# Patient Record
Sex: FEMALE | Race: WHITE | NOT HISPANIC OR LATINO | Employment: OTHER | URBAN - METROPOLITAN AREA
[De-identification: names, ages, dates, MRNs, and addresses within clinical notes are randomized per-mention and may not be internally consistent; named-entity substitution may affect disease eponyms.]

---

## 2017-12-14 ENCOUNTER — APPOINTMENT (OUTPATIENT)
Dept: RADIOLOGY | Facility: CLINIC | Age: 47
End: 2017-12-14

## 2017-12-14 ENCOUNTER — ALLSCRIPTS OFFICE VISIT (OUTPATIENT)
Dept: OTHER | Facility: OTHER | Age: 47
End: 2017-12-14

## 2017-12-14 DIAGNOSIS — M25.552 PAIN IN LEFT HIP: ICD-10-CM

## 2017-12-14 DIAGNOSIS — M16.12 PRIMARY OSTEOARTHRITIS OF LEFT HIP: ICD-10-CM

## 2017-12-14 PROCEDURE — 73502 X-RAY EXAM HIP UNI 2-3 VIEWS: CPT

## 2017-12-15 NOTE — PROGRESS NOTES
Assessment  1  Primary localized osteoarthritis of left hip (715 15) (M16 12)    Plan     Left hip pain    · * XR HIP/PELV 2-3 VWS LEFT W PELVIS IF PERFORMED; Status:Active - RetrospectiveBy Protocol Authorization; Requested for:[id number];      Primary localized osteoarthritis of left hip    · Diclofenac Sodium 75 MG Oral Tablet Delayed Release; TAKE 1 TABLET BYMOUTH TWICE A DAY AS NEEDED FOR PAIN with food   · *1 - [facility]  *  Status: Active  Requested for: [id number]  Care Summary provided  : Yes   · Follow-up visit in 1 month Evaluation and Treatment  Follow-up  Status: Hold For -Scheduling  Requested for: [id number]   · Injection major joint or bursa (shoulder, knee, SI)-POC; Status:Active; Requestedfor:[id number];     Discussion/Summary    Ms Montana has severe L hip arthritis  We discussed her treatment options today  At this time we are going to move forward with a left hip intra-articular steroid injection  She is going to work on quitting smoking  She will try diclofenac as needed for pain control  She will go to 1 session of physical therapy to learn some stretching and range-of-motion exercises  She is going to follow up in January implant for a left total hip arthroplasty for likely the end of February  She will follow up sooner if needed  Chief Complaint  c/o L hip pain      History of Present Illness  HPI: 68-year-old female presents with left hip pain for the past year  Initially her pain was intermittent and worse at the end of the day  At this time her pain is constant  He has been worsening  She describes it as an achy pain in her groin also the posterior aspect of her left hip  Occasionally she gets numbness and tingling down her leg into all toes  Her pain radiates into her thigh but not past that  She denies any history of trauma  She is currently taking Advil up to a 1000 mg 2 to 3 times a day  She has not done physical therapy   She has not had injections or surgery on her hip  She is currently a smoker  She smokes about 1 pack per day  Review of Systems   Cardiovascular: no chest pain  Gastrointestinal: no constipation  Genitourinary: no incontinence  Musculoskeletal: as noted in HPI  Integumentary: no rashes  Neurological: no dizziness  Endocrine: no excessive thirst   Psychiatric: no depression  Active Problems  1  Left hip pain (719 45) (M25 552)    Past Medical History   · Left hip pain (719 45) (M25 552)    The active problems and past medical history were reviewed and updated today  Surgical History   · Denied: History Of Prior Surgery    The surgical history was reviewed and updated today  Family History     Mother    · Family history of malignant neoplasm (V16 9) (Z80 9)     Family History    · Family history of malignant neoplasm (V16 9) (Z80 9)    The family history was reviewed and updated today  Social History   · Smoking (305 1) (F17 200)  The social history was reviewed and updated today  Allergies  1  No Known Drug Allergies    Vitals  Signs   Heart Rate: 96VVLQXAUS: 387JBTZBPRWD: 83NOVJDK: 5 ft 6 inWeight: 143 lb 6 ozBMI Calculated: 23 14BSA Calculated: 1 74    Physical Exam  Hearing intact, no audible wheezing, regular rate, no discharge from eyes   Left Hip: Appearance: no deformity-- and-- not dislocated  Mild TTP over greater trochanter  FF: 0-90, IR: 0-10, ER: 0-10, pain with ROM  Motor: Normal   Constitutional - General appearance: Normal   Musculoskeletal - Lower extremity compartments: Normal   Cardiovascular - Pulses: Normal  Dorsalis pedis pulse was 2+ on the left  Neurologic - Lower extremity peripheral neuro exam: Normal  Peripheral sensation findings: decreased sensation L4, L5, S1  Neuromuscular strength examination findings: involved side normal foot eversion, inversion, and great toe extension    Psychiatric - Orientation to person, place, and time: Normal -- Mood and affect: Normal       Results/Data  I personally reviewed the films/images/results in the office today  My interpretation follows  X-ray Review X-ray left hip: Severe DJD        Future Appointments    Date/Time Provider Specialty Site   01/30/2018 03:15 PM Nichole Bautista DO Orthopedic Surgery [facility]   12/29/2017 10:45 AM Hermelindo Narayan DO Pain Management [facility]     Signatures   Electronically signed by : Darien Casey DO; Dec 14 2017  2:09PM EST                       (Author)

## 2017-12-29 ENCOUNTER — HOSPITAL ENCOUNTER (OUTPATIENT)
Dept: RADIOLOGY | Facility: MEDICAL CENTER | Age: 47
Discharge: HOME | End: 2018-01-01
Attending: PHYSICAL MEDICINE & REHABILITATION | Admitting: PHYSICAL MEDICINE & REHABILITATION
Payer: COMMERCIAL

## 2017-12-29 PROCEDURE — 77002 NEEDLE LOCALIZATION BY XRAY: CPT | Performed by: PHYSICAL MEDICINE & REHABILITATION

## 2018-01-22 VITALS
HEIGHT: 66 IN | SYSTOLIC BLOOD PRESSURE: 137 MMHG | HEART RATE: 84 BPM | DIASTOLIC BLOOD PRESSURE: 80 MMHG | WEIGHT: 143.38 LBS | BODY MASS INDEX: 23.04 KG/M2

## 2018-01-30 ENCOUNTER — APPOINTMENT (OUTPATIENT)
Dept: RADIOLOGY | Facility: CLINIC | Age: 48
End: 2018-01-30
Payer: COMMERCIAL

## 2018-01-30 ENCOUNTER — OFFICE VISIT (OUTPATIENT)
Dept: OBGYN CLINIC | Facility: MEDICAL CENTER | Age: 48
End: 2018-01-30
Payer: COMMERCIAL

## 2018-01-30 VITALS
HEART RATE: 78 BPM | SYSTOLIC BLOOD PRESSURE: 156 MMHG | DIASTOLIC BLOOD PRESSURE: 92 MMHG | WEIGHT: 133.8 LBS | BODY MASS INDEX: 21.6 KG/M2

## 2018-01-30 DIAGNOSIS — M16.12 PRIMARY OSTEOARTHRITIS OF ONE HIP, LEFT: Primary | ICD-10-CM

## 2018-01-30 PROCEDURE — 72170 X-RAY EXAM OF PELVIS: CPT

## 2018-01-30 PROCEDURE — 99213 OFFICE O/P EST LOW 20 MIN: CPT | Performed by: ORTHOPAEDIC SURGERY

## 2018-01-30 RX ORDER — CHLORHEXIDINE GLUCONATE 4 G/100ML
SOLUTION TOPICAL DAILY PRN
Status: CANCELLED | OUTPATIENT
Start: 2018-01-30

## 2018-01-30 RX ORDER — ACETAMINOPHEN 325 MG/1
975 TABLET ORAL ONCE
Status: CANCELLED | OUTPATIENT
Start: 2018-01-30 | End: 2018-01-30

## 2018-01-30 RX ORDER — GABAPENTIN 300 MG/1
300 CAPSULE ORAL ONCE
Status: CANCELLED | OUTPATIENT
Start: 2018-01-30 | End: 2018-01-30

## 2018-01-30 RX ORDER — SODIUM CHLORIDE 9 MG/ML
75 INJECTION, SOLUTION INTRAVENOUS CONTINUOUS
Status: CANCELLED | OUTPATIENT
Start: 2018-01-30

## 2018-01-30 NOTE — PROGRESS NOTES
Assessment/Plan     1  Primary osteoarthritis of one hip, left      Orders Placed This Encounter   Procedures    XR pelvis ap only 1 or 2 vw     Ms Montana has severe left hip osteoarthritis  We discussed her treatment options today as well as the risks and benefits of her treatment options  She understands that having a total hip replacement at her young age will make it so that she will likely need a revision total hip replacement at some point  She is also currently smoking  She states that she is unable to fully quit but thinks she that she can cut down to 5 cigarettes per day  She denies any history of diabetes  She denies any history of MRSA infections  She denies any history of blood clots or any family history of blood clots  She would like to move forward with a left total hip replacement as she has severely limited, her pain is intolerable, and she has failed conservative treatment  She also had quit 1 of her jobs secondary to the pain from her hip  We reviewed the risks of total hip replacement in detail today  The risks of surgery include, but are not limited to infection, blood clot, wound healing problems, blood loss, damage to blood vessels and nerves, persistent pain and stiffness, dislocation, fracture, leg-length discrepancy, need for additional surgery, need for revision surgery, failure of hardware, heart attack, stroke, death  The patient understood and agreed to by oral and written consent  I answered all questions regarding surgery  She will go for clearance from her dentist as well as her primary care physician  She will cut back to at least 5 cigarettes per day loose 1 week before surgery understands she will need to keep this up for at least a month after surgery  She has a direct phone number to the office for any further questions  She will follow up in 10-14 days from date of surgery or sooner if needed      I answered all of the patient's questions during the visit and provided education of the patient's condition during the visit  The patient verbalized understanding of the information given and agrees with the plan  This note was dictated using Seawind software  It may contain errors including improperly dictated words  Please contact physician directly for any questions  Return for 10-14 days after sx  Subjective   Chief Complaint:   Chief Complaint   Patient presents with    Left Hip - Follow-up       Britney Carroll is a 50 y o  female who presents for follow up for L hip osteoarthritis  She is persistent left hip pain  Most her pain is in the groin as well as the posterior aspect of her hip  His knee pain  She went for a left hip steroid injection at the end of December which helps somewhat but not significantly  She states that she is able to put her shoes and socks on easier since the injection  She takes diclofenac as needed which helps somewhat  She did not go to physical therapy  There was a recent death in the family  She also takes Tylenol as needed for pain control  She continues to smoke but has cut down  She currently smokes about a pack a day  She is here today to discuss a left total hip replacement  Review of Systems  See \[facility]\"" for musculoskeletal review  All other systems reviewed are negative     History:  History reviewed  No pertinent past medical history  History reviewed  No pertinent surgical history  Social History   History   Alcohol use Not on file     History   Drug use: Unknown     History   Smoking Status    Never Smoker   Smokeless Tobacco    Never Used     Family History: History reviewed  No pertinent family history      Meds/Allergies     (Not in a hospital admission)  No Known Allergies       Objective     /92   Pulse 78   Wt 60 7 kg (133 lb 12 8 oz)   BMI 21 60 kg/m²      PE:  AAOx 3  WDWN  Hearing intact, no drainage from eyes  no audible wheezing, regular rate  no abdominal distension  LE compartments soft, skin intact    Ortho Exam:  left hip:   No dislocation/deformity  Limited ROM- FF: 0-100, IR: 0-10, ER: 0-30  Pain with ROM  Left leg appears slightly longer than her right  AT/GS intact    Imaging Studies: I have personally reviewed pertinent films in PACS   XR L hip:  Severe DJD

## 2018-01-31 PROBLEM — M16.12 PRIMARY OSTEOARTHRITIS OF ONE HIP, LEFT: Status: ACTIVE | Noted: 2018-01-31

## 2018-02-06 ENCOUNTER — TRANSCRIBE ORDERS (OUTPATIENT)
Dept: ADMINISTRATIVE | Facility: HOSPITAL | Age: 48
End: 2018-02-06

## 2018-02-06 ENCOUNTER — APPOINTMENT (OUTPATIENT)
Dept: PREADMISSION TESTING | Facility: HOSPITAL | Age: 48
End: 2018-02-06
Payer: COMMERCIAL

## 2018-02-06 ENCOUNTER — APPOINTMENT (OUTPATIENT)
Dept: LAB | Facility: HOSPITAL | Age: 48
End: 2018-02-06
Attending: ORTHOPAEDIC SURGERY
Payer: COMMERCIAL

## 2018-02-06 ENCOUNTER — HOSPITAL ENCOUNTER (OUTPATIENT)
Dept: NON INVASIVE DIAGNOSTICS | Facility: HOSPITAL | Age: 48
Discharge: HOME | End: 2018-02-06
Attending: ORTHOPAEDIC SURGERY
Payer: COMMERCIAL

## 2018-02-06 ENCOUNTER — ANESTHESIA EVENT (OUTPATIENT)
Dept: PERIOP | Facility: HOSPITAL | Age: 48
DRG: 470 | End: 2018-02-06
Payer: COMMERCIAL

## 2018-02-06 DIAGNOSIS — M16.12 PRIMARY OSTEOARTHRITIS OF LEFT HIP: ICD-10-CM

## 2018-02-06 DIAGNOSIS — Z01.818 PREOP EXAMINATION: ICD-10-CM

## 2018-02-06 DIAGNOSIS — Z01.818 PREOP EXAMINATION: Primary | ICD-10-CM

## 2018-02-06 LAB
ABO GROUP BLD: NORMAL
ANION GAP SERPL CALCULATED.3IONS-SCNC: 2 MMOL/L (ref 4–13)
APTT PPP: 31 SECONDS (ref 23–35)
ATRIAL RATE: 68 BPM
BACTERIA UR QL AUTO: ABNORMAL /HPF
BASOPHILS # BLD AUTO: 0.06 THOUSANDS/ΜL (ref 0–0.1)
BASOPHILS NFR BLD AUTO: 1 % (ref 0–1)
BILIRUB UR QL STRIP: NEGATIVE
BLD GP AB SCN SERPL QL: NEGATIVE
BUN SERPL-MCNC: 21 MG/DL (ref 5–25)
CALCIUM SERPL-MCNC: 8.9 MG/DL (ref 8.3–10.1)
CHLORIDE SERPL-SCNC: 106 MMOL/L (ref 100–108)
CLARITY UR: CLEAR
CO2 SERPL-SCNC: 33 MMOL/L (ref 21–32)
COLOR UR: YELLOW
CREAT SERPL-MCNC: 1.01 MG/DL (ref 0.6–1.3)
EOSINOPHIL # BLD AUTO: 0.19 THOUSAND/ΜL (ref 0–0.61)
EOSINOPHIL NFR BLD AUTO: 3 % (ref 0–6)
ERYTHROCYTE [DISTWIDTH] IN BLOOD BY AUTOMATED COUNT: 13 % (ref 11.6–15.1)
EST. AVERAGE GLUCOSE BLD GHB EST-MCNC: 97 MG/DL
GFR SERPL CREATININE-BSD FRML MDRD: 65 ML/MIN/1.73SQ M
GLUCOSE SERPL-MCNC: 73 MG/DL (ref 65–140)
GLUCOSE UR STRIP-MCNC: NEGATIVE MG/DL
HBA1C MFR BLD: 5 % (ref 4.2–6.3)
HCT VFR BLD AUTO: 40.6 % (ref 34.8–46.1)
HGB BLD-MCNC: 13.9 G/DL (ref 11.5–15.4)
HGB UR QL STRIP.AUTO: ABNORMAL
HYALINE CASTS #/AREA URNS LPF: ABNORMAL /LPF
INR PPP: 0.89 (ref 0.86–1.16)
KETONES UR STRIP-MCNC: NEGATIVE MG/DL
LEUKOCYTE ESTERASE UR QL STRIP: NEGATIVE
LYMPHOCYTES # BLD AUTO: 2.78 THOUSANDS/ΜL (ref 0.6–4.47)
LYMPHOCYTES NFR BLD AUTO: 40 % (ref 14–44)
MCH RBC QN AUTO: 32.6 PG (ref 26.8–34.3)
MCHC RBC AUTO-ENTMCNC: 34.2 G/DL (ref 31.4–37.4)
MCV RBC AUTO: 95 FL (ref 82–98)
MONOCYTES # BLD AUTO: 0.49 THOUSAND/ΜL (ref 0.17–1.22)
MONOCYTES NFR BLD AUTO: 7 % (ref 4–12)
NEUTROPHILS # BLD AUTO: 3.39 THOUSANDS/ΜL (ref 1.85–7.62)
NEUTS SEG NFR BLD AUTO: 49 % (ref 43–75)
NITRITE UR QL STRIP: NEGATIVE
NON-SQ EPI CELLS URNS QL MICRO: ABNORMAL /HPF
NRBC BLD AUTO-RTO: 0 /100 WBCS
P AXIS: 62 DEGREES
PH UR STRIP.AUTO: 6.5 [PH] (ref 4.5–8)
PLATELET # BLD AUTO: 344 THOUSANDS/UL (ref 149–390)
PMV BLD AUTO: 9.8 FL (ref 8.9–12.7)
POTASSIUM SERPL-SCNC: 4.2 MMOL/L (ref 3.5–5.3)
PR INTERVAL: 142 MS
PROT UR STRIP-MCNC: >=300 MG/DL
PROTHROMBIN TIME: 12 SECONDS (ref 12.1–14.4)
QRS AXIS: 24 DEGREES
QRSD INTERVAL: 84 MS
QT INTERVAL: 386 MS
QTC INTERVAL: 410 MS
RBC # BLD AUTO: 4.26 MILLION/UL (ref 3.81–5.12)
RBC #/AREA URNS AUTO: ABNORMAL /HPF
RH BLD: POSITIVE
SODIUM SERPL-SCNC: 141 MMOL/L (ref 136–145)
SP GR UR STRIP.AUTO: 1.02 (ref 1–1.03)
SPECIMEN EXPIRATION DATE: NORMAL
T WAVE AXIS: 38 DEGREES
UROBILINOGEN UR QL STRIP.AUTO: 0.2 E.U./DL
VENTRICULAR RATE: 68 BPM
WBC # BLD AUTO: 6.91 THOUSAND/UL (ref 4.31–10.16)
WBC #/AREA URNS AUTO: ABNORMAL /HPF

## 2018-02-06 PROCEDURE — 83036 HEMOGLOBIN GLYCOSYLATED A1C: CPT

## 2018-02-06 PROCEDURE — 86901 BLOOD TYPING SEROLOGIC RH(D): CPT

## 2018-02-06 PROCEDURE — 86900 BLOOD TYPING SEROLOGIC ABO: CPT

## 2018-02-06 PROCEDURE — 85025 COMPLETE CBC W/AUTO DIFF WBC: CPT

## 2018-02-06 PROCEDURE — 36415 COLL VENOUS BLD VENIPUNCTURE: CPT

## 2018-02-06 PROCEDURE — 85610 PROTHROMBIN TIME: CPT

## 2018-02-06 PROCEDURE — 81001 URINALYSIS AUTO W/SCOPE: CPT

## 2018-02-06 PROCEDURE — 85730 THROMBOPLASTIN TIME PARTIAL: CPT

## 2018-02-06 PROCEDURE — 93010 ELECTROCARDIOGRAM REPORT: CPT | Performed by: INTERNAL MEDICINE

## 2018-02-06 PROCEDURE — 93005 ELECTROCARDIOGRAM TRACING: CPT

## 2018-02-06 PROCEDURE — 87086 URINE CULTURE/COLONY COUNT: CPT

## 2018-02-06 PROCEDURE — 80048 BASIC METABOLIC PNL TOTAL CA: CPT

## 2018-02-06 PROCEDURE — 86850 RBC ANTIBODY SCREEN: CPT

## 2018-02-06 RX ORDER — PAROXETINE 30 MG/1
TABLET, FILM COATED ORAL DAILY
Refills: 5 | COMMUNITY
Start: 2018-01-08 | End: 2021-11-02 | Stop reason: SDUPTHER

## 2018-02-06 RX ORDER — ALPRAZOLAM 1 MG/1
TABLET ORAL
Refills: 5 | COMMUNITY
Start: 2017-11-08 | End: 2021-01-06 | Stop reason: CLARIF

## 2018-02-06 RX ORDER — PENICILLIN V POTASSIUM 500 MG/1
500 TABLET ORAL EVERY 6 HOURS SCHEDULED
COMMUNITY
End: 2021-01-06 | Stop reason: CLARIF

## 2018-02-06 RX ORDER — METHYLPHENIDATE HYDROCHLORIDE 20 MG/1
TABLET ORAL
Refills: 0 | COMMUNITY
Start: 2018-01-08 | End: 2021-11-02 | Stop reason: SDUPTHER

## 2018-02-06 RX ORDER — FOLIC ACID 1 MG/1
1 TABLET ORAL DAILY
COMMUNITY
End: 2021-01-06 | Stop reason: CLARIF

## 2018-02-06 RX ORDER — DICLOFENAC SODIUM 75 MG/1
1 TABLET, DELAYED RELEASE ORAL AS NEEDED
COMMUNITY
Start: 2017-12-14 | End: 2018-03-07 | Stop reason: HOSPADM

## 2018-02-06 RX ORDER — ASCORBIC ACID 500 MG
500 TABLET ORAL 2 TIMES DAILY
COMMUNITY
End: 2021-01-06 | Stop reason: CLARIF

## 2018-02-06 RX ORDER — FERROUS SULFATE 325(65) MG
325 TABLET ORAL 2 TIMES DAILY WITH MEALS
COMMUNITY
End: 2021-01-06 | Stop reason: CLARIF

## 2018-02-06 RX ORDER — SODIUM CHLORIDE 9 MG/ML
125 INJECTION, SOLUTION INTRAVENOUS CONTINUOUS
Status: CANCELLED | OUTPATIENT
Start: 2018-03-05

## 2018-02-06 NOTE — PRE-PROCEDURE INSTRUCTIONS
Pre-Surgery Instructions:   Medication Instructions    ALPRAZolam (XANAX) 1 mg tablet Patient was instructed by Physician and understands   ascorbic acid (VITAMIN C) 500 mg tablet Patient was instructed by Physician and understands   diclofenac (VOLTAREN) 75 mg EC tablet Patient was instructed by Physician and understands   ferrous sulfate 325 (65 Fe) mg tablet Patient was instructed by Physician and understands   folic acid (FOLVITE) 1 mg tablet Patient was instructed by Physician and understands   Glucos-Chondroit-Hyaluron-MSM (GLUCOSAMINE CHONDROITIN JOINT PO) Patient was instructed by Physician and understands   GuaiFENesin (MUCINEX PO) Patient was instructed by Physician and understands   methylphenidate (RITALIN) 20 MG tablet Patient was instructed by Physician and understands   PARoxetine (PAXIL) 30 mg tablet Patient was instructed by Physician and understands   penicillin V potassium (VEETID) 500 mg tablet Patient was instructed by Physician and understands   Pseudoephedrine-Ibuprofen (ADVIL COLD/SINUS PO) Patient was instructed by Physician and understands  Patient was seen by Dr Ibeth Wild and was instructed to take no medications am of surgery  Patient was instructed to avoid NSAIDS, Aspirin, Vitamins, and supplements 7 days prior to surgery  [facility] pre-op instructions reviewed  Pre-op bathing reviewed and patient was given chlorhexidine soap

## 2018-02-06 NOTE — ANESTHESIA PREPROCEDURE EVALUATION
Review of Systems/Medical History  Patient summary reviewed  Chart reviewed      Cardiovascular  Exercise tolerance: good,     Pulmonary  Negative pulmonary ROS Smoker cigarette smoker  , Tobacco cessation counseling given ,        GI/Hepatic  Negative GI/hepatic ROS          Negative  ROS        Endo/Other  Negative endo/other ROS      GYN  Negative gynecology ROS          Hematology   Musculoskeletal    Arthritis     Neurology  Negative neurology ROS      Psychology   Anxiety, Depression , being treated for depression,              Physical Exam    Airway    Mallampati score: I  TM Distance: <3 FB  Neck ROM: full     Dental   No notable dental hx     Cardiovascular  Rhythm: regular, Rate: normal, Cardiovascular exam normal    Pulmonary  Pulmonary exam normal Breath sounds clear to auscultation,     Other Findings  Poor dentition        Anesthesia Plan  ASA Score- 2     Anesthesia Type- general with ASA Monitors  Additional Monitors:   Airway Plan: ETT  Plan Factors- Patient instructed to abstain from smoking on day of procedure  Patient smoked on day of surgery  Induction- intravenous  Postoperative Plan- Plan for postoperative opioid use  Informed Consent- Anesthetic plan and risks discussed with patient

## 2018-02-08 LAB — BACTERIA UR CULT: NORMAL

## 2018-02-12 ENCOUNTER — OFFICE VISIT (OUTPATIENT)
Dept: FAMILY MEDICINE CLINIC | Facility: CLINIC | Age: 48
End: 2018-02-12
Payer: COMMERCIAL

## 2018-02-12 VITALS
SYSTOLIC BLOOD PRESSURE: 106 MMHG | BODY MASS INDEX: 21.51 KG/M2 | WEIGHT: 126 LBS | HEART RATE: 76 BPM | HEIGHT: 64 IN | RESPIRATION RATE: 14 BRPM | DIASTOLIC BLOOD PRESSURE: 64 MMHG

## 2018-02-12 DIAGNOSIS — M16.12 PRIMARY OSTEOARTHRITIS OF ONE HIP, LEFT: ICD-10-CM

## 2018-02-12 DIAGNOSIS — Z01.818 PRE-OPERATIVE CLEARANCE: Primary | ICD-10-CM

## 2018-02-12 PROCEDURE — 99214 OFFICE O/P EST MOD 30 MIN: CPT | Performed by: FAMILY MEDICINE

## 2018-02-12 NOTE — LETTER
February 12, 2018     Raul Gregg, [address] [address] [place] [address]    Patient: Giovani Little   YOB: 1970   Date of Visit: 2/12/2018       Dear Dr Aletha Lam: Thank you for referring Camilla Talley to me for evaluation  Below are the relevant portions of my assessment and plan of care  Urinalysis was positive for blood and protein  I asked her to make sure she is well hydrated and we will check a repeat urinalysis in 1 week  I do not believe this should delay the surgery  If you have questions, please do not hesitate to call me  I look forward to following Lili Wesley along with you           Sincerely,        Sangeeta Mora, DO        CC: No Recipients

## 2018-02-12 NOTE — PROGRESS NOTES
Assessment/Plan:      Medically cleared for upcoming total hip replacement  I did order a recheck of the urinalysis to be done February 19th because of the protein and blood in her urine  There are no diagnoses linked to this encounter  Subjective:      Patient ID: Vibha Aragon is a 46 y o  female  This is Remberto Spurr is first visit to the office  She is scheduled for left total hip replacement February 28, 2018  She is in today for preoperative evaluation  The following portions of the patient's history were reviewed and updated as appropriate: allergies, current medications, past family history, past medical history, past social history, past surgical history and problem list     Review of Systems   Constitutional: Negative  HENT: Negative  Eyes: Negative  Respiratory: Negative  Cardiovascular: Negative  Gastrointestinal: Negative  Endocrine: Negative  Genitourinary: Negative  Musculoskeletal: Positive for arthralgias and gait problem  Skin: Negative  Allergic/Immunologic: Negative  Hematological: Negative  Psychiatric/Behavioral: Negative  Objective:    Vitals:    02/12/18 1527   BP: 106/64   Pulse: 76   Resp: 14        Physical Exam   Constitutional: She is oriented to person, place, and time  She appears well-developed and well-nourished  HENT:   Head: Normocephalic and atraumatic  Right Ear: External ear normal    Left Ear: External ear normal    Eyes: Conjunctivae and EOM are normal  Pupils are equal, round, and reactive to light  Neck: Normal range of motion  Cardiovascular: Normal rate and regular rhythm  Pulmonary/Chest: Effort normal and breath sounds normal    Abdominal: Soft  Bowel sounds are normal    Neurological: She is alert and oriented to person, place, and time  She has normal reflexes  Skin: Skin is warm and dry  Psychiatric: She has a normal mood and affect  Nursing note and vitals reviewed

## 2018-02-19 ENCOUNTER — TELEPHONE (OUTPATIENT)
Dept: OBGYN CLINIC | Facility: HOSPITAL | Age: 48
End: 2018-02-19

## 2018-02-20 ENCOUNTER — TELEPHONE (OUTPATIENT)
Dept: OBGYN CLINIC | Facility: HOSPITAL | Age: 48
End: 2018-02-20

## 2018-02-23 ENCOUNTER — TELEPHONE (OUTPATIENT)
Dept: OBGYN CLINIC | Facility: HOSPITAL | Age: 48
End: 2018-02-23

## 2018-02-23 ENCOUNTER — TRANSCRIBE ORDERS (OUTPATIENT)
Dept: ADMINISTRATIVE | Facility: HOSPITAL | Age: 48
End: 2018-02-23

## 2018-02-23 ENCOUNTER — LAB (OUTPATIENT)
Dept: LAB | Facility: HOSPITAL | Age: 48
DRG: 470 | End: 2018-02-23
Payer: COMMERCIAL

## 2018-02-23 DIAGNOSIS — R31.9 HEMATURIA, UNSPECIFIED TYPE: Primary | ICD-10-CM

## 2018-02-23 DIAGNOSIS — R80.1 PERSISTENT PROTEINURIA: Primary | ICD-10-CM

## 2018-02-23 DIAGNOSIS — Z01.818 PREOP EXAMINATION: Primary | ICD-10-CM

## 2018-02-23 LAB
BACTERIA UR QL AUTO: ABNORMAL /HPF
BILIRUB UR QL STRIP: NEGATIVE
CLARITY UR: CLEAR
COLOR UR: YELLOW
GLUCOSE UR STRIP-MCNC: NEGATIVE MG/DL
HGB UR QL STRIP.AUTO: ABNORMAL
KETONES UR STRIP-MCNC: NEGATIVE MG/DL
LEUKOCYTE ESTERASE UR QL STRIP: NEGATIVE
NITRITE UR QL STRIP: NEGATIVE
NON-SQ EPI CELLS URNS QL MICRO: ABNORMAL /HPF
PH UR STRIP.AUTO: 8 [PH] (ref 4.5–8)
PROT UR STRIP-MCNC: >=300 MG/DL
RBC #/AREA URNS AUTO: ABNORMAL /HPF
SP GR UR STRIP.AUTO: 1.02 (ref 1–1.03)
UROBILINOGEN UR QL STRIP.AUTO: 0.2 E.U./DL
WBC #/AREA URNS AUTO: ABNORMAL /HPF

## 2018-02-23 PROCEDURE — 81001 URINALYSIS AUTO W/SCOPE: CPT | Performed by: FAMILY MEDICINE

## 2018-02-23 NOTE — TELEPHONE ENCOUNTER
Pt returned my call to do pre-op elective admission assessment  Pt Med list, hx and allergies reviewed with pt  Pt confirms she is a current every day smoker, she is eporting she has indeed cut back her daily amount  Pt reports she is down to 5-6 cigarettes daily  Pt denies alcohol use  Pt reports she lives in a multi-level home with her son  Pts son and mother are planned caregivers for pt when [facility]  Pt is planning to have a first floor setup  Pt has 16 steps into the back of the home,where there are handrails  Pt has 8 steps into the front, pt does not have hand rails going into front  Pt has 16 steps to the second level  Pt is currently ambulating with a cane  She is independent with her ADLs  She reports she has a cane, walker and commode already at home  Pt has a step in tub  Pt reports [facility] [address] [facility]  Pt reports she did not receive an RX for lovenox because "it is too pricey, surgeon and I talked about using aspirin"  Pt admits to having a hx of anxiety and depression, pt denies any SI/HI/feelings of hopelessness/helplessness  Pt admits to some recent anxiety but it "has been controlled more or less"  Pt reports she has a dental appointment on Monday for "a cavity filling"  Pt reports she wants to do home PT when [facility] "for maybe a week or so", then she reports she'd be agreeable to transition to outpt PT at [facility]  Pt reports she has a post-op CT of the abd scheduled for after surgery on 3/14, she denies any urinary S&S or abd pain  Pt enrolled in [facility]  Pt denies having questions at this time

## 2018-03-05 ENCOUNTER — HOSPITAL ENCOUNTER (INPATIENT)
Facility: HOSPITAL | Age: 48
LOS: 2 days | Discharge: HOME HEALTH | DRG: 470 | End: 2018-03-07
Attending: ORTHOPAEDIC SURGERY | Admitting: ORTHOPAEDIC SURGERY
Payer: COMMERCIAL

## 2018-03-05 ENCOUNTER — ANESTHESIA (OUTPATIENT)
Dept: PERIOP | Facility: HOSPITAL | Age: 48
DRG: 470 | End: 2018-03-05
Payer: COMMERCIAL

## 2018-03-05 ENCOUNTER — APPOINTMENT (INPATIENT)
Dept: RADIOLOGY | Facility: HOSPITAL | Age: 48
DRG: 470 | End: 2018-03-05
Payer: COMMERCIAL

## 2018-03-05 DIAGNOSIS — Z96.642 STATUS POST TOTAL HIP REPLACEMENT, LEFT: Primary | ICD-10-CM

## 2018-03-05 DIAGNOSIS — M16.12 PRIMARY OSTEOARTHRITIS OF ONE HIP, LEFT: ICD-10-CM

## 2018-03-05 LAB
ABO GROUP BLD: NORMAL
BLD GP AB SCN SERPL QL: NEGATIVE
RH BLD: POSITIVE
SPECIMEN EXPIRATION DATE: NORMAL

## 2018-03-05 PROCEDURE — C1713 ANCHOR/SCREW BN/BN,TIS/BN: HCPCS | Performed by: ORTHOPAEDIC SURGERY

## 2018-03-05 PROCEDURE — 86850 RBC ANTIBODY SCREEN: CPT | Performed by: PHYSICIAN ASSISTANT

## 2018-03-05 PROCEDURE — 73501 X-RAY EXAM HIP UNI 1 VIEW: CPT

## 2018-03-05 PROCEDURE — 0SRB0JA REPLACEMENT OF LEFT HIP JOINT WITH SYNTHETIC SUBSTITUTE, UNCEMENTED, OPEN APPROACH: ICD-10-PCS | Performed by: ORTHOPAEDIC SURGERY

## 2018-03-05 PROCEDURE — C1776 JOINT DEVICE (IMPLANTABLE): HCPCS | Performed by: ORTHOPAEDIC SURGERY

## 2018-03-05 PROCEDURE — 86900 BLOOD TYPING SEROLOGIC ABO: CPT | Performed by: PHYSICIAN ASSISTANT

## 2018-03-05 PROCEDURE — 27130 TOTAL HIP ARTHROPLASTY: CPT | Performed by: ORTHOPAEDIC SURGERY

## 2018-03-05 PROCEDURE — 27130 TOTAL HIP ARTHROPLASTY: CPT | Performed by: PHYSICIAN ASSISTANT

## 2018-03-05 PROCEDURE — 86901 BLOOD TYPING SEROLOGIC RH(D): CPT | Performed by: PHYSICIAN ASSISTANT

## 2018-03-05 DEVICE — BIOLOX DELTA CERAMIC FEMORAL HEAD +5.0 36MM DIA 12/14 TAPER
Type: IMPLANTABLE DEVICE | Site: HIP | Status: FUNCTIONAL
Brand: BIOLOX DELTA

## 2018-03-05 DEVICE — TRI-LOCK BPS FEMORAL STEM 12/14 TAPER TRI-LOCK BPS W/GRIPTION SIZE 4 HI 103MM
Type: IMPLANTABLE DEVICE | Site: HIP | Status: FUNCTIONAL
Brand: TRI-LOCK GRIPTION

## 2018-03-05 DEVICE — PINNACLE CANCELLOUS BONE SCREW 6.5MM X 15MM
Type: IMPLANTABLE DEVICE | Site: HIP | Status: FUNCTIONAL
Brand: PINNACLE

## 2018-03-05 DEVICE — PINNACLE POROCOAT ACETABULAR SHELL SECTOR II 52MM OD
Type: IMPLANTABLE DEVICE | Site: HIP | Status: FUNCTIONAL
Brand: PINNACLE POROCOAT

## 2018-03-05 DEVICE — PINNACLE HIP SOLUTIONS ALTRX POLYETHYLENE ACETABULAR LINER NEUTRAL 36MM ID 52MM OD
Type: IMPLANTABLE DEVICE | Site: HIP | Status: FUNCTIONAL
Brand: PINNACLE ALTRX

## 2018-03-05 RX ORDER — TRANEXAMIC ACID 100 MG/ML
INJECTION, SOLUTION INTRAVENOUS AS NEEDED
Status: DISCONTINUED | OUTPATIENT
Start: 2018-03-05 | End: 2018-03-05 | Stop reason: SURG

## 2018-03-05 RX ORDER — FENTANYL CITRATE 50 UG/ML
INJECTION, SOLUTION INTRAMUSCULAR; INTRAVENOUS AS NEEDED
Status: DISCONTINUED | OUTPATIENT
Start: 2018-03-05 | End: 2018-03-05 | Stop reason: SURG

## 2018-03-05 RX ORDER — LABETALOL HYDROCHLORIDE 5 MG/ML
INJECTION, SOLUTION INTRAVENOUS AS NEEDED
Status: DISCONTINUED | OUTPATIENT
Start: 2018-03-05 | End: 2018-03-05 | Stop reason: SURG

## 2018-03-05 RX ORDER — MIDAZOLAM HYDROCHLORIDE 1 MG/ML
INJECTION INTRAMUSCULAR; INTRAVENOUS AS NEEDED
Status: DISCONTINUED | OUTPATIENT
Start: 2018-03-05 | End: 2018-03-05 | Stop reason: SURG

## 2018-03-05 RX ORDER — ALPRAZOLAM 0.5 MG/1
0.5 TABLET ORAL 3 TIMES DAILY PRN
Status: DISCONTINUED | OUTPATIENT
Start: 2018-03-05 | End: 2018-03-07 | Stop reason: HOSPADM

## 2018-03-05 RX ORDER — SENNOSIDES 8.6 MG
1 TABLET ORAL
Status: DISCONTINUED | OUTPATIENT
Start: 2018-03-05 | End: 2018-03-07 | Stop reason: HOSPADM

## 2018-03-05 RX ORDER — DOCUSATE SODIUM 100 MG/1
100 CAPSULE, LIQUID FILLED ORAL 2 TIMES DAILY
Status: DISCONTINUED | OUTPATIENT
Start: 2018-03-05 | End: 2018-03-07 | Stop reason: HOSPADM

## 2018-03-05 RX ORDER — ACETAMINOPHEN 325 MG/1
650 TABLET ORAL EVERY 6 HOURS PRN
Status: DISCONTINUED | OUTPATIENT
Start: 2018-03-05 | End: 2018-03-07 | Stop reason: HOSPADM

## 2018-03-05 RX ORDER — ONDANSETRON 2 MG/ML
4 INJECTION INTRAMUSCULAR; INTRAVENOUS ONCE AS NEEDED
Status: DISCONTINUED | OUTPATIENT
Start: 2018-03-05 | End: 2018-03-05 | Stop reason: HOSPADM

## 2018-03-05 RX ORDER — HYDRALAZINE HYDROCHLORIDE 20 MG/ML
INJECTION INTRAMUSCULAR; INTRAVENOUS AS NEEDED
Status: DISCONTINUED | OUTPATIENT
Start: 2018-03-05 | End: 2018-03-05 | Stop reason: SURG

## 2018-03-05 RX ORDER — FOLIC ACID 1 MG/1
1 TABLET ORAL DAILY
Status: DISCONTINUED | OUTPATIENT
Start: 2018-03-06 | End: 2018-03-07 | Stop reason: HOSPADM

## 2018-03-05 RX ORDER — PANTOPRAZOLE SODIUM 40 MG/1
40 TABLET, DELAYED RELEASE ORAL
Status: DISCONTINUED | OUTPATIENT
Start: 2018-03-06 | End: 2018-03-07 | Stop reason: HOSPADM

## 2018-03-05 RX ORDER — ONDANSETRON 2 MG/ML
INJECTION INTRAMUSCULAR; INTRAVENOUS AS NEEDED
Status: DISCONTINUED | OUTPATIENT
Start: 2018-03-05 | End: 2018-03-05 | Stop reason: SURG

## 2018-03-05 RX ORDER — ASCORBIC ACID 500 MG
500 TABLET ORAL DAILY
Status: DISCONTINUED | OUTPATIENT
Start: 2018-03-06 | End: 2018-03-07 | Stop reason: HOSPADM

## 2018-03-05 RX ORDER — FENTANYL CITRATE/PF 50 MCG/ML
50 SYRINGE (ML) INJECTION
Status: DISCONTINUED | OUTPATIENT
Start: 2018-03-05 | End: 2018-03-05 | Stop reason: HOSPADM

## 2018-03-05 RX ORDER — METHYLPHENIDATE HYDROCHLORIDE 10 MG/1
20 TABLET ORAL
Status: DISCONTINUED | OUTPATIENT
Start: 2018-03-06 | End: 2018-03-07 | Stop reason: HOSPADM

## 2018-03-05 RX ORDER — ONDANSETRON 2 MG/ML
4 INJECTION INTRAMUSCULAR; INTRAVENOUS EVERY 6 HOURS PRN
Status: DISCONTINUED | OUTPATIENT
Start: 2018-03-05 | End: 2018-03-07 | Stop reason: HOSPADM

## 2018-03-05 RX ORDER — SODIUM CHLORIDE 9 MG/ML
100 INJECTION, SOLUTION INTRAVENOUS CONTINUOUS
Status: CANCELLED | OUTPATIENT
Start: 2018-03-05 | Stop reason: SDUPTHER

## 2018-03-05 RX ORDER — SIMETHICONE 80 MG
80 TABLET,CHEWABLE ORAL 4 TIMES DAILY PRN
Status: DISCONTINUED | OUTPATIENT
Start: 2018-03-05 | End: 2018-03-07 | Stop reason: HOSPADM

## 2018-03-05 RX ORDER — SODIUM CHLORIDE 9 MG/ML
100 INJECTION, SOLUTION INTRAVENOUS CONTINUOUS
Status: DISCONTINUED | OUTPATIENT
Start: 2018-03-05 | End: 2018-03-07 | Stop reason: HOSPADM

## 2018-03-05 RX ORDER — FERROUS SULFATE 325(65) MG
325 TABLET ORAL
Status: DISCONTINUED | OUTPATIENT
Start: 2018-03-06 | End: 2018-03-07 | Stop reason: HOSPADM

## 2018-03-05 RX ORDER — LORAZEPAM 2 MG/ML
0.5 INJECTION INTRAMUSCULAR ONCE
Status: COMPLETED | OUTPATIENT
Start: 2018-03-05 | End: 2018-03-05

## 2018-03-05 RX ORDER — PAROXETINE 30 MG/1
30 TABLET, FILM COATED ORAL DAILY
Status: DISCONTINUED | OUTPATIENT
Start: 2018-03-06 | End: 2018-03-07 | Stop reason: HOSPADM

## 2018-03-05 RX ORDER — OXYCODONE HYDROCHLORIDE 5 MG/1
5 TABLET ORAL
Status: DISCONTINUED | OUTPATIENT
Start: 2018-03-05 | End: 2018-03-07 | Stop reason: HOSPADM

## 2018-03-05 RX ORDER — ROCURONIUM BROMIDE 10 MG/ML
INJECTION, SOLUTION INTRAVENOUS AS NEEDED
Status: DISCONTINUED | OUTPATIENT
Start: 2018-03-05 | End: 2018-03-05 | Stop reason: SURG

## 2018-03-05 RX ORDER — CALCIUM CARBONATE 200(500)MG
1000 TABLET,CHEWABLE ORAL DAILY PRN
Status: DISCONTINUED | OUTPATIENT
Start: 2018-03-05 | End: 2018-03-07 | Stop reason: HOSPADM

## 2018-03-05 RX ORDER — SODIUM CHLORIDE 9 MG/ML
125 INJECTION, SOLUTION INTRAVENOUS CONTINUOUS
Status: DISCONTINUED | OUTPATIENT
Start: 2018-03-05 | End: 2018-03-05 | Stop reason: SDUPTHER

## 2018-03-05 RX ORDER — HYDROMORPHONE HYDROCHLORIDE 2 MG/ML
INJECTION, SOLUTION INTRAMUSCULAR; INTRAVENOUS; SUBCUTANEOUS AS NEEDED
Status: DISCONTINUED | OUTPATIENT
Start: 2018-03-05 | End: 2018-03-05 | Stop reason: SURG

## 2018-03-05 RX ORDER — SODIUM CHLORIDE 9 MG/ML
75 INJECTION, SOLUTION INTRAVENOUS CONTINUOUS
Status: DISCONTINUED | OUTPATIENT
Start: 2018-03-05 | End: 2018-03-05 | Stop reason: SDUPTHER

## 2018-03-05 RX ORDER — GABAPENTIN 100 MG/1
100 CAPSULE ORAL EVERY 8 HOURS SCHEDULED
Status: DISCONTINUED | OUTPATIENT
Start: 2018-03-05 | End: 2018-03-07 | Stop reason: HOSPADM

## 2018-03-05 RX ORDER — CHLORHEXIDINE GLUCONATE 4 G/100ML
SOLUTION TOPICAL DAILY PRN
Status: DISCONTINUED | OUTPATIENT
Start: 2018-03-05 | End: 2018-03-05

## 2018-03-05 RX ORDER — PROPOFOL 10 MG/ML
INJECTION, EMULSION INTRAVENOUS AS NEEDED
Status: DISCONTINUED | OUTPATIENT
Start: 2018-03-05 | End: 2018-03-05 | Stop reason: SURG

## 2018-03-05 RX ORDER — ACETAMINOPHEN 325 MG/1
975 TABLET ORAL ONCE
Status: COMPLETED | OUTPATIENT
Start: 2018-03-05 | End: 2018-03-05

## 2018-03-05 RX ORDER — OXYCODONE HYDROCHLORIDE 5 MG/1
10 TABLET ORAL
Status: DISCONTINUED | OUTPATIENT
Start: 2018-03-05 | End: 2018-03-07 | Stop reason: HOSPADM

## 2018-03-05 RX ORDER — GLYCOPYRROLATE 0.2 MG/ML
INJECTION INTRAMUSCULAR; INTRAVENOUS AS NEEDED
Status: DISCONTINUED | OUTPATIENT
Start: 2018-03-05 | End: 2018-03-05 | Stop reason: SURG

## 2018-03-05 RX ORDER — GABAPENTIN 300 MG/1
300 CAPSULE ORAL ONCE
Status: COMPLETED | OUTPATIENT
Start: 2018-03-05 | End: 2018-03-05

## 2018-03-05 RX ADMIN — HYDRALAZINE HYDROCHLORIDE 10 MG: 20 INJECTION INTRAMUSCULAR; INTRAVENOUS at 16:30

## 2018-03-05 RX ADMIN — NEOSTIGMINE METHYLSULFATE 3 MG: 1 INJECTION, SOLUTION INTRAMUSCULAR; INTRAVENOUS; SUBCUTANEOUS at 17:56

## 2018-03-05 RX ADMIN — ACETAMINOPHEN 975 MG: 325 TABLET, FILM COATED ORAL at 11:08

## 2018-03-05 RX ADMIN — FENTANYL CITRATE 50 MCG: 50 INJECTION INTRAMUSCULAR; INTRAVENOUS at 19:07

## 2018-03-05 RX ADMIN — FENTANYL CITRATE 50 MCG: 50 INJECTION, SOLUTION INTRAMUSCULAR; INTRAVENOUS at 15:47

## 2018-03-05 RX ADMIN — GABAPENTIN 100 MG: 100 CAPSULE ORAL at 21:28

## 2018-03-05 RX ADMIN — LORAZEPAM 0.5 MG: 2 INJECTION INTRAMUSCULAR; INTRAVENOUS at 19:15

## 2018-03-05 RX ADMIN — SODIUM CHLORIDE 100 ML/HR: 0.9 INJECTION, SOLUTION INTRAVENOUS at 19:27

## 2018-03-05 RX ADMIN — TRANEXAMIC ACID 1000 MG: 100 INJECTION, SOLUTION INTRAVENOUS at 15:24

## 2018-03-05 RX ADMIN — SODIUM CHLORIDE: 0.9 INJECTION, SOLUTION INTRAVENOUS at 16:11

## 2018-03-05 RX ADMIN — GABAPENTIN 300 MG: 300 CAPSULE ORAL at 11:09

## 2018-03-05 RX ADMIN — CEFAZOLIN SODIUM 1000 MG: 1 SOLUTION INTRAVENOUS at 14:33

## 2018-03-05 RX ADMIN — FENTANYL CITRATE 50 MCG: 50 INJECTION, SOLUTION INTRAMUSCULAR; INTRAVENOUS at 15:34

## 2018-03-05 RX ADMIN — ONDANSETRON HYDROCHLORIDE 4 MG: 2 INJECTION, SOLUTION INTRAVENOUS at 17:57

## 2018-03-05 RX ADMIN — HYDROMORPHONE HYDROCHLORIDE 1 MG: 2 INJECTION, SOLUTION INTRAMUSCULAR; INTRAVENOUS; SUBCUTANEOUS at 16:06

## 2018-03-05 RX ADMIN — HYDROMORPHONE HYDROCHLORIDE 1 MG: 2 INJECTION, SOLUTION INTRAMUSCULAR; INTRAVENOUS; SUBCUTANEOUS at 15:58

## 2018-03-05 RX ADMIN — SODIUM CHLORIDE: 0.9 INJECTION, SOLUTION INTRAVENOUS at 15:20

## 2018-03-05 RX ADMIN — CEFAZOLIN SODIUM 1000 MG: 1 SOLUTION INTRAVENOUS at 21:32

## 2018-03-05 RX ADMIN — PROPOFOL 200 MG: 10 INJECTION, EMULSION INTRAVENOUS at 14:37

## 2018-03-05 RX ADMIN — ROCURONIUM BROMIDE 50 MG: 10 INJECTION INTRAVENOUS at 14:37

## 2018-03-05 RX ADMIN — LABETALOL HYDROCHLORIDE 5 MG: 5 INJECTION, SOLUTION INTRAVENOUS at 16:12

## 2018-03-05 RX ADMIN — MIDAZOLAM HYDROCHLORIDE 2 MG: 1 INJECTION, SOLUTION INTRAMUSCULAR; INTRAVENOUS at 14:33

## 2018-03-05 RX ADMIN — DEXAMETHASONE SODIUM PHOSPHATE 8 MG: 10 INJECTION INTRAMUSCULAR; INTRAVENOUS at 15:10

## 2018-03-05 RX ADMIN — FENTANYL CITRATE 50 MCG: 50 INJECTION INTRAMUSCULAR; INTRAVENOUS at 19:17

## 2018-03-05 RX ADMIN — SODIUM CHLORIDE 125 ML/HR: 0.9 INJECTION, SOLUTION INTRAVENOUS at 11:33

## 2018-03-05 RX ADMIN — DOCUSATE SODIUM 100 MG: 100 CAPSULE, LIQUID FILLED ORAL at 21:28

## 2018-03-05 RX ADMIN — ROCURONIUM BROMIDE 20 MG: 10 INJECTION INTRAVENOUS at 15:32

## 2018-03-05 RX ADMIN — SODIUM CHLORIDE 100 ML/HR: 0.9 INJECTION, SOLUTION INTRAVENOUS at 23:50

## 2018-03-05 RX ADMIN — GLYCOPYRROLATE 0.4 MG: 0.2 INJECTION, SOLUTION INTRAMUSCULAR; INTRAVENOUS at 17:56

## 2018-03-05 RX ADMIN — LIDOCAINE HYDROCHLORIDE 40 MG: 20 INJECTION, SOLUTION INTRAVENOUS at 14:37

## 2018-03-05 RX ADMIN — FENTANYL CITRATE 100 MCG: 50 INJECTION, SOLUTION INTRAMUSCULAR; INTRAVENOUS at 14:37

## 2018-03-05 NOTE — POST OP PROGRESS NOTES
Progress Note - Orthopedics   Lord Cagle 46 y o  female MRN: [id number]  Unit/Bed#: OR POOL Encounter: [id number]    Assessment:  46 y o  female s/p left total hip arthroplasty POD 0     Plan:  Pain control prn  PT/OT-WBAT left LE   Posterior hip precautions  Abduction pillow while in bed  Lovenox/TEDs/SCDs for DVT prophylaxis  Abx x 24h    Subjective: Pt S&E  Resting comfortably  Awakens to name, but does not respond to commands or answer questions yet  Vitals: Blood pressure 138/74, pulse 79, temperature (!) 97 2 °F (36 2 °C), temperature source Tympanic, resp  rate 16, height 5' 6" (1 676 m), weight 59 kg (130 lb), SpO2 98 %  ,Body mass index is 20 98 kg/m²        Intake/Output Summary (Last 24 hours) at 03/05/18 1840  Last data filed at 03/05/18 1746   Gross per 24 hour   Intake             3100 ml   Output              400 ml   Net             2700 ml       Invasive Devices     Peripheral Intravenous Line            Peripheral IV 03/05/18 Right Wrist less than 1 day          Drain            Urethral Catheter Latex 16 Fr  less than 1 day                Ortho Exam: Maddi Blevins:  Drsg:  C/D/I, palpable pedal pulse, foot DF/PF intact- spontaneous

## 2018-03-05 NOTE — PROGRESS NOTES
Progress Note - Orthopedics   All Racer 46 y o  female MRN: [id number]  Unit/Bed#: OR POOL Encounter: [id number]    Assessment:  46 y o  female with left hip arthritis    Plan: For left total hip arthroplasty today  NPO  Consented  Pain control prn    Subjective: Pt S&E in preop holding  No further questions  Ready for OR  Vitals: Blood pressure 138/74, pulse 79, temperature (!) 97 2 °F (36 2 °C), temperature source Tympanic, resp  rate 16, height 5' 6" (1 676 m), weight 59 kg (130 lb), SpO2 98 %  ,Body mass index is 20 98 kg/m²        Intake/Output Summary (Last 24 hours) at 03/05/18 1416  Last data filed at 03/05/18 1255   Gross per 24 hour   Intake              300 ml   Output                0 ml   Net              300 ml       Invasive Devices     Peripheral Intravenous Line            Peripheral IV 03/05/18 Right Wrist less than 1 day                Ortho Exam: leftLE: sensation grossly intact L4, L5, S1, palpable pedal pulse, EHL/AT/GS intact  Hip:  Skin intact

## 2018-03-05 NOTE — H&P (VIEW-ONLY)
Assessment/Plan:      Medically cleared for upcoming total hip replacement  I did order a recheck of the urinalysis to be done February 19th because of the protein and blood in her urine  There are no diagnoses linked to this encounter  Subjective:      Patient ID: Giovani Little is a 46 y o  female  This is Lili Wesley is first visit to the office  She is scheduled for left total hip replacement February 28, 2018  She is in today for preoperative evaluation  The following portions of the patient's history were reviewed and updated as appropriate: allergies, current medications, past family history, past medical history, past social history, past surgical history and problem list     Review of Systems   Constitutional: Negative  HENT: Negative  Eyes: Negative  Respiratory: Negative  Cardiovascular: Negative  Gastrointestinal: Negative  Endocrine: Negative  Genitourinary: Negative  Musculoskeletal: Positive for arthralgias and gait problem  Skin: Negative  Allergic/Immunologic: Negative  Hematological: Negative  Psychiatric/Behavioral: Negative  Objective:    Vitals:    02/12/18 1527   BP: 106/64   Pulse: 76   Resp: 14        Physical Exam   Constitutional: She is oriented to person, place, and time  She appears well-developed and well-nourished  HENT:   Head: Normocephalic and atraumatic  Right Ear: External ear normal    Left Ear: External ear normal    Eyes: Conjunctivae and EOM are normal  Pupils are equal, round, and reactive to light  Neck: Normal range of motion  Cardiovascular: Normal rate and regular rhythm  Pulmonary/Chest: Effort normal and breath sounds normal    Abdominal: Soft  Bowel sounds are normal    Neurological: She is alert and oriented to person, place, and time  She has normal reflexes  Skin: Skin is warm and dry  Psychiatric: She has a normal mood and affect  Nursing note and vitals reviewed

## 2018-03-05 NOTE — OP NOTE
OPERATIVE REPORT  PATIENT NAME: Jen Branham    :  1970  MRN: [id number]  Pt Location: [facility] OR ROOM 01    SURGERY DATE: 3/5/2018    Surgeon(s) and Role:     * Soraida Pool, DO - Primary     * Jose Treviño PA-C - Assisting  Will CHARLIE Nur    Preop Diagnosis:  Primary osteoarthritis of one hip, left [M16 12]    Post-Op Diagnosis Codes:     * Primary osteoarthritis of one hip, left [M16 12]    Procedure(s) (LRB):  ARTHROPLASTY HIP TOTAL (Left)    Specimen(s):  * No specimens in log *    Estimated Blood Loss:   Minimal    Drains:   none    Anesthesia Type:   GA    Operative Indications:  Primary osteoarthritis of one hip, left [M16 12]      Operative Findings:  See below    Complications:   None    Implants:  Depuy  trilock size 4  Douglas size 52  36mm ceramic femoral head  15 x 6 5mm acetabular screw   Neutral acetabular liner      Procedure and Technique:  INDICATIONS FOR PROCEDURE:  The patients is a 46years old female who presented to the office with left hip arthritis  Conservative treatments were tried and failed  The patient had debilitating pain and decreased quality of life from their end-stage arthritis  Surgery was then recommended  Extensive counseling in regards to the reasons for surgical intervention as well as the risks and benefits of surgery were reviewed  The risks include, but are not limited to infection, extensive blood clots, blood clots, wound healing problems, damage to blood vessels and nerves, dislocation, leg length discrepancy, fracture, the need for further surgery  The patient understood and agreed to by oral and written consent  OPERATIVE PROCEDURE:  The patient was identified as Jen Branham by Her ID bracelet by the surgical staff in the preoperative area at [address]   The patient was wheeled back to the surgical room and placed on the operative table  Preoperative antibiotics were given      Anesthesia was administered and the patient was intubated without problems  The patient was placed in the lateral decubitus  position and all bony prominences were carefully protected  The left leg was then prepped and draped in the usual sterile fashion  A timeout was performed where the patients name and surgical site were once again identified  An incision was made over  the posterolateral aspect of the patients left hip  Dissection was made through the skin and subcutaneous tissue down to the fascia over the gluteus porter  The fascia was incised and the gluteus porter muscle fibers were spread  A retractor was carefully placed under the gluteus medius to protect it  The external rotators were identified and released  The piriformis was tagged with an ethibond suture  The gluteus minimus was identified and a retractor was carefully placed under it to protect it  The capsule was incised and tagged with an ethibond suture  At this point all retractors were removed and the suture tagging the piriformis was used to catarina the patients leg length  Retractors were once again placed and the hip was dislocated without problems  A trial and the neck length measurement from the template were used to identify the best place to make the neck cut  The cut was made with a saw and the femoral head was removed  Severe arthritic changes were noted on the femoral head and acetabulum  Retractors were placed to provide acetabular exposure  The labrum was removed  Sequential reaming took place and a size 52mm acetabular shell was found to be the best fit  The capsule was injected with 10mL of a combination of toradol, marcaine, and morphine  The shell was impacted into place  A trial liner was placed and a guide was used to evaluate the position  Attention was then paid to the femur  Instruments were used to provide for exposure    A box osteotome and canal finder were used to open the femoral canal   Sequential broaching took place and it was found that a size 3 femoral broach to be the best fit  The broach was left in place and a size 36mm femoral head with a high offset neck were then trialed  The leg lengths were checked and the leg was taken through a ROM to evaluate for stability  Both needed improvement  A size 3 stem with high offset neck and 36mm +8 5 femoral head were trialed  Both the leg length and stability were better  It was then decided to broach up to a size 4 stem and then trial with high offset neck and 36mm +5 femoral head  Both the stability and leg lengths were found to be excellent  Attention was returned to the acetabulum  After exposure was obtained, one 15 x 6 5mm screw was placed through the cup  Anterior osteophytes were removed  The final liner was impacted into placed  The liner was checked and found to be secure  Attention returned to the femur and the final femoral stem was impacted into place  Another trial was performed and the stability and accuracy of the leg length were confirmed and found to be acceptable  The final 36mm +5 ceramic femoral head was impacted securely into place  The acetabulum was irrigated and the hip was carefully reduced  Copious amounts of irrigation was used to irrigate the hip  A betadine wash followed by more irrigation was performed  The capsule and external rotators were repaired with a #5 Ethibond suture  Irrigation was used throughout the closure  The gluteus porter fascia was repaired with #1-0 vicryl suture  The subcutaneous fat was closed with a running #1-0 vicryl suture  The skin was closed with #2-0 vicryl and #3-0 monocryl subcutaneously  Steri strips were placed and a sterile dressing were placed over top  The patient was transferred onto a hospital bed and awakened without difficulty  I attest that I was present and performed this procedure    Tory Deras PA-C and CHARLIE Gallegos  were present for the entire procedure and provided essential assistance with limb position, patient prepping, and retraction    A qualified resident physician was not available    Patient Disposition:  extubated and stable    SIGNATURE: Bonnie Bowden DO  DATE: March 5, 2018  TIME: 6:09 PM

## 2018-03-05 NOTE — PHYSICAL THERAPY NOTE
Physical Therapy Cancellation Note    Pt still in the OR  Will follow in a m  Nsg notified   Jeny Rosas PT

## 2018-03-06 PROBLEM — R31.9 PAINLESS HEMATURIA: Status: ACTIVE | Noted: 2018-03-06

## 2018-03-06 PROBLEM — F17.200 CURRENT SMOKER: Status: ACTIVE | Noted: 2018-03-06

## 2018-03-06 PROBLEM — F41.9 ANXIETY: Status: ACTIVE | Noted: 2018-03-06

## 2018-03-06 PROBLEM — D64.9 MILD ANEMIA: Status: ACTIVE | Noted: 2018-03-06

## 2018-03-06 LAB
ANION GAP SERPL CALCULATED.3IONS-SCNC: 8 MMOL/L (ref 4–13)
BUN SERPL-MCNC: 21 MG/DL (ref 5–25)
CALCIUM SERPL-MCNC: 7.3 MG/DL (ref 8.3–10.1)
CHLORIDE SERPL-SCNC: 111 MMOL/L (ref 100–108)
CO2 SERPL-SCNC: 22 MMOL/L (ref 21–32)
CREAT SERPL-MCNC: 0.76 MG/DL (ref 0.6–1.3)
ERYTHROCYTE [DISTWIDTH] IN BLOOD BY AUTOMATED COUNT: 12.9 % (ref 11.6–15.1)
GFR SERPL CREATININE-BSD FRML MDRD: 92 ML/MIN/1.73SQ M
GLUCOSE SERPL-MCNC: 117 MG/DL (ref 65–140)
HCT VFR BLD AUTO: 33.2 % (ref 34.8–46.1)
HGB BLD-MCNC: 10.9 G/DL (ref 11.5–15.4)
MCH RBC QN AUTO: 31.8 PG (ref 26.8–34.3)
MCHC RBC AUTO-ENTMCNC: 32.8 G/DL (ref 31.4–37.4)
MCV RBC AUTO: 97 FL (ref 82–98)
PLATELET # BLD AUTO: 289 THOUSANDS/UL (ref 149–390)
PMV BLD AUTO: 9.5 FL (ref 8.9–12.7)
POTASSIUM SERPL-SCNC: 4.5 MMOL/L (ref 3.5–5.3)
RBC # BLD AUTO: 3.43 MILLION/UL (ref 3.81–5.12)
SODIUM SERPL-SCNC: 141 MMOL/L (ref 136–145)
WBC # BLD AUTO: 11.17 THOUSAND/UL (ref 4.31–10.16)

## 2018-03-06 PROCEDURE — 97535 SELF CARE MNGMENT TRAINING: CPT

## 2018-03-06 PROCEDURE — 97116 GAIT TRAINING THERAPY: CPT

## 2018-03-06 PROCEDURE — 99024 POSTOP FOLLOW-UP VISIT: CPT | Performed by: ORTHOPAEDIC SURGERY

## 2018-03-06 PROCEDURE — 97110 THERAPEUTIC EXERCISES: CPT

## 2018-03-06 PROCEDURE — 97163 PT EVAL HIGH COMPLEX 45 MIN: CPT

## 2018-03-06 PROCEDURE — G8978 MOBILITY CURRENT STATUS: HCPCS

## 2018-03-06 PROCEDURE — 97166 OT EVAL MOD COMPLEX 45 MIN: CPT

## 2018-03-06 PROCEDURE — 97530 THERAPEUTIC ACTIVITIES: CPT

## 2018-03-06 PROCEDURE — G8988 SELF CARE GOAL STATUS: HCPCS

## 2018-03-06 PROCEDURE — G8987 SELF CARE CURRENT STATUS: HCPCS

## 2018-03-06 PROCEDURE — 80048 BASIC METABOLIC PNL TOTAL CA: CPT | Performed by: ORTHOPAEDIC SURGERY

## 2018-03-06 PROCEDURE — G8979 MOBILITY GOAL STATUS: HCPCS

## 2018-03-06 PROCEDURE — 85027 COMPLETE CBC AUTOMATED: CPT | Performed by: ORTHOPAEDIC SURGERY

## 2018-03-06 PROCEDURE — 99253 IP/OBS CNSLTJ NEW/EST LOW 45: CPT | Performed by: PHYSICIAN ASSISTANT

## 2018-03-06 RX ADMIN — GABAPENTIN 100 MG: 100 CAPSULE ORAL at 05:02

## 2018-03-06 RX ADMIN — FOLIC ACID 1 MG: 1 TABLET ORAL at 08:11

## 2018-03-06 RX ADMIN — OXYCODONE HYDROCHLORIDE 10 MG: 5 TABLET ORAL at 19:23

## 2018-03-06 RX ADMIN — FERROUS SULFATE TAB 325 MG (65 MG ELEMENTAL FE) 325 MG: 325 (65 FE) TAB at 08:10

## 2018-03-06 RX ADMIN — HYDROMORPHONE HYDROCHLORIDE 0.5 MG: 1 INJECTION, SOLUTION INTRAMUSCULAR; INTRAVENOUS; SUBCUTANEOUS at 20:03

## 2018-03-06 RX ADMIN — OXYCODONE HYDROCHLORIDE AND ACETAMINOPHEN 500 MG: 500 TABLET ORAL at 08:11

## 2018-03-06 RX ADMIN — METHYLPHENIDATE HYDROCHLORIDE 20 MG: 10 TABLET ORAL at 07:40

## 2018-03-06 RX ADMIN — OXYCODONE HYDROCHLORIDE 10 MG: 5 TABLET ORAL at 23:07

## 2018-03-06 RX ADMIN — NICOTINE 7 MG: 7 PATCH, EXTENDED RELEASE TRANSDERMAL at 08:11

## 2018-03-06 RX ADMIN — DOCUSATE SODIUM 100 MG: 100 CAPSULE, LIQUID FILLED ORAL at 08:11

## 2018-03-06 RX ADMIN — METHYLPHENIDATE HYDROCHLORIDE 20 MG: 10 TABLET ORAL at 12:22

## 2018-03-06 RX ADMIN — GABAPENTIN 100 MG: 100 CAPSULE ORAL at 21:07

## 2018-03-06 RX ADMIN — ENOXAPARIN SODIUM 40 MG: 40 INJECTION SUBCUTANEOUS at 08:11

## 2018-03-06 RX ADMIN — GABAPENTIN 100 MG: 100 CAPSULE ORAL at 13:51

## 2018-03-06 RX ADMIN — OXYCODONE HYDROCHLORIDE 10 MG: 5 TABLET ORAL at 09:53

## 2018-03-06 RX ADMIN — ACETAMINOPHEN 650 MG: 325 TABLET, FILM COATED ORAL at 08:11

## 2018-03-06 RX ADMIN — PAROXETINE HYDROCHLORIDE 30 MG: 30 TABLET, FILM COATED ORAL at 08:13

## 2018-03-06 RX ADMIN — PANTOPRAZOLE SODIUM 40 MG: 40 TABLET, DELAYED RELEASE ORAL at 05:02

## 2018-03-06 RX ADMIN — DOCUSATE SODIUM 100 MG: 100 CAPSULE, LIQUID FILLED ORAL at 17:32

## 2018-03-06 RX ADMIN — Medication 1 TABLET: at 08:11

## 2018-03-06 RX ADMIN — SODIUM CHLORIDE 100 ML/HR: 0.9 INJECTION, SOLUTION INTRAVENOUS at 09:55

## 2018-03-06 RX ADMIN — OXYCODONE HYDROCHLORIDE 10 MG: 5 TABLET ORAL at 15:44

## 2018-03-06 RX ADMIN — CEFAZOLIN SODIUM 1000 MG: 1 SOLUTION INTRAVENOUS at 05:33

## 2018-03-06 NOTE — PLAN OF CARE
Problem: OCCUPATIONAL THERAPY ADULT  Goal: Performs self-care activities at highest level of function for planned discharge setting  See evaluation for individualized goals  Treatment Interventions: ADL retraining, Functional transfer training, UE strengthening/ROM, Endurance training, Cognitive reorientation, Patient/family training, Equipment evaluation/education, Compensatory technique education, Energy conservation, Activityengagement          See flowsheet documentation for full assessment, interventions and recommendations  Limitation: Decreased ADL status, Decreased UE strength, Decreased Safe judgement during ADL, Decreased cognition, Decreased endurance, Decreased self-care trans, Decreased high-level ADLs (total hip precautions)  Prognosis: Good  Assessment: Pt is a 46 y o  female seen for OT evaluation s/p admit to [facility]  on 3/5/2018 w/ Primary osteoarthritis of one hip, left, s/p total hip replacement, posterior approach,WBAT w/ total hip precautions  Comorbidities affecting pt's functional performance at time of assessment include: OA, anxiety, depression  Personal factors affecting pt at time of IE include:will be alone, however family able to assist her as needed  Prior to admission, pt was living alone and reports independent w/ ADLs, independent w/ IADLs, independent w/ functional transfers and mobility w/ no AD, driving and working  Upon evaluation: Pt requires MIN assist supine>sit bed mobility w/ VCs for positioning and cues to maintain total hip precautions, MIN assist sit<>stand w/ VCs for positioning of UEs and operated LE, MAX assist LB ADLS, setup UB ADLs, MOD assist toileting 2* the following deficits impacting occupational performance: decreased strength and endurance, impaired balance, impaired activity tolerance, increased pain, impulsive, total hip precautions, decreased activity tolerance   Pt to benefit from continued skilled OT tx while in the hospital to address deficits as defined above and maximize level of functional independence w ADL's and functional mobility  Occupational Performance areas to address include: grooming, bathing/shower, toilet hygiene, dressing, functional mobility and clothing management, review of THR packet and THPS, [facility] training  Pt able to recall 2/3 THPS, requires continued education  From OT standpoint, recommendation at time of d/c would be home w/ family support        OT Discharge Recommendation: Home with family support         Comments: Madelyn Jackman MS, OTR/L

## 2018-03-06 NOTE — CASE MANAGEMENT
Initial Clinical Review    Age/Sex: 46 y o  female    Surgery Date:    3/5/2018    Procedure:    Preop Diagnosis:  Primary osteoarthritis of one hip, left [M16 12]     Post-Op Diagnosis Codes:     * Primary osteoarthritis of one hip, left [M16 12]     Procedure(s) (LRB):  ARTHROPLASTY HIP TOTAL (Left)    Anesthesia:    general    Admission Orders: Date/Time/Statement: 3/5/18 @ 1809     Orders Placed This Encounter   Procedures    Inpatient Admission     Standing Status:   Standing     Number of Occurrences:   1     Order Specific Question:   Admitting Physician     Answer:   Ambrosio Cummings [[id number]]     Order Specific Question:   Level of Care     Answer:   Med Surg [[id number]]     Order Specific Question:   Estimated length of stay     Answer:   More than 2 Midnights     Order Specific Question:   Certification     Answer:   I certify that inpatient services are medically necessary for this patient for a duration of greater than two midnights  See H&P and MD Progress Notes for additional information about the patient's course of treatment  Vital Signs: /78 (BP Location: Left arm)   Pulse 69   Temp 99 9 °F (37 7 °C) (Temporal)   Resp 16   Ht 5' 6" (1 676 m)   Wt 59 kg (130 lb)   SpO2 97%   BMI 20 98 kg/m²     Diet:        Diet Orders            Start     Ordered    03/05/18 2006  Diet Regular; Regular House  Diet effective now     Question Answer Comment   Diet Type Regular    Regular Regular House    RD to adjust diet per protocol?  Yes        03/05/18 2006          Mobility:   As  kati    DVT Prophylaxis:   SCD'S    Pain Control:   Pain Medications             diclofenac (VOLTAREN) 75 mg EC tablet Take 1 tablet by mouth as needed      PARoxetine (PAXIL) 30 mg tablet TK 1 T PO QD        Reg diet  Neurovascular  Checks  q 4 hrs  PT/OT    Thank you,  [facility] in the [facility] by Panch[facility] Cagle for 2017  Network Utilization Review Department  Phone: [phone]; Fax [phone]  ATTENTION: The Network Utilization Review Department is now centralized for our 7 Facilities  Make a note that we have a new phone and fax numbers for our Department  Please call with any questions or concerns to [phone] and carefully follow the prompts so that you are directed to the right person  All voicemails are confidential  Fax any determinations, approvals, denials, and requests for initial or continue stay review clinical to [phone]  Due to HIGH CALL volume, it would be easier if you could please send faxed requests to expedite your requests and in part, help us provide discharge notifications faster

## 2018-03-06 NOTE — ASSESSMENT & PLAN NOTE
S/p left MARCIA POD #1  Pain is somewhat controlled, no nausea  No chest pain or shortness of breath, mild temperature of 99 9° but no fever  Continue Lovenox, continue current analgesics, continue PT OT  Continue management per primary team

## 2018-03-06 NOTE — PLAN OF CARE
Problem: PHYSICAL THERAPY ADULT  Goal: Performs mobility at highest level of function for planned discharge setting  See evaluation for individualized goals  Treatment/Interventions: Functional transfer training, LE strengthening/ROM, Elevations, Therapeutic exercise, Endurance training, Patient/family training, Bed mobility, Gait training, Spoke to nursing, OT  Equipment Recommended: Other (Comment) (pt has RW & BSC at home)       See flowsheet documentation for full assessment, interventions and recommendations  Outcome: Progressing  Prognosis: Good  Problem List: Decreased strength, Decreased endurance, Impaired balance, Decreased mobility, Decreased safety awareness, Pain, Orthopedic restrictions  Assessment: Pt  46 y  o female s/p L THR (posterior) 2* to severe DJD  Pt referred to PT for mobility assessment & D/C planning  PTA, pt reports being I w/o AD  On eval, pt demonstrate dec mobility, balance, endurance & amb 2* to LLE pain & weakness  Pt require minAx1 for most functional mobility + cues for techniques  Gait deviations as above but no gross LOB noted  Inc pain to L hip noted after mobility  RN made aware  Ice pack applied  Pt instructed on hip precautions, pt require cues to recall  No dizziness reported t/o session  /78 sitting, end of session  Will continue skilled PT to improve function & safety  Pt plans to return home at D/C  At this time, will anticipate good progress in PT for safe D/C to home  Will recommend HHPT & family support at D/C  Pt will need to achieve PT goals prior to D/C for safe return to home  CM to follow  Pt tolerated OOB in chair at end of session w/o issues  Call bell in reach  Nsg staff to continue to mobilized pt (OOB in chair for all meals & ambulate in room/unit) as tolerated to prevent further decline in function  Ns notified           Recommendation: Home PT, Home with family support     PT - OK to Discharge: No (pt to achieve PT goals prior to D/C home)    See flowsheet documentation for full assessment

## 2018-03-06 NOTE — DISCHARGE INSTRUCTIONS
TOTAL HIP REPLACEMENT DISCHARGE INSTRUCTIONS    Surgical Dressing: You may remove your dressing 7 days from the date of your surgery then change your dressing daily until drainage stops  Maintain steri strips  Let them fall off on their own  Do not put any lotions or creams on your incision  If there are any signs of infection such as drainage persisting beyond a few days, unusual looking drainage (yellow, green), increased redness around the incision, or fever/chills let your doctor know  Do not get your incision wet until approved by your physician  Medications:  Upon discharge you will be given a prescription for an anticoagulant (i e  Ecotrin (Aspirin), Coumadin (Warfarin), Lovenox (Enoxaparin)) and a narcotic pain reliever  Do not take any over the counter NSAIDs (i e  Ibuprofen, Motrin, Advil, Naprosyn, Aleve) while on your anticoagulation medication  Narcotic pain relievers cannot be refilled over the phone  Please be mindful of the number of pills you have left so you can  a prescription for a refill if needed during office hours  If you are on Coumadin (Warfarin) you will need your blood drawn every Monday and Thursday once you are home  Initially your visiting nurse will draw your blood  Later you will go to an outpatient lab  You will receive a phone call the next day and your Coumadin (warfarin) will be dosed based on these results  Take this dosage daily until you hear from us next  Walking:  Use two crutches or a walker for EVERY step  Gradually increase your walking daily  You can progress to a cane as tolerated once advised by your physical therapist       Sleeping Positions: You may sleep on your back, stomach, or either side with a pillow between your knees  Also use the pillow between your legs as you turn to your side or stomach  Bathing:  No tub baths  Do not submerge your incision    You may shower, but your incision must stay clean and dry while showering  It may be helpful to use gauze and a Tegaderm dressing to seal off your incision  You may sit on a shower chair or stand and shower briefly  Use your crutches/walker to get in and out of the shower  Once your incision is healed, and it is approved by your physician, you may get your incision wet  Sexual Relations:  Resume according to your comfort  Swimming:  No swimming or hot tubs until approved by your physician  Swimming will be allowed once your incision is well healed  Driving: You may ride as a passenger now  No driving until your follow-up appointment  To get into the car use the front passenger seat with the seat pushed back as far as possible  Scoot yourself back in the seat  Use your hands to assist your legs into the car  Physical therapy:  When you have finished with home visiting PT, you may begin outpatient PT  Call your surgeons office if you have not already received a prescription  A prescription can be faxed to the outpatient center of your choice  Posterior hip precautions:  Avoid bending greater than 90 degrees at the hips, twisting/pivoting, and crossing the knees (You may cross at the ankles only) for the first 6 weeks after surgery  Your physical therapist will review this with you  Special considerations: To minimize swelling, stiffness, and decrease pain use cold as needed, but not heat  Ice 20 minutes at a time with a cloth between your skin and the ice  Ice after walking, when you have pain, or after you have completed your exercises  Limit your sitting to 60 minutes at one time  Continue to wear your REBECA stockings at all times for 2 weeks after surgery, except when washing  You can wear them as needed after that  Follow up:  Call [phone] to make an appointment to see your surgeon within 2 weeks of your surgery if you havent done so already    If you have any questions during business hours please call the direct office phone number [phone]  Otherwise please call [phone] for any concerns  For the future:  Prior to any dental work, including routine cleanings, call the office for a prescription for antibiotics to be taken prior to your dental appointment  For any invasive procedures (i e  endoscopy, colonoscopy, etc ) inform the doctor performing the procedure that you have a total knee replacement and will antibiotics just prior to the procedure to protect it

## 2018-03-06 NOTE — PHYSICAL THERAPY NOTE
PT EVALUATION    Pt  Name: Lord Cagle  Pt  Age: 46 y o  Patient Active Problem List   Diagnosis    Primary osteoarthritis of one hip, left    Pre-operative clearance    Anxiety    Current smoker    Painless hematuria    Mild anemia       Primary osteoarthritis of one hip, left [M16 12]    Past Medical History:   Diagnosis Date    Anxiety     Depression     Osteoarthritis        Past Surgical History:   Procedure Laterality Date    ECTOPIC PREGNANCY SURGERY      FL TOTAL HIP ARTHROPLASTY Left 3/5/2018    Procedure: ARTHROPLASTY HIP TOTAL;  Surgeon: Isaias Kellogg DO;  Location: [facility];  Service: Orthopedics    WISDOM TOOTH EXTRACTION      x1          03/06/18 1609   Pain Assessment   Pain Score 8   Pain Type Acute pain;Surgical pain   Pain Location Hip   Pain Orientation Left   Hospital Pain Intervention(s) Medication (See MAR); Cold applied;Repositioned; Ambulation/increased activity; Emotional support   Restrictions/Precautions   LLE Weight Bearing Per Order WBAT   Other Precautions Fall Risk;Pain;THR   General   Chart Reviewed Yes   Response to Previous Treatment Patient with no complaints from previous session  Family/Caregiver Present No   Cognition   Overall Cognitive Status WFL   Arousal/Participation Alert; Cooperative   Attention Within functional limits   Following Commands Follows multistep commands without difficulty   Subjective   Subjective Pt reports of inc L hip pain but willing to participate in therapy  Bed Mobility   Supine to Sit 5  Supervision   Additional items HOB elevated; Increased time required;Verbal cues;LE management   Sit to Supine 5  Supervision   Additional items Increased time required;Verbal cues;LE management   Additional Comments pt able to self assist LLE in & OOB   Transfers   Sit to Stand 5  Supervision   Additional items Increased time required;Verbal cues   Stand to Sit 5  Supervision   Additional items Increased time required;Verbal cues Additional Comments cues for hand placement & LLE positioning   Ambulation/Elevation   Gait pattern Decreased foot clearance;Decreased L stance; Inconsistent abdiaziz; Wide BLAISE  (inc strides; vaulting on LLE at times; reciprocal)   Gait Assistance 5  Supervision   Additional items Verbal cues   Assistive Device Rolling walker   Distance 160'x1   Balance   Static Sitting Good   Static Standing Fair   Ambulatory Fair -   Endurance Deficit   Endurance Deficit Yes   Endurance Deficit Description pain   Activity Tolerance   Activity Tolerance Patient limited by pain   Nurse Made Aware [place]   Exercises   THR Supine;10 reps;AAROM; Left   Assessment   Prognosis Good   Problem List Decreased strength;Decreased endurance; Impaired balance;Decreased mobility;Pain;Orthopedic restrictions   Assessment Pt seen for PT per POC  Improved mobility & activity tolerance noted this tx session despite inc L hip pain  See above levels of assistance required for all functional tasks  Gait deviations as above but no gross LOB noted  Pt require cues to dec strides & dec amb speed  Improved gait patterns noted towards the end of amb, able to tolerate reciprocal gait  Pt tolerated above mentioned thera  ex well  No dizziness reported t/o session  Will continue PT per POC  Will continue to recommend HHPT at D/C  Pt back in bed at end of session w/o issues  Call bell in reach  Ice pack given  Nsg staff to continue to mobilized pt (OOB in chair for all meals & ambulate in room/unit) as tolerated to prevent decline in function  Nsg notified  Barriers to Discharge Decreased caregiver support   Goals   Patient Goals less pain   STG Expiration Date 03/13/18   Treatment Day 1   Plan   Treatment/Interventions Functional transfer training;LE strengthening/ROM; Elevations; Therapeutic exercise; Endurance training;Patient/family training;Bed mobility;Gait training;Spoke to nursing   Progress Progressing toward goals   PT Frequency Twice a day;7x/wk; Weekend Recommendation   Recommendation Home PT; Home with family support   Equipment Recommended Other (Comment)  (pt has RW & BSC at home)   PT - OK to Discharge No  (pt to achieve PT goals prior to D/C home)   Jennifer Screen, PT

## 2018-03-06 NOTE — PHYSICAL THERAPY NOTE
PT EVALUATION    Pt  Name: Deepti Joseph  Pt  Age: 46 y o  Patient Active Problem List   Diagnosis    Primary osteoarthritis of one hip, left    Pre-operative clearance       Primary osteoarthritis of one hip, left [M16 12]    Past Medical History:   Diagnosis Date    Anxiety     Depression     Osteoarthritis        Past Surgical History:   Procedure Laterality Date    ECTOPIC PREGNANCY SURGERY      DE TOTAL HIP ARTHROPLASTY Left 3/5/2018    Procedure: ARTHROPLASTY HIP TOTAL;  Surgeon: Carla Rhodes DO;  Location: [facility];  Service: Orthopedics    WISDOM TOOTH EXTRACTION      x1        03/06/18 0942   Note Type   Note type Eval only   Pain Assessment   Pain Score 5  (after mobility)   Pain Type Acute pain;Surgical pain   Pain Location Hip   Pain Orientation Left   Hospital Pain Intervention(s) Medication (See MAR); Cold applied;Repositioned; Ambulation/increased activity; Emotional support   Home Living   Type of [address] [facility] Two level; Able to live on main level with bedroom/bathroom;Stairs to enter with rails  (14steps to 2nd level; 14STE B/L rail (back); 4+4STE (front) )   [address] Walker;Cane;Sock aid   Prior Function   Level of [place] Independent with ADLs and functional mobility  (w/o AD)   Lives With Son  (but currently not at home)   [facility]   Comments pt's mother & sister lives close; son currently away from home   Restrictions/Precautions   LLE Weight Bearing Per Order WBAT   Other Precautions Multiple lines; Fall Risk;Pain;THR  (Simultaneous filing   User may not have seen previous data )   General   Family/Caregiver Present No   Cognition   Overall Cognitive Status WFL   Arousal/Participation Alert   Orientation Level Oriented X4   Following Commands Follows multistep commands with increased time or repetition   RUE Assessment   RUE Assessment (defer to OT)   LUE Assessment   LUE Assessment (defer to OT)   RLE Assessment   RLE Assessment WFL   Strength RLE   RLE Overall Strength 4+/5   LLE Assessment   LLE Assessment X  (limited to hip precautions)   Strength LLE   LLE Overall Strength 4-/5   L Hip Flexion 2+/5   Coordination   Sensation WFL   Bed Mobility   Supine to Sit 4  Minimal assistance   Additional items Assist x 1;HOB elevated; Bedrails; Increased time required;Verbal cues;LE management   Additional Comments cues for techniques   Transfers   Sit to Stand 4  Minimal assistance   Additional items Assist x 1; Increased time required;Verbal cues   Stand to Sit 4  Minimal assistance   Additional items Assist x 1; Armrests; Increased time required;Verbal cues   Additional Comments cues for techniques   Ambulation/Elevation   Gait pattern Decreased foot clearance;Decreased L stance; Wide BLAISE;Circumduction; Excessively slow   Gait Assistance 4  Minimal assist   Additional items Assist x 1;Verbal cues; Tactile cues   Assistive Device Rolling walker   Distance 80'x1   Balance   Static Sitting Good   Static Standing Fair -   Ambulatory Poor +   Endurance Deficit   Endurance Deficit Yes   Endurance Deficit Description pain   Activity Tolerance   Activity Tolerance Patient limited by pain   Nurse Made Aware Klaudia   Assessment   Prognosis Good   Problem List Decreased strength;Decreased endurance; Impaired balance;Decreased mobility; Decreased safety awareness;Pain;Orthopedic restrictions   Assessment Pt  46 y  o female s/p L THR (posterior) 2* to severe DJD  Pt referred to PT for mobility assessment & D/C planning  PTA, pt reports being I w/o AD  On eval, pt demonstrate dec mobility, balance, endurance & amb 2* to LLE pain & weakness  Pt require minAx1 for most functional mobility + cues for techniques  Gait deviations as above but no gross LOB noted  Inc pain to L hip noted after mobility  RN made aware  Ice pack applied   Pt instructed on hip precautions, pt require cues to recall  No dizziness reported t/o session  /78 sitting, end of session  Will continue skilled PT to improve function & safety  Pt plans to return home at D/C  At this time, will anticipate good progress in PT for safe D/C to home  Will recommend HHPT & family support at D/C  Pt will need to achieve PT goals prior to D/C for safe return to home  CM to follow  Pt tolerated OOB in chair at end of session w/o issues  Call bell in reach  Nsg staff to continue to mobilized pt (OOB in chair for all meals & ambulate in room/unit) as tolerated to prevent further decline in function  Ns notified  Goals   Patient Goals go home   STG Expiration Date 03/13/18   Short Term Goal #1 Goals to be met in 7 days; pt will be able to: 1) inc strength & balance by 1/2 grade; 2) inc functional mobility to modified I; 3) inc amb w/ RW approx  >150' w/ modified I; 4) inc barthel score to 65; 5) negotiate stairs w/ S; 6) pt/caregiver ed   Treatment Day 0   Plan   Treatment/Interventions Functional transfer training;LE strengthening/ROM; Elevations; Therapeutic exercise; Endurance training;Patient/family training;Bed mobility;Gait training;Spoke to nursing;OT   PT Frequency Twice a day;7x/wk; Weekend   Recommendation   Recommendation Home PT; Home with family support   Equipment Recommended Other (Comment)  (pt has RW & BSC at home)   PT - OK to Discharge No  (pt to achieve PT goals prior to D/C home)   Barthel Index   Feeding 10   Bathing 0   Grooming Score 5   Dressing Score 5   Bladder Score 0   Bowels Score 10   Toilet Use Score 5   Transfers (Bed/Chair) Score 10   Mobility (Level Surface) Score 0   Stairs Score 0   Barthel Index Score 45   Hx/personal factors: co-morbidities; fall risk; currently functioning below baseline; inaccessible home; dec caregiver; lines; use of AD; pain; THP  Examination: dec mobility, dec balance, dec endurance, dec amb, high risk for falls; pain; LLE weakness  Clinical: unpredictable (POD#1, abnormal lab values; pain mgt; fall risk)  Complexity: high    Mel Galeas, PT

## 2018-03-06 NOTE — POST OP PROGRESS NOTES
Progress Note - Orthopedics   Sharee Elizabeth 46 y o  female MRN: [id number]  Unit/Bed#: E2 -01 Encounter: [id number]    Assessment:  46 y o  female s/p left total hip arthroplasty POD 1     Plan:  Pain control prn  PT/OT-WBAT left LE   Posterior hip precautions  Abduction pillow while in bed  Lovenox/TEDs/SCDs for DVT prophylaxis  Abx x 24h    Subjective: Pt S&E this AM   No complaints  Pain controlled  Denies SOB, abdominal pain  Vitals: Blood pressure 138/66, pulse 66, temperature 99 1 °F (37 3 °C), temperature source Temporal, resp  rate 16, height 5' 6" (1 676 m), weight 59 kg (130 lb), SpO2 97 %  ,Body mass index is 20 98 kg/m²        Intake/Output Summary (Last 24 hours) at 03/06/18 1849  Last data filed at 03/06/18 1746   Gross per 24 hour   Intake             1330 ml   Output              875 ml   Net              455 ml       Invasive Devices     Peripheral Intravenous Line            Peripheral IV 03/05/18 Right Wrist 1 day                Ortho Exam: [place] Spray:  Drsg:  C/D/I, sensation grossly intact L4, L5, S1, palpable pedal pulse, EHL/AT/GS intact    Lab, Imaging and other studies:   CBC:   Lab Results   Component Value Date    WBC 11 17 (H) 03/06/2018    HGB 10 9 (L) 03/06/2018    HCT 33 2 (L) 03/06/2018    MCV 97 03/06/2018     03/06/2018    MCH 31 8 03/06/2018    MCHC 32 8 03/06/2018    RDW 12 9 03/06/2018    MPV 9 5 03/06/2018     CMP:   Lab Results   Component Value Date     03/06/2018     (H) 03/06/2018    CO2 22 03/06/2018    ANIONGAP 8 03/06/2018    BUN 21 03/06/2018    CREATININE 0 76 03/06/2018    GLUCOSE 117 03/06/2018    CALCIUM 7 3 (L) 03/06/2018    EGFR 92 03/06/2018

## 2018-03-06 NOTE — PLAN OF CARE

## 2018-03-06 NOTE — ASSESSMENT & PLAN NOTE
Microscopic, pt expresses concern for whether a UA was performed during this admission  She has no gross hematuria or spotting from intermittent vaginal bleeding and reports she stopped having menses several months back  No MAXI today, no evidence of hypotension concerning for ATN today  Prior UA reviewed, pt w/300+ proteinuria as well but no coarse granular casts  Recommend op f/u with urology as expressed by pt's PCP to evaluate for possible RCC for underlying renal disease

## 2018-03-06 NOTE — SOCIAL WORK
CM met with patient at bedside to address discharge needs  Patient reports living in a two story house with son, however, patient's son is not currently home  Patient is independent with ADLs and functional mobility  Patient reports having all necessary DME  Patient recommended for Home PT; referral submitted  No POA or caregiver identified; patient declined information att his time  Patient reports her mother and sister live nearby and will be able to assist as needed  Patient uses [facility] on [address] for Rx needs, patient made aware of [address] to use at discharge if needed  PCP identified  Patient made aware of Cm's name and role  No other needs at present  CM to follow as needed

## 2018-03-06 NOTE — CONSULTS
Consult- Britney Carroll 1970, 46 y o  female MRN: [id number]    Unit/Bed#: E2 -31 Encounter: [id number]    Primary Care Provider: Ori Leggett DO   Date and time admitted to hospital: 3/5/2018 10:48 AM      Inpatient consult to Internal Medicine  Consult performed by: Sandy Kendall ordered by: Ibeth Gonzalez, [address] [facility] Consultation - [facility] Internal Medicine    Patient Information: Britney Carroll 46 y o  female MRN: [id number]  Unit/Bed#: E2 -01 Encounter: [id number]  PCP: Ori Leggett DO  Date of Admission:  3/5/2018  Date of Consultation: 03/06/18  Requesting Physician: Deana Alvarez DO    Reason For Consultation:   OA of left hip primary    Assessment/Plan:      Mild anemia   Assessment & Plan    Postoperative abla  Pain is well controlled, BP well controlled  Continue FeSO4, continue folic acid  Continue to monitor        Painless hematuria   Assessment & Plan    Microscopic, pt expresses concern for whether a UA was performed during this admission  She has no gross hematuria or spotting from intermittent vaginal bleeding and reports she stopped having menses several months back  No MAXI today, no evidence of hypotension concerning for ATN today  Prior UA reviewed, pt w/300+ proteinuria as well but no coarse granular casts  Recommend op f/u with urology as expressed by pt's PCP to evaluate for possible RCC for underlying renal disease            Current smoker   Assessment & Plan    Continue nicotine patch encouraged cessation given painless hematuria        Anxiety   Assessment & Plan    Well controlled currently  Continue paxil, ritalin, xanax prn        * Primary osteoarthritis of one hip, left   Assessment & Plan    S/p left MARCIA POD #1  Pain is somewhat controlled, no nausea  No chest pain or shortness of breath, mild temperature of 99 9° but no fever  Continue Lovenox, continue current analgesics, continue PT OT  Continue management per primary team          ·     VTE Prophylaxis: Enoxaparin (Lovenox)  / reason for no mechanical VTE prophylaxis       Recommendations for Discharge:  · No barriers for d/c from [facility]    Counseling / Coordination of Care Time: 45 minutes  Greater than 50% of total time spent on patient counseling and coordination of care  Collaboration of Care: Were Recommendations Directly Discussed with Primary Treatment Team? - No     History of Present Illness:    Mey Sheridan is a 46 y o  female who is originally admitted to the orthopedic surgery service on 3/5/2018 due to osteoarthritis of left hip  We are consulted for primary osteoarthritis of left hip  Patient is a pleasant 57-year-old female with past medical history of 30 pack-year smoking history, anxiety, and osteoarthritis of left hip being admitted for elective left MARCIA  She reports she has been having ongoing pain with her hip primarily with flexion and extension in climbing stairs for quite some time  She has tried conservative measures such as analgesics and anti-inflammatories with minimal in no significant lasting relief  She has been evaluated by Orthopedics for this  She continues to have pain which is impacting and impairing her quality of life and found to be a candidate for elective MARCIA  She is now postop day 1  She reports that her pain is modestly controlled she has no nausea no numbness in the lower extremity  She below the move her leg although she reports she is still pretty sore  She has no chest pain or shortness of breath no lightheadedness or dizziness  She has anxiety and takes Paxil as well as Xanax as needed and Ritalin for inattentiveness and low energy    She reports she is doing well with these medications and is tolerating them well  She has no history of hypertension hyperlipidemia diabetes CAD CHF CVA or arrhythmias      She does express concern about whether not urinalysis was collected on this admission as she has been told that she has microscopic hematuria in her blood  She reports at this time and previously she has no dysuria frequency or urgency  She has not had menses in several years  She reports that she has not been having any intermittent spotting  She reports that she does have a follow-up with Urology and is to get a CT scan of her abdomen and pelvis per her primary care provider  She denies any other significant medical history  Review of Systems:    Review of Systems   Constitutional: Negative for appetite change, chills and fever  HENT: Negative for congestion, rhinorrhea and sore throat  Respiratory: Negative for shortness of breath  Cardiovascular: Negative for chest pain and palpitations  Gastrointestinal: Negative for abdominal pain  Genitourinary: Positive for hematuria  Negative for dysuria, menstrual problem, urgency and vaginal bleeding  Musculoskeletal: Positive for arthralgias  Neurological: Negative for light-headedness and headaches  All other systems reviewed and are negative  Past Medical and Surgical History:     Past Medical History:   Diagnosis Date    Anxiety     Depression     Osteoarthritis        Past Surgical History:   Procedure Laterality Date    ECTOPIC PREGNANCY SURGERY      [place] TOTAL HIP ARTHROPLASTY Left 3/5/2018    Procedure: ARTHROPLASTY HIP TOTAL;  Surgeon: Theresa Araujo DO;  Location: [facility];  Service: Orthopedics    WISDOM TOOTH EXTRACTION      x1       Meds/Allergies:    all medications and allergies reviewed    Allergies: No Known Allergies    Social History:     Marital Status: Single    Substance Use History:   History   Alcohol Use No     History   Smoking Status    Heavy Tobacco Smoker    Packs/day: 0 50    Years: 20 00    Types: Cigarettes   Smokeless Tobacco    Never Used     Comment: Working on Reducing for surgery        History   Drug Use No       Family History:    no family hx of cancer    Physical Exam:     Vitals: Blood Pressure: 119/78 (03/06/18 0813)  Pulse: 69 (03/06/18 0813)  Temperature: 99 9 °F (37 7 °C) (03/06/18 0813)  Temp Source: Temporal (03/06/18 0813)  Respirations: 16 (03/06/18 0813)  Height: 5' 6" (167 6 cm) (03/05/18 1115)  Weight - Scale: 59 kg (130 lb) (03/05/18 1115)  SpO2: 97 % (03/06/18 0813)    Physical Exam   Constitutional: She appears well-developed and well-nourished  No distress  HENT:   Head: Normocephalic and atraumatic  Right Ear: External ear normal    Left Ear: External ear normal    Eyes: Conjunctivae are normal  Right eye exhibits no discharge  Left eye exhibits no discharge  No scleral icterus  Neck: Normal range of motion  Cardiovascular: Normal rate, regular rhythm, normal heart sounds and intact distal pulses  Exam reveals no gallop and no friction rub  No murmur heard  Pulmonary/Chest: Effort normal  No respiratory distress  She has no wheezes  She has no rales  She exhibits no tenderness  Abdominal: Soft  She exhibits no distension  There is no tenderness  There is no rebound and no guarding  Musculoskeletal: She exhibits tenderness (TTP at left lateral thigh   )  Pain hip flexion w/AROM and TTP of left lateral thigh     Neurological: She is alert  Skin: Skin is warm and dry  She is not diaphoretic  Psychiatric: She has a normal mood and affect  Vitals reviewed  Additional Data:     Lab Results: I have personally reviewed pertinent reports  Results from last 7 days  Lab Units 03/06/18  0450   WBC Thousand/uL 11 17*   HEMOGLOBIN g/dL 10 9*   HEMATOCRIT % 33 2*   PLATELETS Thousands/uL 289       Results from last 7 days  Lab Units 03/06/18  0450   SODIUM mmol/L 141   POTASSIUM mmol/L 4 5   CHLORIDE mmol/L 111*   CO2 mmol/L 22   BUN mg/dL 21   CREATININE mg/dL 0 76   CALCIUM mg/dL 7 3*   GLUCOSE RANDOM mg/dL 117           Imaging: I have personally reviewed pertinent reports        Xr Hip/pelv 1 Vw Left If Performed    Result Date: 3/5/2018  Narrative: LEFT HIP INDICATION:  Left hip pain  COMPARISON: None VIEWS:  XR HIP/PELV 1 VW LEFT W PELVIS IF PERFORMED FINDINGS: Postoperative changes of left total hip arthroplasty which appears intact  There is subcutaneous air diffusely throughout the left hip likely from postsurgical change  Impression: Postoperative changes of left total hip arthroplasty which appears intact  There is subcutaneous air diffusely throughout the left hip likely from postsurgical change  Workstation performed: [id number]       EKG, Pathology, and Other Studies Reviewed on Admission:   · EKG:     ** Please Note: This note has been constructed using a voice recognition system   **

## 2018-03-06 NOTE — PROGRESS NOTES
Patient is very drowsy but easy to arouse  Patients vitals are stable and report no pain  Patient was educated on the pain scale and how to use it  Will continue to monitor

## 2018-03-06 NOTE — ASSESSMENT & PLAN NOTE
Postoperative abla  Pain is well controlled, BP well controlled  Continue FeSO4, continue folic acid  Continue to monitor

## 2018-03-06 NOTE — PLAN OF CARE
Problem: PHYSICAL THERAPY ADULT  Goal: Performs mobility at highest level of function for planned discharge setting  See evaluation for individualized goals  Treatment/Interventions: Functional transfer training, LE strengthening/ROM, Elevations, Therapeutic exercise, Endurance training, Patient/family training, Bed mobility, Gait training, Spoke to nursing, OT  Equipment Recommended: Other (Comment) (pt has RW & BSC at home)       See flowsheet documentation for full assessment, interventions and recommendations  Outcome: Progressing  Prognosis: Good  Problem List: Decreased strength, Decreased endurance, Impaired balance, Decreased mobility, Pain, Orthopedic restrictions  Assessment: Pt seen for PT per POC  Improved mobility & activity tolerance noted this tx session despite inc L hip pain  See above levels of assistance required for all functional tasks  Gait deviations as above but no gross LOB noted  Pt require cues to dec strides & dec amb speed  Improved gait patterns noted towards the end of amb, able to tolerate reciprocal gait  Pt tolerated above mentioned thera  ex well  No dizziness reported t/o session  Will continue PT per POC  Will continue to recommend HHPT at D/C  Pt back in bed at end of session w/o issues  Call bell in reach  Ice pack given  Nsg staff to continue to mobilized pt (OOB in chair for all meals & ambulate in room/unit) as tolerated to prevent decline in function  Nsg notified  Barriers to Discharge: Decreased caregiver support     Recommendation: Home PT, Home with family support     PT - OK to Discharge: No (pt to achieve PT goals prior to D/C home)    See flowsheet documentation for full assessment

## 2018-03-06 NOTE — OCCUPATIONAL THERAPY NOTE
OccupationalTherapy Evaluation and Treatment     Patient Name: Riley White  OSOHQ'C Date: 3/6/2018  Problem List  Patient Active Problem List   Diagnosis    Primary osteoarthritis of one hip, left    Pre-operative clearance     Past Medical History  Past Medical History:   Diagnosis Date    Anxiety     Depression     Osteoarthritis      Past Surgical History  Past Surgical History:   Procedure Laterality Date    ECTOPIC PREGNANCY SURGERY      MA TOTAL HIP ARTHROPLASTY Left 3/5/2018    Procedure: ARTHROPLASTY HIP TOTAL;  Surgeon: Anton Hurley DO;  Location: [facility];  Service: Orthopedics    WISDOM TOOTH EXTRACTION      x1           03/06/18 0944   Note Type   Note type Eval/Treat   Restrictions/Precautions   Weight Bearing Precautions Per Order Yes   LLE Weight Bearing Per Order WBAT   Other Precautions Impulsive; Fall Risk;Multiple lines;THR;WBS  (total hip precautions)   Pain Assessment   Pain Assessment 0-10   Pain Score 5   Pain Type Acute pain;Surgical pain   Pain Location Hip   Pain Orientation Left   Hospital Pain Intervention(s) Ambulation/increased activity;Repositioned;Cold applied  (RN provided medications)   Response to Interventions tolerated   Home Living   Type of [address] [place] [address] Two level; Able to live on main level with bedroom/bathroom;Stairs to enter with rails  (BSC 1st floor, 4+4 DANIELITO (front), back (14 steps w/ b/l rails))   Bathroom Shower/Tub Tub/shower unit   Bathroom Toilet Standard   Bathroom Equipment Commode  (shower stool)   Bathroom Accessibility Accessible   Home Equipment Walker;Cane;Sock aid;Reacher   Additional Comments driving PTA   Prior Function   Level of [place] Independent with ADLs and functional mobility   Lives With Son  (reports son currently not home)   Receives Help From Family   ADL Assistance Independent   IADLs Independent   Falls in the last 6 months 0   Vocational Part time employment   Comments pt reports mother and sister live nearby and are able to assist her as needed   Lifestyle   Autonomy per pt independent w/ ADLs, independent IADLs, independent functional transfers and mobility, driving and working   Reciprocal Relationships family, mother and sister   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   Intrinsic Gratification watching tv, taking care of pets   ADL   Where Assessed Chair   Eating Assistance 6  Modified independent   Grooming Assistance 5  Supervision/Setup   UB Bathing Assistance 5  Supervision/Setup   LB Bathing Assistance 2  Maximal Assistance   UB Dressing Assistance 5  Supervision/Setup   LB Dressing Assistance 2  Maximal [address]  3  Moderate Assistance   Bed Mobility   Supine to Sit 4  Minimal assistance   Additional items Assist x 1; Increased time required;Verbal cues;LE management; Bedrails;HOB elevated   Additional Comments increased time and VCs to maintain THPs   Transfers   Sit to Stand 4  Minimal assistance   Additional items Assist x 1; Increased time required;Verbal cues; Impulsive   Stand to Sit 4  Minimal assistance   Additional items Assist x 1; Increased time required;Verbal cues; Impulsive;Armrests   Additional Comments VCs for UE positioning and operated L LE positioning   Functional Mobility   Functional Mobility 4  Minimal assistance   Additional Comments assist x 1  w/ VCs for safety and positioning   Additional items Rolling walker   Balance   Static Sitting Good   Dynamic Sitting Fair +   Static Standing Fair   Dynamic Standing Fair -   Ambulatory Poor +   Activity Tolerance   Activity Tolerance Patient limited by fatigue;Patient limited by pain   Nurse Made Aware appropriate to see per Klaudia BARNETT   RUJOSIAH Assessment   RUE Assessment WFL  (4/5)   LUE Assessment   LUE Assessment WFL  (4/5)   Hand Function   Gross Motor Coordination Functional   Fine Motor Coordination Functional   Sensation   Light Touch No apparent deficits   Sharp/Dull No apparent deficits Proprioception   Proprioception No apparent deficits   Vision-Basic Assessment   Current Vision No visual deficits   Vision - Complex Assessment   Ocular Range of Motion WFL   Acuity Able to read clock/calendar on wall without difficulty   Perception   Inattention/Neglect Appears intact   Cognition   Overall Cognitive Status [facility]   Arousal/Participation Alert; Cooperative   Attention Within functional limits   Orientation Level Oriented X4   Memory Decreased short term memory;Decreased recall of precautions   Following Commands Follows one step commands without difficulty   Comments pt able to recall 2/3 THPS at start of session   Assessment   Limitation Decreased ADL status; Decreased UE strength;Decreased Safe judgement during ADL;Decreased cognition;Decreased endurance;Decreased self-care trans;Decreased high-level ADLs  (total hip precautions)   Prognosis Good   Assessment Pt is a 46 y o  female seen for OT evaluation s/p admit to [facility]  on 3/5/2018 w/ Primary osteoarthritis of one hip, left, s/p total hip replacement, posterior approach,WBAT w/ total hip precautions  Comorbidities affecting pt's functional performance at time of assessment include: OA, anxiety, depression  Personal factors affecting pt at time of IE include:will be alone, however family able to assist her as needed  Prior to admission, pt was living alone and reports independent w/ ADLs, independent w/ IADLs, independent w/ functional transfers and mobility w/ no AD, driving and working   Upon evaluation: Pt requires MIN assist supine>sit bed mobility w/ VCs for positioning and cues to maintain total hip precautions, MIN assist sit<>stand w/ VCs for positioning of UEs and operated LE, MAX assist LB ADLS, setup UB ADLs, MOD assist toileting 2* the following deficits impacting occupational performance: decreased strength and endurance, impaired balance, impaired activity tolerance, increased pain, impulsive, total hip precautions, decreased activity tolerance  Pt to benefit from continued skilled OT tx while in the hospital to address deficits as defined above and maximize level of functional independence w ADL's and functional mobility  Occupational Performance areas to address include: grooming, bathing/shower, toilet hygiene, dressing, functional mobility and clothing management, review of THR packet and THPS, [address] [employer] training  Pt able to recall 2/3 THPS, requires continued education  From OT standpoint, recommendation at time of d/c would be home w/ family support  Goals   Patient Goals "to walk my dog again"   LTG Time Frame 3-7   Long Term Goal please see below goals   Plan   Treatment Interventions ADL retraining;Functional transfer training;UE strengthening/ROM; Endurance training;Cognitive reorientation;Patient/family training;Equipment evaluation/education; Compensatory technique education; Energy conservation; Activityengagement   Goal Expiration Date 03/13/18   Treatment Day 1   OT Frequency 3-5x/wk   Additional Treatment Session   Start Time 0932   End Time 3603   Treatment Assessment Pt seen for skilled OT session focused on [address] [place] [employer] training, and home safety education  OT reviewed total hip replacement packet and precautions w/ pt and pt receptive, pt educated on continuing to review packet  Pt educated on use of dressing stick to doff socks and sock aide to don socks and pt completed w/ MIN assist  Pt educated on dressing operated L LE first, pt verbalized understanding  Pt educated on environmental placement of items, removal of throw rugs and use of a bag to transport items when returning home, pt receptive to education  Pt seated in bedside chair w/ abductor pillow in place at end of session  Pt continues to be limited due to decreased endurance, THPs, impulsivity, impaired balance, increased pain   Will continue to follow to address OT goals   Additional Treatment Day 1   Recommendation   OT Discharge Recommendation Home with family support   Barthel Index   Feeding 10   Bathing 0   Grooming Score 5   Dressing Score 5   Bladder Score 0   Bowels Score 10   Toilet Use Score 5   Transfers (Bed/Chair) Score 10   Mobility (Level Surface) Score 0   Stairs Score 0   Barthel Index Score 45   Modified [place] Scale   Modified [place] Scale 4      Occupational Therapy Goals to be met in 3-7 days:  1) Pt will improve activity tolerance to G for min 30 min txment sessions  2) Pt will complete ADLs/self care w/ mod I w/ LHAE  3) Pt will complete toileting w/ mod I w/ G hygiene/thoroughness using DME PRN  4) Pt will improve functional transfers on/off all surfaces using DME PRN w/ G balance/safety including toileting w/ mod I  5) Pt will improve fx'l mobility during I/ADl/leisure tasks using DME PRN w/ g balance/safety w/ mod I  6) Pt will engage in ongoing cognitive assessment w/ G participation to A w/ safe d/c planning/recommendations  7) Pt will demonstrate G carryover of pt/caregiver education and training as appropriate w/ mod I  w/ G tolerance  8) Pt will demonstrate improved bed mobility to mod I  9) Pt will demonstrate 100% carryover of LHAE for LB ADLs/self care and leisure s/p skilled education w/ mod I and G participation  10) Pt will demonstrate improved standing tolerance to 3-5 minutes during functional tasks/simulated IADLs w/ no LOB to enhance ADL performance  11) Pt will recall and  demonstrate 100% adherence to total hip precautions t/o OT session      Documentation completed by: Trace Godfrey MS, OTR/L

## 2018-03-07 VITALS
WEIGHT: 130 LBS | HEART RATE: 87 BPM | SYSTOLIC BLOOD PRESSURE: 149 MMHG | TEMPERATURE: 98.5 F | OXYGEN SATURATION: 97 % | BODY MASS INDEX: 20.89 KG/M2 | RESPIRATION RATE: 18 BRPM | DIASTOLIC BLOOD PRESSURE: 84 MMHG | HEIGHT: 66 IN

## 2018-03-07 LAB
ANION GAP SERPL CALCULATED.3IONS-SCNC: 6 MMOL/L (ref 4–13)
BUN SERPL-MCNC: 15 MG/DL (ref 5–25)
CALCIUM SERPL-MCNC: 8.3 MG/DL (ref 8.3–10.1)
CHLORIDE SERPL-SCNC: 106 MMOL/L (ref 100–108)
CO2 SERPL-SCNC: 26 MMOL/L (ref 21–32)
CREAT SERPL-MCNC: 0.75 MG/DL (ref 0.6–1.3)
ERYTHROCYTE [DISTWIDTH] IN BLOOD BY AUTOMATED COUNT: 12.7 % (ref 11.6–15.1)
GFR SERPL CREATININE-BSD FRML MDRD: 93 ML/MIN/1.73SQ M
GLUCOSE SERPL-MCNC: 108 MG/DL (ref 65–140)
HCT VFR BLD AUTO: 31.9 % (ref 34.8–46.1)
HGB BLD-MCNC: 10.8 G/DL (ref 11.5–15.4)
MCH RBC QN AUTO: 32.2 PG (ref 26.8–34.3)
MCHC RBC AUTO-ENTMCNC: 33.9 G/DL (ref 31.4–37.4)
MCV RBC AUTO: 95 FL (ref 82–98)
PLATELET # BLD AUTO: 279 THOUSANDS/UL (ref 149–390)
PMV BLD AUTO: 9.9 FL (ref 8.9–12.7)
POTASSIUM SERPL-SCNC: 4 MMOL/L (ref 3.5–5.3)
RBC # BLD AUTO: 3.35 MILLION/UL (ref 3.81–5.12)
SODIUM SERPL-SCNC: 138 MMOL/L (ref 136–145)
WBC # BLD AUTO: 10.44 THOUSAND/UL (ref 4.31–10.16)

## 2018-03-07 PROCEDURE — 97530 THERAPEUTIC ACTIVITIES: CPT

## 2018-03-07 PROCEDURE — 80048 BASIC METABOLIC PNL TOTAL CA: CPT | Performed by: ORTHOPAEDIC SURGERY

## 2018-03-07 PROCEDURE — 99232 SBSQ HOSP IP/OBS MODERATE 35: CPT | Performed by: INTERNAL MEDICINE

## 2018-03-07 PROCEDURE — 85027 COMPLETE CBC AUTOMATED: CPT | Performed by: ORTHOPAEDIC SURGERY

## 2018-03-07 PROCEDURE — 97535 SELF CARE MNGMENT TRAINING: CPT

## 2018-03-07 PROCEDURE — 99024 POSTOP FOLLOW-UP VISIT: CPT | Performed by: ORTHOPAEDIC SURGERY

## 2018-03-07 PROCEDURE — 97116 GAIT TRAINING THERAPY: CPT

## 2018-03-07 PROCEDURE — 97110 THERAPEUTIC EXERCISES: CPT

## 2018-03-07 RX ORDER — DOCUSATE SODIUM 100 MG/1
100 CAPSULE, LIQUID FILLED ORAL 2 TIMES DAILY
Qty: 28 CAPSULE | Refills: 0 | Status: SHIPPED | OUTPATIENT
Start: 2018-03-07 | End: 2021-01-06 | Stop reason: CLARIF

## 2018-03-07 RX ORDER — OXYCODONE HYDROCHLORIDE 5 MG/1
TABLET ORAL
Qty: 60 TABLET | Refills: 0 | Status: SHIPPED | OUTPATIENT
Start: 2018-03-07 | End: 2021-01-06 | Stop reason: CLARIF

## 2018-03-07 RX ORDER — SENNOSIDES 8.6 MG
1 TABLET ORAL
Qty: 30 EACH | Refills: 0 | Status: SHIPPED | OUTPATIENT
Start: 2018-03-07 | End: 2021-01-06 | Stop reason: CLARIF

## 2018-03-07 RX ORDER — GABAPENTIN 100 MG/1
100 CAPSULE ORAL EVERY 8 HOURS SCHEDULED
Qty: 42 CAPSULE | Refills: 0 | Status: SHIPPED | OUTPATIENT
Start: 2018-03-07 | End: 2021-01-06 | Stop reason: CLARIF

## 2018-03-07 RX ADMIN — OXYCODONE HYDROCHLORIDE AND ACETAMINOPHEN 500 MG: 500 TABLET ORAL at 08:11

## 2018-03-07 RX ADMIN — OXYCODONE HYDROCHLORIDE 10 MG: 5 TABLET ORAL at 03:56

## 2018-03-07 RX ADMIN — FOLIC ACID 1 MG: 1 TABLET ORAL at 08:11

## 2018-03-07 RX ADMIN — DOCUSATE SODIUM 100 MG: 100 CAPSULE, LIQUID FILLED ORAL at 08:12

## 2018-03-07 RX ADMIN — METHYLPHENIDATE HYDROCHLORIDE 20 MG: 10 TABLET ORAL at 13:22

## 2018-03-07 RX ADMIN — PANTOPRAZOLE SODIUM 40 MG: 40 TABLET, DELAYED RELEASE ORAL at 06:06

## 2018-03-07 RX ADMIN — NICOTINE 7 MG: 7 PATCH, EXTENDED RELEASE TRANSDERMAL at 08:12

## 2018-03-07 RX ADMIN — HYDROMORPHONE HYDROCHLORIDE 0.5 MG: 1 INJECTION, SOLUTION INTRAMUSCULAR; INTRAVENOUS; SUBCUTANEOUS at 12:23

## 2018-03-07 RX ADMIN — FERROUS SULFATE TAB 325 MG (65 MG ELEMENTAL FE) 325 MG: 325 (65 FE) TAB at 08:11

## 2018-03-07 RX ADMIN — ENOXAPARIN SODIUM 40 MG: 40 INJECTION SUBCUTANEOUS at 08:11

## 2018-03-07 RX ADMIN — Medication 1 TABLET: at 08:11

## 2018-03-07 RX ADMIN — OXYCODONE HYDROCHLORIDE 10 MG: 5 TABLET ORAL at 08:12

## 2018-03-07 RX ADMIN — HYDROMORPHONE HYDROCHLORIDE 0.5 MG: 1 INJECTION, SOLUTION INTRAMUSCULAR; INTRAVENOUS; SUBCUTANEOUS at 07:26

## 2018-03-07 RX ADMIN — GABAPENTIN 100 MG: 100 CAPSULE ORAL at 06:06

## 2018-03-07 RX ADMIN — PAROXETINE HYDROCHLORIDE 30 MG: 30 TABLET, FILM COATED ORAL at 08:13

## 2018-03-07 RX ADMIN — METHYLPHENIDATE HYDROCHLORIDE 20 MG: 10 TABLET ORAL at 06:06

## 2018-03-07 RX ADMIN — OXYCODONE HYDROCHLORIDE 10 MG: 5 TABLET ORAL at 12:23

## 2018-03-07 NOTE — NURSING NOTE
Patient reviewed with nursing staff follow up appt, discharge medications, s/s of infection, pain management, and general discharge instructions  Patient was able to verbalize that is Ocycodone prescribe was not effective, she would contact her sergon in the AM  Patient also was made aware of the importance of her blood thinner and the complication symptoms of a blood clot  Patient verbalized understanding  Meds to bed was delivered and patient was escorted out of the hospital with all belongings/medications  Patient denied the need for a walker/bed side commode

## 2018-03-07 NOTE — POST OP PROGRESS NOTES
Progress Note - Orthopedics   Puma Lora 46 y o  female MRN: [id number]  Unit/Bed#: E2 -01 Encounter: [id number]    Assessment:  46 y o  female s/p left total hip arthroplasty POD 2, ABLA    Plan:  Pain control prn  PT/OT-WBAT left LE   Posterior hip precautions  Abduction pillow while in bed  Lovenox/TEDs/SCDs for DVT prophylaxis  Possible d/c home later today    Subjective: Pt S&E  Resting comfortably  Pain better controlled now than this AM   Denies abdominal pain, SOB  Vitals: Blood pressure 149/84, pulse 87, temperature 98 5 °F (36 9 °C), temperature source Temporal, resp  rate 18, height 5' 6" (1 676 m), weight 59 kg (130 lb), SpO2 97 %  ,Body mass index is 20 98 kg/m²        Intake/Output Summary (Last 24 hours) at 03/07/18 1036  Last data filed at 03/07/18 4351   Gross per 24 hour   Intake             1445 ml   Output             1900 ml   Net             -455 ml       Invasive Devices     Peripheral Intravenous Line            Peripheral IV 03/05/18 Right Wrist 1 day                Ortho Exam: Cataldo Double:  Drsg:  C/D/I, sensation grossly intact L4, L5, S1, palpable pedal pulse, EHL/AT/GS intact    Lab, Imaging and other studies:   CBC:   Lab Results   Component Value Date    WBC 10 44 (H) 03/07/2018    HGB 10 8 (L) 03/07/2018    HCT 31 9 (L) 03/07/2018    MCV 95 03/07/2018     03/07/2018    MCH 32 2 03/07/2018    MCHC 33 9 03/07/2018    RDW 12 7 03/07/2018    MPV 9 9 03/07/2018     CMP:   Lab Results   Component Value Date     03/07/2018     03/07/2018    CO2 26 03/07/2018    ANIONGAP 6 03/07/2018    BUN 15 03/07/2018    CREATININE 0 75 03/07/2018    GLUCOSE 108 03/07/2018    CALCIUM 8 3 03/07/2018    EGFR 93 03/07/2018

## 2018-03-07 NOTE — PHYSICAL THERAPY NOTE
PT PROGRESS NOTE     03/07/18 0856   Pain Assessment   Pain Score 5   Pain Type Acute pain;Surgical pain   Pain Location Hip   Pain Orientation Left   Hospital Pain Intervention(s) Medication (See MAR); Cold applied;Repositioned; Ambulation/increased activity; Emotional support   Restrictions/Precautions   LLE Weight Bearing Per Order WBAT   Other Precautions Fall Risk;Pain;THR   General   Chart Reviewed Yes   Response to Previous Treatment Patient with no complaints from previous session  Family/Caregiver Present No   Cognition   Overall Cognitive Status WFL   Arousal/Participation Alert; Cooperative   Attention Within functional limits   Following Commands Follows all commands and directions without difficulty   Subjective   Subjective Pt OOB in chair upon my arrival  Pt agreeable to therapy  Bed Mobility   Sit to Supine 5  Supervision   Additional items HOB elevated; Bedrails; Increased time required;Verbal cues;LE management   Additional Comments pt able to self assist LLE into bed;    Transfers   Sit to Stand 5  Supervision   Additional items Armrests; Increased time required;Verbal cues   Stand to Sit 5  Supervision   Additional items Increased time required;Verbal cues   Additional Comments cues for LLE positioning   Ambulation/Elevation   Gait pattern Decreased foot clearance;Decreased L stance; Excessively slow  (inconsistent strides; dec hip flexion)   Gait Assistance 5  Supervision   Additional items Verbal cues   Assistive Device Rolling walker   Distance 350'x1   Stair Management Assistance 5  Supervision   Additional items Verbal cues; Increased time required   Stair Management Technique Step to pattern; Foreward;Nonreciprocal;Two rails   Number of Stairs 5  (x2)   Balance   Static Sitting Normal   Static Standing Fair +   Ambulatory Fair   Endurance Deficit   Endurance Deficit No   Activity Tolerance   Activity Tolerance Patient limited by pain   Nurse Made Aware Isak Mould   Exercises   THR Supine;10 reps;AAROM;AROM; Left  (AAROM for SLR & hip abduction)   Assessment   Prognosis Good   Problem List Decreased strength;Decreased range of motion;Decreased endurance; Impaired balance;Decreased mobility;Pain;Orthopedic restrictions   Assessment Pt seen for PT per POC  Improved mobility & activity tolerance noted this tx session  See above levels of assistance required for all functional tasks  Gait deviations as above but no gross LOB noted  Pt demonstrates occasional L hip circumduction & vaulting 2* to dec hip/knee flexion during swing  Pt able to demonstrate reciprocal gait but require occasional cues to dec strides  Pt tolerated above mentioned thera  ex well, AAROM w/ SLR & hip abduction  Will continue PT per POC  Will continue to recommend HHPT, RW, BSC & family support at D/C  Nsg staff to continue to mobilized pt (OOB in chair for all meals & ambulate in room/unit) as tolerated to prevent decline in function  Nsg notified  Barriers to Discharge Decreased caregiver support; Inaccessible home environment   Goals   Patient Goals less pain   STG Expiration Date 03/13/18   Treatment Day 2   Plan   Treatment/Interventions Functional transfer training;LE strengthening/ROM; Elevations; Therapeutic exercise; Endurance training;Patient/family training;Bed mobility;Gait training;Spoke to nursing   Progress Progressing toward goals   PT Frequency Twice a day;7x/wk; Weekend   Recommendation   Recommendation Home PT; Home with family support   Equipment Recommended Other (Comment)  (RW & BSC)   PT - OK to Discharge No  (pt to achieve PT goals prior to D/C home)   Ramonia Screen, PT

## 2018-03-07 NOTE — PHYSICAL THERAPY NOTE
PT PROGRESS NOTE     03/07/18 1300   Restrictions/Precautions   LLE Weight Bearing Per Order WBAT   Other Precautions Fall Risk;Pain;THR   General   Family/Caregiver Present Yes   Cognition   Overall Cognitive Status WFL   Arousal/Participation Alert; Cooperative   Attention Within functional limits   Orientation Level Oriented X4   Following Commands Follows all commands and directions without difficulty   Subjective   Subjective E2 unit manager Shamar Green requested this PT to check pt if appropriate for D/C today as pt adamant to D/C home now  Bed Mobility   Sit to Supine 6  Modified independent   Additional items Increased time required;LE management   Additional Comments pt able to self assist LLE   Transfers   Sit to Stand 6  Modified independent   Additional items Armrests   Stand to Sit 6  Modified independent   Ambulation/Elevation   Gait pattern Decreased foot clearance;Decreased L stance; Short stride; Step to;Excessively slow; Antalgic   Gait Assistance 5  Supervision   Additional items Verbal cues   Assistive Device Rolling walker   Distance 5'x1   Balance   Static Sitting Normal   Static Standing Fair +   Ambulatory Fair   Activity Tolerance   Activity Tolerance Patient limited by pain   Nurse Made Aware [place] Fire   Exercises   THR (given & instructed written HEP)   Assessment   Prognosis Good   Problem List Decreased strength;Decreased range of motion; Impaired balance;Pain;Decreased mobility;Orthopedic restrictions   Assessment Pt seen to assess appropriateness for D/C as pt adamant about going home today  Upon my arrival, pt tearful & upset due to unmanaged pain 2* to inc delay getting her pain medication  Pt reports that she had to wait 3 hrs to get her pain meds + same incident last night that she had to wait 2 hrs for pain meds as well  Pt verbalized her dissatisfaction with her care here  Given emotional support & validation, pt responded very well   Discussed current functional status & PT standpoint re: D/C status  From PT standpoint, pt may D/C home when medically cleared  Pt state no concerns about going home today & she feels confident that she is doing well that she can manage at home w/ family support  Pt also state that she can manage her pain better at home than in here  Pt's only request is to be D/C ASAP due to inclement weather  Pt did demonstrate improved function this tx session, please see above for details  Pt instructed on HEP, car transfers, home safety & mgt w/ good understanding  Will continue to recommend HHPT at D/C  Unit Manager Adriana Givens, [address]  notified re: D/C  Barriers to Discharge None   Goals   Patient Goals less pain & to go home ASAP   STG Expiration Date 03/13/18   Treatment Day 3   Plan   Treatment/Interventions Functional transfer training;LE strengthening/ROM; Elevations; Therapeutic exercise; Endurance training;Patient/family training;Bed mobility;Gait training;Spoke to nursing;Spoke to case management; Family   Progress Improving as expected   PT Frequency Twice a day;7x/wk; Weekend   Recommendation   Recommendation Home PT; Home with family support   Equipment Recommended Other (Comment)  (pt has RW & BSC at home)   PT - OK to Discharge Yes  (when medically cleared)   Dari Arellano, PT

## 2018-03-07 NOTE — PLAN OF CARE
Problem: PHYSICAL THERAPY ADULT  Goal: Performs mobility at highest level of function for planned discharge setting  See evaluation for individualized goals  Treatment/Interventions: Functional transfer training, LE strengthening/ROM, Elevations, Therapeutic exercise, Endurance training, Patient/family training, Bed mobility, Gait training, Spoke to nursing, OT  Equipment Recommended: Other (Comment) (pt has RW & BSC at home)       See flowsheet documentation for full assessment, interventions and recommendations  Outcome: Progressing  Prognosis: Good  Problem List: Decreased strength, Decreased range of motion, Decreased endurance, Impaired balance, Decreased mobility, Pain, Orthopedic restrictions  Assessment: Pt seen for PT per POC  Improved mobility & activity tolerance noted this tx session  See above levels of assistance required for all functional tasks  Gait deviations as above but no gross LOB noted  Pt demonstrates occasional L hip circumduction & vaulting 2* to dec hip/knee flexion during swing  Pt able to demonstrate reciprocal gait but require occasional cues to dec strides  Pt tolerated above mentioned thera  ex well, AAROM w/ SLR & hip abduction  Will continue PT per POC  Will continue to recommend HHPT, RW, BSC & family support at D/C  Nsg staff to continue to mobilized pt (OOB in chair for all meals & ambulate in room/unit) as tolerated to prevent decline in function  Nsg notified  Barriers to Discharge: Decreased caregiver support, Inaccessible home environment     Recommendation: Home PT, Home with family support     PT - OK to Discharge: No (pt to achieve PT goals prior to D/C home)    See flowsheet documentation for full assessment

## 2018-03-07 NOTE — PLAN OF CARE
Problem: OCCUPATIONAL THERAPY ADULT  Goal: Performs self-care activities at highest level of function for planned discharge setting  See evaluation for individualized goals  Treatment Interventions: ADL retraining, Functional transfer training, UE strengthening/ROM, Endurance training, Cognitive reorientation, Patient/family training, Equipment evaluation/education, Compensatory technique education, Energy conservation, Activityengagement          See flowsheet documentation for full assessment, interventions and recommendations  Outcome: Progressing  Limitation: Decreased ADL status, Decreased UE strength, Decreased Safe judgement during ADL, Decreased cognition, Decreased endurance, Decreased self-care trans, Decreased high-level ADLs (total hip precautions)  Prognosis: Good  Assessment: Pt was seen for skilled OT with focus on completion of self care routine, review of use of AE for LE dressing, review of hip safety packet with application in home environment and review of RW safety and fall prevention  Pt with initial tears and pain noted with greeting this tx session  10/10 pain initially noted  Reported findings to nursing staff  Pt reports having F understanding of THP's though only recalling 2/3 when questioned  SBA overall for UE self care  Min A with cues for appropriate use of LHAE to don socks, underwear and pants  Inconsistent recall noted to lift head  With rise and descent with use functional transfers and reach for clothing management  Improvement noted following redirection through out tx session  See above levels of a required for all functional tasks  Pt will benefit from family support and continued use of LHAE and bedside commode that she has at home        OT Discharge Recommendation: Home with family support         Comments: Cori Vidal [place]

## 2018-03-07 NOTE — PLAN OF CARE
Problem: PHYSICAL THERAPY ADULT  Goal: Performs mobility at highest level of function for planned discharge setting  See evaluation for individualized goals  Treatment/Interventions: Functional transfer training, LE strengthening/ROM, Elevations, Therapeutic exercise, Endurance training, Patient/family training, Bed mobility, Gait training, Spoke to nursing, OT  Equipment Recommended: Other (Comment) (pt has RW & BSC at home)       See flowsheet documentation for full assessment, interventions and recommendations  Outcome: Adequate for Discharge  Prognosis: Good  Problem List: Decreased strength, Decreased range of motion, Impaired balance, Pain, Decreased mobility, Orthopedic restrictions  Assessment: Pt seen to assess appropriateness for D/C as pt adamant about going home today  Upon my arrival, pt tearful & upset due to unmanaged pain 2* to inc delay getting her pain medication  Pt reports that she had to wait 3 hrs to get her pain meds + same incident last night that she had to wait 2 hrs for pain meds as well  Pt verbalized her dissatisfaction with her care here  Given emotional support & validation, pt responded very well  Discussed current functional status & PT standpoint re: D/C status  From PT standpoint, pt may D/C home when medically cleared  Pt state no concerns about going home today & she feels confident that she is doing well that she can manage at home w/ family support  Pt also state that she can manage her pain better at home than in here  Pt's only request is to be D/C ASAP due to inclement weather  Pt did demonstrate improved function this tx session, please see above for details  Pt instructed on HEP, car transfers, home safety & mgt w/ good understanding  Will continue to recommend HHPT at D/C  Unit Manager Christy Siegel, [address]  notified re: D/C     Barriers to Discharge: None     Recommendation: Home PT, Home with family support     PT - OK to Discharge: Yes (when medically cleared)    See flowsheet documentation for full assessment

## 2018-03-07 NOTE — PROGRESS NOTES
Patient was given Dilaudid 0 5mg at 07:26, Oxycodone IR 10mg at 08:12  Patient was next eligible for pain medication at 11:30 At 12:00 Patient's call bell was answered by staff  Patient claimed her pain was uncontrolled and was yelling at staff  RN did respond that she was able to receive Dilaudid at that time  Dispite staffs attempts to reeducate patient on available medications and times available, the patient only escalated in anxiety and anger  Shortly following the PRN pain medication administration, patient was observed to be in a calm manner  Dr Tracy Chinchilla was made aware of the patient's current condition and request to be discharged  Discharge order was placed  Patient was educated to call office in AM if pain management become an issue

## 2018-03-07 NOTE — PROGRESS NOTES
Patient laying in bed on her right side  Patient has 2 pillows between legs  Patient was educated on the importance of not crossing legs because of her posterior hip surgery

## 2018-03-07 NOTE — PROCEDURES
Procedure    Indication: Hip pain  Preoperative diagnosis: Hip arthropathy  Postoperative diagnosis: Hip arthropathy    Procedure: Fluoroscopically-guided left intraarticular hip injection      After discussing the risks, benefits, and alternatives to the procedure, the patient expressed understanding and wished to proceed  The patient was brought to the fluoroscopic suite and placed in the supine position  Procedural pause conducted to verify: correct patient identity, procedure to be performed, and as applicable, correct side and site, correct patient position, and availability of implants, special equipment and special requirements  The femoral artery was palpated  Using fluoroscopy, an AP view was utilized to visualize the hip joint  Next, a point at the junction of the ipsilateral femoral head and femoral neck was identified, and noted to be a safe distance lateral to the pulsation of the aforementioned femoral artery  The skin was sterilely prepped and draped in the usual fashion using Chloraprep skin prep  The skin and subcutaneous tissues were anesthetized with 0 5% lidocaine  Using fluoroscopic guidance, a 3 5 inch 22 gauge spinal needle was advanced into the joint capsule  After negative aspiration, Omnipaque 300 contrast was injected which confirmed intra-articular placement without evidence of intravascular uptake  A 4 ml solution consisting of 40 mg of Depo-Medrol in 3 mL of 0 25% bupivacaine was injected  The needle was withdrawn from the skin while being flushed with 0 5% buffered lidocaine  The patient tolerated the procedure well, and there were no apparent complications  After appropriate observation, the patient was dismissed from the outpatient procedure area in good condition under their own power               Signatures   Electronically signed by : Tiana Turcios DO; Dec 29 2017 10:48AM EST                       (Author)

## 2018-03-07 NOTE — OCCUPATIONAL THERAPY NOTE
Occupational Therapy Treatment Note:       03/07/18 0810   Restrictions/Precautions   Weight Bearing Precautions Per Order Yes   Other Precautions Pain; Fall Risk   Pain Assessment   Pain Assessment 0-10   Pain Score Worst Possible Pain   Pain Type Acute pain   Pain Location Hip   Pain Orientation Left   Pain Descriptors Aching   Pain Frequency Constant/continuous   Pain Onset Ongoing   Clinical Progression Rapidly improving   Hospital Pain Intervention(s) Cold applied   Response to Interventions Pt with smiles and very talkative following reassurance and emotional support provided  ADL   Where Assessed Chair   Grooming Assistance 5  Supervision/Setup   Grooming Deficit Setup   UB Bathing Assistance 5  Supervision/Setup   UB Bathing Deficit Setup   LB Bathing Assistance 4  Minimal Assistance   LB Bathing Deficit Setup;Steadying;Verbal cueing;Supervision/safety; Increased time to complete;Perineal area; Buttocks; Left lower leg including foot   LB Bathing Comments No LOB noted with activity  UB Dressing Assistance 5  Supervision/Setup   UB Dressing Deficit Setup   LB Dressing Assistance 4  Minimal Assistance   LB Dressing Deficit Setup;Steadying; Requires assistive device for steadying;Verbal cueing;Supervision/safety; Increased time to complete; Don/doff L sock; Don/doff R sock; Thread LLE into pants; Thread RLE into pants; Thread RLE into underwear; Thread LLE into underwear;Pull up over hips   LB Dressing Comments cues to carry over appropriate techs required  Toileting Assistance  4  Minimal Assistance   Toileting Deficit Setup;Steadying;Verbal cueing;Supervison/safety; Increased time to complete; Bedside commode;Clothing management down;Clothing management up;Perineal hygiene   Toileting Comments Pt has commode at home  Functional Standing Tolerance   Time 3 mins  Activity functional mobility  Comments No LOB noted with activity      Transfers   Sit to Stand 5  Supervision   Additional items Assist x 1 Stand to Sit 5  Supervision   Additional items Assist x 1   Stand pivot 5  Supervision   Additional items Assist x 1   Toilet transfer 4  Minimal assistance   Additional items Assist x 1; Increased time required;Verbal cues; Commode   Additional Comments cues to lift head with activity  Functional Mobility   Functional Mobility 4  Minimal assistance   Additional Comments x1   Additional items Rolling walker   Cognition   Overall Cognitive Status WFL   Arousal/Participation Alert   Attention Within functional limits   Orientation Level Oriented X4   Memory Decreased recall of precautions   Following Commands Follows multistep commands with increased time or repetition   Comments Pt with need for redirection to carry over reviewed techs to maintain THP's with self care routine  Activity Tolerance   Activity Tolerance Patient limited by fatigue;Patient limited by pain   Medical Staff Made Aware Reported all findings to nursing staff  Assessment   Assessment Pt was seen for skilled OT with focus on completion of self care routine, review of use of AE for LE dressing, review of hip safety packet with application in home environment and review of RW safety and fall prevention  Pt with initial tears and pain noted with greeting this tx session  10/10 pain initially noted  Reported findings to nursing staff  Pt reports having F understanding of THP's though only recalling 2/3 when questioned  SBA overall for UE self care  Min A with cues for appropriate use of LHAE to don socks, underwear and pants  Inconsistent recall noted to lift head  With rise and descent with use functional transfers and reach for clothing management  Improvement noted following redirection through out tx session  See above levels of a required for all functional tasks  Pt will benefit from family support and continued use of LHAE and bedside commode that she has at home      Plan   Treatment Interventions ADL retraining;Functional transfer training   Goal Expiration Date 03/13/18   Treatment Day 2   OT Frequency 3-5x/wk   Recommendation   OT Discharge Recommendation Home with family support   Barthel Index   Feeding 10   Bathing 0   Grooming Score 5   Dressing Score 5   Bladder Score 0   Bowels Score 10   Toilet Use Score 5   Transfers (Bed/Chair) Score 10   Mobility (Level Surface) Score 0   Stairs Score 0   Barthel Index Score 45   Modified Slaton Scale   Modified Slaton Scale 4   Charlotte Navarro, [address]

## 2018-03-07 NOTE — PROGRESS NOTES
[facility] Progress Note  Patient: Devika Kim 46 y o  female   MRN: [id number]  PCP: Gemini Mo DO  Unit/Bed#: E2 [facility] Encounter: [id number]  Date Of Visit: 18    Assessment:    Principal Problem:    Primary osteoarthritis of one hip, left  Active Problems:    Anxiety    Current smoker    Painless hematuria    Mild anemia      Plan:  · Postop day #2   Status post left total hip arthroplasty  Continue with pain control as needed  Weightbearing as tolerated per physical therapy  · Mild anemia likely postoperative blood loss anemia  Continue with iron supplementation  Patient will be medically stable for discharge  · History of hematuria  Outpatient Urology follow-up  · Tobacco abuse  Counseled on cessation  VTE Pharmacologic Prophylaxis:   Pharmacologic: Enoxaparin (Lovenox)  Mechanical VTE Prophylaxis in Place: No      Time Spent for Care: 15 minutes  More than 50% of total time spent on counseling and coordination of care as described above  Current Length of Stay: 2 day(s)    Current Patient Status: Inpatient   Discharge Plan / Estimated Discharge Date:  Per primary team    Code Status: No Order      Subjective:   Complains of pain at surgical site    Objective:     Vitals:   Temp (24hrs), Av °F (37 2 °C), Min:98 3 °F (36 8 °C), Max:99 8 °F (37 7 °C)    HR:  [66-95] 87  Resp:  [16-20] 18  BP: (116-149)/(60-84) 149/84  SpO2:  [94 %-97 %] 97 %  Body mass index is 20 98 kg/m²  Input and Output Summary (last 24 hours): Intake/Output Summary (Last 24 hours) at 18 0936  Last data filed at 18 7710   Gross per 24 hour   Intake             1445 ml   Output             1900 ml   Net             -455 ml       Physical Exam:     Physical Exam   Constitutional: She is oriented to person, place, and time  No distress  HENT:   Head: Normocephalic and atraumatic  Eyes: Conjunctivae are normal  Pupils are equal, round, and reactive to light  Neck: Normal range of motion  Neck supple  Cardiovascular: Normal rate and regular rhythm  Pulmonary/Chest: Effort normal and breath sounds normal    Abdominal: Soft  Bowel sounds are normal    Musculoskeletal: She exhibits no edema  Neurological: She is alert and oriented to person, place, and time  Skin: Skin is warm  She is not diaphoretic  Psychiatric: She has a normal mood and affect  Additional Data:     Labs:      Results from last 7 days  Lab Units 03/07/18  0423   WBC Thousand/uL 10 44*   HEMOGLOBIN g/dL 10 8*   HEMATOCRIT % 31 9*   PLATELETS Thousands/uL 279       Results from last 7 days  Lab Units 03/07/18  0423   SODIUM mmol/L 138   POTASSIUM mmol/L 4 0   CHLORIDE mmol/L 106   CO2 mmol/L 26   BUN mg/dL 15   CREATININE mg/dL 0 75   CALCIUM mg/dL 8 3   GLUCOSE RANDOM mg/dL 108           * I Have Reviewed All Lab Data Listed Above  * Additional Pertinent Lab Tests Reviewed:  Clark Garcia Admission Reviewed    Imaging:    Imaging Reports Reviewed Today Include: NA    Recent Cultures (last 7 days):           Last 24 Hours Medication List:     Current Facility-Administered Medications:  acetaminophen 650 mg Oral Q6H PRN Esthela Pale, DO    ALPRAZolam 0 5 mg Oral TID PRN Esthela Pale, DO    ascorbic acid 500 mg Oral Daily Esthela Pale, DO    calcium carbonate 1,000 mg Oral Daily PRN Esthela Pale, DO    docusate sodium 100 mg Oral BID Esthela Pale, DO    enoxaparin 40 mg Subcutaneous Daily Elizabeth Valentin, DO    ferrous sulfate 325 mg Oral Daily With Breakfast Elizabeth Valentin, DO    folic acid 1 mg Oral Daily Esthela Pale, DO    gabapentin 100 mg Oral Q8H [address] Elizabeth Valentin, DO    HYDROmorphone 0 5 mg Intravenous Q3H PRN Esthela Pale, DO    methylphenidate 20 mg Oral BID before breakfast/lunch Esthela Pale, DO    multivitamin-minerals 1 tablet Oral Daily Elizabeth Valentin, DO    nicotine 7 mg Transdermal Daily Esthela Pale, DO ondansetron 4 mg Intravenous Q6H PRN [place] Balboa, DO    oxyCODONE 10 mg Oral Q3H PRN Branden Balboa, DO    oxyCODONE 5 mg Oral Q3H PRN Branden Balboa, DO    pantoprazole 40 mg Oral Early Morning Branden Balboa, DO    PARoxetine 30 mg Oral Daily Branden Balboa, DO    senna 1 tablet Oral HS PRN Branden Balboa, DO    simethicone 80 mg Oral 4x Daily PRN [place] Balboa, DO    sodium chloride 100 mL/hr Intravenous Continuous [place] Brandyoa, DO Last Rate: Stopped (03/06/18 2001)        Today, Patient Was Seen By: Mariaa Victor MD    ** Please Note: This note has been constructed using a voice recognition system   **

## 2018-03-08 ENCOUNTER — TELEPHONE (OUTPATIENT)
Dept: OBGYN CLINIC | Facility: HOSPITAL | Age: 48
End: 2018-03-08

## 2018-03-08 ENCOUNTER — TELEPHONE (OUTPATIENT)
Dept: OBGYN CLINIC | Facility: MEDICAL CENTER | Age: 48
End: 2018-03-08

## 2018-03-08 ENCOUNTER — TRANSITIONAL CARE MANAGEMENT (OUTPATIENT)
Dept: FAMILY MEDICINE CLINIC | Facility: CLINIC | Age: 48
End: 2018-03-08

## 2018-03-08 NOTE — DISCHARGE SUMMARY
Admission diagnosis: L hip OA  D/C diagnosis: L hip OA  Procedure: L MARCIA  Date of surgery:3/5/18  Admission dates: 3/5/18-3/7/18    Pricilla Garcia presented to the hospital on 3/5/2018 with Left hip arthritis for a total hip arthoplasty  She underwent the procedure that same day and the details of the procedure can be found in the operative note  Post-operatively She was placed on a pain and bowel regimen  She was made WBAT of the Left lower extremity and consults were placed to physical therapy and occupational therapy as well as internal medicine who all saw him throughout Her admission  She was placed on posterior hip precautions  She was started on Lovenox and sequential compression as well as REBECA stockings were used for DVT prophylaxis  She received 24 hours of antibiotic prophylaxis  On POD 1 labs were checked  Her dressing was found to be clean, dry, and intact  She worked with physical therapy  On POD 2 labs were checked  Her dressing was found to be clean, dry, and intact  She worked with physical therapy  Later that day She was found to be safe and stable for discharge to Home  Discharge instructions were provided as well as needed prescriptions          Results from last 7 days  Lab Units 03/07/18  0423 03/06/18  0450   WBC Thousand/uL 10 44* 11 17*   HEMOGLOBIN g/dL 10 8* 10 9*   HEMATOCRIT % 31 9* 33 2*   PLATELETS Thousands/uL 279 289           Results from last 7 days  Lab Units 03/07/18  0423 03/06/18  0450   SODIUM mmol/L 138 141   POTASSIUM mmol/L 4 0 4 5   CHLORIDE mmol/L 106 111*   CO2 mmol/L 26 22   BUN mg/dL 15 21   CREATININE mg/dL 0 75 0 76   GLUCOSE RANDOM mg/dL 108 117   CALCIUM mg/dL 8 3 7 3*

## 2018-03-08 NOTE — TELEPHONE ENCOUNTER
Patient called this morning to update us  She states she was able to  Lovenox for 40 days supply  She does not have the aspirin  She states she also got her script filled for oxycodone  She also was discussing that she had some concerns about her hospital stay  She states her pain was not well controlled during the hospital stay  She states that she would ring the Bell for 5 times before  the nurse's would come in  She states the nurse was not giving her the pain medication on schedule  She would go 5-6 hours in between medication  She states now she is home and able to take her oxycodone on her own she is feeling much better  She just wanted to make us aware that she was not happy with her hospital stay  All patient's questions and concerns were answered  She knows to call with any issues

## 2018-03-08 NOTE — TELEPHONE ENCOUNTER
Call placed for post-op follow up, spoke with pt  AVS and AVS med list reviewed with pt  Pt reports JACKI SAM did her intake today for home services, she reports PT has not been out to her home yet "but the nurse told me they would be"  Pt reports she is doing her home exercises given by the hospital   Pt denying any pain currently  She reports she is taking 1-2 tablets of oxycodone Q4 hours and gabapentin Q8 hours for pain  She reports she is doing her lovenox and denies bleeding from any source  She confirms she is taking one capsule of docusate BID, LBM today, she denies black/bloody stools  Pt reports she is not taking the voltaren tablets as advised by her physician  She also resumed her paxil daily, pt denies anxiety/SI/HI  Pt reports she is wearing BL REBECA stockings as recommended by her surgeon  Pt reports she is ambulating with a walker, she denies falls  Pt reports her incision is still covered "but it is clean and dry", she denies any signs of drainage/bleeding  Pt reports that "there isn't much swelling at all"  Pt denies CP/SOB/dizziness/calf pain/fevers  Pt denies having questions/concerns at this time

## 2018-03-09 ENCOUNTER — TELEPHONE (OUTPATIENT)
Dept: FAMILY MEDICINE CLINIC | Facility: CLINIC | Age: 48
End: 2018-03-09

## 2018-03-09 NOTE — CASE MANAGEMENT
Notification of Discharge  This is a Notification of Discharge from our facility [address]  Please be advised that this patient has been discharge from our facility  Below you will find the admission and discharge date and time including the patients disposition  PRESENTATION DATE: 3/5/2018 10:48 AM  IP ADMISSION DATE: 3/5/18 1809  DISCHARGE DATE: 3/7/2018  2:26 PM  DISPOSITION: Home with [address] [place] [address] in the [facility] by Pancho Cagle for 2017  Network Utilization Review Department  Phone: [phone]; Fax [phone]  ATTENTION: The Network Utilization Review Department is now centralized for our 7 Facilities  Make a note that we have a new phone and fax numbers for our Department  Please call with any questions or concerns to [phone] and carefully follow the prompts so that you are directed to the right person  All voicemails are confidential  Fax any determinations, approvals, denials, and requests for initial or continue stay review clinical to [phone]  Due to HIGH CALL volume, it would be easier if you could please send faxed requests to expedite your requests and in part, help us provide discharge notifications faster

## 2018-03-16 ENCOUNTER — OFFICE VISIT (OUTPATIENT)
Dept: OBGYN CLINIC | Facility: MEDICAL CENTER | Age: 48
End: 2018-03-16

## 2018-03-16 ENCOUNTER — APPOINTMENT (OUTPATIENT)
Dept: RADIOLOGY | Facility: CLINIC | Age: 48
End: 2018-03-16
Payer: COMMERCIAL

## 2018-03-16 VITALS
HEIGHT: 66 IN | SYSTOLIC BLOOD PRESSURE: 128 MMHG | WEIGHT: 132 LBS | BODY MASS INDEX: 21.21 KG/M2 | DIASTOLIC BLOOD PRESSURE: 80 MMHG | HEART RATE: 81 BPM

## 2018-03-16 DIAGNOSIS — Z96.642 STATUS POST TOTAL HIP REPLACEMENT, LEFT: ICD-10-CM

## 2018-03-16 DIAGNOSIS — Z96.642 STATUS POST TOTAL HIP REPLACEMENT, LEFT: Primary | ICD-10-CM

## 2018-03-16 PROCEDURE — 99024 POSTOP FOLLOW-UP VISIT: CPT | Performed by: ORTHOPAEDIC SURGERY

## 2018-03-16 PROCEDURE — 73502 X-RAY EXAM HIP UNI 2-3 VIEWS: CPT

## 2018-03-16 RX ORDER — OXYCODONE HYDROCHLORIDE 5 MG/1
5 TABLET ORAL EVERY 8 HOURS PRN
Qty: 40 TABLET | Refills: 0 | Status: SHIPPED | OUTPATIENT
Start: 2018-03-16 | End: 2021-01-06 | Stop reason: CLARIF

## 2018-03-16 NOTE — PROGRESS NOTES
Assessment/Plan:  1  Status post total hip replacement, left      Orders Placed This Encounter   Procedures    XR hip/pelv 2-3 vws left if performed    Ambulatory referral to Physical Therapy       Transition to outpatient PT  Pain control prn- oxycodone and tylenol, patient aware to wean away from narcotics  Continue with lovenox for DVT prophylaxis  REBECA stockings as needed for swelling  May shower and let water run over incision, do not submerge incision  Patient aware to call ahead for abx prior to dental appts  Return in about 4 weeks (around 4/13/2018)  Subjective   Chief Complaint:   Chief Complaint   Patient presents with    Left Hip - Post-op       Malissa Duarte is a 46 y o  female who presents for 2 week  follow up s/p left MARCIA  Overall she is doing well  She has been doing home physical therapy  She is on Lovenox for DVT prophylaxis  She takes about 2-3 oxycodone per day as needed for pain control  Review of Systems  ROS:    See HPI for musculoskeletal review  All other systems reviewed are negative     History:  Past Medical History:   Diagnosis Date    Anxiety     Depression     Osteoarthritis      Past Surgical History:   Procedure Laterality Date    ECTOPIC PREGNANCY SURGERY      MN TOTAL HIP ARTHROPLASTY Left 3/5/2018    Procedure: ARTHROPLASTY HIP TOTAL;  Surgeon: Branden Mary DO;  Location: [facility];  Service: Orthopedics    WISDOM TOOTH EXTRACTION      x1     Social History   History   Alcohol Use No     History   Drug Use No     History   Smoking Status    Heavy Tobacco Smoker    Packs/day: 0 50    Years: 20 00    Types: Cigarettes   Smokeless Tobacco    Never Used     Comment: Working on Reducing for surgery        Family History:   Family History   Problem Relation Age of Onset    Cancer Family     Cancer Father     Atrial fibrillation Father     Hypertension Father        Meds/Allergies     (Not in a hospital admission)  No Known Allergies Objective     /80   Pulse 81   Ht 5' 6" (1 676 m)   Wt 59 9 kg (132 lb)   BMI 21 31 kg/m²      PE:  AAOx 3  WDWN  Hearing intact, no drainage from eyes  no audible wheezing  no abdominal distension  LE compartments soft, AT/GS intact    Ortho Exam:  left hip:   INC: C/D/I, No erythema, mild swelling  No pain with ROM  L leg slightly longer than right    Imaging Studies: I have personally reviewed pertinent films in PACS  left knee:  S/p TKA, adequate alignment

## 2018-03-23 ENCOUNTER — TELEPHONE (OUTPATIENT)
Dept: OBGYN CLINIC | Facility: HOSPITAL | Age: 48
End: 2018-03-23

## 2018-03-23 NOTE — TELEPHONE ENCOUNTER
Patient Negrito Garcia [employer] would like a return to work status update   [phone], Fax [phone]

## 2018-03-26 ENCOUNTER — EVALUATION (OUTPATIENT)
Dept: PHYSICAL THERAPY | Facility: CLINIC | Age: 48
End: 2018-03-26
Payer: COMMERCIAL

## 2018-03-26 DIAGNOSIS — M25.552 PAIN OF LEFT HIP JOINT: ICD-10-CM

## 2018-03-26 DIAGNOSIS — Z96.642 STATUS POST TOTAL HIP REPLACEMENT, LEFT: Primary | ICD-10-CM

## 2018-03-26 PROCEDURE — 97140 MANUAL THERAPY 1/> REGIONS: CPT | Performed by: PHYSICAL THERAPIST

## 2018-03-26 PROCEDURE — 97110 THERAPEUTIC EXERCISES: CPT | Performed by: PHYSICAL THERAPIST

## 2018-03-26 PROCEDURE — G8990 OTHER PT/OT CURRENT STATUS: HCPCS | Performed by: PHYSICAL THERAPIST

## 2018-03-26 PROCEDURE — 97161 PT EVAL LOW COMPLEX 20 MIN: CPT | Performed by: PHYSICAL THERAPIST

## 2018-03-26 PROCEDURE — G8991 OTHER PT/OT GOAL STATUS: HCPCS | Performed by: PHYSICAL THERAPIST

## 2018-03-26 NOTE — PROGRESS NOTES
PT Evaluation     Today's date: 3/26/2018  Patient name: Aidan Ramirez  : 1970  MRN: [id number]  Referring provider: Hunter Waller DO  Dx:   Encounter Diagnosis     ICD-10-CM    1  Status post total hip replacement, left Q14 692 Ambulatory referral to Physical Therapy   2  Pain of left hip joint M25 552                   Assessment  Impairments: abnormal coordination, abnormal or restricted ROM, activity intolerance, impaired physical strength and pain with function    Assessment details: Pt is a 46 y o  female presenting to outpatient physical therapy with Status post total hip replacement, left  (primary encounter diagnosis), Pain of left hip joint  Pt presents with minimal pain, decreased range of motion, decreased strength, and decreased tolerance to activity  Reviewed general MARCIA precautions  Pt would benefit from skilled physical therapy to address limitations and to achieve goals  Due to high copay, discussed following up in therapy 1x/week with emphasis on home program, to which patient was in agreement  Thank you for this referral    Understanding of Dx/Px/POC: good   Prognosis: good    Goals  ST  Patient will report 25% decrease in pain in 4 weeks  2  Patient will demonstrate 25% improvement in ROM in 4 weeks  3  Patient will demonstrate 1/2 grade improvement in strength in 4 weeks  LT  Patient will be able to perform IADLS without restriction or pain by discharge  2  Patient will be independent in HEP by discharge  3  Patient will be able to return to recreational/work duties without restriction or pain by discharge        Plan  Patient would benefit from: PT eval and skilled PT  Planned modality interventions: cryotherapy  Planned therapy interventions: IADL retraining, body mechanics training, flexibility, functional ROM exercises, home exercise program, neuromuscular re-education, manual therapy, postural training, strengthening, stretching, therapeutic activities and therapeutic exercise  Frequency: 1x week  Duration in visits: 4  Duration in weeks: 4        Subjective Evaluation    History of Present Illness  Date of surgery: 3/5/2018  Mechanism of injury: Pt reports gradual onset of left hip pain, ultimately opting for left MARCIA  Notes she finished up with home-therapy last Thursday  Denies discomfort in hip presently, however, notes she has soreness at the end of the day  Notes she was able to negotiate stairs in a reciprocal pattern this past weekend with HR balance assist  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, however, is currently out of work since surgery  Notes her goals for therapy are to return to work and improve gait  States she has follow up with physician on 18     Pain  Current pain ratin  At best pain ratin  At worst pain ratin  Quality: dull ache and discomfort  Relieving factors: ice and medications  Aggravating factors: walking    Social Support  Lives in: multiple-level home    Treatments  Previous treatment: medication and physical therapy  Current treatment: medication  Patient Goals  Patient goals for therapy: independence with ADLs/IADLs, return to work, improved balance and increased strength          Objective     Active Range of Motion   Left Hip   Flexion: 90 degrees   Extension: -10 degrees   Abduction: 12 degrees   External rotation (90/90): 15 degrees     Right Hip   Flexion: 108 degrees   Extension: -5 degrees   Abduction: 25 degrees   External rotation (90/90): 35 degrees   Internal rotation (90/90): 25 degrees     Additional Active Range of Motion Details  At IE: ROM measurements taken within protocol limitations for general MARCIA    Passive Range of Motion   Left Hip   Abduction: 24 degrees     Right Hip   Abduction: 30 degrees     Additional Passive Range of Motion Details  At IE: ROM measurements taken within protocol limitations for general MARCIA    Strength/Myotome Testing     Left Hip   Planes of Motion   Flexion: 4  Extension: 3-  Abduction: 4-  External rotation: 4-  Internal rotation: 3-    Right Hip   Planes of Motion   Flexion: 5  Extension: 4  Abduction: 4+  External rotation: 4+  Internal rotation: 4+    Ambulation     Ambulation: Level Surfaces   Ambulation with assistive device: independent    Additional Level Surfaces Ambulation Details  Utilizes SPC for ambulation, circumduction of left LE , increased trunk movement laterally    General Comments     Hip Comments   Incision clean, dry, intact, well approximated, negative for redness or drainage  Several steri-strips still intact        Flowsheet Rows    Flowsheet Row Most Recent Value   PT/OT G-Codes   Current Score  60   Projected Score  67   FOTO information reviewed  Yes          Precautions: general hip precautions    Daily Treatment Diary     Manual  3/26            PROM hip (within precautions) perf                                                                    Exercise Diary  3/26            Treadmill                          Mini squats             Side stepping             Step ups             Standing hip abd & ext             SLS                          LAQ             Seated hip ER             Supine hip abd 15            Bridges 15            HL hip abd otb 15            Prone hip ext                                                                                               Modalities              CP

## 2018-03-28 ENCOUNTER — TELEPHONE (OUTPATIENT)
Dept: OBGYN CLINIC | Facility: CLINIC | Age: 48
End: 2018-03-28

## 2018-03-28 ENCOUNTER — HOSPITAL ENCOUNTER (OUTPATIENT)
Dept: CT IMAGING | Facility: HOSPITAL | Age: 48
Discharge: HOME | End: 2018-03-28
Payer: COMMERCIAL

## 2018-03-28 DIAGNOSIS — R31.9 HEMATURIA, UNSPECIFIED TYPE: ICD-10-CM

## 2018-03-28 PROCEDURE — 74178 CT ABD&PLV WO CNTR FLWD CNTR: CPT

## 2018-03-28 RX ADMIN — IOHEXOL 100 ML: 350 INJECTION, SOLUTION INTRAVENOUS at 09:50

## 2018-03-28 NOTE — TELEPHONE ENCOUNTER
Caller: [facility] [place]  Ph: [phone]  Caller is requesting a refill for iron and folic acid  [facility] on [facility] in [place]

## 2018-04-02 ENCOUNTER — OFFICE VISIT (OUTPATIENT)
Dept: PHYSICAL THERAPY | Facility: CLINIC | Age: 48
End: 2018-04-02
Payer: COMMERCIAL

## 2018-04-02 DIAGNOSIS — M25.552 PAIN OF LEFT HIP JOINT: ICD-10-CM

## 2018-04-02 DIAGNOSIS — Z96.642 STATUS POST TOTAL HIP REPLACEMENT, LEFT: Primary | ICD-10-CM

## 2018-04-02 PROCEDURE — G8991 OTHER PT/OT GOAL STATUS: HCPCS | Performed by: PHYSICAL THERAPIST

## 2018-04-02 PROCEDURE — G8990 OTHER PT/OT CURRENT STATUS: HCPCS | Performed by: PHYSICAL THERAPIST

## 2018-04-02 PROCEDURE — 97140 MANUAL THERAPY 1/> REGIONS: CPT

## 2018-04-02 PROCEDURE — 97110 THERAPEUTIC EXERCISES: CPT

## 2018-04-02 NOTE — PROGRESS NOTES
Daily Note     Today's date: 2018  Patient name: Mel Leo  : 1970  MRN: [id number]  Referring provider: Soila Swartz DO  Dx:   Encounter Diagnosis     ICD-10-CM    1  Status post total hip replacement, left Z96 642    2  Pain of left hip joint M25 552                   Subjective: Pt reports with c/o soreness in B Le's, but currently denied pain in L hip  She noted compliance with HEP and no difficulty or concerns doing so  Objective: See treatment diary below  Precautions: general hip precautions    Daily Treatment Diary     Manual  3/26 4/2           PROM hip (within precautions) perf perf                                                                   Exercise Diary  3/26 4/2           Treadmill                          Mini squats  15           Side stepping  3 laps           Step ups  1r x15           Standing hip abd & ext  15 ea b/l           SLS  15"x2 ea b/l                        LAQ  3"x15           Seated hip ER  15           Supine hip abd 15 15           Bridges 15 15           HL hip abd otb 15 otb 15           Prone hip ext  nv                                                                                             Modalities              CP                                           Assessment: Tolerated treatment well  Good response with PROM as well  Patient demonstrated fatigue post treatment and exhibited good technique with therapeutic exercises      Plan: Progress treatment as tolerated

## 2018-04-03 ENCOUNTER — TELEPHONE (OUTPATIENT)
Dept: FAMILY MEDICINE CLINIC | Facility: CLINIC | Age: 48
End: 2018-04-03

## 2018-04-03 NOTE — TELEPHONE ENCOUNTER
----- Message from Delores Morales DO sent at 4/3/2018 10:51 AM EDT -----  Call patient with normal results

## 2018-04-04 NOTE — TELEPHONE ENCOUNTER
Called and left v/m that pt should stop taking iron and folic at this time   Should she have any questions, she can return call at [phone]

## 2018-04-09 ENCOUNTER — APPOINTMENT (OUTPATIENT)
Dept: PHYSICAL THERAPY | Facility: CLINIC | Age: 48
End: 2018-04-09
Payer: COMMERCIAL

## 2018-04-12 ENCOUNTER — OFFICE VISIT (OUTPATIENT)
Dept: OBGYN CLINIC | Facility: MEDICAL CENTER | Age: 48
End: 2018-04-12

## 2018-04-12 VITALS
HEIGHT: 66 IN | WEIGHT: 128 LBS | HEART RATE: 81 BPM | DIASTOLIC BLOOD PRESSURE: 92 MMHG | BODY MASS INDEX: 20.57 KG/M2 | SYSTOLIC BLOOD PRESSURE: 136 MMHG

## 2018-04-12 DIAGNOSIS — Z96.642 STATUS POST TOTAL HIP REPLACEMENT, LEFT: Primary | ICD-10-CM

## 2018-04-12 PROCEDURE — 99024 POSTOP FOLLOW-UP VISIT: CPT | Performed by: ORTHOPAEDIC SURGERY

## 2018-04-12 NOTE — LETTER
April 12, 2018     Patient: Jessica Benz   YOB: 1970   Date of Visit: 4/12/2018       To Whom it May Concern:    Houston Hernandez is under my professional care  She was seen in my office on 4/12/2018  She may return to work without restrictions on 4/26/18  If you have any questions or concerns, please don't hesitate to call           Sincerely,          Pearle Meigs, DO        CC: No Recipients

## 2018-04-12 NOTE — PROGRESS NOTES
Assessment/Plan:  1  Status post total hip replacement, left      No orders of the defined types were placed in this encounter  outpatient PT  Pain control prn- oxycodone and tylenol, patient aware to wean away from narcotics  Can stop lovenox  REBECA stockings as needed for swelling  Done with posterior hip precautions  Patient aware to call ahead for abx prior to dental appts  Return in about 6 weeks (around 5/24/2018)  I answered all of the patient's questions during the visit and provided education of the patient's condition during the visit  The patient verbalized understanding of the information given and agrees with the plan  This note was dictated using Naiku software  It may contain errors including improperly dictated words  Please contact physician directly for any questions  Subjective   Chief Complaint:   Chief Complaint   Patient presents with    Left Hip - Post-op, Follow-up       Roxie Epley is a 46 y o  female who presents for 6 week follow up s/p left MARCIA  Overall she is doing well  She notes some mild discomfort after physical therapy but states it is a Good pain   She taking Tylenol as needed for pain control  She has been takes oxycodone when she needs it  She has been doing outpatient physical therapy and making progress  She was previously using a shoe insert but has stopped and is satisfied with her leg lengths  Review of Systems  ROS:    See HPI for musculoskeletal review     All other systems reviewed are negative     History:  Past Medical History:   Diagnosis Date    Anxiety     Depression     Osteoarthritis      Past Surgical History:   Procedure Laterality Date    ECTOPIC PREGNANCY SURGERY      HI TOTAL HIP ARTHROPLASTY Left 3/5/2018    Procedure: ARTHROPLASTY HIP TOTAL;  Surgeon: Odette Santizo DO;  Location: [facility];  Service: Orthopedics    WISDOM TOOTH EXTRACTION      x1     Social History   History   Alcohol Use No     History Drug Use No     History   Smoking Status    Heavy Tobacco Smoker    Packs/day: 0 50    Years: 20 00    Types: Cigarettes   Smokeless Tobacco    Never Used     Comment: Working on Reducing for surgery        Family History:   Family History   Problem Relation Age of Onset   Kina Overton Cancer Family     Cancer Father     Atrial fibrillation Father     Hypertension Father        Meds/Allergies     (Not in a hospital admission)  No Known Allergies       Objective     /92   Pulse 81   Ht 5' 6" (1 676 m)   Wt 58 1 kg (128 lb)   BMI 20 66 kg/m²      PE:  AAOx 3  WDWN  Hearing intact, no drainage from eyes  no audible wheezing  no abdominal distension  LE compartments soft, AT/GS intact    Ortho Exam:  left hip:   INC: healed, no erythema  No pain with ROM  L leg slightly longer than R

## 2018-05-25 ENCOUNTER — TELEPHONE (OUTPATIENT)
Dept: OBGYN CLINIC | Facility: MEDICAL CENTER | Age: 48
End: 2018-05-25

## 2018-05-25 NOTE — TELEPHONE ENCOUNTER
Called and left v/m for pt to reschedule having missed her appt with Dr Ashley Jacobs earlier this afternoon  Scheduling # was left [phone] so that she can callback and reschedule       CB# [phone]

## 2020-12-06 PROCEDURE — 90471 IMMUNIZATION ADMIN: CPT

## 2020-12-06 PROCEDURE — 99283 EMERGENCY DEPT VISIT LOW MDM: CPT

## 2020-12-07 ENCOUNTER — APPOINTMENT (EMERGENCY)
Dept: RADIOLOGY | Facility: HOSPITAL | Age: 50
End: 2020-12-07
Payer: COMMERCIAL

## 2020-12-07 ENCOUNTER — HOSPITAL ENCOUNTER (EMERGENCY)
Facility: HOSPITAL | Age: 50
Discharge: HOME | End: 2020-12-07
Attending: EMERGENCY MEDICINE | Admitting: EMERGENCY MEDICINE
Payer: COMMERCIAL

## 2020-12-07 VITALS
SYSTOLIC BLOOD PRESSURE: 174 MMHG | WEIGHT: 127.43 LBS | DIASTOLIC BLOOD PRESSURE: 111 MMHG | TEMPERATURE: 97.5 F | OXYGEN SATURATION: 99 % | BODY MASS INDEX: 20.57 KG/M2 | HEART RATE: 89 BPM | RESPIRATION RATE: 16 BRPM

## 2020-12-07 DIAGNOSIS — S80.212A ABRASION OF KNEE, BILATERAL: Primary | ICD-10-CM

## 2020-12-07 DIAGNOSIS — S82.64XA CLOSED NONDISPLACED FRACTURE OF LATERAL MALLEOLUS OF RIGHT FIBULA, INITIAL ENCOUNTER: ICD-10-CM

## 2020-12-07 DIAGNOSIS — S80.211A ABRASION OF KNEE, BILATERAL: Primary | ICD-10-CM

## 2020-12-07 PROCEDURE — 73610 X-RAY EXAM OF ANKLE: CPT

## 2020-12-07 PROCEDURE — 29515 APPLICATION SHORT LEG SPLINT: CPT | Performed by: EMERGENCY MEDICINE

## 2020-12-07 PROCEDURE — 99283 EMERGENCY DEPT VISIT LOW MDM: CPT | Performed by: EMERGENCY MEDICINE

## 2020-12-07 PROCEDURE — 73564 X-RAY EXAM KNEE 4 OR MORE: CPT

## 2020-12-07 PROCEDURE — 90715 TDAP VACCINE 7 YRS/> IM: CPT | Performed by: EMERGENCY MEDICINE

## 2020-12-07 RX ADMIN — TETANUS TOXOID, REDUCED DIPHTHERIA TOXOID AND ACELLULAR PERTUSSIS VACCINE, ADSORBED 0.5 ML: 5; 2.5; 8; 8; 2.5 SUSPENSION INTRAMUSCULAR at 01:35

## 2020-12-11 ENCOUNTER — OFFICE VISIT (OUTPATIENT)
Dept: OBGYN CLINIC | Facility: MEDICAL CENTER | Age: 50
End: 2020-12-11
Payer: COMMERCIAL

## 2020-12-11 ENCOUNTER — APPOINTMENT (OUTPATIENT)
Dept: RADIOLOGY | Facility: MEDICAL CENTER | Age: 50
End: 2020-12-11
Payer: COMMERCIAL

## 2020-12-11 VITALS
DIASTOLIC BLOOD PRESSURE: 125 MMHG | WEIGHT: 127 LBS | HEIGHT: 66 IN | BODY MASS INDEX: 20.41 KG/M2 | SYSTOLIC BLOOD PRESSURE: 205 MMHG | HEART RATE: 74 BPM

## 2020-12-11 DIAGNOSIS — S82.831A OTHER CLOSED FRACTURE OF DISTAL END OF RIGHT FIBULA, INITIAL ENCOUNTER: Primary | ICD-10-CM

## 2020-12-11 DIAGNOSIS — S80.211A ABRASION OF KNEE, BILATERAL: ICD-10-CM

## 2020-12-11 DIAGNOSIS — S82.831A OTHER CLOSED FRACTURE OF DISTAL END OF RIGHT FIBULA, INITIAL ENCOUNTER: ICD-10-CM

## 2020-12-11 DIAGNOSIS — S93.492A SPRAIN OF ANTERIOR TALOFIBULAR LIGAMENT OF LEFT ANKLE, INITIAL ENCOUNTER: ICD-10-CM

## 2020-12-11 DIAGNOSIS — S80.212A ABRASION OF KNEE, BILATERAL: ICD-10-CM

## 2020-12-11 PROCEDURE — 73610 X-RAY EXAM OF ANKLE: CPT

## 2020-12-11 PROCEDURE — 99214 OFFICE O/P EST MOD 30 MIN: CPT | Performed by: EMERGENCY MEDICINE

## 2020-12-11 PROCEDURE — 27786 TREATMENT OF ANKLE FRACTURE: CPT | Performed by: EMERGENCY MEDICINE

## 2020-12-12 ENCOUNTER — TELEPHONE (OUTPATIENT)
Dept: OTHER | Facility: OTHER | Age: 50
End: 2020-12-12

## 2020-12-12 ENCOUNTER — TELEPHONE (OUTPATIENT)
Dept: OBGYN CLINIC | Facility: OTHER | Age: 50
End: 2020-12-12

## 2020-12-12 ENCOUNTER — HOSPITAL ENCOUNTER (EMERGENCY)
Facility: HOSPITAL | Age: 50
Discharge: HOME | End: 2020-12-12
Attending: EMERGENCY MEDICINE | Admitting: EMERGENCY MEDICINE
Payer: COMMERCIAL

## 2020-12-12 VITALS
TEMPERATURE: 98 F | SYSTOLIC BLOOD PRESSURE: 197 MMHG | OXYGEN SATURATION: 99 % | HEART RATE: 108 BPM | DIASTOLIC BLOOD PRESSURE: 118 MMHG | RESPIRATION RATE: 18 BRPM

## 2020-12-12 DIAGNOSIS — M79.604 ACUTE LEG PAIN, RIGHT: Primary | ICD-10-CM

## 2020-12-12 DIAGNOSIS — Z47.89 AFTERCARE FOR CAST OR SPLINT CHECK OR CHANGE: ICD-10-CM

## 2020-12-12 DIAGNOSIS — Z47.89 PROBLEM WITH FIBERGLASS CAST: Primary | ICD-10-CM

## 2020-12-12 PROCEDURE — 99282 EMERGENCY DEPT VISIT SF MDM: CPT

## 2020-12-12 PROCEDURE — 99282 EMERGENCY DEPT VISIT SF MDM: CPT | Performed by: EMERGENCY MEDICINE

## 2020-12-16 ENCOUNTER — OFFICE VISIT (OUTPATIENT)
Dept: OBGYN CLINIC | Facility: MEDICAL CENTER | Age: 50
End: 2020-12-16

## 2020-12-16 VITALS — DIASTOLIC BLOOD PRESSURE: 82 MMHG | SYSTOLIC BLOOD PRESSURE: 134 MMHG | TEMPERATURE: 97.7 F | HEART RATE: 79 BPM

## 2020-12-16 DIAGNOSIS — S80.212A ABRASION OF KNEE, BILATERAL: ICD-10-CM

## 2020-12-16 DIAGNOSIS — S80.211A ABRASION OF KNEE, BILATERAL: ICD-10-CM

## 2020-12-16 DIAGNOSIS — S93.492A SPRAIN OF ANTERIOR TALOFIBULAR LIGAMENT OF LEFT ANKLE, INITIAL ENCOUNTER: ICD-10-CM

## 2020-12-16 DIAGNOSIS — S82.831D OTHER CLOSED FRACTURE OF DISTAL END OF RIGHT FIBULA WITH ROUTINE HEALING, SUBSEQUENT ENCOUNTER: Primary | ICD-10-CM

## 2020-12-16 PROCEDURE — 99024 POSTOP FOLLOW-UP VISIT: CPT | Performed by: EMERGENCY MEDICINE

## 2021-01-06 ENCOUNTER — APPOINTMENT (EMERGENCY)
Dept: CT IMAGING | Facility: HOSPITAL | Age: 51
DRG: 286 | End: 2021-01-06
Payer: COMMERCIAL

## 2021-01-06 ENCOUNTER — HOSPITAL ENCOUNTER (INPATIENT)
Facility: HOSPITAL | Age: 51
LOS: 13 days | Discharge: HOME | DRG: 286 | End: 2021-01-19
Attending: EMERGENCY MEDICINE | Admitting: INTERNAL MEDICINE
Payer: COMMERCIAL

## 2021-01-06 DIAGNOSIS — R09.02 HYPOXIA: ICD-10-CM

## 2021-01-06 DIAGNOSIS — I50.9 CHF (CONGESTIVE HEART FAILURE) (HCC): Primary | ICD-10-CM

## 2021-01-06 DIAGNOSIS — J96.01 ACUTE RESPIRATORY FAILURE WITH HYPOXIA (HCC): ICD-10-CM

## 2021-01-06 DIAGNOSIS — I16.0 HYPERTENSIVE URGENCY: ICD-10-CM

## 2021-01-06 DIAGNOSIS — I50.21 ACUTE SYSTOLIC CONGESTIVE HEART FAILURE (HCC): ICD-10-CM

## 2021-01-06 DIAGNOSIS — D53.9 MACROCYTIC ANEMIA: ICD-10-CM

## 2021-01-06 DIAGNOSIS — J90 BILATERAL PLEURAL EFFUSION: ICD-10-CM

## 2021-01-06 DIAGNOSIS — I42.8 NON-ISCHEMIC CARDIOMYOPATHY (HCC): ICD-10-CM

## 2021-01-06 DIAGNOSIS — S82.841S ANKLE FRACTURE, BIMALLEOLAR, CLOSED, RIGHT, SEQUELA: ICD-10-CM

## 2021-01-06 DIAGNOSIS — N17.9 AKI (ACUTE KIDNEY INJURY) (HCC): ICD-10-CM

## 2021-01-06 PROBLEM — E87.6 HYPOKALEMIA: Status: ACTIVE | Noted: 2021-01-06

## 2021-01-06 PROBLEM — F90.9 ADHD: Status: ACTIVE | Noted: 2021-01-06

## 2021-01-06 PROBLEM — R77.8 ELEVATED TROPONIN: Status: ACTIVE | Noted: 2021-01-06

## 2021-01-06 PROBLEM — R79.89 ELEVATED TROPONIN: Status: ACTIVE | Noted: 2021-01-06

## 2021-01-06 LAB
ALBUMIN SERPL BCP-MCNC: 2.3 G/DL (ref 3.5–5)
ALP SERPL-CCNC: 102 U/L (ref 46–116)
ALT SERPL W P-5'-P-CCNC: 33 U/L (ref 12–78)
ANION GAP SERPL CALCULATED.3IONS-SCNC: 8 MMOL/L (ref 4–13)
APTT PPP: 32 SECONDS (ref 23–37)
AST SERPL W P-5'-P-CCNC: 27 U/L (ref 5–45)
BACTERIA UR QL AUTO: NORMAL /HPF
BASOPHILS # BLD AUTO: 0.1 THOUSANDS/ΜL (ref 0–0.1)
BASOPHILS NFR BLD AUTO: 1 % (ref 0–1)
BILIRUB SERPL-MCNC: 0.31 MG/DL (ref 0.2–1)
BILIRUB UR QL STRIP: NEGATIVE
BUN SERPL-MCNC: 27 MG/DL (ref 5–25)
CALCIUM ALBUM COR SERPL-MCNC: 9.5 MG/DL (ref 8.3–10.1)
CALCIUM SERPL-MCNC: 8.1 MG/DL (ref 8.3–10.1)
CHLORIDE SERPL-SCNC: 106 MMOL/L (ref 100–108)
CLARITY UR: CLEAR
CO2 SERPL-SCNC: 28 MMOL/L (ref 21–32)
COLOR UR: YELLOW
CREAT SERPL-MCNC: 1.87 MG/DL (ref 0.6–1.3)
EOSINOPHIL # BLD AUTO: 0.29 THOUSAND/ΜL (ref 0–0.61)
EOSINOPHIL NFR BLD AUTO: 3 % (ref 0–6)
ERYTHROCYTE [DISTWIDTH] IN BLOOD BY AUTOMATED COUNT: 13.6 % (ref 11.6–15.1)
FLUAV RNA RESP QL NAA+PROBE: NEGATIVE
FLUBV RNA RESP QL NAA+PROBE: NEGATIVE
GFR SERPL CREATININE-BSD FRML MDRD: 30 ML/MIN/1.73SQ M
GLUCOSE SERPL-MCNC: 101 MG/DL (ref 65–140)
GLUCOSE UR STRIP-MCNC: NEGATIVE MG/DL
HCT VFR BLD AUTO: 39.8 % (ref 34.8–46.1)
HGB BLD-MCNC: 13.3 G/DL (ref 11.5–15.4)
HGB UR QL STRIP.AUTO: ABNORMAL
IMM GRANULOCYTES # BLD AUTO: 0.03 THOUSAND/UL (ref 0–0.2)
IMM GRANULOCYTES NFR BLD AUTO: 0 % (ref 0–2)
INR PPP: 0.95 (ref 0.84–1.19)
KETONES UR STRIP-MCNC: NEGATIVE MG/DL
LEUKOCYTE ESTERASE UR QL STRIP: NEGATIVE
LYMPHOCYTES # BLD AUTO: 3.22 THOUSANDS/ΜL (ref 0.6–4.47)
LYMPHOCYTES NFR BLD AUTO: 30 % (ref 14–44)
MCH RBC QN AUTO: 32.5 PG (ref 26.8–34.3)
MCHC RBC AUTO-ENTMCNC: 33.4 G/DL (ref 31.4–37.4)
MCV RBC AUTO: 97 FL (ref 82–98)
MONOCYTES # BLD AUTO: 0.6 THOUSAND/ΜL (ref 0.17–1.22)
MONOCYTES NFR BLD AUTO: 6 % (ref 4–12)
NEUTROPHILS # BLD AUTO: 6.54 THOUSANDS/ΜL (ref 1.85–7.62)
NEUTS SEG NFR BLD AUTO: 60 % (ref 43–75)
NITRITE UR QL STRIP: NEGATIVE
NON-SQ EPI CELLS URNS QL MICRO: NORMAL /HPF
NRBC BLD AUTO-RTO: 0 /100 WBCS
NT-PROBNP SERPL-MCNC: ABNORMAL PG/ML
PH UR STRIP.AUTO: 7 [PH] (ref 4.5–8)
PLATELET # BLD AUTO: 361 THOUSANDS/UL (ref 149–390)
PMV BLD AUTO: 9.7 FL (ref 8.9–12.7)
POTASSIUM SERPL-SCNC: 3.3 MMOL/L (ref 3.5–5.3)
PROT SERPL-MCNC: 6.4 G/DL (ref 6.4–8.2)
PROT UR STRIP-MCNC: ABNORMAL MG/DL
PROTHROMBIN TIME: 12.5 SECONDS (ref 11.6–14.5)
RBC # BLD AUTO: 4.09 MILLION/UL (ref 3.81–5.12)
RBC #/AREA URNS AUTO: NORMAL /HPF
RSV RNA RESP QL NAA+PROBE: NEGATIVE
SARS-COV-2 RNA RESP QL NAA+PROBE: NEGATIVE
SODIUM SERPL-SCNC: 142 MMOL/L (ref 136–145)
SP GR UR STRIP.AUTO: 1.02 (ref 1–1.03)
TROPONIN I SERPL-MCNC: 0.1 NG/ML
TROPONIN I SERPL-MCNC: 0.14 NG/ML
UROBILINOGEN UR QL STRIP.AUTO: 0.2 E.U./DL
WBC # BLD AUTO: 10.78 THOUSAND/UL (ref 4.31–10.16)
WBC #/AREA URNS AUTO: NORMAL /HPF

## 2021-01-06 PROCEDURE — 0241U HB NFCT DS VIR RESP RNA 4 TRGT: CPT | Performed by: PHYSICIAN ASSISTANT

## 2021-01-06 PROCEDURE — 84484 ASSAY OF TROPONIN QUANT: CPT | Performed by: PHYSICIAN ASSISTANT

## 2021-01-06 PROCEDURE — 36415 COLL VENOUS BLD VENIPUNCTURE: CPT | Performed by: PHYSICIAN ASSISTANT

## 2021-01-06 PROCEDURE — 99285 EMERGENCY DEPT VISIT HI MDM: CPT | Performed by: PHYSICIAN ASSISTANT

## 2021-01-06 PROCEDURE — 85730 THROMBOPLASTIN TIME PARTIAL: CPT | Performed by: PHYSICIAN ASSISTANT

## 2021-01-06 PROCEDURE — 85025 COMPLETE CBC W/AUTO DIFF WBC: CPT | Performed by: PHYSICIAN ASSISTANT

## 2021-01-06 PROCEDURE — 80053 COMPREHEN METABOLIC PANEL: CPT | Performed by: PHYSICIAN ASSISTANT

## 2021-01-06 PROCEDURE — 93005 ELECTROCARDIOGRAM TRACING: CPT

## 2021-01-06 PROCEDURE — 81001 URINALYSIS AUTO W/SCOPE: CPT

## 2021-01-06 PROCEDURE — 99223 1ST HOSP IP/OBS HIGH 75: CPT | Performed by: INTERNAL MEDICINE

## 2021-01-06 PROCEDURE — 83880 ASSAY OF NATRIURETIC PEPTIDE: CPT | Performed by: PHYSICIAN ASSISTANT

## 2021-01-06 PROCEDURE — 85610 PROTHROMBIN TIME: CPT | Performed by: PHYSICIAN ASSISTANT

## 2021-01-06 PROCEDURE — 71275 CT ANGIOGRAPHY CHEST: CPT

## 2021-01-06 PROCEDURE — 96374 THER/PROPH/DIAG INJ IV PUSH: CPT

## 2021-01-06 PROCEDURE — 99285 EMERGENCY DEPT VISIT HI MDM: CPT

## 2021-01-06 RX ORDER — LABETALOL 20 MG/4 ML (5 MG/ML) INTRAVENOUS SYRINGE
10 ONCE
Status: COMPLETED | OUTPATIENT
Start: 2021-01-06 | End: 2021-01-06

## 2021-01-06 RX ORDER — POTASSIUM CHLORIDE 20 MEQ/1
20 TABLET, EXTENDED RELEASE ORAL DAILY
Status: DISCONTINUED | OUTPATIENT
Start: 2021-01-07 | End: 2021-01-07

## 2021-01-06 RX ORDER — FUROSEMIDE 10 MG/ML
40 INJECTION INTRAMUSCULAR; INTRAVENOUS ONCE
Status: COMPLETED | OUTPATIENT
Start: 2021-01-06 | End: 2021-01-06

## 2021-01-06 RX ORDER — ACETAMINOPHEN 325 MG/1
650 TABLET ORAL EVERY 4 HOURS PRN
Status: DISCONTINUED | OUTPATIENT
Start: 2021-01-06 | End: 2021-01-19 | Stop reason: HOSPADM

## 2021-01-06 RX ORDER — NITROGLYCERIN 0.4 MG/1
0.4 TABLET SUBLINGUAL ONCE
Status: DISCONTINUED | OUTPATIENT
Start: 2021-01-06 | End: 2021-01-06

## 2021-01-06 RX ORDER — PAROXETINE HYDROCHLORIDE 20 MG/1
60 TABLET, FILM COATED ORAL DAILY
Status: DISCONTINUED | OUTPATIENT
Start: 2021-01-07 | End: 2021-01-19 | Stop reason: HOSPADM

## 2021-01-06 RX ORDER — NITROGLYCERIN 20 MG/100ML
5-200 INJECTION INTRAVENOUS
Status: DISCONTINUED | OUTPATIENT
Start: 2021-01-06 | End: 2021-01-06

## 2021-01-06 RX ORDER — AMLODIPINE BESYLATE 5 MG/1
5 TABLET ORAL DAILY
Status: DISCONTINUED | OUTPATIENT
Start: 2021-01-07 | End: 2021-01-07

## 2021-01-06 RX ORDER — FUROSEMIDE 10 MG/ML
20 INJECTION INTRAMUSCULAR; INTRAVENOUS
Status: DISCONTINUED | OUTPATIENT
Start: 2021-01-07 | End: 2021-01-07

## 2021-01-06 RX ORDER — QUETIAPINE FUMARATE 100 MG/1
100 TABLET, FILM COATED ORAL
COMMUNITY
Start: 2020-12-05 | End: 2021-11-02 | Stop reason: SDUPTHER

## 2021-01-06 RX ORDER — METHYLPHENIDATE HYDROCHLORIDE 10 MG/1
20 TABLET ORAL 2 TIMES DAILY
Status: DISCONTINUED | OUTPATIENT
Start: 2021-01-07 | End: 2021-01-07

## 2021-01-06 RX ORDER — NICOTINE 21 MG/24HR
1 PATCH, TRANSDERMAL 24 HOURS TRANSDERMAL DAILY
Status: DISCONTINUED | OUTPATIENT
Start: 2021-01-07 | End: 2021-01-19 | Stop reason: HOSPADM

## 2021-01-06 RX ORDER — QUETIAPINE FUMARATE 25 MG/1
100 TABLET, FILM COATED ORAL
Status: DISCONTINUED | OUTPATIENT
Start: 2021-01-06 | End: 2021-01-19 | Stop reason: HOSPADM

## 2021-01-06 RX ORDER — ONDANSETRON 2 MG/ML
4 INJECTION INTRAMUSCULAR; INTRAVENOUS EVERY 6 HOURS PRN
Status: DISCONTINUED | OUTPATIENT
Start: 2021-01-06 | End: 2021-01-19 | Stop reason: HOSPADM

## 2021-01-06 RX ORDER — HEPARIN SODIUM 5000 [USP'U]/ML
5000 INJECTION, SOLUTION INTRAVENOUS; SUBCUTANEOUS EVERY 8 HOURS SCHEDULED
Status: DISCONTINUED | OUTPATIENT
Start: 2021-01-06 | End: 2021-01-19 | Stop reason: HOSPADM

## 2021-01-06 RX ORDER — ASPIRIN 81 MG/1
324 TABLET, CHEWABLE ORAL ONCE
Status: COMPLETED | OUTPATIENT
Start: 2021-01-06 | End: 2021-01-06

## 2021-01-06 RX ADMIN — FUROSEMIDE 40 MG: 10 INJECTION, SOLUTION INTRAMUSCULAR; INTRAVENOUS at 19:20

## 2021-01-06 RX ADMIN — LABETALOL 20 MG/4 ML (5 MG/ML) INTRAVENOUS SYRINGE 10 MG: at 20:26

## 2021-01-06 RX ADMIN — IOHEXOL 85 ML: 350 INJECTION, SOLUTION INTRAVENOUS at 18:36

## 2021-01-06 RX ADMIN — ASPIRIN 324 MG: 81 TABLET, CHEWABLE ORAL at 19:24

## 2021-01-06 RX ADMIN — NITROGLYCERIN 1 INCH: 20 OINTMENT TOPICAL at 19:20

## 2021-01-06 NOTE — ED NOTES
Patient diaphoretic and anxious, oxygen increased to 3 L nasal cannula at this time, A  [facility] PAC alerted at this time        Nicole Trejo RN  01/06/21 4059

## 2021-01-06 NOTE — ED PROVIDER NOTES
History  Chief Complaint   Patient presents with    Shortness of Breath     increasing shortness of breath  symptoms for three days  some chest congestion  denies positive covid contacts  right ankle fx beginning of december  reports being last active and sitting more than normal       Patient is a 43 y/o female with hx of anxiety, depression, OA presents to the ED for evaluation of SOB  Pt states over the past 3 days she has had gradually worsening SOB and chest congestion  Pt states she did have right ankle fracture December 7th and has been in cast/not moving much  Pt denies sick contacts or recent travel  Pt has not taken any medications for her symptoms  Pt denies fever, chills, chest pain, leg swelling, palpitations, headache, vision changes, focal weakness, vomiting, diarrhea  Prior to Admission Medications   Prescriptions Last Dose Informant Patient Reported? Taking?    Glucos-Chondroit-Hyaluron-MSM (GLUCOSAMINE CHONDROITIN JOINT PO) 1/6/2021 at Unknown time Self Yes Yes   Sig: Take 1 tablet by mouth as needed   PARoxetine (PAXIL) 30 mg tablet 1/6/2021 at Unknown time Self Yes Yes   Sig: Take 60 mg by mouth daily    QUEtiapine (SEROquel) 100 mg tablet 1/5/2021 at Unknown time  Yes Yes   Sig: Take 100 mg by mouth daily at bedtime   methylphenidate (RITALIN) 20 MG tablet 1/6/2021 at Unknown time Self Yes Yes   Sig: TK 1 T PO BID      Facility-Administered Medications: None       Past Medical History:   Diagnosis Date    Anxiety     Depression     Osteoarthritis        Past Surgical History:   Procedure Laterality Date    ECTOPIC PREGNANCY SURGERY      [place] TOTAL HIP ARTHROPLASTY Left 3/5/2018    Procedure: ARTHROPLASTY HIP TOTAL;  Surgeon: Natacha Locke DO;  Location: [facility];  Service: Orthopedics    WISDOM TOOTH EXTRACTION      x1       Family History   Problem Relation Age of Onset    Cancer Family     Cancer Father     Atrial fibrillation Father     Hypertension Father      I have reviewed and agree with the history as documented  E-Cigarette/Vaping    E-Cigarette Use Never User      E-Cigarette/Vaping Substances    Nicotine No     THC No     CBD No     Flavoring No     Other No     Unknown No      Social History     Tobacco Use    Smoking status: Heavy Tobacco Smoker     Packs/day: 0 50     Years: 20 00     Pack years: 10 00     Types: Cigarettes    Smokeless tobacco: Never Used    Tobacco comment: Working on Reducing for surgery  Substance Use Topics    Alcohol use: Yes     Frequency: Monthly or less     Drinks per session: 1 or 2     Comment: social drinker    Drug use: No       Review of Systems   Constitutional: Positive for fatigue  Negative for chills and fever  HENT: Negative for ear pain and sore throat  Eyes: Negative for redness  Respiratory: Positive for cough and shortness of breath  Cardiovascular: Negative for chest pain, palpitations and leg swelling  Gastrointestinal: Negative for abdominal pain, diarrhea, nausea and vomiting  Genitourinary: Negative for dysuria and hematuria  Musculoskeletal: Negative for back pain and neck pain  Skin: Negative for rash  Neurological: Negative for weakness and headaches  Psychiatric/Behavioral: Negative for confusion  Physical Exam  Physical Exam  Constitutional:       General: She is not in acute distress  Appearance: She is well-developed  She is not ill-appearing, toxic-appearing or diaphoretic  HENT:      Head: Normocephalic and atraumatic  Nose: Nose normal       Mouth/Throat:      Mouth: Mucous membranes are moist    Eyes:      Extraocular Movements: Extraocular movements intact  Conjunctiva/sclera: Conjunctivae normal    Neck:      Musculoskeletal: Normal range of motion and neck supple  Vascular: No JVD  Cardiovascular:      Rate and Rhythm: Regular rhythm  Tachycardia present  Heart sounds: No murmur  No friction rub  No gallop      Pulmonary:      Effort: Tachypnea and respiratory distress present  Breath sounds: Decreased breath sounds present  No wheezing, rhonchi or rales  Musculoskeletal:      Right lower leg: Normal  She exhibits no tenderness, no bony tenderness and no swelling  No edema  Left lower leg: Normal  She exhibits no tenderness, no bony tenderness and no swelling  No edema  Skin:     General: Skin is warm and dry  Capillary Refill: Capillary refill takes less than 2 seconds  Findings: No rash  Neurological:      Mental Status: She is alert and oriented to person, place, and time  GCS: GCS eye subscore is 4  GCS verbal subscore is 5  GCS motor subscore is 6     Psychiatric:         Mood and Affect: Mood and affect normal          Speech: Speech normal          Behavior: Behavior normal          Vital Signs  ED Triage Vitals   Temperature Pulse Respirations Blood Pressure SpO2   01/06/21 1632 01/06/21 1632 01/06/21 1632 01/06/21 1632 01/06/21 1632   97 9 °F (36 6 °C) 104 16 (!) 230/120 90 %      Temp Source Heart Rate Source Patient Position - Orthostatic VS BP Location FiO2 (%)   01/06/21 1632 01/06/21 1632 01/06/21 1632 01/06/21 1632 --   Temporal Monitor Sitting Left arm       Pain Score       01/06/21 1919       No Pain           Vitals:    01/07/21 0636 01/07/21 0838 01/07/21 1117 01/07/21 1500   BP: 155/96 153/95 (!) 163/115 (!) 149/109   Pulse: 83 91 86 90   Patient Position - Orthostatic VS:  Lying Lying Lying         Visual Acuity      ED Medications  Medications   PARoxetine (PAXIL) tablet 60 mg (60 mg Oral Given 1/7/21 0855)   QUEtiapine (SEROquel) tablet 100 mg (100 mg Oral Given 1/7/21 0003)   ondansetron (ZOFRAN) injection 4 mg (4 mg Intravenous Given 1/7/21 1546)   nicotine (NICODERM CQ) 14 mg/24hr TD 24 hr patch 1 patch (1 patch Transdermal Medication Applied 1/7/21 0855)   heparin (porcine) subcutaneous injection 5,000 Units (5,000 Units Subcutaneous Given 1/7/21 1537)   acetaminophen (TYLENOL) tablet 650 mg (has no administration in time range)   Labetalol HCl (NORMODYNE) injection 10 mg (0 mg Intravenous Hold 1/7/21 1214)   potassium chloride (K-DUR,KLOR-CON) CR tablet 40 mEq (40 mEq Oral Given 1/7/21 0907)   furosemide (LASIX) injection 40 mg (40 mg Intravenous Given 1/7/21 1531)   carvedilol (COREG) tablet 6 25 mg (6 25 mg Oral Given 1/7/21 1536)   atorvastatin (LIPITOR) tablet 40 mg (40 mg Oral Given 1/7/21 1537)   promethazine (PHENERGAN) injection 12 5 mg (has no administration in time range)   iohexol (OMNIPAQUE) 350 MG/ML injection (MULTI-DOSE) 85 mL (85 mL Intravenous Given 1/6/21 1836)   furosemide (LASIX) injection 40 mg (40 mg Intravenous Given 1/6/21 1920)   nitroglycerin (NITRO-BID) 2 % TD ointment 1 inch (1 inch Topical Given 1/6/21 1920)   aspirin chewable tablet 324 mg (324 mg Oral Given 1/6/21 1924)   Labetalol HCl (NORMODYNE) injection 10 mg (10 mg Intravenous Given 1/6/21 2026)   potassium chloride oral solution 40 mEq (40 mEq Oral Given 1/7/21 1213)       Diagnostic Studies  Results Reviewed     Procedure Component Value Units Date/Time    Troponin I [[id number]]  (Abnormal) Collected: 01/07/21 1202    Lab Status: Final result Specimen: Blood from Arm, Left Updated: 01/07/21 1256     Troponin I 0 09 ng/mL     Troponin I [[id number]]  (Abnormal) Collected: 01/07/21 0127    Lab Status: Final result Specimen: Blood from Arm, Left Updated: 01/07/21 0213     Troponin I 0 13 ng/mL     Troponin I [[id number]]  (Abnormal) Collected: 01/06/21 2230    Lab Status: Final result Specimen: Blood from Arm, Right Updated: 01/06/21 2307     Troponin I 0 14 ng/mL     Urine Microscopic [[id number]]  (Normal) Collected: 01/06/21 2107    Lab Status: Final result Specimen: Urine, Clean Catch Updated: 01/06/21 2136     RBC, UA 2-4 /hpf      WBC, UA 2-4 /hpf      Epithelial Cells Occasional /hpf      Bacteria, UA Occasional /hpf     POCT urinalysis dipstick [[id number]]  (Abnormal) Resulted: 01/06/21 2110    Lab Status: Final result Specimen: Urine Updated: 01/06/21 2110    Urine Macroscopic, POC [[id number]]  (Abnormal) Collected: 01/06/21 2107    Lab Status: Final result Specimen: Urine Updated: 01/06/21 2108     Color, UA Yellow     Clarity, UA Clear     pH, UA 7 0     Leukocytes, UA Negative     Nitrite, UA Negative     Protein,  (2+) mg/dl      Glucose, UA Negative mg/dl      Ketones, UA Negative mg/dl      Urobilinogen, UA 0 2 E U /dl      Bilirubin, UA Negative     Blood, UA Small     Specific [place], UA 1 025    Narrative:      CLINITEK RESULT    NT-BNP PRO [[id number]]  (Abnormal) Collected: 01/06/21 1653    Lab Status: Final result Specimen: Blood from Arm, Right Updated: 01/06/21 1848     NT-proBNP 96,433 pg/mL     COVID19, Influenza A/B, RSV PCR, [facility] [[id number]]  (Normal) Collected: 01/06/21 1712    Lab Status: Final result Specimen: Nasopharyngeal Swab Updated: 01/06/21 1847     SARS-CoV-2 Negative     INFLUENZA A PCR Negative     INFLUENZA B PCR Negative     RSV PCR Negative    Narrative: This test has been authorized by FDA under an EUA (Emergency Use Assay) for use by authorized laboratories  Clinical caution and judgement should be used with the interpretation of these results with consideration of the clinical impression and other laboratory testing  Testing reported as "Positive" or "Negative" has been proven to be accurate according to standard laboratory validation requirements  All testing is performed with control materials showing appropriate reactivity at standard intervals      Comprehensive metabolic panel [[id number]]  (Abnormal) Collected: 01/06/21 1653    Lab Status: Final result Specimen: Blood from Arm, Right Updated: 01/06/21 1812     Sodium 142 mmol/L      Potassium 3 3 mmol/L      Chloride 106 mmol/L      CO2 28 mmol/L      ANION GAP 8 mmol/L      BUN 27 mg/dL      Creatinine 1 87 mg/dL      Glucose 101 mg/dL      Calcium 8 1 mg/dL      Corrected Calcium 9 5 mg/dL      AST 27 U/L ALT 33 U/L      Alkaline Phosphatase 102 U/L      Total Protein 6 4 g/dL      Albumin 2 3 g/dL      Total Bilirubin 0 31 mg/dL      eGFR 30 ml/min/1 73sq m     Narrative:      [place] guidelines for Chronic Kidney Disease (CKD):     Stage 1 with normal or high GFR (GFR > 90 mL/min/1 73 square meters)    Stage 2 Mild CKD (GFR = 60-89 mL/min/1 73 square meters)    Stage 3A Moderate CKD (GFR = 45-59 mL/min/1 73 square meters)    Stage 3B Moderate CKD (GFR = 30-44 mL/min/1 73 square meters)    Stage 4 Severe CKD (GFR = 15-29 mL/min/1 73 square meters)    Stage 5 End Stage CKD (GFR <15 mL/min/1 73 square meters)  Note: GFR calculation is accurate only with a steady state creatinine    Troponin I [[id number]]  (Abnormal) Collected: 01/06/21 1653    Lab Status: Final result Specimen: Blood from Arm, Right Updated: 01/06/21 1806     Troponin I 0 10 ng/mL     Protime-INR [[id number]]  (Normal) Collected: 01/06/21 1653    Lab Status: Final result Specimen: Blood from Arm, Right Updated: 01/06/21 1800     Protime 12 5 seconds      INR 0 95    APTT [[id number]]  (Normal) Collected: 01/06/21 1653    Lab Status: Final result Specimen: Blood from Arm, Right Updated: 01/06/21 1800     PTT 32 seconds     CBC and differential [[id number]]  (Abnormal) Collected: 01/06/21 1653    Lab Status: Final result Specimen: Blood from Arm, Right Updated: 01/06/21 1729     WBC 10 78 Thousand/uL      RBC 4 09 Million/uL      Hemoglobin 13 3 g/dL      Hematocrit 39 8 %      MCV 97 fL      MCH 32 5 pg      MCHC 33 4 g/dL      RDW 13 6 %      MPV 9 7 fL      Platelets 884 Thousands/uL      nRBC 0 /100 WBCs      Neutrophils Relative 60 %      Immat GRANS % 0 %      Lymphocytes Relative 30 %      Monocytes Relative 6 %      Eosinophils Relative 3 %      Basophils Relative 1 %      Neutrophils Absolute 6 54 Thousands/µL      Immature Grans Absolute 0 03 Thousand/uL      Lymphocytes Absolute 3 22 Thousands/µL      Monocytes Absolute 0 60 Thousand/µL      Eosinophils Absolute 0 29 Thousand/µL      Basophils Absolute 0 10 Thousands/µL                  XR chest portable   Final Result by Rafiq Weinstein MD (01/07 1044)      Interstitial edema with effusions and atelectasis, corresponding with the chest CT one day earlier  Workstation performed: [id number]         CTA ED chest PE study   Final Result by Javier Zepeda MD (01/06 1907)      No pulmonary embolus  Bilateral moderate pleural effusions  Posterior bibasilar atelectasis/infiltrates  Questionable hilar and subcarinal lymphadenopathy limited by lack of IV contrast       Workstation performed: [id number]                    Procedures  ECG 12 Lead Documentation Only    Date/Time: 1/6/2021 4:45 PM  Performed by: Chapis Juares PA-C  Authorized by: Chapis Juares PA-C     Indications / Diagnosis:  Sob  ECG reviewed by me, the ED Provider: yes    Patient location:  ED  Previous ECG:     Previous ECG:  Compared to current    Comparison ECG info:  06-feb-2018    Similarity:  No change    Comparison to cardiac monitor: Yes    Interpretation:     Interpretation: abnormal    Quality:     Tracing quality:  Limited by artifact  Rate:     ECG rate:  96    ECG rate assessment: normal    Rhythm:     Rhythm: sinus rhythm    Ectopy:     Ectopy: PVCs      PVCs:  Frequent  QRS:     QRS axis:  Normal    QRS intervals:  Normal  Conduction:     Conduction: normal    ST segments:     ST segments:  Non-specific  T waves:     T waves: non-specific    Comments:      QT/QTc: 350/442   No STEMI             ED Course  ED Course as of Jan 07 1850   Wed Jan 06, 2021   1740 Patient placed on 2L NC   SpO2(!): 89 %   1807 Troponin I(!): 0 10   1813 MAXI   Creatinine(!): 1 87   1822 Patient diaphoretic and now on 3L NC, will get repeat EKG and expedite CTA      1850 NT-proBNP(!): 96,433   1909 No pulmonary embolus      Bilateral moderate pleural effusions      Posterior bibasilar atelectasis/infiltrates      Questionable hilar and subcarinal lymphadenopathy limited by lack of IV contrast    CTA ED chest PE study   1920 [place] text sent to Cardiology on-call      3065 Discussed case with Dr Tr Delacruz, Cardiology on-call, recommends nitro drip, labetalol IV, lasix and admission to step down      1953 Discussed with KAHLIL  We had a detailed discussion of the patient's condition and case, including need for admission   Accepts to his/her service for step down level 2   Bed request/bridging orders placed  SBIRT 20yo+      Most Recent Value   SBIRT (22 yo +)   In order to provide better care to our patients, we are screening all of our patients for alcohol and drug use  Would it be okay to ask you these screening questions? No Filed at: 01/06/2021 1656          Wells' Criteria for PE      Most Recent Value   Wells' Criteria for PE   Clinical signs and symptoms of DVT  0 Filed at: 01/06/2021 1645   PE is primary diagnosis or equally likely  3 Filed at: 01/06/2021 1645   HR >100  1 5 Filed at: 01/06/2021 1645   Immobilization at least 3 days or Surgery in the previous 4 weeks  1 5 Filed at: 01/06/2021 1645   Previous, objectively diagnosed PE or DVT  0 Filed at: 01/06/2021 1645   Hemoptysis  0 Filed at: 01/06/2021 1645   Malignancy with treatment within 6 months or palliative  0 Filed at: 01/06/2021 1645   Wells' Criteria Total  6 Filed at: 01/06/2021 1645                MDM  Number of Diagnoses or Management Options  Bilateral pleural effusion: new and requires workup  CHF (congestive heart failure) ([facility] [address] ): new and requires workup  Hypertensive urgency: new and requires workup  Hypoxia: new and requires workup  Diagnosis management comments: Patient is a 43 y/o female with hx of anxiety, depression, OA presents to the ED for evaluation of SOB  Pt states over the past 3 days she has had gradually worsening SOB and chest congestion   Pt states she did have right ankle fracture December 7th and has been in cast/not moving much  EKG shows no STEMI  Patient 89% on RA, 2L NC placed, patient tachypneic with decreased breath sounds  Troponin elevated at 0 10  BNP elevated at 96,433  CTA shows No pulmonary embolus  Bilateral moderate pleural effusions  Posterior bibasilar atelectasis/infiltrates  Discussed case with Dr Lisa Recio, Cardiology on-call, recommends nitro drip, labetalol IV, lasix and admission to step down for new onset CHF and hypertensive urgency  Discussed with SLIM  We had a detailed discussion of the patient's condition and case, including need for admission   Accepts to his/her service   Bed request/bridging orders placed  Disposition  Final diagnoses:   CHF (congestive heart failure) ([facility])   Bilateral pleural effusion   Hypertensive urgency   Hypoxia     Time reflects when diagnosis was documented in both MDM as applicable and the Disposition within this note     Time User Action Codes Description Comment    1/6/2021  7:51 PM Vicky Seal Add [I50 9] CHF (congestive heart failure) ([facility] [address] )     1/6/2021  7:52 PM Vicky Seal Add [J90] Bilateral pleural effusion     1/6/2021  7:52 PM Vicky Seal Add [I16 0] Hypertensive urgency     1/6/2021  7:52 PM Vicky Seal Add [R09 02] Hypoxia     1/6/2021  9:10 PM Anders Soliz Add [J96 01] Acute respiratory failure with hypoxia [facility])       ED Disposition     ED Disposition Condition Date/Time Comment    Admit Stable Wed Jan 6, 2021  7:51 PM Case was discussed with KAHLIL and the patient's admission status was agreed to be Admission Status: inpatient status to the service of Dr Francie Brock           Follow-up Information    None         Current Discharge Medication List      CONTINUE these medications which have NOT CHANGED    Details   Glucos-Chondroit-Hyaluron-MSM (GLUCOSAMINE CHONDROITIN JOINT PO) Take 1 tablet by mouth as needed      methylphenidate (RITALIN) 20 MG tablet TK 1 T PO BID  Refills: 0 PARoxetine (PAXIL) 30 mg tablet Take 60 mg by mouth daily   Refills: 5      QUEtiapine (SEROquel) 100 mg tablet Take 100 mg by mouth daily at bedtime           No discharge procedures on file      PDMP Review       Value Time User    PDMP Reviewed  Yes 1/6/2021  9:19 PM Rodrigo Edwards PA-C          ED Provider  Electronically Signed by           Hugo De Santiago PA-C  01/07/21 2096

## 2021-01-07 ENCOUNTER — APPOINTMENT (INPATIENT)
Dept: RADIOLOGY | Facility: HOSPITAL | Age: 51
DRG: 286 | End: 2021-01-07
Payer: COMMERCIAL

## 2021-01-07 ENCOUNTER — APPOINTMENT (INPATIENT)
Dept: NON INVASIVE DIAGNOSTICS | Facility: HOSPITAL | Age: 51
DRG: 286 | End: 2021-01-07
Payer: COMMERCIAL

## 2021-01-07 LAB
ALBUMIN SERPL BCP-MCNC: 1.9 G/DL (ref 3.5–5)
ALP SERPL-CCNC: 86 U/L (ref 46–116)
ALT SERPL W P-5'-P-CCNC: 28 U/L (ref 12–78)
ANION GAP SERPL CALCULATED.3IONS-SCNC: 12 MMOL/L (ref 4–13)
AST SERPL W P-5'-P-CCNC: 24 U/L (ref 5–45)
ATRIAL RATE: 51 BPM
ATRIAL RATE: 86 BPM
ATRIAL RATE: 89 BPM
ATRIAL RATE: 91 BPM
BILIRUB SERPL-MCNC: 0.3 MG/DL (ref 0.2–1)
BUN SERPL-MCNC: 29 MG/DL (ref 5–25)
CALCIUM ALBUM COR SERPL-MCNC: 9.6 MG/DL (ref 8.3–10.1)
CALCIUM SERPL-MCNC: 7.9 MG/DL (ref 8.3–10.1)
CHLORIDE SERPL-SCNC: 107 MMOL/L (ref 100–108)
CHOLEST SERPL-MCNC: 267 MG/DL (ref 50–200)
CO2 SERPL-SCNC: 22 MMOL/L (ref 21–32)
CREAT SERPL-MCNC: 1.69 MG/DL (ref 0.6–1.3)
ERYTHROCYTE [DISTWIDTH] IN BLOOD BY AUTOMATED COUNT: 13.7 % (ref 11.6–15.1)
EST. AVERAGE GLUCOSE BLD GHB EST-MCNC: 105 MG/DL
GFR SERPL CREATININE-BSD FRML MDRD: 34 ML/MIN/1.73SQ M
GLUCOSE SERPL-MCNC: 97 MG/DL (ref 65–140)
HBA1C MFR BLD: 5.3 %
HCT VFR BLD AUTO: 38.9 % (ref 34.8–46.1)
HDLC SERPL-MCNC: 53 MG/DL
HGB BLD-MCNC: 13 G/DL (ref 11.5–15.4)
LDLC SERPL CALC-MCNC: 187 MG/DL (ref 0–100)
MAGNESIUM SERPL-MCNC: 2.2 MG/DL (ref 1.6–2.6)
MCH RBC QN AUTO: 32.9 PG (ref 26.8–34.3)
MCHC RBC AUTO-ENTMCNC: 33.4 G/DL (ref 31.4–37.4)
MCV RBC AUTO: 99 FL (ref 82–98)
P AXIS: 50 DEGREES
P AXIS: 53 DEGREES
P AXIS: 56 DEGREES
P AXIS: 60 DEGREES
PLATELET # BLD AUTO: 328 THOUSANDS/UL (ref 149–390)
PMV BLD AUTO: 9.9 FL (ref 8.9–12.7)
POTASSIUM SERPL-SCNC: 2.9 MMOL/L (ref 3.5–5.3)
PR INTERVAL: 128 MS
PR INTERVAL: 136 MS
PR INTERVAL: 144 MS
PR INTERVAL: 148 MS
PROT SERPL-MCNC: 5.3 G/DL (ref 6.4–8.2)
QRS AXIS: 79 DEGREES
QRS AXIS: 79 DEGREES
QRS AXIS: 80 DEGREES
QRS AXIS: 81 DEGREES
QRSD INTERVAL: 78 MS
QRSD INTERVAL: 80 MS
QRSD INTERVAL: 80 MS
QRSD INTERVAL: 86 MS
QT INTERVAL: 318 MS
QT INTERVAL: 332 MS
QT INTERVAL: 336 MS
QT INTERVAL: 350 MS
QTC INTERVAL: 391 MS
QTC INTERVAL: 402 MS
QTC INTERVAL: 403 MS
QTC INTERVAL: 442 MS
RBC # BLD AUTO: 3.95 MILLION/UL (ref 3.81–5.12)
SODIUM SERPL-SCNC: 141 MMOL/L (ref 136–145)
T WAVE AXIS: -61 DEGREES
T WAVE AXIS: 27 DEGREES
T WAVE AXIS: 33 DEGREES
T WAVE AXIS: 72 DEGREES
TRIGL SERPL-MCNC: 135 MG/DL
TROPONIN I SERPL-MCNC: 0.09 NG/ML
TROPONIN I SERPL-MCNC: 0.13 NG/ML
VENTRICULAR RATE: 86 BPM
VENTRICULAR RATE: 89 BPM
VENTRICULAR RATE: 91 BPM
VENTRICULAR RATE: 96 BPM
WBC # BLD AUTO: 10.42 THOUSAND/UL (ref 4.31–10.16)

## 2021-01-07 PROCEDURE — 71045 X-RAY EXAM CHEST 1 VIEW: CPT

## 2021-01-07 PROCEDURE — 93306 TTE W/DOPPLER COMPLETE: CPT

## 2021-01-07 PROCEDURE — 99255 IP/OBS CONSLTJ NEW/EST HI 80: CPT

## 2021-01-07 PROCEDURE — 80061 LIPID PANEL: CPT | Performed by: PHYSICIAN ASSISTANT

## 2021-01-07 PROCEDURE — 99255 IP/OBS CONSLTJ NEW/EST HI 80: CPT | Performed by: INTERNAL MEDICINE

## 2021-01-07 PROCEDURE — 93010 ELECTROCARDIOGRAM REPORT: CPT | Performed by: INTERNAL MEDICINE

## 2021-01-07 PROCEDURE — 97166 OT EVAL MOD COMPLEX 45 MIN: CPT

## 2021-01-07 PROCEDURE — 80053 COMPREHEN METABOLIC PANEL: CPT | Performed by: INTERNAL MEDICINE

## 2021-01-07 PROCEDURE — 84484 ASSAY OF TROPONIN QUANT: CPT | Performed by: PHYSICIAN ASSISTANT

## 2021-01-07 PROCEDURE — 99232 SBSQ HOSP IP/OBS MODERATE 35: CPT | Performed by: INTERNAL MEDICINE

## 2021-01-07 PROCEDURE — 83735 ASSAY OF MAGNESIUM: CPT | Performed by: INTERNAL MEDICINE

## 2021-01-07 PROCEDURE — 97163 PT EVAL HIGH COMPLEX 45 MIN: CPT

## 2021-01-07 PROCEDURE — 83036 HEMOGLOBIN GLYCOSYLATED A1C: CPT | Performed by: PHYSICIAN ASSISTANT

## 2021-01-07 PROCEDURE — 85027 COMPLETE CBC AUTOMATED: CPT | Performed by: PHYSICIAN ASSISTANT

## 2021-01-07 RX ORDER — PROMETHAZINE HYDROCHLORIDE 25 MG/ML
12.5 INJECTION, SOLUTION INTRAMUSCULAR; INTRAVENOUS EVERY 6 HOURS PRN
Status: DISCONTINUED | OUTPATIENT
Start: 2021-01-07 | End: 2021-01-19 | Stop reason: HOSPADM

## 2021-01-07 RX ORDER — LABETALOL 20 MG/4 ML (5 MG/ML) INTRAVENOUS SYRINGE
10 EVERY 6 HOURS PRN
Status: DISCONTINUED | OUTPATIENT
Start: 2021-01-07 | End: 2021-01-07

## 2021-01-07 RX ORDER — FUROSEMIDE 10 MG/ML
40 INJECTION INTRAMUSCULAR; INTRAVENOUS
Status: DISCONTINUED | OUTPATIENT
Start: 2021-01-07 | End: 2021-01-07

## 2021-01-07 RX ORDER — LABETALOL 20 MG/4 ML (5 MG/ML) INTRAVENOUS SYRINGE
10 EVERY 6 HOURS PRN
Status: DISCONTINUED | OUTPATIENT
Start: 2021-01-07 | End: 2021-01-19 | Stop reason: HOSPADM

## 2021-01-07 RX ORDER — CARVEDILOL 6.25 MG/1
6.25 TABLET ORAL 2 TIMES DAILY WITH MEALS
Status: DISCONTINUED | OUTPATIENT
Start: 2021-01-07 | End: 2021-01-09

## 2021-01-07 RX ORDER — POTASSIUM CHLORIDE 20 MEQ/1
40 TABLET, EXTENDED RELEASE ORAL DAILY
Status: DISCONTINUED | OUTPATIENT
Start: 2021-01-07 | End: 2021-01-09

## 2021-01-07 RX ORDER — ATORVASTATIN CALCIUM 40 MG/1
40 TABLET, FILM COATED ORAL
Status: DISCONTINUED | OUTPATIENT
Start: 2021-01-07 | End: 2021-01-19 | Stop reason: HOSPADM

## 2021-01-07 RX ORDER — FUROSEMIDE 10 MG/ML
40 INJECTION INTRAMUSCULAR; INTRAVENOUS
Status: DISCONTINUED | OUTPATIENT
Start: 2021-01-07 | End: 2021-01-10

## 2021-01-07 RX ORDER — POTASSIUM CHLORIDE 20MEQ/15ML
40 LIQUID (ML) ORAL ONCE
Status: COMPLETED | OUTPATIENT
Start: 2021-01-07 | End: 2021-01-07

## 2021-01-07 RX ADMIN — FUROSEMIDE 40 MG: 10 INJECTION, SOLUTION INTRAMUSCULAR; INTRAVENOUS at 09:08

## 2021-01-07 RX ADMIN — HEPARIN SODIUM 5000 UNITS: 5000 INJECTION INTRAVENOUS; SUBCUTANEOUS at 21:13

## 2021-01-07 RX ADMIN — QUETIAPINE FUMARATE 100 MG: 25 TABLET ORAL at 21:13

## 2021-01-07 RX ADMIN — HEPARIN SODIUM 5000 UNITS: 5000 INJECTION INTRAVENOUS; SUBCUTANEOUS at 00:03

## 2021-01-07 RX ADMIN — PAROXETINE 60 MG: 20 TABLET, FILM COATED ORAL at 08:55

## 2021-01-07 RX ADMIN — HEPARIN SODIUM 5000 UNITS: 5000 INJECTION INTRAVENOUS; SUBCUTANEOUS at 05:15

## 2021-01-07 RX ADMIN — POTASSIUM CHLORIDE 40 MEQ: 1500 TABLET, EXTENDED RELEASE ORAL at 09:07

## 2021-01-07 RX ADMIN — ATORVASTATIN CALCIUM 40 MG: 40 TABLET, FILM COATED ORAL at 15:37

## 2021-01-07 RX ADMIN — FUROSEMIDE 40 MG: 10 INJECTION, SOLUTION INTRAMUSCULAR; INTRAVENOUS at 15:31

## 2021-01-07 RX ADMIN — LABETALOL 20 MG/4 ML (5 MG/ML) INTRAVENOUS SYRINGE 10 MG: at 05:15

## 2021-01-07 RX ADMIN — Medication 1 PATCH: at 08:55

## 2021-01-07 RX ADMIN — AMLODIPINE BESYLATE 5 MG: 5 TABLET ORAL at 08:55

## 2021-01-07 RX ADMIN — QUETIAPINE FUMARATE 100 MG: 25 TABLET ORAL at 00:03

## 2021-01-07 RX ADMIN — CARVEDILOL 6.25 MG: 6.25 TABLET, FILM COATED ORAL at 09:14

## 2021-01-07 RX ADMIN — HEPARIN SODIUM 5000 UNITS: 5000 INJECTION INTRAVENOUS; SUBCUTANEOUS at 15:37

## 2021-01-07 RX ADMIN — CARVEDILOL 6.25 MG: 6.25 TABLET, FILM COATED ORAL at 15:36

## 2021-01-07 RX ADMIN — ONDANSETRON 4 MG: 2 INJECTION INTRAMUSCULAR; INTRAVENOUS at 15:46

## 2021-01-07 RX ADMIN — POTASSIUM CHLORIDE 40 MEQ: 20 SOLUTION ORAL at 12:13

## 2021-01-07 NOTE — ASSESSMENT & PLAN NOTE
Present on CT imaging  Likely secondary to new onset CHF    Continue with IV diuresis and await pulmonary consultation

## 2021-01-07 NOTE — ASSESSMENT & PLAN NOTE
Presenting with shortness of breath for the past week  O2 saturation 80% on room air  Currently requiring 3 L to maintain saturations in the 90s  Secondary to acute volume overload-likely new onset CHF  Also with bilateral moderate pleural effusions on CT imaging  COVID negative  Continue with IV diuresis    May benefit from thoracentesis but will defer to pulmonology  Consult pulmonology

## 2021-01-07 NOTE — CONSULTS
Consult - Cardiology   Arleen Tyson 44 y o  female MRN: [id number]  Unit/Bed#: E4 -01 Encounter: [id number]        Reason For Consult:  Respiratory failure                 ASSESSMENT:  1  Shortness of breath, acute respiratory failure:   -Oxygen in upper 80's on arrival   -Presently on 3L nasal cannula saturating in mid 90's   -Etiology possible CHF    2  Accelerated hypertension:   -BP trend 200's/ 100's on arrival   -Presently trending downwards, 170's/90's    3  Elevated troponin 0 10 --> 0 14 --> 0 13:   -EKG NSR with PVC's, anterolateral TWI   -CTA w/ bilateral pleural effusions, no PE   -No known cardiac history     4  Elevated creatinine 1 87 --> 1 69 (baseline 0 8 from 2018)    5  Concern for acute CHF, NTPRO BNP 96,000 on arrival    6  Tobacco abuse    7  Recent right ankle fracture on 12/07/2020, manage non-operatively    PLAN/ DISCUSSION:     Patient definitely looks volume overloaded  She has bilateral crackles and JVD on exam  ProBNP is significantly elevated  She has no cardiac history nor has she ever had any testing performed on her heart  · Check baseline chest x-ray    · Check echocardiogram    · Continue IV furosemide 40 mg twice daily    · Carvedilol started by primary service    · Continue to trend renal function, electrolytes, standing weights, I/O    · Further recommendation to follow echocardiogram      · NSVT noted on tele    History Of Present Illness: This is a 57-year-old female without prior care from a cardiologist   She has no problems with her heart that she knows of  She says she has never had any testing performed on her heart with the exception of EKGs  She does have a family history of heart disease in her father who had atrial fibrillation and mother who had hypertension  The patient herself has a rather limited past medical history and takes medications only for depression  She does smoke cigarettes approximately 1/2 pack per day for at least 20 years  She is ordinarily active in her regular everyday life known to me that 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  She notes to me that she can work an 8+ hour shift during most of which she stands on her feet  She could ordinarily climb at least 1 flight of stairs without any chest pain or shortness of breath  Beginning Saturday 01/02/2021 this patient had some generalized abdominal bloating and nausea  She did not pay much attention to these symptoms and simply rested for the day  The next day on Sunday she began to feel short of breath  She was comfortable at rest but with exertion noted her breathing was more labored  Over the next 3 days the symptoms gradually worsened  She recently fractured her right ankle and thought that she was just deconditioned  She noted that she would wake up at night having trouble breathing needed to sit up and walk around her home in order to catch her breath  As her symptoms gradually worsened she came to the emergency department of [facility] in the evening hours of January 6, 2021  Past Medical History:        Past Medical History:   Diagnosis Date    Anxiety     Depression     Osteoarthritis       Past Surgical History:   Procedure Laterality Date    ECTOPIC PREGNANCY SURGERY      [place] TOTAL HIP ARTHROPLASTY Left 3/5/2018    Procedure: ARTHROPLASTY HIP TOTAL;  Surgeon: Sam Navarrete DO;  Location: [facility];  Service: Orthopedics    WISDOM TOOTH EXTRACTION      x1        Allergy:        No Known Allergies    Medications:       Prior to Admission medications    Medication Sig Start Date End Date Taking?  Authorizing Provider   Glucos-Chondroit-Hyaluron-MSM (GLUCOSAMINE CHONDROITIN JOINT PO) Take 1 tablet by mouth as needed   Yes Historical Provider, MD   methylphenidate (RITALIN) 20 MG tablet TK 1 T PO BID 1/8/18  Yes Historical Provider, MD   PARoxetine (PAXIL) 30 mg tablet Take 60 mg by mouth daily 1/8/18  Yes Historical Provider, MD   QUEtiapine (SEROquel) 100 mg tablet Take 100 mg by mouth daily at bedtime 12/5/20  Yes Historical Provider, MD       Family History:     Family History   Problem Relation Age of Onset    Cancer Family     Cancer Father     Atrial fibrillation Father     Hypertension Father         Social History:       Social History     Socioeconomic History    Marital status: Single     Spouse name: None    Number of children: None    Years of education: None    Highest education level: None   Occupational History    None   Social Needs    Financial resource strain: None    Food insecurity     Worry: None     Inability: None    Transportation needs     Medical: None     Non-medical: None   Tobacco Use    Smoking status: Heavy Tobacco Smoker     Packs/day: 0 50     Years: 20 00     Pack years: 10 00     Types: Cigarettes    Smokeless tobacco: Never Used    Tobacco comment: Working on Reducing for surgery  Substance and Sexual Activity    Alcohol use: Yes     Frequency: Monthly or less     Drinks per session: 1 or 2     Comment: social drinker    Drug use: No    Sexual activity: Not Currently   Lifestyle    Physical activity     Days per week: None     Minutes per session: None    Stress: None   Relationships    Social connections     Talks on phone: None     Gets together: None     Attends Sikh service: None     Active member of club or organization: None     Attends meetings of clubs or organizations: None     Relationship status: None    Intimate partner violence     Fear of current or ex partner: None     Emotionally abused: None     Physically abused: None     Forced sexual activity: None   Other Topics Concern    None   Social History Narrative    None       ROS:  Symptoms per HPI  Remainder review of systems is negative    Exam:  General:  alert, oriented and in no distress, cooperative  Head: Normocephalic, atraumatic  Eyes:  EOMI   Pupils - equal, round, reactive to accomodation  No icterus  Normal Conjunctiva  Oropharynx: moist and normal-appearing mucosa  Neck:  8 cm JVD noted  Heart:  Tachycardic, regular, no murmurs  Respiratory effort / Chest Inspection: unlabored  Lungs:  Bilateral crackles   Abdomen: flat, normal findings: bowel sounds normal and soft, non-tender  Lower Limbs:  no pitting edema  Pulses[de-identified]  RLE - DP: present 2+                 LLE - DP: present 2+  Musculoskeletal: ROM grossly normal    DATA:      ECG:                 Normal sinus rhythm with frequent PVCs      Telemetry: Normal sinus rhythm,   HR 90's  Frequent PVC's & NSVT noted         Weights: Wt Readings from Last 3 Encounters:   01/07/21 57 7 kg (127 lb 3 3 oz)   12/11/20 57 6 kg (127 lb)   12/06/20 57 8 kg (127 lb 6 8 oz)   , Body mass index is 20 53 kg/m²           Lab Studies:    Results from last 7 days   Lab Units 01/07/21  0127 01/06/21  2230 01/06/21  1653   TROPONIN I ng/mL 0 13* 0 14* 0 10*        Results from last 7 days   Lab Units 01/07/21  0444   TRIGLYCERIDES mg/dL 135   HDL mg/dL 53     Results from last 7 days   Lab Units 01/06/21  1653   WBC Thousand/uL 10 78*   HEMOGLOBIN g/dL 13 3   HEMATOCRIT % 39 8   PLATELETS Thousands/uL 361   ,   Results from last 7 days   Lab Units 01/07/21  0444 01/06/21  1653   POTASSIUM mmol/L 2 9* 3 3*   CHLORIDE mmol/L 107 106   CO2 mmol/L 22 28   BUN mg/dL 29* 27*   CREATININE mg/dL 1 69* 1 87*   CALCIUM mg/dL 7 9* 8 1*   ALK PHOS U/L 86 102   ALT U/L 28 33   AST U/L 24 27

## 2021-01-07 NOTE — ED NOTES
Messaged provider to ask if wants EKG with redrawn of troponin   Informed provider on up to date BP      Stormy Duggan  01/06/21 0226

## 2021-01-07 NOTE — OCCUPATIONAL THERAPY NOTE
Occupational Therapy Evaluation     Patient Name: Ab Dawson  LVDXB'L Date: 1/7/2021  Problem List  Principal Problem:    Acute respiratory failure with hypoxia ([facility] [address] )  Active Problems:    Anxiety    Current smoker    Hypertensive urgency    Hypokalemia    MAXI (acute kidney injury) ([facility] [address] )    Bilateral pleural effusion    Acute CHF (congestive heart failure) ([facility])    Elevated troponin    ADHD    Past Medical History  Past Medical History:   Diagnosis Date    Anxiety     Depression     Osteoarthritis      Past Surgical History  Past Surgical History:   Procedure Laterality Date    ECTOPIC PREGNANCY SURGERY      [place] TOTAL HIP ARTHROPLASTY Left 3/5/2018    Procedure: ARTHROPLASTY HIP TOTAL;  Surgeon: Larissa Kaur DO;  Location: [facility];  Service: Orthopedics    WISDOM TOOTH EXTRACTION      x1           01/07/21 0935   OT Last Visit   OT Visit Date 01/07/21   Note Type   Note type Evaluation   Restrictions/Precautions   Weight Bearing Precautions Per Order No   Braces or Orthoses CAM Boot   Other Precautions O2  (3L O2, 91% after functional mobility without O2)   Pain Assessment   Pain Assessment Tool Pain Assessment not indicated - pt denies pain   Pain Score No Pain   Home Living   Type of [address] [place] [address] Two level;Stairs to enter with rails;Bed/bath upstairs  (2+4 DANIELITO in front, 12 DANIELITO in back)   Bathroom Shower/Tub Tub/shower unit   Bathroom Toilet Raised   Bathroom Equipment Shower chair  (reports using a "Shahnaz Bravo" child chair)   [address] Walker;Cane  (currently in CAM boot)   Additional Comments no use of DME at baseline   Prior Function   Level of [place] Independent with ADLs and functional mobility   Lives With Alone   Receives Help From Family  (mother and sister live across street)   ADL Assistance Independent   IADLs Independent   Falls in the last 6 months 1 to 4   Vocational Full time employment   Comments (+)drives, has not been driving since fall and since being in CAM boot due to restrictions   Lifestyle   Autonomy PTA pt living alone in [facility], pt (I) with ADLs and IADLs, no use of AD at baseline, (+)fall, (+)drives   Reciprocal Relationships supportive sister and mother live across the street   Service 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   Intrinsic Gratification working   Subjective   Subjective "Oh I feel great"   ADL   Eating Assistance 7  Independent   Grooming Assistance 7  Independent   UB Bathing Assistance 7  Independent   LB [address]  [address] 7  Independent    [place]  7  Independent   Bed Mobility   Supine to Sit 7  Independent   Additional Comments OOB and in chair at end of session   Transfers   Sit to Stand 7  Independent   Stand to Sit 7  Independent   Functional Mobility   Functional Mobility 7  Independent   Additional Comments functional household distance   Additional items   (no AD)   Balance   Static Sitting Normal   Dynamic Sitting Good   Static Standing Good   Dynamic Standing Good   Ambulatory Good   Activity Tolerance   Activity Tolerance Patient tolerated treatment well   Medical Staff Made Aware PT Theresa, PT student Natalie Pond, RN Korin   RUJOSIAH Assessment   RUE Assessment WFL   LUE Assessment   LUE Assessment WFL   Hand Function   Gross Motor Coordination Functional   Fine Motor Coordination Functional   Sensation   Light Touch No apparent deficits   Sharp/Dull No apparent deficits   Cognition   Overall Cognitive Status WFL   Arousal/Participation Alert; Cooperative   Attention Within functional limits   Orientation Level Oriented X4   Memory Within functional limits   Following Commands Follows all commands and directions without difficulty   Comments Pleasant and cooperative   Assessment   Prognosis Good   Assessment Patient is a 44 y o  female admitted to [facility]  on 1/6/2021 due to Acute respiratory failure with hypoxia ([facility] )  Comorbidities affecting pt's physical performance at time of assessment include anxiety, smokes, hypokalemia, MAXI, ADHD, CHF  Patient has active OT orders and activity orders for Up as tolerated  PTA pt living alone in [facility], pt (I) with ADLs and IADLs, no use of AD at baseline, (+)fall, (+)drives  Personal factors affecting pt at time of IE include:steps to enter environment and limited home support  At the time of evaluation patient currently requires no A for UB ADLs, is (I) for LB ADLs, (I) for functional transfers, and (I) for functional mobility  No further acute OT needs identified at this time  Recommend continued mobilization with hospital staff and restorative services while in the hospital to increase pts endurance and strength upon D/C  From OT standpoint, recommend D/C to home with family support when medically cleared  D/C pt from OT caseload at this time  Goals   Patient Goals to go home   Plan   OT Frequency Eval only   Recommendation   OT Discharge Recommendation Return to previous environment with no needs   OT - OK to Discharge Yes  (when medically cleared)   Barthel Index   Feeding 10   Bathing 5   Grooming Score 5   Dressing Score 10   Bladder Score 10   Bowels Score 10   Toilet Use Score 10   Transfers (Bed/Chair) Score 15   Mobility (Level Surface) Score 15   Stairs Score 10   Barthel Index Score 100      Pt with no identified skilled OT needs, d/c OT orders       At the end of the session, all needs met and pt seated in bedside chair, call bell within reach and x-ray tech in room    [facility], OTR/L

## 2021-01-07 NOTE — ED NOTES
Pt ambulated to bathroom, when coming back stated she felt nauseous  Pt had vomit episode  Once the pt laid in bed with oxygen, she states symptoms improved        Juan Vallejo  01/06/21 6993

## 2021-01-07 NOTE — H&P
H&P- Zakia Duran 1970, 44 y o  female MRN: [id number]    Unit/Bed#: ED 21 Encounter: [id number]    Primary Care Provider: No primary care provider on file  Date and time admitted to hospital: 1/6/2021  4:34 PM        * Acute respiratory failure with hypoxia (HCC)  Assessment & Plan  Presenting with shortness of breath for the past week  O2 saturation 80% on room air  Currently requiring 3 L to maintain saturations in the 90s  Secondary to acute volume overload-likely new onset CHF  Also with bilateral moderate pleural effusions on CT imaging  COVID negative  Continue with IV diuresis  May benefit from thoracentesis but will defer to pulmonology  Consult pulmonology    Acute CHF (congestive heart failure) [facility])  Assessment & Plan  Presenting with shortness of breath, bilateral pleural effusions, elevated BNP 96,433  Denies history of any CHF or previously being on diuretics  Continue with IV diuresis 20 mg b i d  Obtain echocardiogram   Consult cardiology  Monitor I&Os and daily weights  Monitor on telemetry    Bilateral pleural effusion  Assessment & Plan  Present on CT imaging  Likely secondary to new onset CHF  Continue with IV diuresis and await pulmonary consultation    Hypertensive urgency  Assessment & Plan  Presenting with significantly elevated blood pressures 230/120  Received IV Lasix in ED  ED spoke with cardiology who advised IV labetalol and nitroglycerin drip  Seen and evaluated patient in the ED  Blood pressure is slowly improving-administer IV labetalol  Hold off on nitroglycerin drip for now  Gradual decrease BP  Goal -180 for now  Start oral amlodipine 5 mg daily    Elevated troponin  Assessment & Plan  Troponin elevated at 0 10    Already received aspirin bolus 324 mg in ED  Will continue to trend x2  Monitor on telemetry  Await further recommendation cardiac workup    MAXI (acute kidney injury) ([facility] [address] )  Assessment & Plan  AKA on CKD stage 3  Presenting creatinine 1  87   Baseline around 0 7-0 8  Continue to monitor in the setting of IV diuresis    ADHD  Assessment & Plan  Continue Ritalin 20 mg b i d  Hypokalemia  Assessment & Plan  Potassium 3 3  Continue to replete in the setting of diuresis    Current smoker  Assessment & Plan  Smokes less than 1 pack per day  Counseled on cessation-provide nicotine patch    Anxiety  Assessment & Plan  Maintained on Seroquel and Paxil      VTE Prophylaxis: Heparin  / sequential compression device   Code Status: full code  Anticipated Length of Stay:  Patient will be admitted on an Inpatient basis with an anticipated length of stay of  Greater than 2 midnights  Justification for Hospital Stay:  Acute respiratory failure with hypoxia need for oxygen, acute new onset CHF with need for IV diuresis and Cardiology consult    Chief Complaint:   Shortness of breath x1 week    History of Present Illness:    Dina Rust is a 44 y o  female with past medical history of anxiety/depression, osteoarthritis, history of right fibula fracture December 2020  She presents with a complaint of increasing shortness of breath for the past week  Initially thought this is secondary to being more sedentary in the setting of her recent foot fracture that today she felt in distress and unable to get air in  Denies any lower extremity edema  Denies any history of heart failure or being on diuretics in the past   She has also noticed a headache and blurry vision ongoing for the past week as well  No loss of vision or vomiting  Upon presentation to the ED her blood pressures were elevated in the 200s  Denies history of hypertension or ever being on medications  Lives at home alone  Uses a cane, walker, and crutches at home secondary to her recent foot fracture  Offered to update family-patient declined  Review of Systems:    General:   No Fever or chills; + headache   EENT:   No ear pain, facial swelling; No sneezing, sore throat     Skin:   No rashes, color changes  Respiratory:     + shortness of breath, cough,   Cardiovascular:     No chest pain, palpitations  Gastrointestinal:    No nausea, vomiting, diarrhea; No abdominal pain  Musculoskeletal:     No arthralgias, myalgias, swelling  Neurologic:   No dizziness, numbness, weakness  No speech difficulties  Psych:   No agitation,    Otherwise, All other twelve-point review of systems normal      Past Medical and Surgical History:     Past Medical History:   Diagnosis Date    Anxiety     Depression     Osteoarthritis        Past Surgical History:   Procedure Laterality Date    ECTOPIC PREGNANCY SURGERY      VT TOTAL HIP ARTHROPLASTY Left 3/5/2018    Procedure: ARTHROPLASTY HIP TOTAL;  Surgeon: Davian Sanchez DO;  Location: [facility];  Service: Orthopedics    WISDOM TOOTH EXTRACTION      x1       Meds/Allergies:    Current Facility-Administered Medications   Medication Dose Route Frequency Provider Last Rate Last Admin    nitroGLYcerin (TRIDIL) 50 mg in 250 mL infusion (premix)  5-200 mcg/min Intravenous Titrated Stacy Canela PA-C   Stopped at 01/06/21 2011     Current Outpatient Medications   Medication Sig Dispense Refill    Glucos-Chondroit-Hyaluron-MSM (GLUCOSAMINE CHONDROITIN JOINT PO) Take 1 tablet by mouth as needed      methylphenidate (RITALIN) 20 MG tablet TK 1 T PO BID  0    PARoxetine (PAXIL) 30 mg tablet Take 60 mg by mouth daily   5    QUEtiapine (SEROquel) 100 mg tablet Take 100 mg by mouth daily at bedtime         No Known Allergies    Allergies: No Known Allergies    Social History:     Marital Status: Single     Substance Use History:   Social History     Substance and Sexual Activity   Alcohol Use No     Social History     Tobacco Use   Smoking Status Heavy Tobacco Smoker    Packs/day: 0 50    Years: 20 00    Pack years: 10 00    Types: Cigarettes   Smokeless Tobacco Never Used   Tobacco Comment    Working on Reducing for surgery        Social History Substance and Sexual Activity   Drug Use No       Family History:    non-contributory    Physical Exam:     Vitals:   Blood Pressure: 152/99 (01/06/21 2034)  Pulse: 81 (01/06/21 2034)  Temperature: 97 6 °F (36 4 °C) (01/06/21 1816)  Temp Source: Tympanic (01/06/21 1816)  Respirations: (!) 28 (01/06/21 2034)  Height: 5' 6" (167 6 cm) (01/06/21 1632)  Weight - Scale: 58 1 kg (128 lb) (01/06/21 1632)  SpO2: 93 % (01/06/21 2034)    Physical Exam  Vitals signs and nursing note reviewed  Constitutional:       General: She is not in acute distress  Appearance: Normal appearance  She is normal weight  She is not ill-appearing, toxic-appearing or diaphoretic  HENT:      Head: Normocephalic and atraumatic  Eyes:      General: No scleral icterus  Cardiovascular:      Rate and Rhythm: Normal rate and regular rhythm  Pulmonary:      Effort: No respiratory distress  Breath sounds: No stridor  No wheezing or rhonchi  Comments: On 3 L nasal cannula  Decreased breath sounds bilaterally  Abdominal:      General: Bowel sounds are normal  There is no distension  Palpations: Abdomen is soft  There is no mass  Tenderness: There is no abdominal tenderness  Hernia: No hernia is present  Musculoskeletal:         General: No swelling  Skin:     General: Skin is warm and dry  Neurological:      Mental Status: She is oriented to person, place, and time  Mental status is at baseline  Psychiatric:         Mood and Affect: Mood normal          Behavior: Behavior normal          Additional Data:     Lab Results: I have personally reviewed pertinent reports        Results from last 7 days   Lab Units 01/06/21  1653   WBC Thousand/uL 10 78*   HEMOGLOBIN g/dL 13 3   HEMATOCRIT % 39 8   PLATELETS Thousands/uL 361   NEUTROS PCT % 60   LYMPHS PCT % 30   MONOS PCT % 6   EOS PCT % 3     Results from last 7 days   Lab Units 01/06/21  1653   POTASSIUM mmol/L 3 3*   CHLORIDE mmol/L 106   CO2 mmol/L 28   BUN mg/dL 27*   CREATININE mg/dL 1 87*   CALCIUM mg/dL 8 1*   ALK PHOS U/L 102   ALT U/L 33   AST U/L 27     Results from last 7 days   Lab Units 01/06/21  1653   INR  0 95       Imaging: I have personally reviewed pertinent reports  Xr Ankle 3+ Vw Right    Result Date: 12/14/2020  Narrative: RIGHT ANKLE INDICATION:   [id number]: Other fracture of upper and lower end of right fibula, initial encounter for closed fracture  COMPARISON:  12/7/2020 VIEWS:  XR ANKLE 3+ VW RIGHT Stress view included FINDINGS: Oblique fracture of the distal right fibula demonstrates no change in alignment  During stress imaging, no disruption of the ankle mortise is seen No lytic or blastic osseous lesion  There is soft tissue swelling laterally     Impression: Unchanged alignment of the distal right fibular fracture Workstation performed: [id number]     Cta Ed Chest Pe Study    Result Date: 1/6/2021  Narrative: CTA - CHEST WITH IV CONTRAST - PULMONARY ANGIOGRAM INDICATION:   sob, recent surgery  COMPARISON: None  TECHNIQUE: CTA examination of the chest was performed using angiographic technique according to a protocol specifically tailored to evaluate for pulmonary embolism  Axial, sagittal, and coronal 2D reformatted images were created from the source data and  submitted for interpretation  In addition, coronal 3D MIP postprocessing was performed on the acquisition scanner  Radiation dose length product (DLP) for this visit:  163 mGy-cm   This examination, like all CT scans performed in the [facility], was performed utilizing techniques to minimize radiation dose exposure, including the use of iterative reconstruction and automated exposure control  IV Contrast:  85 mL of iohexol (OMNIPAQUE)  FINDINGS: PULMONARY ARTERIAL TREE:  No pulmonary embolus is seen  LUNGS:  Posterior bilateral lower lobe atelectasis/infiltrates  Remaining lung parenchyma is clear  Scattered less than 3 mm nodules in the upper lobes  Edilberto Lewis There is no tracheal or endobronchial lesion  PLEURA:  Bilateral moderate pleural effusions  HEART/GREAT VESSELS:  Unremarkable for patient's age  MEDIASTINUM AND NACHO:  Bilateral hilar and subcarinal soft tissue density suggesting lymph nodes although limited by lack of IV contrast  CHEST WALL AND LOWER NECK:   Unremarkable  VISUALIZED STRUCTURES IN THE UPPER ABDOMEN:  Unremarkable  OSSEOUS STRUCTURES:  No acute fracture or destructive osseous lesion  Impression: No pulmonary embolus  Bilateral moderate pleural effusions  Posterior bibasilar atelectasis/infiltrates  Questionable hilar and subcarinal lymphadenopathy limited by lack of IV contrast  Workstation performed: [id number]       EKG, Pathology, and Other Studies Reviewed on Admission:   · EK, sinus, PVCs    [employer] Records Reviewed: Yes     Total Time for Visit, including Counseling / Coordination of Care: 45 minutes  Greater than 50% of this total time spent on direct patient counseling and coordination of care  ** Please Note: This note has been constructed using a voice recognition system   **

## 2021-01-07 NOTE — ED NOTES
Spoke with SLIM, advised to give labetalol and hold nitro drip        [employer]  01/06/21 2028

## 2021-01-07 NOTE — ED NOTES
Patients mother Kevin Guzmán updated on plan of care and of pending admission upon patient request     Tayler Morillo RN  01/06/21 1846

## 2021-01-07 NOTE — CONSULTS
Consultation - Pulmonary Medicine   Emely Jhaveri 44 y o  female MRN: [id number]  Unit/Bed#: E4 -01 Encounter: [id number]      Assessment/ Plan:  1  Acute hypoxic respiratory failure  · Continue to wean oxygen as tolerated  · Maintain SpO2 greater than equal to 88%  Patient did not wear oxygen prior to this hospital stay  · Continue to encourage cough and deep breathing exercises  · I suspect that patient's acute hypoxic respiratory failure is likely secondary to her bilateral pleural effusions which I feel is brought on by patient's heart failure  Echocardiogram revealed ejection fraction of 25% with severe diffuse hypokinesis as well as grade 3 diastolic dysfunction    2  Tobacco abuse  · Continue to encourage smoking cessation  · Continue nicotine replacement therapy with nicotine patch while here in the hospital    3  Abnormal chest x-ray  · Patient was pulmonary vascular congestion and bilateral pleural effusions  Likely secondary to volume overload and acute CHF  Will continue to monitor and diurese  Should patient's hypoxia not improve could consider diagnostic/therapeutic thoracentesis however I will hold off at this time as they do hope she will diurese and her oxygen saturations will improve  4  Accelerated hypertension  · Continue management per primary care team    5  New onset congestive heart failure  · Cards consult  · Follow-up echocardiogram  · BNP on admission was 96,433  · Continue Lasix 40 mg IV b i d     6  Elevated troponins  · Cardiology is consulted, appreciate their input  Likely demand ischemia secondary to accelerated hypertension, a new onset heart failure      History of Present Illness   Physician Requesting Consult: Kiran Lott DO  Reason for Consult / Principal Problem:  Pleural effusions and acute hypoxic respiratory failure  Hx and PE limited by: NA  HPI: Emely Jhaveri is a 44y o  year old female who we are asked to see in pulmonary consult for hypoxia and abnormal chest x-ray  Patient reports that approximately 1 week ago she suffered a right fibula fracture  She started having elevated blood pressures at home after her fracture  Then yesterday she started to have increased shortness of breath, lower extremity swelling, and persistently elevated blood pressure  She decided to come to the hospital for further evaluation  She has been having a headache and blurry vision  She denies any other associated symptoms  In the ED her blood pressure was found to be in the 200s  Her chest x-ray showed pulmonary vascular congestion, and her BNP on admission was 96,000  Patient was started on diuresis and admitted to the hospital for further evaluation  From pulmonary perspective she does not follow with anybody in the pulmonary office  She is a current everyday smoker  She does not use any inhalers or nebulizers  Inpatient consult to Pulmonology  Consult performed by: Samuel Jiang PA-C  Consult ordered by: Vanessa Vee PA-C          Review of Systems   Constitutional: Positive for activity change, diaphoresis and fatigue  Negative for appetite change, chills, fever and unexpected weight change  HENT: Negative for congestion, mouth sores, nosebleeds, postnasal drip, rhinorrhea and sinus pain  Respiratory: Positive for cough, chest tightness, shortness of breath and wheezing  Negative for apnea, choking and stridor  Cardiovascular: Negative for chest pain and palpitations  Gastrointestinal: Negative for abdominal distention, abdominal pain, constipation, diarrhea, nausea and vomiting  Genitourinary: Negative for difficulty urinating  Musculoskeletal: Negative for arthralgias  Allergic/Immunologic: Negative for immunocompromised state  Neurological: Negative for dizziness  Hematological: Negative for adenopathy  Psychiatric/Behavioral: Negative for agitation  All other systems reviewed and are negative        Historical Information Past Medical History:   Diagnosis Date    Anxiety     Depression     Osteoarthritis      Past Surgical History:   Procedure Laterality Date    ECTOPIC PREGNANCY SURGERY      AR TOTAL HIP ARTHROPLASTY Left 3/5/2018    Procedure: ARTHROPLASTY HIP TOTAL;  Surgeon: Davian Sanchez DO;  Location: [facility];  Service: Orthopedics    WISDOM TOOTH EXTRACTION      x1     Social History   Social History     Substance and Sexual Activity   Alcohol Use Yes    Frequency: Monthly or less    Drinks per session: 1 or 2    Comment: social drinker     Social History     Substance and Sexual Activity   Drug Use No     Social History     Tobacco Use   Smoking Status Heavy Tobacco Smoker    Packs/day: 0 50    Years: 20 00    Pack years: 10 00    Types: Cigarettes   Smokeless Tobacco Never Used   Tobacco Comment    Working on Reducing for surgery  Occupational History:  Patient reports that she is a current everyday smoker of approximately 1 pack per day for the past 20 years  She currently works as a nurse's aide      Family History:   Family History   Problem Relation Age of Onset    Cancer Family     Cancer Father     Atrial fibrillation Father     Hypertension Father        Meds/Allergies   current meds:   Current Facility-Administered Medications   Medication Dose Route Frequency    acetaminophen (TYLENOL) tablet 650 mg  650 mg Oral Q4H PRN    atorvastatin (LIPITOR) tablet 40 mg  40 mg Oral Daily With Dinner    carvedilol (COREG) tablet 6 25 mg  6 25 mg Oral BID With Meals    furosemide (LASIX) injection 40 mg  40 mg Intravenous BID (diuretic)    heparin (porcine) subcutaneous injection 5,000 Units  5,000 Units Subcutaneous Q8H [facility]    Labetalol HCl (NORMODYNE) injection 10 mg  10 mg Intravenous Q6H PRN    nicotine (NICODERM CQ) 14 mg/24hr TD 24 hr patch 1 patch  1 patch Transdermal Daily    ondansetron (ZOFRAN) injection 4 mg  4 mg Intravenous Q6H PRN    PARoxetine (PAXIL) tablet 60 mg  60 mg Oral Daily    potassium chloride (K-DUR,KLOR-CON) CR tablet 40 mEq  40 mEq Oral Daily    potassium chloride oral solution 40 mEq  40 mEq Oral Once    QUEtiapine (SEROquel) tablet 100 mg  100 mg Oral HS       No Known Allergies    Objective   Vitals: Blood pressure 153/95, pulse 91, temperature 98 °F (36 7 °C), temperature source Temporal, resp  rate 18, height 5' 6" (1 676 m), weight 57 7 kg (127 lb 3 3 oz), SpO2 93 %  ,Body mass index is 20 53 kg/m²  3 liters/minute nasal cannula    Intake/Output Summary (Last 24 hours) at 1/7/2021 1114  Last data filed at 1/7/2021 0601  Gross per 24 hour   Intake 240 ml   Output 400 ml   Net -160 ml     Invasive Devices     Peripheral Intravenous Line            Peripheral IV 01/06/21 Right Antecubital less than 1 day                Physical Exam  Vitals signs and nursing note reviewed  Constitutional:       General: She is not in acute distress  Appearance: She is well-developed and normal weight  She is not ill-appearing or diaphoretic  HENT:      Head: Normocephalic and atraumatic  Mouth/Throat:      Pharynx: No pharyngeal swelling or oropharyngeal exudate  Eyes:      Extraocular Movements: Extraocular movements intact  Pupils: Pupils are equal, round, and reactive to light  Neck:      Musculoskeletal: Normal range of motion and neck supple  Vascular: No JVD  Trachea: No tracheal deviation  Cardiovascular:      Rate and Rhythm: Normal rate and regular rhythm  Pulses: Normal pulses  No decreased pulses  Heart sounds: Normal heart sounds  No murmur  No friction rub  No gallop  Pulmonary:      Effort: Pulmonary effort is normal  No tachypnea or accessory muscle usage  Breath sounds: Decreased breath sounds present  Comments: Breath sounds are decreased bilaterally without any wheezes, rales, rhonchi  Chest:      Chest wall: No mass or tenderness  Musculoskeletal: Normal range of motion  Right lower leg: No edema  Left lower leg: No edema  Lymphadenopathy:      Cervical: No cervical adenopathy  Skin:     General: Skin is warm and dry  Capillary Refill: Capillary refill takes less than 2 seconds  Coloration: Skin is not cyanotic or pale  Neurological:      General: No focal deficit present  Mental Status: She is alert  Psychiatric:         Mood and Affect: Mood normal          Lab Results:   I have personally reviewed pertinent lab results  , ABG: No results found for: PHART, GGT6MZR, PO2ART, OPT9EXY, T5CXLHQU, BEART, SOURCE, BNP: No results found for: BNP, CBC:   Lab Results   Component Value Date    WBC 10 78 (H) 01/06/2021    HGB 13 3 01/06/2021    HCT 39 8 01/06/2021    MCV 97 01/06/2021     01/06/2021    MCH 32 5 01/06/2021    MCHC 33 4 01/06/2021    RDW 13 6 01/06/2021    MPV 9 7 01/06/2021    NRBC 0 01/06/2021   , CMP:   Lab Results   Component Value Date    SODIUM 141 01/07/2021    K 2 9 (L) 01/07/2021     01/07/2021    CO2 22 01/07/2021    BUN 29 (H) 01/07/2021    CREATININE 1 69 (H) 01/07/2021    CALCIUM 7 9 (L) 01/07/2021    AST 24 01/07/2021    ALT 28 01/07/2021    ALKPHOS 86 01/07/2021    EGFR 34 01/07/2021   , PT/INR:   Lab Results   Component Value Date    INR 0 95 01/06/2021   , Troponin:   Lab Results   Component Value Date    TROPONINI 0 13 (H) 01/07/2021     Imaging Studies: I have personally reviewed pertinent reports  Chest x-ray ordered this a m  Is pending  CT scan of the chest 01/06/2021  FINDINGS:     PULMONARY ARTERIAL TREE:  No pulmonary embolus is seen       LUNGS:  Posterior bilateral lower lobe atelectasis/infiltrates  Remaining lung parenchyma is clear  Scattered less than 3 mm nodules in the upper lobes     There is no tracheal or endobronchial lesion      PLEURA:  Bilateral moderate pleural effusions      HEART/GREAT VESSELS:  Unremarkable for patient's age      MEDIASTINUM AND NACHO:  Bilateral hilar and subcarinal soft tissue density suggesting lymph nodes although limited by lack of IV contrast      CHEST WALL AND LOWER NECK:   Unremarkable      VISUALIZED STRUCTURES IN THE UPPER ABDOMEN:  Unremarkable      OSSEOUS STRUCTURES:  No acute fracture or destructive osseous lesion      IMPRESSION:     No pulmonary embolus      Bilateral moderate pleural effusions      Posterior bibasilar atelectasis/infiltrates      Questionable hilar and subcarinal lymphadenopathy limited by lack of IV contrast     Echocardiogram  LEFT VENTRICLE: The ventricle was mildly dilated  Systolic function was markedly reduced by visual assessment  Ejection fraction was estimated to be 25 %  There was severe diffuse hypokinesis  DOPPLER: Doppler parameters were consistent  with a reversible restrictive pattern, indicative of decreased left ventricular diastolic compliance and/or increased left atrial pressure (grade 3 diastolic dysfunction)      RIGHT VENTRICLE: The size was normal  Systolic function was normal  Wall thickness was normal      LEFT ATRIUM: The atrium was mildly dilated      RIGHT ATRIUM: Size was normal      MITRAL VALVE: Valve structure was normal  There was mild thickening of the anterior and posterior leaflets  There was normal leaflet separation  DOPPLER: The transmitral velocity was within the normal range  There was no evidence for  stenosis  There was mild regurgitation      AORTIC VALVE: The valve was trileaflet  Leaflets exhibited normal thickness, normal cuspal separation, and sclerosis  DOPPLER: Transaortic velocity was within the normal range  There was no evidence for stenosis  There was trace  regurgitation      TRICUSPID VALVE: The valve structure was normal  There was normal leaflet separation  DOPPLER: The transtricuspid velocity was within the normal range  There was no evidence for stenosis  There was mild regurgitation   Estimated peak PA  pressure was 40 mmHg      PULMONIC VALVE: Leaflets exhibited normal thickness, no calcification, and normal cuspal separation  DOPPLER: The transpulmonic velocity was within the normal range  There was no evidence for stenosis  There was trace regurgitation      PERICARDIUM: A small pericardial effusion was identified circumferential to the heart  There was no evidence of hemodynamic compromise  There was a large left pleural effusion      AORTA: The root exhibited normal size      SYSTEMIC VEINS: IVC: The inferior vena cava was normal in size and course  Respirophasic changes were normal     EKG, Pathology, and Other Studies: I have personally reviewed pertinent reports         PFT Results: None     Code Status: Level 1 - Full Code

## 2021-01-07 NOTE — PROGRESS NOTES
Progress Note - Pepper Alcantara 44 y o  female MRN: [id number]    Unit/Bed#: E4 -01 Encounter: [id number]    Assessment/Plan:    Acute respiratory failure  appears related to acute heart failure, with elevated BNP and accelerated hypertension, may also be a component of COPD exacerbation with ongoing smoking, continue IV Lasix, DC Norvasc and start Coreg    Accelerated hypertension  improving control continue to monitor with Coreg and Norvasc    Tobacco abuse   cessation counseling provided, continue nicotine patch    Pulmonary congestion  appears related to acute heart failure continue IV diuretics    Acute heart failure   await echo and Cardiology consult to further define    Mild troponin increased  most likely related to acute heart failure and accelerated hypertension monitor with Cardiology    MDD     continue Paxil and Seroquel    Hypokalemia    secondary to diuresis will provide 80 milliequivalents of potassium today and monitor, check magnesium    Right ankle fracture   continue ortho follow-up    A KI     most likely related to heart failure decreased perfusion, improving with diuresis creatinine 1 69 today    Dyslipidemia     initiate Lipitor    Subjective:   Feels much better, less short of breath, less cough, no chest pain no nausea vomiting no fevers chills appetite good    Objective:     Vitals: Blood pressure 153/95, pulse 91, temperature 98 °F (36 7 °C), temperature source Temporal, resp  rate 18, height 5' 6" (1 676 m), weight 57 7 kg (127 lb 3 3 oz), SpO2 93 %  ,Body mass index is 20 53 kg/m²        Results from last 7 days   Lab Units 01/06/21  1653   WBC Thousand/uL 10 78*   HEMOGLOBIN g/dL 13 3   HEMATOCRIT % 39 8   PLATELETS Thousands/uL 361   INR  0 95     Results from last 7 days   Lab Units 01/07/21  0444   POTASSIUM mmol/L 2 9*   CHLORIDE mmol/L 107   CO2 mmol/L 22   BUN mg/dL 29*   CREATININE mg/dL 1 69*   CALCIUM mg/dL 7 9*   ALK PHOS U/L 86   ALT U/L 28   AST U/L 24 Scheduled Meds:  Current Facility-Administered Medications   Medication Dose Route Frequency Provider Last Rate    acetaminophen  650 mg Oral Q4H PRN Manan Suárez PA-C      amLODIPine  5 mg Oral Daily Sugey Soliz PA-C      furosemide  40 mg Intravenous BID (diuretic) Reginald Snow DO      heparin (porcine)  5,000 Units Subcutaneous Q8H [facility] Sugey Soliz PA-C      Labetalol HCl  10 mg Intravenous Q6H PRN Pratibha Montoya MD      nicotine  1 patch Transdermal Daily Sugey Soliz PA-C      ondansetron  4 mg Intravenous Q6H PRN Manan Suárez PA-C      PARoxetine  60 mg Oral Daily Manan Suárez PA-C      potassium chloride  40 mEq Oral Daily Reginald Snow,       potassium chloride  40 mEq Oral Once Reginald Snow,       QUEtiapine  100 mg Oral HS Sugey Soliz PA-C         Continuous Infusions:         Physical exam:  General appearance:  Alert no distress interaction appropriate  Head/Eyes:  Nonicteric PERRL EOMI  Neck:  Supple  Lungs:  Decreased BS bilateral crackles  Heart: normal S1 S2 regular  Abdomen: Soft nontender with bowel sounds  Extremities: no edema  Skin: no rash    Invasive Devices     Peripheral Intravenous Line            Peripheral IV 01/06/21 Right Antecubital less than 1 day                  VTE Pharmacologic Prophylaxis:  Heparin    Counseling / Coordination of Care  Total floor / unit time spent today 30  minutes  Greater than 50% of total time was spent with the patient and / or family counseling and / or coordination of care    A description of the counseling / coordination of care:  Discussed with Cardiology

## 2021-01-07 NOTE — ASSESSMENT & PLAN NOTE
AKA on CKD stage 3  Presenting creatinine 1 87    Baseline around 0 7-0 8  Continue to monitor in the setting of IV diuresis

## 2021-01-07 NOTE — PHYSICAL THERAPY NOTE
PHYSICAL THERAPY EVAL        Patient Name: Arleen Tyson  XVDSL'B Date: 1/7/2021   Time: 8225-4072        01/07/21 0936   PT Last Visit   PT Visit Date 01/07/21   Note Type   Note type Evaluation   Pain Assessment   Pain Assessment Tool 0-10   Pain Score No Pain   Home Living   Type of Home House   Home Layout Stairs to enter with rails;Stairs to enter without rails; Two level;Bed/bath upstairs  (2+4 DANIELITO in front w/o HR, 12STE in back w/HR  )   Bathroom Shower/Tub Tub/shower unit   Bathroom Toilet Raised   Bathroom Equipment Shower chair   Bathroom Accessibility Accessible   Home Equipment Cane;Walker;Crutches; Other (Comment)  (CAM boot )   Additional Comments no current DME use, pt w/ DME d/t recent foot fracture    Prior Function   Level of [place] Independent with ADLs and functional mobility   Lives With Alone   Receives Help From Family  (mother and sister across the street)   ADL Assistance Independent   IADLs Independent   Falls in the last 6 months 1 to 4  (1)   Vocational Full time employment   Comments (+)  but not since fall and being in boot, mother currently drives pt around  Not on O2 at home  Restrictions/Precautions   Braces or Orthoses CAM Boot  (RLE )   Other Precautions O2  (3L O2, 91% on RA after bathroom use )   General   Additional Pertinent History pt admitted 1/6 for acute respiratory failure w/ hypoxia  CAM Boot to RLE d/t recent fracture      Cognition   Overall Cognitive Status WFL   Arousal/Participation Cooperative   Attention Within functional limits   Orientation Level Oriented X4   Memory Within functional limits   Following Commands Follows one step commands without difficulty   RLE Assessment   RLE Assessment WFL  (observed 3/5 )   LLE Assessment   LLE Assessment WFL  (observed 3/5 )   Coordination   Sensation WFL   Transfers   Sit to Stand 7  Independent   Stand to Sit 7  Independent Toilet transfer 7  Independent   Additional items Standard toilet   Additional Comments pt seated upright at EOB upon arrival     Ambulation/Elevation   Gait pattern Excessively slow; Short stride   Gait Assistance 7  Independent   Assistive Device None   Distance 15x1, 10x1    Balance   Static Sitting Normal   Dynamic Sitting Good   Static Standing Good   Dynamic Standing Fair +   Ambulatory Fair +   Endurance Deficit   Endurance Deficit Yes   Endurance Deficit Description d/t fatigue    Activity Tolerance   Activity Tolerance Patient tolerated treatment well;Patient limited by fatigue   Medical Staff Made Aware Rita Newby    Nurse Made Aware Korin BARNETT    Assessment   Prognosis Fair   Problem List Decreased endurance;Decreased mobility  (3L O2 )   Assessment Mili Feliciano is a 43 yo female admitted to [facility] on 1/6/21 for acute respiratory failure w/ hypoxia  Pta pt independent for I/ADLs, w/ no O2 use at baseline  CAM Boot to RLE d/t recent fracture and on 3L of O2  PT consulted per pt POC for functional mobility assessment and d/c recommendations   Pt currently functioning at Independent for transfers and ambulation w/ no AD  Pt off O2 for bathroom use and O2 of 91%  O2 returned d/t noted SOB in seated  Pt functioning near baseline level, but limited by decreased endurance and mobility, and current O2 use  Pt will benefit from continued skilled IP PT to address the above mentioned impairments  in order to maximize recovery and increase functional independence when completing mobility and ADLs  Recommended d/t includes return to previous w/ support, pending ability to negociate stairs and increase ambulation distances  End of session pt seated upright in chair, w/ all needs in reach  Barriers to Discharge Inaccessible home environment   Barriers to Discharge Comments DANIELITO    Goals   Patient Goals to go home    STG Expiration Date 01/17/21   Short Term Goal #1 1   Pt will ambulate >150'x1 w/ least restrictive AD for community/household distances  2  Pt will increase activity tolerance to 45 minutes  5  Pt will negotiate stairs at Mod I for safe d/c home  3  Increase Barthel  by MCID value of 35 to facilitate independence   4  PT for ongoing patient and family/caregiver education, DME needs and d/c planning in order to promote highest level of function in least restrictive environment  PT Treatment Day 0   Plan   Treatment/Interventions Functional transfer training; Therapeutic exercise;Elevations;LE strengthening/ROM; Endurance training;Patient/family training;Equipment eval/education; Bed mobility;Gait training;Continued evaluation;Spoke to nursing;OT   PT Frequency 2-3x/wk   Recommendation   PT Discharge Recommendation Return to previous environment with social support   PT - OK to Discharge Yes   Additional Comments when medically cleared    Modified Christiano Scale   Modified Andrew Scale 3   Barthel Index   Feeding 10   Bathing 5   Grooming Score 5   Dressing Score 10   Bladder Score 10   Bowels Score 10   Toilet Use Score 10   Transfers (Bed/Chair) Score 15   Mobility (Level Surface) Score 0   Stairs Score 0   Barthel Index Score 75     History: co - morbidities, social background(lives alone), past experience(recent fracture),  3L O2   Exam: impairments in systems including neuromuscular (balance,locomotion, gait, transfers, motor function and learning), pulmonary, cognition, Barthel Index   Clinical: unstable/unpredictable, ongoing management for primary admission   Complexity:high    Anders Chapa, SPT

## 2021-01-07 NOTE — ASSESSMENT & PLAN NOTE
Presenting with shortness of breath, bilateral pleural effusions, elevated BNP 96,433  Denies history of any CHF or previously being on diuretics  Continue with IV diuresis 20 mg b i d    Obtain echocardiogram   Consult cardiology  Monitor I&Os and daily weights  Monitor on telemetry

## 2021-01-07 NOTE — UTILIZATION REVIEW
Initial Clinical Review    Admission: Date/Time/Statement:   Admission Orders (From admission, onward)     Ordered        01/06/21 1953  Inpatient Admission  Once                   Orders Placed This Encounter   Procedures    Inpatient Admission     Standing Status:   Standing     Number of Occurrences:   1     Order Specific Question:   Admitting Physician     Answer:   Andre Bangura [[id number]]     Order Specific Question:   Level of Care     Answer:   Level 2 Stepdown / HOT [14]     Order Specific Question:   Estimated length of stay     Answer:   More than 2 Midnights     Order Specific Question:   Certification     Answer:   I certify that inpatient services are medically necessary for this patient for a duration of greater than two midnights  See H&P and MD Progress Notes for additional information about the patient's course of treatment  ED Arrival Information     Expected Arrival Acuity Means of Arrival Escorted By Service Admission Type    - 1/6/2021 16:23 Emergent Walk-In Self General Medicine Emergency    Arrival Complaint    shortness of breath        Chief Complaint   Patient presents with    Shortness of Breath     increasing shortness of breath  symptoms for three days  some chest congestion  denies positive covid contacts  right ankle fx beginning of december  reports being last active and sitting more than normal             HPI:     44 y o  female with past medical history of anxiety/depression, osteoarthritis, current smoker, history of right fibula fracture December 2020  presents to the ED from homewith a complaint of increasing shortness of breath for the past week  She has also noticed a headache and blurry vision ongoing for the past week as well  Upon presentation to the ED her blood pressures were elevated in the 200s  O2 sat 90%, placed on supplemental oxygen  Denies history of hypertension or ever being on medications   Lives at home alone   Uses a cane, walker, and crutches at home secondary to her recent foot fracture  CT showed B/L moderate pleural effusions, BNP elevated at 96,433  Initial troponin 0 10  Creatinine 1 87 (baseine 0 7-0 8), and potassium 3 3  Plan: Inpatient Med Surg admission for evaluation and treatment of acute respiratory failure with hypoxia, acute CHF, B/L pleural effusion, hypertensive urgency, elevated troponin, MAXI and hypokalemia:  O2, IV diuresis, telemetry, ECHO, consult Pulmonology and Cardiology  Start amlodipine, trend troponin, monitor creatinine, re[;ete potassium  1/7 Cardiology consult:  She is definitely volume overloaded  Would continue with IV furosemide 40 mg b i d  With reduced EF less than 35% in new diagnosis of heart failure, would plan to diurese with IV diuretics until euvolemic, consider thoracentesis if pleural effusions do not improve with IV diuretics, and once euvolemic and renal function improves, plan for cardiac catheterization for ischemic evaluation  Will need to be discharged from the hospital likely with a life vest    Echo with severely reduced EF, 25%, appears global   Started on goal-directed medical therapy for heart failure with reduced ejection fraction  Hold off on adding Ace inhibitor/angiotensin receptor blocker with decreased renal function at this time  Physical exam: rales diffusely B/L with decreased breath sounds at bases  B/L LE edema  Pulmonology: effusions secondary to heart failure and are transudative  Agree with diuresis and BP control  If effusions persist, will consider thoracentesis  Internal Medicine: MAXI likely related to heart failure, decreased perfusion  Improving to 1 69 today  Continue IV Lasix  Give 80 mEq of potassium today and monitor, check magnesium  D/C Norvasc and start Coreg         ED Triage Vitals   Temperature Pulse Respirations Blood Pressure SpO2   01/06/21 1632 01/06/21 1632 01/06/21 1632 01/06/21 1632 01/06/21 1632   97 9 °F (36 6 °C) 104 16 (!) 230/120 90 % Room air Temp Source Heart Rate Source Patient Position - Orthostatic VS BP Location FiO2 (%)   01/06/21 1632 01/06/21 1632 01/06/21 1632 01/06/21 1632 --   Temporal Monitor Sitting Left arm       Pain Score       01/06/21 1919       No Pain          Wt Readings from Last 1 Encounters:   01/07/21 57 7 kg (127 lb 3 3 oz)     Additional Vital Signs:       Date/Time  Temp  Pulse  Resp  BP  MAP (mmHg)  SpO2   Nasal Cannula O2 Flow Rate (L/min)  O2 Device    01/07/21 1500  97 7 °F (36 5 °C)  90  22  149/109Abnormal   --  94 %   3 L/min  Nasal cannula    01/07/21 1117  97 6 °F (36 4 °C)  86  18  163/115Abnormal   --  94 %   3 L/min  Nasal cannula    01/07/21 0838  98 °F (36 7 °C)  91  18  153/95  118  93 %   3 L/min  Nasal cannula    01/07/21 0636  --  83  --  155/96  --  --   --  --    01/07/21 0445  97 6 °F (36 4 °C)  83  18  173/81Abnormal   --  92 %   --  Nasal cannula    01/07/21 0126  --  --  --  142/81  --  --   --  --    01/06/21 2318  97 °F (36 1 °C)Abnormal   82  18  167/96  --  94 %   --  Nasal cannula    01/06/21 2230  --  84  25Abnormal   166/110Abnormal   133  91 %   --  --    01/06/21 2200  --  80  --  179/127Abnormal   145  --   --  --    01/06/21 2100  --  84  25Abnormal   164/119Abnormal   136  92 %   --  --    01/06/21 2034  --  81  28Abnormal   152/99  --  93 %   3 L/min  Nasal cannula    01/06/21 2029  --  94  28Abnormal   183/106Abnormal   --  96 %   3 L/min  Nasal cannula    01/06/21 2015  --  94  30Abnormal   208/105Abnormal   151  93 %   --  --    01/06/21 1945  --  98  25Abnormal   166/114Abnormal   135  92 %   --  --    01/06/21 1928  --  97  18  203/128Abnormal   --  90 %   --  None (Room air)    01/06/21 1919  --  103  30Abnormal   219/106Abnormal   --  90 %   --  --    01/06/21 1816  97 6 °F (36 4 °C)  96  26Abnormal   187/130Abnormal   151  92 %   3 L/min  Nasal cannula    01/06/21 1721  --  96  30Abnormal   --  --  92 %   2 L/min  Nasal cannula    01/06/21 1715  --  94  29Abnormal   --  --  89 %Abnormal    --  --    01/06/21 1700  --  92  33Abnormal   190/116Abnormal   143  90 %   --  Room air          Pertinent Labs/Diagnostic Test Results:     1/7 CXR:  Interstitial edema with effusions and atelectasis, corresponding with the chest CT one day earlier        1/6 CTA chest:    Bilateral moderate pleural effusions  Posterior bibasilar atelectasis/infiltrates  No pulmonary embolus  1/7 ECHO:         LEFT VENTRICLE:   The ventricle was mildly dilated  Systolic function was markedly reduced by visual assessment  Ejection fraction was estimated to be 25 %  There was severe diffuse hypokinesis  Doppler parameters were consistent with a reversible restrictive pattern, indicative of decreased left ventricular diastolic compliance and/or increased left atrial pressure (grade 3 diastolic dysfunction)      LEFT ATRIUM: The atrium was mildly dilated      MITRAL VALVE: There was mild regurgitation      AORTIC VALVE: There was trace regurgitation      TRICUSPID VALVE: There was mild regurgitation      PULMONIC VALVE: There was trace regurgitation      PERICARDIUM: A small pericardial effusion was identified circumferential to the heart  There was no evidence of hemodynamic compromise  There was a large left pleural effusion        1/6 EKG:  Sinus bradycardia with frequent Premature ventricular complexes  Possible Left atrial enlargement  Anteroseptal infarct (cited on or before 06-JAN-2021)  ST & T wave abnormality, consider lateral ischemia  Abnormal ECG  When compared with ECG of 06-FEB-2018 13:24, Significant changes have occurred      Results from last 7 days   Lab Units 01/06/21  1712   SARS-COV-2  Negative     Results from last 7 days   Lab Units 01/07/21  1202 01/06/21  1653   WBC Thousand/uL 10 42* 10 78*   HEMOGLOBIN g/dL 13 0 13 3   HEMATOCRIT % 38 9 39 8   PLATELETS Thousands/uL 328 361   NEUTROS ABS Thousands/µL  --  6 54         Results from last 7 days   Lab Units 01/07/21  0444 01/06/21  1653 SODIUM mmol/L 141 142   POTASSIUM mmol/L 2 9* 3 3*   CHLORIDE mmol/L 107 106   CO2 mmol/L 22 28   ANION GAP mmol/L 12 8   BUN mg/dL 29* 27*   CREATININE mg/dL 1 69* 1 87*   EGFR ml/min/1 73sq m 34 30   CALCIUM mg/dL 7 9* 8 1*   MAGNESIUM mg/dL 2 2  --      Results from last 7 days   Lab Units 01/07/21  0444 01/06/21  1653   AST U/L 24 27   ALT U/L 28 33   ALK PHOS U/L 86 102   TOTAL PROTEIN g/dL 5 3* 6 4   ALBUMIN g/dL 1 9* 2 3*   TOTAL BILIRUBIN mg/dL 0 30 0 31         Results from last 7 days   Lab Units 01/07/21  0444 01/06/21  1653   GLUCOSE RANDOM mg/dL 97 101                       Results from last 7 days   Lab Units 01/07/21  1202 01/07/21  0127 01/06/21  2230 01/06/21  1653   TROPONIN I ng/mL 0 09* 0 13* 0 14* 0 10*         Results from last 7 days   Lab Units 01/06/21  1653   PROTIME seconds 12 5   INR  0 95   PTT seconds 32                         Results from last 7 days   Lab Units 01/06/21  1653   NT-PRO BNP pg/mL 96,433*                         Results from last 7 days   Lab Units 01/06/21  2107   CLARITY UA  Clear   COLOR UA  Yellow   SPEC GRAV UA  1 025   PH UA  7 0   GLUCOSE UA mg/dl Negative   KETONES UA mg/dl Negative   BLOOD UA  Small*   PROTEIN UA mg/dl 100 (2+)*   NITRITE UA  Negative   BILIRUBIN UA  Negative   UROBILINOGEN UA E U /dl 0 2   LEUKOCYTES UA  Negative   WBC UA /hpf 2-4   RBC UA /hpf 2-4   BACTERIA UA /hpf Occasional   EPITHELIAL CELLS WET PREP /hpf Occasional     Results from last 7 days   Lab Units 01/06/21  1712   INFLUENZA A PCR  Negative   INFLUENZA B PCR  Negative   RSV PCR  Negative           ED Treatment:   Medication Administration from 01/06/2021 1623 to 01/06/2021 2304       Date/Time Order Dose Route Action     01/06/2021 1836 iohexol (OMNIPAQUE) 350 MG/ML injection (MULTI-DOSE) 85 mL 85 mL Intravenous Given     01/06/2021 1920 furosemide (LASIX) injection 40 mg 40 mg Intravenous Given     01/06/2021 1920 nitroglycerin (NITRO-BID) 2 % TD ointment 1 inch 1 inch Topical Given     01/06/2021 1924 aspirin chewable tablet 324 mg 324 mg Oral Given     01/06/2021 2026 Labetalol HCl (NORMODYNE) injection 10 mg 10 mg Intravenous Given        Past Medical History:   Diagnosis Date    Anxiety     Depression     Osteoarthritis      Present on Admission:   Hypertensive urgency   Acute respiratory failure with hypoxia (HCC)   Hypokalemia   [facility]I [facility] [address] )   Bilateral pleural effusion   [facility]   Elevated troponin   Current smoker   Anxiety   ADHD      Admitting Diagnosis: CHF (congestive heart failure) (HCC) [I50 9]  Hypoxia [R09 02]  Bilateral pleural effusion [J90]  Hypertensive urgency [I16 0]  Acute respiratory failure with hypoxia (HCC) [J96 01]  Shortness of breath [R06 02]  Age/Sex: 44 y o  female         Admission Orders:    Cardio-Pulmonary monitoring, daily weight, I&O, SCD, 1500 ml fluid restriction  Scheduled Medications:            atorvastatin, 40 mg, Oral, Daily With Dinner  carvedilol, 6 25 mg, Oral, BID With Meals  furosemide, 40 mg, Intravenous, BID (diuretic)  heparin (porcine), 5,000 Units, Subcutaneous, Q8H [address]  nicotine, 1 patch, Transdermal, Daily  PARoxetine, 60 mg, Oral, Daily  potassium chloride, 40 mEq, Oral, Daily  QUEtiapine, 100 mg, Oral, HS      Continuous IV Infusions: None  PRN Meds:  acetaminophen, 650 mg, Oral, Q4H PRN  Labetalol HCl, 10 mg, Intravenous, Q6H PRN  ondansetron, 4 mg, Intravenous, Q6H PRN        IP CONSULT TO NUTRITION SERVICES  IP CONSULT TO CARDIOLOGY  IP CONSULT TO PULMONOLOGY    Network Utilization Review Department  ATTENTION: Please call with any questions or concerns to [phone] and carefully listen to the prompts so that you are directed to the right person   All voicemails are confidential   Abby Carbone all requests for admission clinical reviews, approved or denied determinations and any other requests to dedicated fax number below belonging to the campus where the patient is receiving treatment   List of dedicated fax numbers for the Facilities:  [address] [place] [address] DENIALS (Administrative/Medical Necessity) [phone]   [address] (Maternity/NICU/Pediatrics) [phone] [address] [place] [address] Dr Lidia Dubois 6685 (  Jessie Faustin "Sanaz" 103) [id number] [address] Zoie Tran 1481 [address] [place] [id number] [facility] 951 [phone]

## 2021-01-07 NOTE — PLAN OF CARE
Problem: Potential for Falls  Goal: Patient will remain free of falls  Description: INTERVENTIONS:  - Assess patient frequently for physical needs  -  Identify cognitive and physical deficits and behaviors that affect risk of falls  -  [place] fall precautions as indicated by assessment   - Educate patient/family on patient safety including physical limitations  - Instruct patient to call for assistance with activity based on assessment  - Modify environment to reduce risk of injury  - Consider OT/PT consult to assist with strengthening/mobility  Outcome: Progressing     Problem: PAIN - ADULT  Goal: Verbalizes/displays adequate comfort level or baseline comfort level  Description: Interventions:  - Encourage patient to monitor pain and request assistance  - Assess pain using appropriate pain scale  - Administer analgesics based on type and severity of pain and evaluate response  - Implement non-pharmacological measures as appropriate and evaluate response  - Consider cultural and social influences on pain and pain management  - Notify physician/advanced practitioner if interventions unsuccessful or patient reports new pain  Outcome: Progressing     Problem: SAFETY ADULT  Goal: Patient will remain free of falls  Description: INTERVENTIONS:  - Assess patient frequently for physical needs  -  Identify cognitive and physical deficits and behaviors that affect risk of falls    -  [place] fall precautions as indicated by assessment   - Educate patient/family on patient safety including physical limitations  - Instruct patient to call for assistance with activity based on assessment  - Modify environment to reduce risk of injury  - Consider OT/PT consult to assist with strengthening/mobility  Outcome: Progressing  Goal: Maintain or return to baseline ADL function  Description: INTERVENTIONS:  -  Assess patient's ability to carry out ADLs; assess patient's baseline for ADL function and identify physical deficits which impact ability to perform ADLs (bathing, care of mouth/teeth, toileting, grooming, dressing, etc )  - Assess/evaluate cause of self-care deficits   - Assess range of motion  - Assess patient's mobility; develop plan if impaired  - Assess patient's need for assistive devices and provide as appropriate  - Encourage maximum independence but intervene and supervise when necessary  - Involve family in performance of ADLs  - Assess for home care needs following discharge   - Consider OT consult to assist with ADL evaluation and planning for discharge  - Provide patient education as appropriate  Outcome: Progressing  Goal: Maintain or return mobility status to optimal level  Description: INTERVENTIONS:  - Assess patient's baseline mobility status (ambulation, transfers, stairs, etc )    - Identify cognitive and physical deficits and behaviors that affect mobility  - Identify mobility aids required to assist with transfers and/or ambulation (gait belt, sit-to-stand, lift, walker, cane, etc )  - [place] fall precautions as indicated by assessment  - Record patient progress and toleration of activity level on Mobility SBAR; progress patient to next Phase/Stage  - Instruct patient to call for assistance with activity based on assessment  - Consider rehabilitation consult to assist with strengthening/weightbearing, etc   Outcome: Progressing     Problem: DISCHARGE PLANNING  Goal: Discharge to home or other facility with appropriate resources  Description: INTERVENTIONS:  - Identify barriers to discharge w/patient and caregiver  - Arrange for needed discharge resources and transportation as appropriate  - Identify discharge learning needs (meds, wound care, etc )  - Arrange for interpretive services to assist at discharge as needed  - Refer to Case Management Department for coordinating discharge planning if the patient needs post-hospital services based on physician/advanced practitioner order or complex needs related to functional status, cognitive ability, or social support system  Outcome: Progressing     Problem: Knowledge Deficit  Goal: Patient/family/caregiver demonstrates understanding of disease process, treatment plan, medications, and discharge instructions  Description: Complete learning assessment and assess knowledge base    Interventions:  - Provide teaching at level of understanding  - Provide teaching via preferred learning methods  Outcome: Progressing     Problem: CARDIOVASCULAR - ADULT  Goal: Maintains optimal cardiac output and hemodynamic stability  Description: INTERVENTIONS:  - Monitor I/O, vital signs and rhythm  - Monitor for S/S and trends of decreased cardiac output  - Administer and titrate ordered vasoactive medications to optimize hemodynamic stability  - Assess quality of pulses, skin color and temperature  - Assess for signs of decreased coronary artery perfusion  - Instruct patient to report change in severity of symptoms  Outcome: Progressing  Goal: Absence of cardiac dysrhythmias or at baseline rhythm  Description: INTERVENTIONS:  - Continuous cardiac monitoring, vital signs, obtain 12 lead EKG if ordered  - Administer antiarrhythmic and heart rate control medications as ordered  - Monitor electrolytes and administer replacement therapy as ordered  Outcome: Progressing

## 2021-01-07 NOTE — PLAN OF CARE
Problem: PHYSICAL THERAPY ADULT  Goal: Performs mobility at highest level of function for planned discharge setting  See evaluation for individualized goals  Description: Treatment/Interventions: Functional transfer training, Therapeutic exercise, Elevations, LE strengthening/ROM, Endurance training, Patient/family training, Equipment eval/education, Bed mobility, Gait training, Continued evaluation, Spoke to nursing, OT          See flowsheet documentation for full assessment, interventions and recommendations  Note: Prognosis: Fair  Problem List: Decreased endurance, Decreased mobility(3L O2 )  Assessment: Destinee Hsu is a 45 yo female admitted to [facility] on 1/6/21 for acute respiratory failure w/ hypoxia  Pta pt independent for I/ADLs, w/ no O2 use at baseline  CAM Boot to RLE d/t recent fracture and on 3L of O2  PT consulted per pt POC for functional mobility assessment and d/c recommendations  Pt currently functioning at Independent for transfers and ambulation w/ no AD  Pt off O2 for bathroom use and O2 of 91%  O2 returned d/t noted SOB in seated  Pt functioning near baseline level, but limited by decreased endurance and mobility, and current O2 use  Pt will benefit from continued skilled IP PT to address the above mentioned impairments  in order to maximize recovery and increase functional independence when completing mobility and ADLs  Recommended d/t includes return to previous w/ support, pending ability to negociate stairs and increase ambulation distances  End of session pt seated upright in chair, w/ all needs in reach  Barriers to Discharge: Inaccessible home environment  Barriers to Discharge Comments: DANIELITO   PT Discharge Recommendation: Return to previous environment with social support     PT - OK to Discharge: Yes    See flowsheet documentation for full assessment

## 2021-01-07 NOTE — ASSESSMENT & PLAN NOTE
Troponin elevated at 0 10    Already received aspirin bolus 324 mg in ED  Will continue to trend x2  Monitor on telemetry  Await further recommendation cardiac workup

## 2021-01-07 NOTE — PLAN OF CARE
Problem: Potential for Falls  Goal: Patient will remain free of falls  Description: INTERVENTIONS:  - Assess patient frequently for physical needs  -  Identify cognitive and physical deficits and behaviors that affect risk of falls  -  [place] fall precautions as indicated by assessment   - Educate patient/family on patient safety including physical limitations  - Instruct patient to call for assistance with activity based on assessment  - Modify environment to reduce risk of injury  - Consider OT/PT consult to assist with strengthening/mobility  Outcome: Progressing     Problem: PAIN - ADULT  Goal: Verbalizes/displays adequate comfort level or baseline comfort level  Description: Interventions:  - Encourage patient to monitor pain and request assistance  - Assess pain using appropriate pain scale  - Administer analgesics based on type and severity of pain and evaluate response  - Implement non-pharmacological measures as appropriate and evaluate response  - Consider cultural and social influences on pain and pain management  - Notify physician/advanced practitioner if interventions unsuccessful or patient reports new pain  Outcome: Progressing     Problem: SAFETY ADULT  Goal: Patient will remain free of falls  Description: INTERVENTIONS:  - Assess patient frequently for physical needs  -  Identify cognitive and physical deficits and behaviors that affect risk of falls    -  [place] fall precautions as indicated by assessment   - Educate patient/family on patient safety including physical limitations  - Instruct patient to call for assistance with activity based on assessment  - Modify environment to reduce risk of injury  - Consider OT/PT consult to assist with strengthening/mobility  Outcome: Progressing  Goal: Maintain or return to baseline ADL function  Description: INTERVENTIONS:  -  Assess patient's ability to carry out ADLs; assess patient's baseline for ADL function and identify physical deficits which impact ability to perform ADLs (bathing, care of mouth/teeth, toileting, grooming, dressing, etc )  - Assess/evaluate cause of self-care deficits   - Assess range of motion  - Assess patient's mobility; develop plan if impaired  - Assess patient's need for assistive devices and provide as appropriate  - Encourage maximum independence but intervene and supervise when necessary  - Involve family in performance of ADLs  - Assess for home care needs following discharge   - Consider OT consult to assist with ADL evaluation and planning for discharge  - Provide patient education as appropriate  Outcome: Progressing  Goal: Maintain or return mobility status to optimal level  Description: INTERVENTIONS:  - Assess patient's baseline mobility status (ambulation, transfers, stairs, etc )    - Identify cognitive and physical deficits and behaviors that affect mobility  - Identify mobility aids required to assist with transfers and/or ambulation (gait belt, sit-to-stand, lift, walker, cane, etc )  - [place] fall precautions as indicated by assessment  - Record patient progress and toleration of activity level on Mobility SBAR; progress patient to next Phase/Stage  - Instruct patient to call for assistance with activity based on assessment  - Consider rehabilitation consult to assist with strengthening/weightbearing, etc   Outcome: Progressing     Problem: DISCHARGE PLANNING  Goal: Discharge to home or other facility with appropriate resources  Description: INTERVENTIONS:  - Identify barriers to discharge w/patient and caregiver  - Arrange for needed discharge resources and transportation as appropriate  - Identify discharge learning needs (meds, wound care, etc )  - Arrange for interpretive services to assist at discharge as needed  - Refer to Case Management Department for coordinating discharge planning if the patient needs post-hospital services based on physician/advanced practitioner order or complex needs related to functional status, cognitive ability, or social support system  Outcome: Progressing     Problem: Knowledge Deficit  Goal: Patient/family/caregiver demonstrates understanding of disease process, treatment plan, medications, and discharge instructions  Description: Complete learning assessment and assess knowledge base    Interventions:  - Provide teaching at level of understanding  - Provide teaching via preferred learning methods  Outcome: Progressing     Problem: CARDIOVASCULAR - ADULT  Goal: Maintains optimal cardiac output and hemodynamic stability  Description: INTERVENTIONS:  - Monitor I/O, vital signs and rhythm  - Monitor for S/S and trends of decreased cardiac output  - Administer and titrate ordered vasoactive medications to optimize hemodynamic stability  - Assess quality of pulses, skin color and temperature  - Assess for signs of decreased coronary artery perfusion  - Instruct patient to report change in severity of symptoms  Outcome: Progressing  Goal: Absence of cardiac dysrhythmias or at baseline rhythm  Description: INTERVENTIONS:  - Continuous cardiac monitoring, vital signs, obtain 12 lead EKG if ordered  - Administer antiarrhythmic and heart rate control medications as ordered  - Monitor electrolytes and administer replacement therapy as ordered  Outcome: Progressing

## 2021-01-07 NOTE — ASSESSMENT & PLAN NOTE
Presenting with significantly elevated blood pressures 230/120  Received IV Lasix in ED  ED spoke with cardiology who advised IV labetalol and nitroglycerin drip  Seen and evaluated patient in the ED  Blood pressure is slowly improving-administer IV labetalol  Hold off on nitroglycerin drip for now  Gradual decrease BP  Goal -180 for now    Start oral amlodipine 5 mg daily

## 2021-01-07 NOTE — CONSULTS
Pt pleasant and agreeable, currently having ECHO done, pt wants to defer CHF education, written material provided, pt admits to eating more convenience foods recently, but typically does not add salt to meals  Will follow for further education and completion  Thank you for consult

## 2021-01-08 LAB
ANION GAP SERPL CALCULATED.3IONS-SCNC: 8 MMOL/L (ref 4–13)
BUN SERPL-MCNC: 33 MG/DL (ref 5–25)
CALCIUM SERPL-MCNC: 8.1 MG/DL (ref 8.3–10.1)
CHLORIDE SERPL-SCNC: 109 MMOL/L (ref 100–108)
CO2 SERPL-SCNC: 27 MMOL/L (ref 21–32)
CREAT SERPL-MCNC: 1.63 MG/DL (ref 0.6–1.3)
GFR SERPL CREATININE-BSD FRML MDRD: 36 ML/MIN/1.73SQ M
GLUCOSE SERPL-MCNC: 101 MG/DL (ref 65–140)
POTASSIUM SERPL-SCNC: 3.9 MMOL/L (ref 3.5–5.3)
SODIUM SERPL-SCNC: 144 MMOL/L (ref 136–145)

## 2021-01-08 PROCEDURE — 99232 SBSQ HOSP IP/OBS MODERATE 35: CPT | Performed by: INTERNAL MEDICINE

## 2021-01-08 PROCEDURE — 80048 BASIC METABOLIC PNL TOTAL CA: CPT | Performed by: INTERNAL MEDICINE

## 2021-01-08 PROCEDURE — 94762 N-INVAS EAR/PLS OXIMTRY CONT: CPT

## 2021-01-08 RX ORDER — ISOSORBIDE DINITRATE 10 MG/1
10 TABLET ORAL
Status: DISCONTINUED | OUTPATIENT
Start: 2021-01-08 | End: 2021-01-19 | Stop reason: HOSPADM

## 2021-01-08 RX ORDER — HYDRALAZINE HYDROCHLORIDE 10 MG/1
10 TABLET, FILM COATED ORAL
Status: DISCONTINUED | OUTPATIENT
Start: 2021-01-08 | End: 2021-01-09

## 2021-01-08 RX ADMIN — FUROSEMIDE 40 MG: 10 INJECTION, SOLUTION INTRAMUSCULAR; INTRAVENOUS at 10:09

## 2021-01-08 RX ADMIN — HEPARIN SODIUM 5000 UNITS: 5000 INJECTION INTRAVENOUS; SUBCUTANEOUS at 21:31

## 2021-01-08 RX ADMIN — HEPARIN SODIUM 5000 UNITS: 5000 INJECTION INTRAVENOUS; SUBCUTANEOUS at 14:47

## 2021-01-08 RX ADMIN — HEPARIN SODIUM 5000 UNITS: 5000 INJECTION INTRAVENOUS; SUBCUTANEOUS at 05:24

## 2021-01-08 RX ADMIN — Medication 1 PATCH: at 09:08

## 2021-01-08 RX ADMIN — HYDRALAZINE HYDROCHLORIDE 10 MG: 10 TABLET, FILM COATED ORAL at 14:47

## 2021-01-08 RX ADMIN — POTASSIUM CHLORIDE 40 MEQ: 1500 TABLET, EXTENDED RELEASE ORAL at 09:07

## 2021-01-08 RX ADMIN — ATORVASTATIN CALCIUM 40 MG: 40 TABLET, FILM COATED ORAL at 18:03

## 2021-01-08 RX ADMIN — PAROXETINE 60 MG: 20 TABLET, FILM COATED ORAL at 09:08

## 2021-01-08 RX ADMIN — FUROSEMIDE 40 MG: 10 INJECTION, SOLUTION INTRAMUSCULAR; INTRAVENOUS at 18:03

## 2021-01-08 RX ADMIN — CARVEDILOL 6.25 MG: 6.25 TABLET, FILM COATED ORAL at 18:03

## 2021-01-08 RX ADMIN — ISOSORBIDE DINITRATE 10 MG: 10 TABLET ORAL at 14:47

## 2021-01-08 RX ADMIN — QUETIAPINE FUMARATE 100 MG: 25 TABLET ORAL at 21:31

## 2021-01-08 RX ADMIN — CARVEDILOL 6.25 MG: 6.25 TABLET, FILM COATED ORAL at 09:07

## 2021-01-08 NOTE — PROGRESS NOTES
Progress Note - Pulmonary   Yudith Patel 44 y o  female MRN: [id number]  Unit/Bed#: E4 -01 Encounter: [id number]      Assessment:  1  Acute hypoxic respiratory failure secondary to new onset acute systolic heart failure and bilateral pleural effusions  EF 25%  SpO2 93% on 4 L on my examination  2  Nicotine dependence/tobacco abuse  3  Accelerated hypertension  Blood pressure 170/93 this a m   4  Elevated troponin   5  Scattered less than 3 mm lung nodules noted on CT scan  01/07/2021 echocardiogram reviewed  Estimated EF 25%  Severe diffuse hypokinesia LV  Grade 3 diastolic dysfunction  RV normal systolic function and size  Estimated peak PA pressure 40 mmHg      Plan:  1  Titrate supplemental oxygen to maintain O2 saturation greater than 88%  2  Diuresis and blood pressure control  3  Cardiology following  4  Tobacco cessation discussed and encouraged  5  Check chest x-ray when euvolemic  If persistent effusions could consider thoracentesis  6  Eventual outpatient pulmonary follow-up with pulmonary function testing  7  Will need repeat CT chest in approximately 6 months for re-evaluation of lung nodules  Subjective:   Patient seen and examined  Appears comfortable  States breathing easier today  Denies any fevers, chills, chest pain  Denies any significant cough or hemoptysis  Objective:   Vitals: Blood pressure 170/93, pulse 93, temperature 97 7 °F (36 5 °C), temperature source Temporal, resp  rate 18, height 5' 6" (1 676 m), weight 57 6 kg (126 lb 15 8 oz), SpO2 93 %  , 4 L O2, Body mass index is 20 5 kg/m²        Intake/Output Summary (Last 24 hours) at 1/8/2021 1038  Last data filed at 1/8/2021 0526  Gross per 24 hour   Intake 180 ml   Output 1550 ml   Net -1370 ml         Physical Exam  Gen: Awake, alert, oriented x 3, no acute distress  HEENT: Mucous membranes moist  NECK: No accessory muscle use  Cardiac: Regular, single S1, single S2  Lungs:  Faint bibasilar crackles  Abdomen: normoactive bowel sounds, soft nontender  Extremities: no cyanosis, no clubbing, no edema    Labs: I have personally reviewed pertinent lab results  , ABG: No results found for: PHART, AAH8PKG, PO2ART, TIW7OAP, Y7DPKNQO, BEART, SOURCE, BNP: No results found for: BNP, CBC:   Lab Results   Component Value Date    WBC 10 42 (H) 01/07/2021    HGB 13 0 01/07/2021    HCT 38 9 01/07/2021    MCV 99 (H) 01/07/2021     01/07/2021    MCH 32 9 01/07/2021    MCHC 33 4 01/07/2021    RDW 13 7 01/07/2021    MPV 9 9 01/07/2021   , CMP:   Lab Results   Component Value Date    SODIUM 144 01/08/2021    K 3 9 01/08/2021     (H) 01/08/2021    CO2 27 01/08/2021    BUN 33 (H) 01/08/2021    CREATININE 1 63 (H) 01/08/2021    CALCIUM 8 1 (L) 01/08/2021    EGFR 36 01/08/2021   , PT/INR: No results found for: PT, INR, Troponin:   Lab Results   Component Value Date    TROPONINI 0 09 (H) 01/07/2021     Imaging and other studies: I have personally reviewed pertinent reports     and I have personally reviewed pertinent films in PACS      Thomas Hathaway PA-C

## 2021-01-08 NOTE — PLAN OF CARE
Problem: Potential for Falls  Goal: Patient will remain free of falls  Description: INTERVENTIONS:  - Assess patient frequently for physical needs  -  Identify cognitive and physical deficits and behaviors that affect risk of falls  -  [place] fall precautions as indicated by assessment   - Educate patient/family on patient safety including physical limitations  - Instruct patient to call for assistance with activity based on assessment  - Modify environment to reduce risk of injury  - Consider OT/PT consult to assist with strengthening/mobility  Outcome: Progressing     Problem: PAIN - ADULT  Goal: Verbalizes/displays adequate comfort level or baseline comfort level  Description: Interventions:  - Encourage patient to monitor pain and request assistance  - Assess pain using appropriate pain scale  - Administer analgesics based on type and severity of pain and evaluate response  - Implement non-pharmacological measures as appropriate and evaluate response  - Consider cultural and social influences on pain and pain management  - Notify physician/advanced practitioner if interventions unsuccessful or patient reports new pain  Outcome: Progressing     Problem: SAFETY ADULT  Goal: Patient will remain free of falls  Description: INTERVENTIONS:  - Assess patient frequently for physical needs  -  Identify cognitive and physical deficits and behaviors that affect risk of falls    -  [place] fall precautions as indicated by assessment   - Educate patient/family on patient safety including physical limitations  - Instruct patient to call for assistance with activity based on assessment  - Modify environment to reduce risk of injury  - Consider OT/PT consult to assist with strengthening/mobility  Outcome: Progressing  Goal: Maintain or return to baseline ADL function  Description: INTERVENTIONS:  -  Assess patient's ability to carry out ADLs; assess patient's baseline for ADL function and identify physical deficits which impact ability to perform ADLs (bathing, care of mouth/teeth, toileting, grooming, dressing, etc )  - Assess/evaluate cause of self-care deficits   - Assess range of motion  - Assess patient's mobility; develop plan if impaired  - Assess patient's need for assistive devices and provide as appropriate  - Encourage maximum independence but intervene and supervise when necessary  - Involve family in performance of ADLs  - Assess for home care needs following discharge   - Consider OT consult to assist with ADL evaluation and planning for discharge  - Provide patient education as appropriate  Outcome: Progressing  Goal: Maintain or return mobility status to optimal level  Description: INTERVENTIONS:  - Assess patient's baseline mobility status (ambulation, transfers, stairs, etc )    - Identify cognitive and physical deficits and behaviors that affect mobility  - Identify mobility aids required to assist with transfers and/or ambulation (gait belt, sit-to-stand, lift, walker, cane, etc )  - [place] fall precautions as indicated by assessment  - Record patient progress and toleration of activity level on Mobility SBAR; progress patient to next Phase/Stage  - Instruct patient to call for assistance with activity based on assessment  - Consider rehabilitation consult to assist with strengthening/weightbearing, etc   Outcome: Progressing     Problem: DISCHARGE PLANNING  Goal: Discharge to home or other facility with appropriate resources  Description: INTERVENTIONS:  - Identify barriers to discharge w/patient and caregiver  - Arrange for needed discharge resources and transportation as appropriate  - Identify discharge learning needs (meds, wound care, etc )  - Arrange for interpretive services to assist at discharge as needed  - Refer to Case Management Department for coordinating discharge planning if the patient needs post-hospital services based on physician/advanced practitioner order or complex needs related to functional status, cognitive ability, or social support system  Outcome: Progressing     Problem: Knowledge Deficit  Goal: Patient/family/caregiver demonstrates understanding of disease process, treatment plan, medications, and discharge instructions  Description: Complete learning assessment and assess knowledge base    Interventions:  - Provide teaching at level of understanding  - Provide teaching via preferred learning methods  Outcome: Progressing     Problem: CARDIOVASCULAR - ADULT  Goal: Maintains optimal cardiac output and hemodynamic stability  Description: INTERVENTIONS:  - Monitor I/O, vital signs and rhythm  - Monitor for S/S and trends of decreased cardiac output  - Administer and titrate ordered vasoactive medications to optimize hemodynamic stability  - Assess quality of pulses, skin color and temperature  - Assess for signs of decreased coronary artery perfusion  - Instruct patient to report change in severity of symptoms  Outcome: Progressing  Goal: Absence of cardiac dysrhythmias or at baseline rhythm  Description: INTERVENTIONS:  - Continuous cardiac monitoring, vital signs, obtain 12 lead EKG if ordered  - Administer antiarrhythmic and heart rate control medications as ordered  - Monitor electrolytes and administer replacement therapy as ordered  Outcome: Progressing

## 2021-01-08 NOTE — PLAN OF CARE
Problem: Potential for Falls  Goal: Patient will remain free of falls  Description: INTERVENTIONS:  - Assess patient frequently for physical needs  -  Identify cognitive and physical deficits and behaviors that affect risk of falls    -  [place] fall precautions as indicated by assessment   - Educate patient/family on patient safety including physical limitations  - Instruct patient to call for assistance with activity based on assessment  - Modify environment to reduce risk of injury  - Consider OT/PT consult to assist with strengthening/mobility  Outcome: Progressing

## 2021-01-08 NOTE — SOCIAL WORK
Pt lives alone in a two story home with bed/bath on second floor  No DME  Indp in ADLs  Pharm is [facility] [address]   Pt is diagnosed with anxiety, which is managed with medication  Pt works full-time  Pt drives self  No CM dc concerns/needs at the time

## 2021-01-08 NOTE — UTILIZATION REVIEW
Continued Stay Review    Date: 1/8/2021                         Current Patient Class: IP  Current Level of Care: Level 2 Stepdown    HPI:50 y o  female initially admitted on 1/6  With acute respiratory failure with hypoxia, acute CHF, B/L pleural effusion, hypertensive urgency, elevated troponin, MAXI and hypokalemia:    Assessment/Plan:  Less SOB today but currently  requiring O2 @ 4 L NC  Hypoxic heart failure related to acute combined heart failure, continue oxygen and diuresis and monitor closely with Cardiology  CR stable with diuresis  Hypokalemia corrected  ECHO w/ EF 25%  Will undergo cardiac catheterization when stable  Per Pulm: Continue to wean supplemental oxygen to maintain sats greater than 88%    Pertinent Labs/Diagnostic Results:     1/7 ECHO:  LEFT VENTRICLE:  The ventricle was mildly dilated  Systolic function was markedly reduced by visual assessment  Ejection fraction was estimated to be 25 %  There was severe diffuse hypokinesis  Doppler parameters were consistent with a reversible restrictive pattern, indicative of decreased left ventricular diastolic compliance and/or increased left atrial pressure (grade 3 diastolic dysfunction)  LEFT ATRIUM:  The atrium was mildly dilated  MITRAL VALVE:  There was mild regurgitation  AORTIC VALVE:  There was trace regurgitation  TRICUSPID VALVE:  There was mild regurgitation  PULMONIC VALVE:  There was trace regurgitation  PERICARDIUM:  A small pericardial effusion was identified circumferential to the heart  There was no evidence of hemodynamic compromise    There was a large left pleural effusion      Results from last 7 days   Lab Units 01/06/21  1712   SARS-COV-2  Negative     Results from last 7 days   Lab Units 01/07/21  1202 01/06/21  1653   WBC Thousand/uL 10 42* 10 78*   HEMOGLOBIN g/dL 13 0 13 3   HEMATOCRIT % 38 9 39 8   PLATELETS Thousands/uL 328 361   NEUTROS ABS Thousands/µL  --  6 54     Results from last 7 days   Lab Units 01/08/21  0458 01/07/21  0444 01/06/21  1653   SODIUM mmol/L 144 141 142   POTASSIUM mmol/L 3 9 2 9* 3 3*   CHLORIDE mmol/L 109* 107 106   CO2 mmol/L 27 22 28   ANION GAP mmol/L 8 12 8   BUN mg/dL 33* 29* 27*   CREATININE mg/dL 1 63* 1 69* 1 87*   EGFR ml/min/1 73sq m 36 34 30   CALCIUM mg/dL 8 1* 7 9* 8 1*   MAGNESIUM mg/dL  --  2 2  --      Results from last 7 days   Lab Units 01/07/21  0444 01/06/21  1653   AST U/L 24 27   ALT U/L 28 33   ALK PHOS U/L 86 102   TOTAL PROTEIN g/dL 5 3* 6 4   ALBUMIN g/dL 1 9* 2 3*   TOTAL BILIRUBIN mg/dL 0 30 0 31     Results from last 7 days   Lab Units 01/08/21  0458 01/07/21  0444 01/06/21  1653   GLUCOSE RANDOM mg/dL 101 97 101     Results from last 7 days   Lab Units 01/07/21  0444   HEMOGLOBIN A1C % 5 3   EAG mg/dl 105     Results from last 7 days   Lab Units 01/07/21  1202 01/07/21  0127 01/06/21  2230 01/06/21  1653   TROPONIN I ng/mL 0 09* 0 13* 0 14* 0 10*     Results from last 7 days   Lab Units 01/06/21  1653   PROTIME seconds 12 5   INR  0 95   PTT seconds 32     Results from last 7 days   Lab Units 01/06/21  1653   NT-PRO BNP pg/mL 96,433*     Results from last 7 days   Lab Units 01/06/21  2107   CLARITY UA  Clear   COLOR UA  Yellow   SPEC GRAV UA  1 025   PH UA  7 0   GLUCOSE UA mg/dl Negative   KETONES UA mg/dl Negative   BLOOD UA  Small*   PROTEIN UA mg/dl 100 (2+)*   NITRITE UA  Negative   BILIRUBIN UA  Negative   UROBILINOGEN UA E U /dl 0 2   LEUKOCYTES UA  Negative   WBC UA /hpf 2-4   RBC UA /hpf 2-4   BACTERIA UA /hpf Occasional   EPITHELIAL CELLS WET PREP /hpf Occasional     Results from last 7 days   Lab Units 01/06/21  1712   INFLUENZA A PCR  Negative   INFLUENZA B PCR  Negative   RSV PCR  Negative     1/7 CXR:  Interstitial edema with effusions and atelectasis, corresponding with the chest CT one day earlier      Vital Signs:   01/08/21 1201  97 8 °F (36 6 °C)  95  18  152/100  120  95 % -- Nasal cannula Sitting   01/08/21 0700  97 7 °F (36 5 °C)  93  18 170/93  120  93 % -- Nasal cannula Sitting   01/08/21 0400  --  --  --  --  --  -- 4 L/min Nasal cannula --   01/08/21 0017  97 5 °F (36 4 °C)  80  20  127/79  --  91 % -- Nasal cannula Lying   01/08/21 0000  --  --  --  --  --  -- 4 L/min Nasal cannula      Medications:   Scheduled Medications:  atorvastatin, 40 mg, Oral, Daily With Dinner  carvedilol, 6 25 mg, Oral, BID With Meals  furosemide, 40 mg, Intravenous, BID (diuretic)  heparin (porcine), 5,000 Units, Subcutaneous, Q8H [address]  nicotine, 1 patch, Transdermal, Daily  PARoxetine, 60 mg, Oral, Daily  potassium chloride, 40 mEq, Oral, Daily  QUEtiapine, 100 mg, Oral, HS    Continuous IV Infusions:  N/a  PRN Meds:  acetaminophen, 650 mg, Oral, Q4H PRN  Labetalol HCl, 10 mg, Intravenous, Q6H PRN  ondansetron, 4 mg, Intravenous, Q6H PRN  promethazine, 12 5 mg, Intravenous, Q6H PRN    Discharge Plan: D    Network Utilization Review Department  ATTENTION: Please call with any questions or concerns to [phone] and carefully listen to the prompts so that you are directed to the right person  All voicemails are confidential   Mitali Barbozaly all requests for admission clinical reviews, approved or denied determinations and any other requests to dedicated fax number below belonging to the campus where the patient is receiving treatment   List of dedicated fax numbers for the Facilities:  [address] DENIALS (Administrative/Medical Necessity) [phone]   [address] (Maternity/NICU/Pediatrics) [phone]   [address] Lidia Dubois 8937 (  Jessie Faustin "San[facility]" 103) [address] [place] [address] Rafaela Tran 1481 [address] [place]) [facility] [phone]

## 2021-01-08 NOTE — PROGRESS NOTES
Progress Note - Issa Chua 44 y o  female MRN: [id number]    Unit/Bed#: E4 -01 Encounter: [id number]    Assessment/Plan:    Acute hypoxic resp failure  Related to acute combined heart failure, continue oxygen and diuresis    Acute combined HF   reviewed echo with Cardiology, continue Coreg with IV Lasix, monitor closely with Cardiology    Pleural effusions   most likely related to acute heart failure continue to monitor consider thoracentesis if not improving    Tobacco abuse   cessation counseling provided, continue nicotine patch    Accelerated hypertension  continue Coreg and Lasix    A KI     creatinine stable with diuresis 1 63 today, 2 years ago creatinine around 0 8    MDD     continue Paxil and Seroquel    Hypokalemia    now corrected with p o  Potassium    Right ankle fracture   continue orthopedic follow-up    Dyslipidemia    continue statin     Subjective:   Feels better, less short of breath, no chest pain today no nausea vomiting no fevers chills appetite stable      Objective:     Vitals: Blood pressure 170/93, pulse 93, temperature 97 7 °F (36 5 °C), temperature source Temporal, resp  rate 18, height 5' 6" (1 676 m), weight 57 6 kg (126 lb 15 8 oz), SpO2 93 %  ,Body mass index is 20 5 kg/m²        Results from last 7 days   Lab Units 01/07/21  1202 01/06/21  1653   WBC Thousand/uL 10 42* 10 78*   HEMOGLOBIN g/dL 13 0 13 3   HEMATOCRIT % 38 9 39 8   PLATELETS Thousands/uL 328 361   INR   --  0 95     Results from last 7 days   Lab Units 01/08/21  0458 01/07/21  0444   POTASSIUM mmol/L 3 9 2 9*   CHLORIDE mmol/L 109* 107   CO2 mmol/L 27 22   BUN mg/dL 33* 29*   CREATININE mg/dL 1 63* 1 69*   CALCIUM mg/dL 8 1* 7 9*   ALK PHOS U/L  --  86   ALT U/L  --  28   AST U/L  --  24       Scheduled Meds:  Current Facility-Administered Medications   Medication Dose Route Frequency Provider Last Rate    acetaminophen  650 mg Oral Q4H PRN Sugey Soliz PA-C      atorvastatin  40 mg Oral Daily With [facility] [place]      carvedilol  6 25 mg Oral BID With Meals [facility],       furosemide  40 mg Intravenous BID (diuretic) [facility],       heparin (porcine)  5,000 Units Subcutaneous [facility] Sugey Soliz PA-C      Labetalol HCl  10 mg Intravenous Q6H PRN Isis Maria MD      nicotine  1 patch Transdermal Daily Sugey Soliz PA-C      ondansetron  4 mg Intravenous Q6H PRN Hernandez ALISHA Levi      PARoxetine  60 mg Oral Daily Hernandez SnALISHA erwin      potassium chloride  40 mEq Oral Daily [facility],       promethazine  12 5 mg Intravenous Q6H PRN [facility],       QUEtiapine  100 mg Oral HS Sugey Soliz PA-C         Continuous Infusions:         Physical exam:  General appearance:  Alert no distress interaction appropriate  Head/Eyes:  Nonicteric PERRL EOMI  Neck:  Supple  Lungs:  Decreased BS bilateral basilar crackles  Heart: normal S1 S2 regular  Abdomen: Soft nontender with bowel sounds  Extremities: no edema  Skin: no rash    Invasive Devices     Peripheral Intravenous Line            Peripheral IV 01/06/21 Right Antecubital 1 day                  VTE Pharmacologic Prophylaxis:  Heparin      Counseling / Coordination of Care  Total floor / unit time spent today 30  minutes  Greater than 50% of total time was spent with the patient and / or family counseling and / or coordination of care    A description of the counseling / coordination of care:  Discussed with Cardiology

## 2021-01-09 LAB
ANION GAP SERPL CALCULATED.3IONS-SCNC: 3 MMOL/L (ref 4–13)
BUN SERPL-MCNC: 39 MG/DL (ref 5–25)
CALCIUM SERPL-MCNC: 8.2 MG/DL (ref 8.3–10.1)
CHLORIDE SERPL-SCNC: 103 MMOL/L (ref 100–108)
CO2 SERPL-SCNC: 33 MMOL/L (ref 21–32)
CREAT SERPL-MCNC: 1.55 MG/DL (ref 0.6–1.3)
CREAT UR-MCNC: 40.3 MG/DL
FERRITIN SERPL-MCNC: 89 NG/ML (ref 8–388)
GFR SERPL CREATININE-BSD FRML MDRD: 38 ML/MIN/1.73SQ M
GLUCOSE SERPL-MCNC: 97 MG/DL (ref 65–140)
HBV CORE AB SER QL: NORMAL
HBV CORE IGM SER QL: NORMAL
HBV SURFACE AB SER-ACNC: <3.1 MIU/ML
HBV SURFACE AG SER QL: NORMAL
HCV AB SER QL: NORMAL
IRON SATN MFR SERPL: 35 %
IRON SERPL-MCNC: 77 UG/DL (ref 50–170)
POTASSIUM SERPL-SCNC: 3.9 MMOL/L (ref 3.5–5.3)
PROT UR-MCNC: 402 MG/DL
PROT/CREAT UR: 9.98 MG/G{CREAT} (ref 0–0.1)
SODIUM SERPL-SCNC: 139 MMOL/L (ref 136–145)
TIBC SERPL-MCNC: 217 UG/DL (ref 250–450)
TSH SERPL DL<=0.05 MIU/L-ACNC: 1.62 UIU/ML (ref 0.36–3.74)

## 2021-01-09 PROCEDURE — 84166 PROTEIN E-PHORESIS/URINE/CSF: CPT | Performed by: PHYSICIAN ASSISTANT

## 2021-01-09 PROCEDURE — 86706 HEP B SURFACE ANTIBODY: CPT | Performed by: INTERNAL MEDICINE

## 2021-01-09 PROCEDURE — 86803 HEPATITIS C AB TEST: CPT | Performed by: INTERNAL MEDICINE

## 2021-01-09 PROCEDURE — 83550 IRON BINDING TEST: CPT | Performed by: INTERNAL MEDICINE

## 2021-01-09 PROCEDURE — 82728 ASSAY OF FERRITIN: CPT | Performed by: INTERNAL MEDICINE

## 2021-01-09 PROCEDURE — 84443 ASSAY THYROID STIM HORMONE: CPT | Performed by: INTERNAL MEDICINE

## 2021-01-09 PROCEDURE — 84156 ASSAY OF PROTEIN URINE: CPT | Performed by: PHYSICIAN ASSISTANT

## 2021-01-09 PROCEDURE — 80048 BASIC METABOLIC PNL TOTAL CA: CPT | Performed by: INTERNAL MEDICINE

## 2021-01-09 PROCEDURE — 99233 SBSQ HOSP IP/OBS HIGH 50: CPT | Performed by: INTERNAL MEDICINE

## 2021-01-09 PROCEDURE — 99255 IP/OBS CONSLTJ NEW/EST HI 80: CPT | Performed by: INTERNAL MEDICINE

## 2021-01-09 PROCEDURE — 86705 HEP B CORE ANTIBODY IGM: CPT | Performed by: INTERNAL MEDICINE

## 2021-01-09 PROCEDURE — 86430 RHEUMATOID FACTOR TEST QUAL: CPT | Performed by: INTERNAL MEDICINE

## 2021-01-09 PROCEDURE — 86431 RHEUMATOID FACTOR QUANT: CPT | Performed by: INTERNAL MEDICINE

## 2021-01-09 PROCEDURE — 83540 ASSAY OF IRON: CPT | Performed by: INTERNAL MEDICINE

## 2021-01-09 PROCEDURE — 82570 ASSAY OF URINE CREATININE: CPT | Performed by: PHYSICIAN ASSISTANT

## 2021-01-09 PROCEDURE — 99232 SBSQ HOSP IP/OBS MODERATE 35: CPT | Performed by: INTERNAL MEDICINE

## 2021-01-09 PROCEDURE — 87340 HEPATITIS B SURFACE AG IA: CPT | Performed by: INTERNAL MEDICINE

## 2021-01-09 PROCEDURE — 86704 HEP B CORE ANTIBODY TOTAL: CPT | Performed by: INTERNAL MEDICINE

## 2021-01-09 RX ORDER — POTASSIUM CHLORIDE 20 MEQ/1
20 TABLET, EXTENDED RELEASE ORAL DAILY
Status: DISCONTINUED | OUTPATIENT
Start: 2021-01-10 | End: 2021-01-10

## 2021-01-09 RX ORDER — HYDRALAZINE HYDROCHLORIDE 25 MG/1
25 TABLET, FILM COATED ORAL
Status: DISCONTINUED | OUTPATIENT
Start: 2021-01-09 | End: 2021-01-12

## 2021-01-09 RX ORDER — CARVEDILOL 12.5 MG/1
12.5 TABLET ORAL 2 TIMES DAILY WITH MEALS
Status: DISCONTINUED | OUTPATIENT
Start: 2021-01-09 | End: 2021-01-12

## 2021-01-09 RX ADMIN — ISOSORBIDE DINITRATE 10 MG: 10 TABLET ORAL at 08:24

## 2021-01-09 RX ADMIN — HYDRALAZINE HYDROCHLORIDE 25 MG: 25 TABLET ORAL at 12:20

## 2021-01-09 RX ADMIN — CARVEDILOL 6.25 MG: 6.25 TABLET, FILM COATED ORAL at 08:24

## 2021-01-09 RX ADMIN — HYDRALAZINE HYDROCHLORIDE 10 MG: 10 TABLET, FILM COATED ORAL at 08:24

## 2021-01-09 RX ADMIN — Medication 1 PATCH: at 08:24

## 2021-01-09 RX ADMIN — HEPARIN SODIUM 5000 UNITS: 5000 INJECTION INTRAVENOUS; SUBCUTANEOUS at 06:57

## 2021-01-09 RX ADMIN — PAROXETINE 60 MG: 20 TABLET, FILM COATED ORAL at 08:24

## 2021-01-09 RX ADMIN — ISOSORBIDE DINITRATE 10 MG: 10 TABLET ORAL at 17:30

## 2021-01-09 RX ADMIN — QUETIAPINE FUMARATE 100 MG: 25 TABLET ORAL at 21:38

## 2021-01-09 RX ADMIN — FUROSEMIDE 40 MG: 10 INJECTION, SOLUTION INTRAMUSCULAR; INTRAVENOUS at 08:24

## 2021-01-09 RX ADMIN — ISOSORBIDE DINITRATE 10 MG: 10 TABLET ORAL at 12:20

## 2021-01-09 RX ADMIN — ATORVASTATIN CALCIUM 40 MG: 40 TABLET, FILM COATED ORAL at 17:02

## 2021-01-09 RX ADMIN — HEPARIN SODIUM 5000 UNITS: 5000 INJECTION INTRAVENOUS; SUBCUTANEOUS at 14:10

## 2021-01-09 RX ADMIN — POTASSIUM CHLORIDE 40 MEQ: 1500 TABLET, EXTENDED RELEASE ORAL at 08:24

## 2021-01-09 RX ADMIN — HYDRALAZINE HYDROCHLORIDE 25 MG: 25 TABLET ORAL at 17:30

## 2021-01-09 RX ADMIN — FUROSEMIDE 40 MG: 10 INJECTION, SOLUTION INTRAMUSCULAR; INTRAVENOUS at 17:02

## 2021-01-09 RX ADMIN — CARVEDILOL 12.5 MG: 12.5 TABLET, FILM COATED ORAL at 17:02

## 2021-01-09 RX ADMIN — HEPARIN SODIUM 5000 UNITS: 5000 INJECTION INTRAVENOUS; SUBCUTANEOUS at 21:38

## 2021-01-09 NOTE — CONSULTS
Consultation - Nephrology   Diamond Acevedo 44 y o  female MRN: [id number]  Unit/Bed#: E4 -01 Encounter: [id number]    ASSESSMENT:  1  Acute Kidney Injury, POA- likely secondary to volume overload with cardiorenal syndrome +/- CARLIE, cannot rule out intrinsic disease yet  - baseline creatinine is 0 7 to 1 from 2018  - peak creatinine 1 87 on admission which is trending down  - avoid nephrotoxins, avoid IV contrast, avoid hypotension  - creatinine is improving with diuretics  - UA: small blood, 2-4 rbc, 2+ protein  - prior UAs from 2018 show moderate blood and >300 protein  - PVR: pending  - of note, she did receive IV contrast with CTA on admission which may slow down renal improvement  - hepatitis panel nonreactive  - would check serologic workup  2  Hematuria / Proteinuria- check workup and UPC ratio  3  Acute CHF- cardiology managing diuretics, currently on lasix 40mg IV BID  4  Cardiomyopathy- LVEF 25%, needs lifevest on discharge  - may be stress induced  - for cardiac catheterization 1/11/21  - would recommend holding lasix Sunday night dose through Monday  - would give gentle IVF 4hr pre and post procedure  5  Elevated bicarbonate level- monitor with diuresis  6  Hypokalemia- improved, continue to monitor, she is on kdur 20meq daily  7  Hypertension- BP elevated at times  - avoid hypotension  - continue carvedilol, hydralazine, imdur  - hold off starting ACEI/ARB due to MAXI      PLAN:  Continue IV diuresis per cardiology  Recommend holding diuretics Sunday evening  Consider pre and post hydration surrounding cardiac cath on Monday  Check serologic workup  Check PVR    HISTORY OF PRESENT ILLNESS:  Requesting Physician: Andre Mendez DO  Reason for Consult: MAXI/CHF    Diamond Acevedo is a 44y o  year old female who was admitted to [address] [place] [address] after presenting with shortness of breath about 1 week ago  This was worse with exertion  Cardiology was consulted    She was started on IV lasix  Her creatinine was elevated on admission and has since been slowly improving  Blood work from 2018 showed a normal creatinine but I do not have any blood work since then until now  A renal consultation is requested today for assistance in the management of acute kidney injury in the setting of needing a cardiac catheterization  She has not seen a nephrologist in the past   She has no family history of kidney disease  She admits to taking Aleve as needed for pain at home  PAST MEDICAL HISTORY:  Past Medical History:   Diagnosis Date    Anxiety     Depression     Osteoarthritis        PAST SURGICAL HISTORY:  Past Surgical History:   Procedure Laterality Date    ECTOPIC PREGNANCY SURGERY      [place] TOTAL HIP ARTHROPLASTY Left 3/5/2018    Procedure: ARTHROPLASTY HIP TOTAL;  Surgeon: Clifton Rangel DO;  [facility];  Service: Orthopedics    WISDOM TOOTH EXTRACTION      x1       ALLERGIES:  No Known Allergies    SOCIAL HISTORY:  Social History     Substance and Sexual Activity   Alcohol Use Yes    Frequency: Monthly or less    Drinks per session: 1 or 2    Comment: social drinker     Social History     Substance and Sexual Activity   Drug Use No     Social History     Tobacco Use   Smoking Status Heavy Tobacco Smoker    Packs/day: 0 50    Years: 20 00    Pack years: 10 00    Types: Cigarettes   Smokeless Tobacco Never Used   Tobacco Comment    Working on Reducing for surgery          FAMILY HISTORY:  Family History   Problem Relation Age of Onset    Cancer Family     Cancer Father     Atrial fibrillation Father     Hypertension Father        MEDICATIONS:    Current Facility-Administered Medications:     acetaminophen (TYLENOL) tablet 650 mg, 650 mg, Oral, Q4H PRN, Rosmery Agosto PA-C    atorvastatin (LIPITOR) tablet 40 mg, 40 mg, Oral, Daily With Alizasantino Gaston DO, 40 mg at 01/08/21 1803    carvedilol (COREG) tablet 12 5 mg, 12 5 mg, Oral, BID With Meals, Rujul Madhavi Ranch, DO    furosemide (LASIX) injection 40 mg, 40 mg, Intravenous, BID (diuretic), Graciela Lopez DO, 40 mg at 01/09/21 0824    heparin (porcine) subcutaneous injection 5,000 Units, 5,000 Units, Subcutaneous, Q8H [address], Sugey Soliz PA-C, 5,000 Units at 01/09/21 1410    hydrALAZINE (APRESOLINE) tablet 25 mg, 25 mg, Oral, TID after meals, Rujul TrejoDO, 25 mg at 01/09/21 1220    isosorbide dinitrate (ISORDIL) tablet 10 mg, 10 mg, Oral, TID after meals, Alicia Bazzi PA-C, 10 mg at 01/09/21 1220    Labetalol HCl (NORMODYNE) injection 10 mg, 10 mg, Intravenous, Q6H PRN, Troy Jaramillo MD, Stopped at 01/07/21 1214    nicotine (NICODERM CQ) 14 mg/24hr TD 24 hr patch 1 patch, 1 patch, Transdermal, Daily, Sugey Soliz PA-C, 1 patch at 01/09/21 0824    ondansetron (ZOFRAN) injection 4 mg, 4 mg, Intravenous, Q6H PRN, Sugey Soliz PA-C, 4 mg at 01/07/21 1546    PARoxetine (PAXIL) tablet 60 mg, 60 mg, Oral, Daily, Sugey Soliz PA-C, 60 mg at 01/09/21 0824    [START ON 1/10/2021] potassium chloride (K-DUR,KLOR-CON) CR tablet 20 mEq, 20 mEq, Oral, Daily, Benji Rodríguez DO    promethazine (PHENERGAN) injection 12 5 mg, 12 5 mg, Intravenous, Q6H PRN, Graciela Lopez DO    QUEtiapine (SEROquel) tablet 100 mg, 100 mg, Oral, HS, Sugey Soliz PA-C, 100 mg at 01/08/21 2131    REVIEW OF SYSTEMS:  A complete 10 point review of systems was performed and found to be negative unless otherwise noted in the history of present illness  General: No fevers, chills  Cardiovascular:  No chest pain, No leg edema  Respiratory: No cough, sputum production,  No shortness of breath  Gastrointestinal:  No nausea/vomiting,  No diarrhea,  No abdominal pain  Genitourinary: No hematuria  No foamy urine    No dysuria    PHYSICAL EXAM:  Current Weight: Weight - Scale: 55 kg (121 lb 4 1 oz)  First Weight: Weight - Scale: 58 1 kg (128 lb)  Vitals:    01/09/21 0600 01/09/21 0755 01/09/21 1139 01/09/21 1500   BP:  (!) 184/111 (!) 157/115 136/95   BP Location:  Right arm Left arm Left arm   Pulse:  89 76 92   Resp:  16 16 18   Temp:  97 6 °F (36 4 °C) 97 5 °F (36 4 °C) 97 5 °F (36 4 °C)   TempSrc:  Temporal Temporal Temporal   SpO2:  94% 95% 94%   Weight: 55 kg (121 lb 4 1 oz)      Height:           Intake/Output Summary (Last 24 hours) at 1/9/2021 1638  Last data filed at 1/9/2021 1241  Gross per 24 hour   Intake 580 ml   Output 2250 ml   Net -1670 ml     General: NAD  Skin: no rash  Eyes: anicteric  ENMT: mm moist  Neck: no masses  Respiratory: CTAB  CVS: RRR  Extremities: no edema  GI: soft nt nd  Neuro: alert awake  Psych: mood and affect appropriate      Lab Results:   Results from last 7 days   Lab Units 01/09/21  1126 01/08/21  0458 01/07/21  1202 01/07/21  0444 01/06/21  1653   WBC Thousand/uL  --   --  10 42*  --  10 78*   HEMOGLOBIN g/dL  --   --  13 0  --  13 3   HEMATOCRIT %  --   --  38 9  --  39 8   PLATELETS Thousands/uL  --   --  328  --  361   POTASSIUM mmol/L 3 9 3 9  --  2 9* 3 3*   CHLORIDE mmol/L 103 109*  --  107 106   CO2 mmol/L 33* 27  --  22 28   BUN mg/dL 39* 33*  --  29* 27*   CREATININE mg/dL 1 55* 1 63*  --  1 69* 1 87*   CALCIUM mg/dL 8 2* 8 1*  --  7 9* 8 1*   MAGNESIUM mg/dL  --   --   --  2 2  --    ALK PHOS U/L  --   --   --  86 102   ALT U/L  --   --   --  28 33   AST U/L  --   --   --  24 27

## 2021-01-09 NOTE — PROGRESS NOTES
Cardiology   MD Tova Subramanian MD Ather Mansoor, MD Willian Hikes, DO, Shelby Cuellar DO, [facility] [place]  -------------------------------------------------------------------  Counts include 234 beds at the [facility]  [address] [place] [id number]  [place]  [phone]        Progress Note - Cardiology   Nguyễn Sebastian 44 y o  female MRN: [id number]  Unit/Bed#: E4 -01 Encounter: [id number]        Assessment/Recommendations/Discussion:   1  Acute systolic and diastolic congestive heart failure  2  Moderate bilateral pleural effusions  3  Cardiomyopathy, ejection fraction 67%, uncertain type  4  Non MI related troponin elevation likely secondary to CHF  5  Hypertension  6  Tobacco use  7  Acute kidney injury on chronic kidney disease  8  Dyslipidemia      · Continue IV furosemide  · Continue isosorbide, hydralazine, carvedilol  · Wean off oxygen and is able  · Keep on telemetry in light of paroxysmal atrial tachycardia noted on telemetry earlier this admission  High risk of atrial fibrillation noted onset cardiomyopathy and frequent PACs and prior PAT  · Increase hydralazine and carvedilol for better blood pressure control  · Anticipate cardiac catheterization on Monday  · Reviewed indications, risks, benefits of LifeVest placement in the setting of new onset cardiomyopathy  Patient states he may be interested    Will schedule coronary angiography          Subjective:  Patient seen and examined, no complaints today, still exertional dyspnea          Physical Exam:  GEN:  NAD  HEENT:  MMM, NCAT, pink conjunctiva, EOMI, nonicteric sclera  CV:  NO JVD/HJR, RR, NO M/R/G, +S1/S2, NO PARASTERNAL HEAVE/THRILL, NO LE EDEMA, NO HEPATIC SYSTOLIC PULSATION, WARM EXTREMITIES  RESP:  CTAB/L, decreased breath sounds at bases  ABD:  SOFT, NT, NO GROSS ORGANOMEGALY        Vitals:   BP (!) 157/115 (BP Location: Left arm)   Pulse 76   Temp 97 5 °F (36 4 °C) (Temporal)   Resp 16 Ht 5' 6" (1 676 m)   Wt 55 kg (121 lb 4 1 oz)   SpO2 95%   BMI 19 57 kg/m²   Vitals:    01/08/21 0600 01/09/21 0600   Weight: 57 6 kg (126 lb 15 8 oz) 55 kg (121 lb 4 1 oz)       Intake/Output Summary (Last 24 hours) at 1/9/2021 1202  Last data filed at 1/9/2021 0816  Gross per 24 hour   Intake 100 ml   Output 2000 ml   Net -1900 ml       TELEMETRY:  Sinus rhythm, PVCs, PACs, PAT noted yesterday  Lab Results:  Results from last 7 days   Lab Units 01/07/21  1202   WBC Thousand/uL 10 42*   HEMOGLOBIN g/dL 13 0   HEMATOCRIT % 38 9   PLATELETS Thousands/uL 328     Results from last 7 days   Lab Units 01/08/21  0458 01/07/21  0444   POTASSIUM mmol/L 3 9 2 9*   CHLORIDE mmol/L 109* 107   CO2 mmol/L 27 22   BUN mg/dL 33* 29*   CREATININE mg/dL 1 63* 1 69*   CALCIUM mg/dL 8 1* 7 9*   ALK PHOS U/L  --  86   ALT U/L  --  28   AST U/L  --  24     Results from last 7 days   Lab Units 01/08/21  0458   POTASSIUM mmol/L 3 9   CHLORIDE mmol/L 109*   CO2 mmol/L 27   BUN mg/dL 33*   CREATININE mg/dL 1 63*   CALCIUM mg/dL 8 1*           Medications:    Current Facility-Administered Medications:     acetaminophen (TYLENOL) tablet 650 mg, 650 mg, Oral, Q4H PRN, Carlee Siemens, PA-C    atorvastatin (LIPITOR) tablet 40 mg, 40 mg, Oral, Daily With Jeffrey Dickens DO, 40 mg at 01/08/21 1803    carvedilol (COREG) tablet 6 25 mg, 6 25 mg, Oral, BID With Meals, Wendy Woods DO, 6 25 mg at 01/09/21 0824    furosemide (LASIX) injection 40 mg, 40 mg, Intravenous, BID (diuretic), Wendy Woods DO, 40 mg at 01/09/21 0824    heparin (porcine) subcutaneous injection 5,000 Units, 5,000 Units, Subcutaneous, Q8H [address], Sugey Soliz PA-C, 5,000 Units at 01/09/21 7168    hydrALAZINE (APRESOLINE) tablet 10 mg, 10 mg, Oral, TID after meals, Parmjit Bazzi PA-C, 10 mg at 01/09/21 3390    isosorbide dinitrate (ISORDIL) tablet 10 mg, 10 mg, Oral, TID after meals, Parmjit Bazzi PA-C, 10 mg at 01/09/21 0824    Labetalol HCl (Reginolnereyda Sanket) injection 10 mg, 10 mg, Intravenous, Q6H PRN, Pierec Hull MD, Stopped at 01/07/21 1214    nicotine (NICODERM CQ) 14 mg/24hr TD 24 hr patch 1 patch, 1 patch, Transdermal, Daily, Sugey Soliz PA-C, 1 patch at 01/09/21 0824    ondansetron (ZOFRAN) injection 4 mg, 4 mg, Intravenous, Q6H PRN, Sugey Soliz PA-C, 4 mg at 01/07/21 1546    PARoxetine (PAXIL) tablet 60 mg, 60 mg, Oral, Daily, Sugey Soliz PA-C, 60 mg at 01/09/21 0824    [START ON 1/10/2021] potassium chloride (K-DUR,KLOR-CON) CR tablet 20 mEq, 20 mEq, Oral, Daily, Benji Rodríguez DO    promethazine (PHENERGAN) injection 12 5 mg, 12 5 mg, Intravenous, Q6H PRN, Mary Solis DO    QUEtiapine (SEROquel) tablet 100 mg, 100 mg, Oral, HS, STEPHANIE Villarreal-AMARILYS, 100 mg at 01/08/21 2131    This note was completed in part utilizing M-Modal Fluency Direct Software  Grammatical errors, random word insertions, spelling mistakes, and incomplete sentences may be an occasional consequence of this system secondary to software limitations, ambient noise, and hardware issues  If you have any questions or concerns about the content, text, or information contained within the body of this dictation, please contact the provider for clarification

## 2021-01-09 NOTE — PLAN OF CARE
Problem: Potential for Falls  Goal: Patient will remain free of falls  Description: INTERVENTIONS:  - Assess patient frequently for physical needs  -  Identify cognitive and physical deficits and behaviors that affect risk of falls  -  [place] fall precautions as indicated by assessment   - Educate patient/family on patient safety including physical limitations  - Instruct patient to call for assistance with activity based on assessment  - Modify environment to reduce risk of injury  - Consider OT/PT consult to assist with strengthening/mobility  Outcome: Progressing     Problem: PAIN - ADULT  Goal: Verbalizes/displays adequate comfort level or baseline comfort level  Description: Interventions:  - Encourage patient to monitor pain and request assistance  - Assess pain using appropriate pain scale  - Administer analgesics based on type and severity of pain and evaluate response  - Implement non-pharmacological measures as appropriate and evaluate response  - Consider cultural and social influences on pain and pain management  - Notify physician/advanced practitioner if interventions unsuccessful or patient reports new pain  Outcome: Progressing     Problem: SAFETY ADULT  Goal: Patient will remain free of falls  Description: INTERVENTIONS:  - Assess patient frequently for physical needs  -  Identify cognitive and physical deficits and behaviors that affect risk of falls    -  [place] fall precautions as indicated by assessment   - Educate patient/family on patient safety including physical limitations  - Instruct patient to call for assistance with activity based on assessment  - Modify environment to reduce risk of injury  - Consider OT/PT consult to assist with strengthening/mobility  Outcome: Progressing  Goal: Maintain or return to baseline ADL function  Description: INTERVENTIONS:  -  Assess patient's ability to carry out ADLs; assess patient's baseline for ADL function and identify physical deficits which impact ability to perform ADLs (bathing, care of mouth/teeth, toileting, grooming, dressing, etc )  - Assess/evaluate cause of self-care deficits   - Assess range of motion  - Assess patient's mobility; develop plan if impaired  - Assess patient's need for assistive devices and provide as appropriate  - Encourage maximum independence but intervene and supervise when necessary  - Involve family in performance of ADLs  - Assess for home care needs following discharge   - Consider OT consult to assist with ADL evaluation and planning for discharge  - Provide patient education as appropriate  Outcome: Progressing  Goal: Maintain or return mobility status to optimal level  Description: INTERVENTIONS:  - Assess patient's baseline mobility status (ambulation, transfers, stairs, etc )    - Identify cognitive and physical deficits and behaviors that affect mobility  - Identify mobility aids required to assist with transfers and/or ambulation (gait belt, sit-to-stand, lift, walker, cane, etc )  - [place] fall precautions as indicated by assessment  - Record patient progress and toleration of activity level on Mobility SBAR; progress patient to next Phase/Stage  - Instruct patient to call for assistance with activity based on assessment  - Consider rehabilitation consult to assist with strengthening/weightbearing, etc   Outcome: Progressing     Problem: DISCHARGE PLANNING  Goal: Discharge to home or other facility with appropriate resources  Description: INTERVENTIONS:  - Identify barriers to discharge w/patient and caregiver  - Arrange for needed discharge resources and transportation as appropriate  - Identify discharge learning needs (meds, wound care, etc )  - Arrange for interpretive services to assist at discharge as needed  - Refer to Case Management Department for coordinating discharge planning if the patient needs post-hospital services based on physician/advanced practitioner order or complex needs related to functional status, cognitive ability, or social support system  Outcome: Progressing     Problem: Knowledge Deficit  Goal: Patient/family/caregiver demonstrates understanding of disease process, treatment plan, medications, and discharge instructions  Description: Complete learning assessment and assess knowledge base    Interventions:  - Provide teaching at level of understanding  - Provide teaching via preferred learning methods  Outcome: Progressing     Problem: CARDIOVASCULAR - ADULT  Goal: Maintains optimal cardiac output and hemodynamic stability  Description: INTERVENTIONS:  - Monitor I/O, vital signs and rhythm  - Monitor for S/S and trends of decreased cardiac output  - Administer and titrate ordered vasoactive medications to optimize hemodynamic stability  - Assess quality of pulses, skin color and temperature  - Assess for signs of decreased coronary artery perfusion  - Instruct patient to report change in severity of symptoms  Outcome: Progressing  Goal: Absence of cardiac dysrhythmias or at baseline rhythm  Description: INTERVENTIONS:  - Continuous cardiac monitoring, vital signs, obtain 12 lead EKG if ordered  - Administer antiarrhythmic and heart rate control medications as ordered  - Monitor electrolytes and administer replacement therapy as ordered  Outcome: Progressing

## 2021-01-09 NOTE — PROGRESS NOTES
Progress Note - Pepper Alcantara 44 y o  female MRN: [id number]    Unit/Bed#: E4 -01 Encounter: [id number]    Assessment/Plan:    Acute hypoxic resp failure   related to heart failure and probable COPD, improving with diuresis titrating off oxygen    Acute combined heart failure  reviewed with Cardiology continue Coreg, IV Lasix, continue workup possible cardiac catheterization Monday    Pleural effusion    related to heart failure will repeat chest x-ray in a m  May need thoracentesis    Tobacco abuse    cessation counseling provided    Accelerated hypertension   continue Coreg and Lasix increased Coreg 12 5 milligrams b i d     A KI      creatinine stable, will consult Nephrology, needs  cardiac catheterization    MDD      mood stable continue Paxil and Seroquel    Right ankle fracture    continue ortho follow-up    Dyslipidemia     continue statin    Subjective: This continues to feel better less short of breath no chest pain no nausea vomiting no fevers chills appetite stable    Objective:     Vitals: Blood pressure (!) 184/111, pulse 89, temperature 97 6 °F (36 4 °C), temperature source Temporal, resp  rate 16, height 5' 6" (1 676 m), weight 55 kg (121 lb 4 1 oz), SpO2 94 %  ,Body mass index is 19 57 kg/m²        Results from last 7 days   Lab Units 01/07/21  1202 01/06/21  1653   WBC Thousand/uL 10 42* 10 78*   HEMOGLOBIN g/dL 13 0 13 3   HEMATOCRIT % 38 9 39 8   PLATELETS Thousands/uL 328 361   INR   --  0 95     Results from last 7 days   Lab Units 01/08/21  0458 01/07/21  0444   POTASSIUM mmol/L 3 9 2 9*   CHLORIDE mmol/L 109* 107   CO2 mmol/L 27 22   BUN mg/dL 33* 29*   CREATININE mg/dL 1 63* 1 69*   CALCIUM mg/dL 8 1* 7 9*   ALK PHOS U/L  --  86   ALT U/L  --  28   AST U/L  --  24       Scheduled Meds:  Current Facility-Administered Medications   Medication Dose Route Frequency Provider Last Rate    acetaminophen  650 mg Oral Q4H PRN Sugey Soliz PA-C      atorvastatin  40 mg Oral Daily With [address]      carvedilol  6 25 mg Oral BID With Meals Keily Olivas DO      furosemide  40 mg Intravenous BID (diuretic) Keily Olivas DO      heparin (porcine)  5,000 Units Subcutaneous Q8H [facility] Sugey Soliz PA-C      hydrALAZINE  10 mg Oral TID after meals Alyne Kawasaki Hosford, PA-C      isosorbide dinitrate  10 mg Oral TID after meals Lei Bazzi PA-C      Labetalol HCl  10 mg Intravenous Q6H PRN Theodora Nina MD      nicotine  1 patch Transdermal Daily Sugey Soliz PA-C      ondansetron  4 mg Intravenous Q6H PRN Samuel Lowery PA-C      PARoxetine  60 mg Oral Daily Samuel Lowery PA-C      potassium chloride  40 mEq Oral Daily Keily Olivas DO      promethazine  12 5 mg Intravenous Q6H PRN Keily Olivas DO      QUEtiapine  100 mg Oral HS Sugey Soliz PA-C         Continuous Infusions:         Physical exam:  General appearance:  Alert no distress interaction appropriate  Head/Eyes:  Nonicteric PERRL EOMI  Neck:  Supple  Lungs:  Decreased BS bilateral basilar crackles  Heart: normal S1 S2 regular  Abdomen: Soft nontender with bowel sounds  Extremities: no edema  Skin: no rash    Invasive Devices     Peripheral Intravenous Line            Peripheral IV 01/08/21 Left;Ventral (anterior) Forearm 1 day                  VTE Pharmacologic Prophylaxis:  Heparin    Counseling / Coordination of Care  Total floor / unit time spent today 30  minutes  Greater than 50% of total time was spent with the patient and / or family counseling and / or coordination of care    A description of the counseling / coordination of care:  Discussed with Cardiology

## 2021-01-10 ENCOUNTER — APPOINTMENT (INPATIENT)
Dept: RADIOLOGY | Facility: HOSPITAL | Age: 51
DRG: 286 | End: 2021-01-10
Payer: COMMERCIAL

## 2021-01-10 PROBLEM — S82.831A CLOSED FRACTURE OF DISTAL END OF RIGHT FIBULA: Status: ACTIVE | Noted: 2021-01-10

## 2021-01-10 LAB
ANION GAP SERPL CALCULATED.3IONS-SCNC: 5 MMOL/L (ref 4–13)
BUN SERPL-MCNC: 39 MG/DL (ref 5–25)
C3 SERPL-MCNC: 97.5 MG/DL (ref 90–180)
C4 SERPL-MCNC: 29 MG/DL (ref 10–40)
CALCIUM SERPL-MCNC: 8 MG/DL (ref 8.3–10.1)
CHLORIDE SERPL-SCNC: 104 MMOL/L (ref 100–108)
CO2 SERPL-SCNC: 32 MMOL/L (ref 21–32)
CREAT SERPL-MCNC: 1.63 MG/DL (ref 0.6–1.3)
GFR SERPL CREATININE-BSD FRML MDRD: 36 ML/MIN/1.73SQ M
GLUCOSE SERPL-MCNC: 93 MG/DL (ref 65–140)
POTASSIUM SERPL-SCNC: 3.3 MMOL/L (ref 3.5–5.3)
SODIUM SERPL-SCNC: 141 MMOL/L (ref 136–145)

## 2021-01-10 PROCEDURE — 97110 THERAPEUTIC EXERCISES: CPT

## 2021-01-10 PROCEDURE — 84166 PROTEIN E-PHORESIS/URINE/CSF: CPT | Performed by: PATHOLOGY

## 2021-01-10 PROCEDURE — 86225 DNA ANTIBODY NATIVE: CPT | Performed by: PHYSICIAN ASSISTANT

## 2021-01-10 PROCEDURE — 71046 X-RAY EXAM CHEST 2 VIEWS: CPT

## 2021-01-10 PROCEDURE — 86160 COMPLEMENT ANTIGEN: CPT | Performed by: PHYSICIAN ASSISTANT

## 2021-01-10 PROCEDURE — 99232 SBSQ HOSP IP/OBS MODERATE 35: CPT | Performed by: INTERNAL MEDICINE

## 2021-01-10 PROCEDURE — 86038 ANTINUCLEAR ANTIBODIES: CPT | Performed by: PHYSICIAN ASSISTANT

## 2021-01-10 PROCEDURE — 84165 PROTEIN E-PHORESIS SERUM: CPT | Performed by: PATHOLOGY

## 2021-01-10 PROCEDURE — 97116 GAIT TRAINING THERAPY: CPT

## 2021-01-10 PROCEDURE — 84165 PROTEIN E-PHORESIS SERUM: CPT | Performed by: PHYSICIAN ASSISTANT

## 2021-01-10 PROCEDURE — 99233 SBSQ HOSP IP/OBS HIGH 50: CPT | Performed by: INTERNAL MEDICINE

## 2021-01-10 PROCEDURE — 86255 FLUORESCENT ANTIBODY SCREEN: CPT | Performed by: PHYSICIAN ASSISTANT

## 2021-01-10 PROCEDURE — 83883 ASSAY NEPHELOMETRY NOT SPEC: CPT | Performed by: PHYSICIAN ASSISTANT

## 2021-01-10 PROCEDURE — 83520 IMMUNOASSAY QUANT NOS NONAB: CPT | Performed by: PHYSICIAN ASSISTANT

## 2021-01-10 PROCEDURE — 80048 BASIC METABOLIC PNL TOTAL CA: CPT | Performed by: INTERNAL MEDICINE

## 2021-01-10 RX ORDER — POTASSIUM CHLORIDE 20 MEQ/1
20 TABLET, EXTENDED RELEASE ORAL 2 TIMES DAILY WITH MEALS
Status: DISCONTINUED | OUTPATIENT
Start: 2021-01-10 | End: 2021-01-12

## 2021-01-10 RX ORDER — SODIUM CHLORIDE 9 MG/ML
50 INJECTION, SOLUTION INTRAVENOUS CONTINUOUS
Status: DISCONTINUED | OUTPATIENT
Start: 2021-01-11 | End: 2021-01-11

## 2021-01-10 RX ADMIN — ISOSORBIDE DINITRATE 10 MG: 10 TABLET ORAL at 12:43

## 2021-01-10 RX ADMIN — PAROXETINE 60 MG: 20 TABLET, FILM COATED ORAL at 08:54

## 2021-01-10 RX ADMIN — HYDRALAZINE HYDROCHLORIDE 25 MG: 25 TABLET ORAL at 12:43

## 2021-01-10 RX ADMIN — POTASSIUM CHLORIDE 20 MEQ: 1500 TABLET, EXTENDED RELEASE ORAL at 16:17

## 2021-01-10 RX ADMIN — FUROSEMIDE 40 MG: 10 INJECTION, SOLUTION INTRAMUSCULAR; INTRAVENOUS at 08:53

## 2021-01-10 RX ADMIN — QUETIAPINE FUMARATE 100 MG: 25 TABLET ORAL at 21:51

## 2021-01-10 RX ADMIN — POTASSIUM CHLORIDE 20 MEQ: 1500 TABLET, EXTENDED RELEASE ORAL at 08:53

## 2021-01-10 RX ADMIN — HYDRALAZINE HYDROCHLORIDE 25 MG: 25 TABLET ORAL at 08:53

## 2021-01-10 RX ADMIN — HEPARIN SODIUM 5000 UNITS: 5000 INJECTION INTRAVENOUS; SUBCUTANEOUS at 12:43

## 2021-01-10 RX ADMIN — CARVEDILOL 12.5 MG: 12.5 TABLET, FILM COATED ORAL at 08:54

## 2021-01-10 RX ADMIN — HYDRALAZINE HYDROCHLORIDE 25 MG: 25 TABLET ORAL at 16:17

## 2021-01-10 RX ADMIN — ISOSORBIDE DINITRATE 10 MG: 10 TABLET ORAL at 16:17

## 2021-01-10 RX ADMIN — ISOSORBIDE DINITRATE 10 MG: 10 TABLET ORAL at 08:54

## 2021-01-10 RX ADMIN — ATORVASTATIN CALCIUM 40 MG: 40 TABLET, FILM COATED ORAL at 16:17

## 2021-01-10 RX ADMIN — HEPARIN SODIUM 5000 UNITS: 5000 INJECTION INTRAVENOUS; SUBCUTANEOUS at 21:52

## 2021-01-10 RX ADMIN — Medication 1 PATCH: at 08:54

## 2021-01-10 RX ADMIN — HEPARIN SODIUM 5000 UNITS: 5000 INJECTION INTRAVENOUS; SUBCUTANEOUS at 06:20

## 2021-01-10 RX ADMIN — CARVEDILOL 12.5 MG: 12.5 TABLET, FILM COATED ORAL at 16:16

## 2021-01-10 NOTE — PLAN OF CARE
Problem: Potential for Falls  Goal: Patient will remain free of falls  Description: INTERVENTIONS:  - Assess patient frequently for physical needs  -  Identify cognitive and physical deficits and behaviors that affect risk of falls  -  [place] fall precautions as indicated by assessment   - Educate patient/family on patient safety including physical limitations  - Instruct patient to call for assistance with activity based on assessment  - Modify environment to reduce risk of injury  - Consider OT/PT consult to assist with strengthening/mobility  Outcome: Progressing     Problem: PAIN - ADULT  Goal: Verbalizes/displays adequate comfort level or baseline comfort level  Description: Interventions:  - Encourage patient to monitor pain and request assistance  - Assess pain using appropriate pain scale  - Administer analgesics based on type and severity of pain and evaluate response  - Implement non-pharmacological measures as appropriate and evaluate response  - Consider cultural and social influences on pain and pain management  - Notify physician/advanced practitioner if interventions unsuccessful or patient reports new pain  Outcome: Progressing     Problem: SAFETY ADULT  Goal: Patient will remain free of falls  Description: INTERVENTIONS:  - Assess patient frequently for physical needs  -  Identify cognitive and physical deficits and behaviors that affect risk of falls    -  [place] fall precautions as indicated by assessment   - Educate patient/family on patient safety including physical limitations  - Instruct patient to call for assistance with activity based on assessment  - Modify environment to reduce risk of injury  - Consider OT/PT consult to assist with strengthening/mobility  Outcome: Progressing  Goal: Maintain or return to baseline ADL function  Description: INTERVENTIONS:  -  Assess patient's ability to carry out ADLs; assess patient's baseline for ADL function and identify physical deficits which impact ability to perform ADLs (bathing, care of mouth/teeth, toileting, grooming, dressing, etc )  - Assess/evaluate cause of self-care deficits   - Assess range of motion  - Assess patient's mobility; develop plan if impaired  - Assess patient's need for assistive devices and provide as appropriate  - Encourage maximum independence but intervene and supervise when necessary  - Involve family in performance of ADLs  - Assess for home care needs following discharge   - Consider OT consult to assist with ADL evaluation and planning for discharge  - Provide patient education as appropriate  Outcome: Progressing  Goal: Maintain or return mobility status to optimal level  Description: INTERVENTIONS:  - Assess patient's baseline mobility status (ambulation, transfers, stairs, etc )    - Identify cognitive and physical deficits and behaviors that affect mobility  - Identify mobility aids required to assist with transfers and/or ambulation (gait belt, sit-to-stand, lift, walker, cane, etc )  - [place] fall precautions as indicated by assessment  - Record patient progress and toleration of activity level on Mobility SBAR; progress patient to next Phase/Stage  - Instruct patient to call for assistance with activity based on assessment  - Consider rehabilitation consult to assist with strengthening/weightbearing, etc   Outcome: Progressing     Problem: DISCHARGE PLANNING  Goal: Discharge to home or other facility with appropriate resources  Description: INTERVENTIONS:  - Identify barriers to discharge w/patient and caregiver  - Arrange for needed discharge resources and transportation as appropriate  - Identify discharge learning needs (meds, wound care, etc )  - Arrange for interpretive services to assist at discharge as needed  - Refer to Case Management Department for coordinating discharge planning if the patient needs post-hospital services based on physician/advanced practitioner order or complex needs related to functional status, cognitive ability, or social support system  Outcome: Progressing     Problem: Knowledge Deficit  Goal: Patient/family/caregiver demonstrates understanding of disease process, treatment plan, medications, and discharge instructions  Description: Complete learning assessment and assess knowledge base    Interventions:  - Provide teaching at level of understanding  - Provide teaching via preferred learning methods  Outcome: Progressing     Problem: CARDIOVASCULAR - ADULT  Goal: Maintains optimal cardiac output and hemodynamic stability  Description: INTERVENTIONS:  - Monitor I/O, vital signs and rhythm  - Monitor for S/S and trends of decreased cardiac output  - Administer and titrate ordered vasoactive medications to optimize hemodynamic stability  - Assess quality of pulses, skin color and temperature  - Assess for signs of decreased coronary artery perfusion  - Instruct patient to report change in severity of symptoms  Outcome: Progressing  Goal: Absence of cardiac dysrhythmias or at baseline rhythm  Description: INTERVENTIONS:  - Continuous cardiac monitoring, vital signs, obtain 12 lead EKG if ordered  - Administer antiarrhythmic and heart rate control medications as ordered  - Monitor electrolytes and administer replacement therapy as ordered  Outcome: Progressing

## 2021-01-10 NOTE — PROGRESS NOTES
Progress Note - Amanda Rogers 44 y o  female MRN: [id number]    Unit/Bed#: E4 -01 Encounter: [id number]    Assessment/Plan:    Acute hypoxic resp failure  continues at 4 liters/minute oxygen requirement, etiology acute heart failure and COPD    Acute combined HF   continue Coreg with IV diuresis and hydralazine,   Planned cardiac catheterization tomorrow, serology workup ongoing    Pleural effusion   chest x-ray today to monitor    Tobacco abuse   cessation counseling provided    Accelerated hypertension  improving control with Coreg Lasix and hydralazine    Hypokalemia    continue oral supplementation while on diuretic    A KI     creatinine now stable 1 63, per nephrology hold diuretics today and tomorrow with cardia catheterization plan    Right ankle fracture   healing nicely continue Orthopedic follow-up    Dyslipidemia    continue statin for LDL control    Subjective:   Continues to feel better, less short of breath, no chest pain, appetite very good, no nausea vomiting no fevers chills    Objective:     Vitals: Blood pressure 140/100, pulse 80, temperature (!) 96 6 °F (35 9 °C), temperature source Temporal, resp  rate 18, height 5' 6" (1 676 m), weight 53 6 kg (118 lb 2 7 oz), SpO2 96 %  ,Body mass index is 19 07 kg/m²  Results from last 7 days   Lab Units 01/07/21  1202 01/06/21  1653   WBC Thousand/uL 10 42* 10 78*   HEMOGLOBIN g/dL 13 0 13 3   HEMATOCRIT % 38 9 39 8   PLATELETS Thousands/uL 328 361   INR   --  0 95     Results from last 7 days   Lab Units 01/10/21  0519  01/07/21  0444   POTASSIUM mmol/L 3 3*   < > 2 9*   CHLORIDE mmol/L 104   < > 107   CO2 mmol/L 32   < > 22   BUN mg/dL 39*   < > 29*   CREATININE mg/dL 1 63*   < > 1 69*   CALCIUM mg/dL 8 0*   < > 7 9*   ALK PHOS U/L  --   --  86   ALT U/L  --   --  28   AST U/L  --   --  24    < > = values in this interval not displayed         Scheduled Meds:  Current Facility-Administered Medications   Medication Dose Route Frequency Provider Last Rate    acetaminophen  650 mg Oral Q4H PRN Katerina Gracia PA-C      atorvastatin  40 mg Oral Daily With [employer], DO      carvedilol  12 5 mg Oral BID With Meals Rujul Lisa Rayray, DO      heparin (porcine)  5,000 Units Subcutaneous Q8H [address] Sugey Soliz PA-C      hydrALAZINE  25 mg Oral TID after meals Rujul Trejo, DO      isosorbide dinitrate  10 mg Oral TID after meals Lei Bazzi PA-C      Labetalol HCl  10 mg Intravenous Q6H PRN Dandre Munson MD      nicotine  1 patch Transdermal Daily Sugey Soliz PA-C      ondansetron  4 mg Intravenous Q6H PRN Katerina Gracia PA-C      PARoxetine  60 mg Oral Daily Katerina Gracia PA-C      potassium chloride  20 mEq Oral Daily Sandy Philip,       promethazine  12 5 mg Intravenous Q6H PRN Sandy Philip DO      QUEtiapine  100 mg Oral HS Sugey Soliz PA-C         Continuous Infusions:         Physical exam:  General appearance:  Alert no distress interaction appropriate  Head/Eyes:  Nonicteric PERRL EOMI  Neck:  Supple  Lungs:  Decreased BS bilateral basilar crackles  Heart: normal S1 S2 regular  Abdomen: Soft nontender with bowel sounds  Extremities: no edema  Skin: no rash    Invasive Devices     Peripheral Intravenous Line            Peripheral IV 01/08/21 Left;Ventral (anterior) Forearm 1 day                  VTE Pharmacologic Prophylaxis:  Heparin      Counseling / Coordination of Care  Total floor / unit time spent today 30  minutes  Greater than 50% of total time was spent with the patient and / or family counseling and / or coordination of care    A description of the counseling / coordination of care:  Discussed with Cardiology

## 2021-01-10 NOTE — PROGRESS NOTES
Progress Note - Cardiology   Kiersten Parekh 44 y o  female MRN: [id number]  Unit/Bed#: E4 -01 Encounter: [id number]        Problem List:  Principal Problem:    Acute respiratory failure with hypoxia ([facility] [address] )  Active Problems:    Anxiety    Current smoker    Hypertensive urgency    Hypokalemia    MAXI (acute kidney injury) ([facility] [address] )    Bilateral pleural effusion    Acute CHF (congestive heart failure) ([facility])    Elevated troponin    ADHD      Asessment:  1  Acute respiratory failure due to #2  2  Acute combined systolic and diastolic heart failure  3  Cardiomyopathy, type unspecified, LVEF 25% w/ G3DD (1/7/21)  4  Hypertension  5  Non MI troponin elevation due to CHF, peak 0 14  6  Tobacco abuse  7  MAXI on arrival, peak creatinine 1 87  8  Recent right ankle fracture 12/07/2020, managed non operatively  9  Dyslipidemia: cholesterol 267, triglycerides 135, HDL 53,     Plan/ Discussion:  She looks significantly better today and examined close to euvolemic  She is down 9 lb since admission and has put out 6+ L  Renal function electrolytes are stable  I turned her oxygen down to 3 L today  Standing weight today 118 lb  Creatinine 1 63  Potassium 3 3  Sodium 141  Weight down 9 lb since admission and net output -6 6 L    · Support from Nephrology much appreciated, holding IV Lasix this afternoon and tomorrow    · Plan for cardiac catheterization tomorrow, patient tells me she can lay flat comfortably  Left message on catheterization scheduling line  NPO after midnight    · Nonsustained VT noted on telemetry, 10+ beats, patient agreeable for LifeVest and I will send paperwork today  If EF remains < 35% after 3 months would send for ICD      · Continue carvedilol 12 5 Q 12, hydralazine 25 t i d , Isordil 10 t i d     · Anticipate increasing beta-blocker    · Replete potassium    Subjective:  Feels better today compared to yesterday  No chest pain or shortness of breath  No palpitation    Vitals:  Vitals: 01/09/21 0600 01/10/21 0600   Weight: 55 kg (121 lb 4 1 oz) 53 6 kg (118 lb 2 7 oz)   ,  Vitals:    01/09/21 2030 01/09/21 2334 01/10/21 0600 01/10/21 0926   BP: 140/86 126/94  140/100   BP Location: Left arm Left arm  Left arm   Pulse: 80 72  80   Resp: 18 18  18   Temp: 97 5 °F (36 4 °C) 97 5 °F (36 4 °C)  (!) 96 6 °F (35 9 °C)   TempSrc: Temporal Temporal  Temporal   SpO2: 95% 93%  96%   Weight:   53 6 kg (118 lb 2 7 oz)    Height:           Exam:  General:  Alert awake and oriented, no acute distress  Heart:  Regular rate and rhythm, no murmurs   Respiratory effort:  Comfortable on 3 L  Lungs:  Clear bilaterally  Lower Limbs:  No edema           Telemetry:       Sinus rhythm with heart rate in the 90s  Frequent ectopy  Nonsustained VT noted    Medications:    Current Facility-Administered Medications:     acetaminophen (TYLENOL) tablet 650 mg, 650 mg, Oral, Q4H PRN, Dmitry Vang PA-C    atorvastatin (LIPITOR) tablet 40 mg, 40 mg, Oral, Daily With Loyd Parada, DO, 40 mg at 01/09/21 1702    carvedilol (COREG) tablet 12 5 mg, 12 5 mg, Oral, BID With Meals, Rujul Trejo, DO, 12 5 mg at 01/10/21 0854    heparin (porcine) subcutaneous injection 5,000 Units, 5,000 Units, Subcutaneous, Q8H [address], Sugey Soliz PA-C, 5,000 Units at 01/10/21 0620    hydrALAZINE (APRESOLINE) tablet 25 mg, 25 mg, Oral, TID after meals, Rujul Trejo, DO, 25 mg at 01/10/21 0853    isosorbide dinitrate (ISORDIL) tablet 10 mg, 10 mg, Oral, TID after meals, Lynn Bazzi PA-C, 10 mg at 01/10/21 0854    Labetalol HCl (NORMODYNE) injection 10 mg, 10 mg, Intravenous, Q6H PRN, Bronson Sweet MD, Stopped at 01/07/21 1214    nicotine (NICODERM CQ) 14 mg/24hr TD 24 hr patch 1 patch, 1 patch, Transdermal, Daily, Sugey Soliz PA-C, 1 patch at 01/10/21 0854    ondansetron (ZOFRAN) injection 4 mg, 4 mg, Intravenous, Q6H PRN, Sugey Soliz PA-C, 4 mg at 01/07/21 1546    PARoxetine (PAXIL) tablet 60 mg, 60 mg, Oral, Daily, Selene Hose ALISHA Soliz, 60 mg at 01/10/21 0854    potassium chloride (K-DUR,KLOR-CON) CR tablet 20 mEq, 20 mEq, Oral, BID With Meals, Rey Mckinney, DO    promethazine (PHENERGAN) injection 12 5 mg, 12 5 mg, Intravenous, Q6H PRN, Rey Hank, DO    QUEtiapine (SEROquel) tablet 100 mg, 100 mg, Oral, HS, Sugey ALISHA Soliz, 100 mg at 01/09/21 2138      Labs/Data:        Results from last 7 days   Lab Units 01/07/21  1202 01/06/21  1653   WBC Thousand/uL 10 42* 10 78*   HEMOGLOBIN g/dL 13 0 13 3   HEMATOCRIT % 38 9 39 8   PLATELETS Thousands/uL 328 361     Results from last 7 days   Lab Units 01/10/21  0519 01/09/21  1126 01/08/21  0458 01/07/21  1202  01/07/21  0127 01/06/21  2230   POTASSIUM mmol/L 3 3* 3 9 3 9  --    < >  --   --    CHLORIDE mmol/L 104 103 109*  --    < >  --   --    CO2 mmol/L 32 33* 27  --    < >  --   --    BUN mg/dL 39* 39* 33*  --    < >  --   --    TROPONIN I ng/mL  --   --   --  0 09*  --  0 13* 0 14*    < > = values in this interval not displayed

## 2021-01-10 NOTE — PHYSICAL THERAPY NOTE
PHYSICAL THERAPY NOTE          Patient Name: Amanda Rogers  [id number] Date: 1/10/2021       01/10/21 1159   PT Last Visit   PT Visit Date 01/10/21   Note Type   Note Type Treatment   Pain Assessment   Pain Assessment Tool 0-10   Pain Score No Pain   Restrictions/Precautions   Weight Bearing Precautions Per Order Yes   RLE Weight Bearing Per Order WBAT  (with CAM boot donned)   Braces or Orthoses CAM Boot  (RLE )   Other Precautions Cardiac/sternal;O2;Telemetry   General   Chart Reviewed Yes   Additional Pertinent History Patient continues to have elevated BP, using 4 L of O2 and is scheduled for cardiac Cath on 1/11/2021   Response to Previous Treatment Patient with no complaints from previous session  Family/Caregiver Present No   Cognition   Overall Cognitive Status WFL   Attention Within functional limits   Orientation Level Oriented X4   Memory Within functional limits   Following Commands Follows one step commands without difficulty   Subjective   Subjective The docotor bumped down my O2 to 3L   Transfers   Sit to Stand 7  Independent   Stand to Sit 7  Independent   Additional Comments Patient received sitting in bedside chair  Patient Identifiers name and birhtdate     Ambulation/Elevation   Gait pattern Short stride   Gait Assistance 7  Independent   Distance 50' x 1  (In room)   Stair Management Assistance Not tested   Balance   Static Sitting Normal   Dynamic Sitting Good   Static Standing Good   Dynamic Standing Good   Ambulatory Good  (Gait speed : 0 5 m/sec)   Endurance Deficit   Endurance Deficit Yes   Endurance Deficit Description SPO2 monitored with 3 L of O2 and sats remained 91-94% throughout; /98 at rest and post exercise /100 HR 78-80   Activity Tolerance   Activity Tolerance Patient tolerated treatment well;Patient limited by fatigue   Nurse Made Aware Spoke iván Cuevas RN   Exercises   Hip Flexion 15 reps;Sitting  (2 sets)   Knee AROM Long Arc Quad 15 reps; Sitting;Bilateral  (2 sets)   Ankle Pumps Sitting;20 reps;Bilateral   Assessment   Prognosis Fair   Problem List Decreased endurance;Decreased mobility  (3L O2 )   Assessment Patient tolerated session with /100 post exercise  Patient displaying no increased SOB during session with reduction to 3 L O2  SpO2 remained stable at 91-94%  Patient increasing gait distance today with CAM boot donned due to h/e right ankle fx 4 weeks ago  Will reassess d/c needs post cardiace cath on 1/11/21  Patient remained OOB post session with all needs in her reach  Barriers to Discharge Inaccessible home environment   Goals   Patient Goals To go home   STG Expiration Date 01/17/21   PT Treatment Day 1   Plan   Treatment/Interventions Functional transfer training;Elevations;LE strengthening/ROM; Endurance training;Spoke to nursing   PT Frequency 2-3x/wk   Recommendation   PT Discharge Recommendation Return to previous environment with social support   PT - OK to Discharge Yes   Additional Comments When Medically cleared     Aakash Lubin PT, DPT, GCS

## 2021-01-10 NOTE — PROGRESS NOTES
NEPHROLOGY PROGRESS NOTE   Miguel Pace 44 y o  female MRN: [id number]  Unit/Bed#: E4 -01 Encounter: [id number]  Reason for Consult: MAXI    ASSESSMENT/PLAN:  1  Acute Kidney Injury, POA- likely secondary to volume overload with cardiorenal syndrome +/- CARLIE, cannot rule out intrinsic disease yet  - baseline creatinine is 0 7 to 1 from 2018  - peak creatinine 1 87 on admission which is trending down  - avoid nephrotoxins, avoid IV contrast, avoid hypotension  - creatinine stable at 1 6 today  - UA: small blood, 2-4 rbc, 2+ protein  - prior UAs from 2018 show moderate blood and >300 protein  - PVR: 204ml from 1/9/21, recheck  - of note, she did receive IV contrast with CTA on admission which may slow down renal improvement  - serologic workup pending: C3 & C4 normal, hepatitis panel nonreactive  2  Hematuria / Proteinuria- UPC ratio is significantly elevated at 9 98  - serologic workup pending  3  Acute CHF- cardiology managing diuretics, lasix 40mg IV BID held due to cardiac cath planned for tomorrow  4  Cardiomyopathy- LVEF 25%, needs lifevest on discharge  - may be stress induced  - for cardiac catheterization 1/11/21  - would recommend holding lasix Sunday night dose through Monday  - would give gentle IVF 4hr pre and post procedure  - if creatinine elevated >1 7, would consult with renal prior to cardiac cath  5  Elevated bicarbonate level- monitor with diuresis  6  Hypokalemia- improved, continue to monitor, she is on kdur 20meq daily  7  Hypertension- BP elevated at times  - avoid hypotension  - continue carvedilol, hydralazine, imdur  - hold off starting ACEI/ARB due to MAXI  8  Anemia- iron stores acceptable    Disposition:  Hold lasix today, AM labs, awaiting serologic workup    SUBJECTIVE:  Patient feeling well  States breathing is much improved  Denies acute complaints        OBJECTIVE:  Current Weight: Weight - Scale: 53 6 kg (118 lb 2 7 oz)  Vitals:    01/09/21 2334 01/10/21 0600 01/10/21 5965 01/10/21 1137   BP: 126/94  140/100 131/98   BP Location: Left arm  Left arm Right arm   Pulse: 72  80 73   Resp: 18  18 18   Temp: 97 5 °F (36 4 °C)  (!) 96 6 °F (35 9 °C) (!) 96 7 °F (35 9 °C)   TempSrc: Temporal  Temporal Temporal   SpO2: 93%  96% 95%   Weight:  53 6 kg (118 lb 2 7 oz)     Height:           Intake/Output Summary (Last 24 hours) at 1/10/2021 1408  Last data filed at 1/10/2021 1244  Gross per 24 hour   Intake 480 ml   Output 3254 ml   Net -2774 ml     General: NAD  Skin: no rash  Eyes: anicteric  ENMT: mm moist  Neck: no masses  Respiratory: CTAB  Cardiac: RRR  Extremities: no edema  GI: soft nt nd  Neuro: alert awake  Psych: mood and affect appropriate    Medications:    Current Facility-Administered Medications:     acetaminophen (TYLENOL) tablet 650 mg, 650 mg, Oral, Q4H PRN, Yaneth Michelle PA-C    atorvastatin (LIPITOR) tablet 40 mg, 40 mg, Oral, Daily With Quingenesis Hardin, DO, 40 mg at 01/09/21 1702    carvedilol (COREG) tablet 12 5 mg, 12 5 mg, Oral, BID With Meals, Rujul Trejo, DO, 12 5 mg at 01/10/21 0854    heparin (porcine) subcutaneous injection 5,000 Units, 5,000 Units, Subcutaneous, Q8H [facility] & assisted, Sugey Soliz PA-C, 5,000 Units at 01/10/21 1243    hydrALAZINE (APRESOLINE) tablet 25 mg, 25 mg, Oral, TID after meals, Rujul Trejo, DO, 25 mg at 01/10/21 1243    isosorbide dinitrate (ISORDIL) tablet 10 mg, 10 mg, Oral, TID after meals, Uvaldo Bazzi PA-C, 10 mg at 01/10/21 1243    Labetalol HCl (NORMODYNE) injection 10 mg, 10 mg, Intravenous, Q6H PRN, Rafiq Power MD, Stopped at 01/07/21 1214    nicotine (NICODERM CQ) 14 mg/24hr TD 24 hr patch 1 patch, 1 patch, Transdermal, Daily, Sugey Soliz PA-C, 1 patch at 01/10/21 0854    ondansetron (ZOFRAN) injection 4 mg, 4 mg, Intravenous, Q6H PRN, Sugey Soliz PA-C, 4 mg at 01/07/21 1546    PARoxetine (PAXIL) tablet 60 mg, 60 mg, Oral, Daily, Sugey Soliz PA-C, 60 mg at 01/10/21 0854    potassium chloride (K-DUR,KLOR-CON) CR tablet 20 mEq, 20 mEq, Oral, BID With Meals, Lucero Alonzo, DO    promethazine (PHENERGAN) injection 12 5 mg, 12 5 mg, Intravenous, Q6H PRN, Lucero Acron, DO    QUEtiapine (SEROquel) tablet 100 mg, 100 mg, Oral, HS, Sugey Soliz, PAANTONY, 100 mg at 01/09/21 2138    [START ON 1/11/2021] sodium chloride 0 9 % infusion, 50 mL/hr, Intravenous, Continuous, [facility], ALISHA    Laboratory Results:  Results from last 7 days   Lab Units 01/10/21  0519 01/09/21  1126 01/08/21  0458 01/07/21  1202 01/07/21  0444 01/06/21  1653   WBC Thousand/uL  --   --   --  10 42*  --  10 78*   HEMOGLOBIN g/dL  --   --   --  13 0  --  13 3   HEMATOCRIT %  --   --   --  38 9  --  39 8   PLATELETS Thousands/uL  --   --   --  328  --  361   POTASSIUM mmol/L 3 3* 3 9 3 9  --  2 9* 3 3*   CHLORIDE mmol/L 104 103 109*  --  107 106   CO2 mmol/L 32 33* 27  --  22 28   BUN mg/dL 39* 39* 33*  --  29* 27*   CREATININE mg/dL 1 63* 1 55* 1 63*  --  1 69* 1 87*   CALCIUM mg/dL 8 0* 8 2* 8 1*  --  7 9* 8 1*   MAGNESIUM mg/dL  --   --   --   --  2 2  --

## 2021-01-10 NOTE — PLAN OF CARE
Problem: PHYSICAL THERAPY ADULT  Goal: Performs mobility at highest level of function for planned discharge setting  See evaluation for individualized goals  Description: Treatment/Interventions: Functional transfer training, Elevations, LE strengthening/ROM, Endurance training, Spoke to nursing          See flowsheet documentation for full assessment, interventions and recommendations  1/10/2021 1352 by Jayla Savage PT  Outcome: Progressing  Note: Prognosis: Fair  Problem List: Decreased endurance, Decreased mobility(3L O2 )  Assessment: Patient tolerated session with /100 post exercise  Patient displaying no increased SOB during session with reduction to 3 L O2  SpO2 remained stable at 91-94%  Patient increasing gait distance today with CAM boot donned due to h/e right ankle fx 4 weeks ago  Will reassess d/c needs post cardiace cath on 1/11/21  Patient remained OOB post session with all needs in her reach  Barriers to Discharge: Inaccessible home environment  Barriers to Discharge Comments: DANIELITO   PT Discharge Recommendation: Return to previous environment with social support     PT - OK to Discharge: Yes    See flowsheet documentation for full assessment

## 2021-01-11 ENCOUNTER — APPOINTMENT (INPATIENT)
Dept: NON INVASIVE DIAGNOSTICS | Facility: HOSPITAL | Age: 51
DRG: 286 | End: 2021-01-11
Payer: COMMERCIAL

## 2021-01-11 ENCOUNTER — APPOINTMENT (INPATIENT)
Dept: RADIOLOGY | Facility: HOSPITAL | Age: 51
DRG: 286 | End: 2021-01-11
Payer: COMMERCIAL

## 2021-01-11 PROBLEM — I50.21 ACUTE SYSTOLIC CONGESTIVE HEART FAILURE (HCC): Status: ACTIVE | Noted: 2021-01-06

## 2021-01-11 LAB
ANION GAP SERPL CALCULATED.3IONS-SCNC: 7 MMOL/L (ref 4–13)
BASOPHILS # BLD AUTO: 0.09 THOUSANDS/ΜL (ref 0–0.1)
BASOPHILS NFR BLD AUTO: 1 % (ref 0–1)
BUN SERPL-MCNC: 42 MG/DL (ref 5–25)
CALCIUM SERPL-MCNC: 8.1 MG/DL (ref 8.3–10.1)
CHLORIDE SERPL-SCNC: 106 MMOL/L (ref 100–108)
CO2 SERPL-SCNC: 27 MMOL/L (ref 21–32)
CREAT SERPL-MCNC: 1.61 MG/DL (ref 0.6–1.3)
CRYOGLOB RF SER-ACNC: ABNORMAL [IU]/ML
EOSINOPHIL # BLD AUTO: 0.29 THOUSAND/ΜL (ref 0–0.61)
EOSINOPHIL NFR BLD AUTO: 4 % (ref 0–6)
ERYTHROCYTE [DISTWIDTH] IN BLOOD BY AUTOMATED COUNT: 13.6 % (ref 11.6–15.1)
GFR SERPL CREATININE-BSD FRML MDRD: 37 ML/MIN/1.73SQ M
GLUCOSE SERPL-MCNC: 86 MG/DL (ref 65–140)
HCT VFR BLD AUTO: 37.2 % (ref 34.8–46.1)
HGB BLD-MCNC: 12.3 G/DL (ref 11.5–15.4)
IMM GRANULOCYTES # BLD AUTO: 0.03 THOUSAND/UL (ref 0–0.2)
IMM GRANULOCYTES NFR BLD AUTO: 0 % (ref 0–2)
LYMPHOCYTES # BLD AUTO: 2.18 THOUSANDS/ΜL (ref 0.6–4.47)
LYMPHOCYTES NFR BLD AUTO: 28 % (ref 14–44)
MCH RBC QN AUTO: 32.4 PG (ref 26.8–34.3)
MCHC RBC AUTO-ENTMCNC: 33.1 G/DL (ref 31.4–37.4)
MCV RBC AUTO: 98 FL (ref 82–98)
MONOCYTES # BLD AUTO: 0.49 THOUSAND/ΜL (ref 0.17–1.22)
MONOCYTES NFR BLD AUTO: 6 % (ref 4–12)
NEUTROPHILS # BLD AUTO: 4.8 THOUSANDS/ΜL (ref 1.85–7.62)
NEUTS SEG NFR BLD AUTO: 61 % (ref 43–75)
NRBC BLD AUTO-RTO: 0 /100 WBCS
PLATELET # BLD AUTO: 252 THOUSANDS/UL (ref 149–390)
PMV BLD AUTO: 10.4 FL (ref 8.9–12.7)
POTASSIUM SERPL-SCNC: 3.7 MMOL/L (ref 3.5–5.3)
RBC # BLD AUTO: 3.8 MILLION/UL (ref 3.81–5.12)
RHEUMATOID FACT SER QL LA: POSITIVE
RYE IGE QN: NEGATIVE
SODIUM SERPL-SCNC: 140 MMOL/L (ref 136–145)
WBC # BLD AUTO: 7.88 THOUSAND/UL (ref 4.31–10.16)

## 2021-01-11 PROCEDURE — 99406 BEHAV CHNG SMOKING 3-10 MIN: CPT | Performed by: INTERNAL MEDICINE

## 2021-01-11 PROCEDURE — 99152 MOD SED SAME PHYS/QHP 5/>YRS: CPT | Performed by: PHYSICIAN ASSISTANT

## 2021-01-11 PROCEDURE — 99152 MOD SED SAME PHYS/QHP 5/>YRS: CPT | Performed by: INTERNAL MEDICINE

## 2021-01-11 PROCEDURE — 4A023N7 MEASUREMENT OF CARDIAC SAMPLING AND PRESSURE, LEFT HEART, PERCUTANEOUS APPROACH: ICD-10-PCS | Performed by: INTERNAL MEDICINE

## 2021-01-11 PROCEDURE — C1769 GUIDE WIRE: HCPCS | Performed by: PHYSICIAN ASSISTANT

## 2021-01-11 PROCEDURE — 80048 BASIC METABOLIC PNL TOTAL CA: CPT | Performed by: INTERNAL MEDICINE

## 2021-01-11 PROCEDURE — 99024 POSTOP FOLLOW-UP VISIT: CPT | Performed by: PHYSICIAN ASSISTANT

## 2021-01-11 PROCEDURE — 73610 X-RAY EXAM OF ANKLE: CPT

## 2021-01-11 PROCEDURE — C1894 INTRO/SHEATH, NON-LASER: HCPCS | Performed by: PHYSICIAN ASSISTANT

## 2021-01-11 PROCEDURE — 99153 MOD SED SAME PHYS/QHP EA: CPT | Performed by: PHYSICIAN ASSISTANT

## 2021-01-11 PROCEDURE — 99233 SBSQ HOSP IP/OBS HIGH 50: CPT | Performed by: INTERNAL MEDICINE

## 2021-01-11 PROCEDURE — B2111ZZ FLUOROSCOPY OF MULTIPLE CORONARY ARTERIES USING LOW OSMOLAR CONTRAST: ICD-10-PCS | Performed by: INTERNAL MEDICINE

## 2021-01-11 PROCEDURE — 85025 COMPLETE CBC W/AUTO DIFF WBC: CPT | Performed by: INTERNAL MEDICINE

## 2021-01-11 PROCEDURE — 93458 L HRT ARTERY/VENTRICLE ANGIO: CPT | Performed by: INTERNAL MEDICINE

## 2021-01-11 PROCEDURE — 93458 L HRT ARTERY/VENTRICLE ANGIO: CPT | Performed by: PHYSICIAN ASSISTANT

## 2021-01-11 PROCEDURE — 99232 SBSQ HOSP IP/OBS MODERATE 35: CPT | Performed by: INTERNAL MEDICINE

## 2021-01-11 RX ORDER — VERAPAMIL HYDROCHLORIDE 2.5 MG/ML
INJECTION, SOLUTION INTRAVENOUS CODE/TRAUMA/SEDATION MEDICATION
Status: COMPLETED | OUTPATIENT
Start: 2021-01-11 | End: 2021-01-11

## 2021-01-11 RX ORDER — MIDAZOLAM HYDROCHLORIDE 2 MG/2ML
INJECTION, SOLUTION INTRAMUSCULAR; INTRAVENOUS CODE/TRAUMA/SEDATION MEDICATION
Status: COMPLETED | OUTPATIENT
Start: 2021-01-11 | End: 2021-01-11

## 2021-01-11 RX ORDER — FENTANYL CITRATE 50 UG/ML
INJECTION, SOLUTION INTRAMUSCULAR; INTRAVENOUS CODE/TRAUMA/SEDATION MEDICATION
Status: COMPLETED | OUTPATIENT
Start: 2021-01-11 | End: 2021-01-11

## 2021-01-11 RX ORDER — SODIUM CHLORIDE 9 MG/ML
50 INJECTION, SOLUTION INTRAVENOUS CONTINUOUS
Status: DISPENSED | OUTPATIENT
Start: 2021-01-11 | End: 2021-01-11

## 2021-01-11 RX ORDER — HEPARIN SODIUM 1000 [USP'U]/ML
INJECTION, SOLUTION INTRAVENOUS; SUBCUTANEOUS CODE/TRAUMA/SEDATION MEDICATION
Status: COMPLETED | OUTPATIENT
Start: 2021-01-11 | End: 2021-01-11

## 2021-01-11 RX ORDER — NITROGLYCERIN 20 MG/100ML
INJECTION INTRAVENOUS CODE/TRAUMA/SEDATION MEDICATION
Status: COMPLETED | OUTPATIENT
Start: 2021-01-11 | End: 2021-01-11

## 2021-01-11 RX ORDER — ASPIRIN 81 MG/1
TABLET, CHEWABLE ORAL CODE/TRAUMA/SEDATION MEDICATION
Status: COMPLETED | OUTPATIENT
Start: 2021-01-11 | End: 2021-01-11

## 2021-01-11 RX ADMIN — HEPARIN SODIUM 5000 UNITS: 5000 INJECTION INTRAVENOUS; SUBCUTANEOUS at 15:01

## 2021-01-11 RX ADMIN — HEPARIN SODIUM 5000 UNITS: 5000 INJECTION INTRAVENOUS; SUBCUTANEOUS at 05:22

## 2021-01-11 RX ADMIN — CARVEDILOL 12.5 MG: 12.5 TABLET, FILM COATED ORAL at 08:39

## 2021-01-11 RX ADMIN — HYDRALAZINE HYDROCHLORIDE 25 MG: 25 TABLET ORAL at 12:16

## 2021-01-11 RX ADMIN — QUETIAPINE FUMARATE 100 MG: 25 TABLET ORAL at 23:14

## 2021-01-11 RX ADMIN — HEPARIN SODIUM 5000 UNITS: 5000 INJECTION INTRAVENOUS; SUBCUTANEOUS at 23:14

## 2021-01-11 RX ADMIN — IOHEXOL 20 ML: 350 INJECTION, SOLUTION INTRAVENOUS at 10:05

## 2021-01-11 RX ADMIN — MIDAZOLAM 1 MG: 1 INJECTION INTRAMUSCULAR; INTRAVENOUS at 09:51

## 2021-01-11 RX ADMIN — ISOSORBIDE DINITRATE 10 MG: 10 TABLET ORAL at 12:16

## 2021-01-11 RX ADMIN — FENTANYL CITRATE 50 MCG: 50 INJECTION, SOLUTION INTRAMUSCULAR; INTRAVENOUS at 09:51

## 2021-01-11 RX ADMIN — ASPIRIN 81 MG CHEWABLE TABLET 324 MG: 81 TABLET CHEWABLE at 09:55

## 2021-01-11 RX ADMIN — POTASSIUM CHLORIDE 20 MEQ: 1500 TABLET, EXTENDED RELEASE ORAL at 17:03

## 2021-01-11 RX ADMIN — HYDRALAZINE HYDROCHLORIDE 25 MG: 25 TABLET ORAL at 08:39

## 2021-01-11 RX ADMIN — ISOSORBIDE DINITRATE 10 MG: 10 TABLET ORAL at 08:39

## 2021-01-11 RX ADMIN — SODIUM CHLORIDE 50 ML/HR: 0.9 INJECTION, SOLUTION INTRAVENOUS at 10:27

## 2021-01-11 RX ADMIN — POTASSIUM CHLORIDE 20 MEQ: 1500 TABLET, EXTENDED RELEASE ORAL at 08:39

## 2021-01-11 RX ADMIN — HEPARIN SODIUM 4000 UNITS: 1000 INJECTION INTRAVENOUS; SUBCUTANEOUS at 09:57

## 2021-01-11 RX ADMIN — HYDRALAZINE HYDROCHLORIDE 25 MG: 25 TABLET ORAL at 17:03

## 2021-01-11 RX ADMIN — Medication 1 PATCH: at 08:40

## 2021-01-11 RX ADMIN — VERAPAMIL HYDROCHLORIDE 2.5 MG: 2.5 INJECTION, SOLUTION INTRAVENOUS at 09:57

## 2021-01-11 RX ADMIN — SODIUM CHLORIDE 50 ML/HR: 0.9 INJECTION, SOLUTION INTRAVENOUS at 08:48

## 2021-01-11 RX ADMIN — FENTANYL CITRATE 50 MCG: 50 INJECTION, SOLUTION INTRAMUSCULAR; INTRAVENOUS at 10:01

## 2021-01-11 RX ADMIN — PAROXETINE 60 MG: 20 TABLET, FILM COATED ORAL at 08:39

## 2021-01-11 RX ADMIN — ISOSORBIDE DINITRATE 10 MG: 10 TABLET ORAL at 17:03

## 2021-01-11 RX ADMIN — ATORVASTATIN CALCIUM 40 MG: 40 TABLET, FILM COATED ORAL at 17:03

## 2021-01-11 RX ADMIN — NITROGLYCERIN 200 MCG: 20 INJECTION INTRAVENOUS at 09:57

## 2021-01-11 RX ADMIN — CARVEDILOL 12.5 MG: 12.5 TABLET, FILM COATED ORAL at 17:03

## 2021-01-11 RX ADMIN — MIDAZOLAM 1 MG: 1 INJECTION INTRAMUSCULAR; INTRAVENOUS at 10:01

## 2021-01-11 NOTE — PLAN OF CARE
Problem: Potential for Falls  Goal: Patient will remain free of falls  Description: INTERVENTIONS:  - Assess patient frequently for physical needs  -  Identify cognitive and physical deficits and behaviors that affect risk of falls  -  [place] fall precautions as indicated by assessment   - Educate patient/family on patient safety including physical limitations  - Instruct patient to call for assistance with activity based on assessment  - Modify environment to reduce risk of injury  - Consider OT/PT consult to assist with strengthening/mobility  Outcome: Progressing     Problem: PAIN - ADULT  Goal: Verbalizes/displays adequate comfort level or baseline comfort level  Description: Interventions:  - Encourage patient to monitor pain and request assistance  - Assess pain using appropriate pain scale  - Administer analgesics based on type and severity of pain and evaluate response  - Implement non-pharmacological measures as appropriate and evaluate response  - Consider cultural and social influences on pain and pain management  - Notify physician/advanced practitioner if interventions unsuccessful or patient reports new pain  Outcome: Progressing     Problem: SAFETY ADULT  Goal: Patient will remain free of falls  Description: INTERVENTIONS:  - Assess patient frequently for physical needs  -  Identify cognitive and physical deficits and behaviors that affect risk of falls    -  [place] fall precautions as indicated by assessment   - Educate patient/family on patient safety including physical limitations  - Instruct patient to call for assistance with activity based on assessment  - Modify environment to reduce risk of injury  - Consider OT/PT consult to assist with strengthening/mobility  Outcome: Progressing  Goal: Maintain or return to baseline ADL function  Description: INTERVENTIONS:  -  Assess patient's ability to carry out ADLs; assess patient's baseline for ADL function and identify physical deficits which impact ability to perform ADLs (bathing, care of mouth/teeth, toileting, grooming, dressing, etc )  - Assess/evaluate cause of self-care deficits   - Assess range of motion  - Assess patient's mobility; develop plan if impaired  - Assess patient's need for assistive devices and provide as appropriate  - Encourage maximum independence but intervene and supervise when necessary  - Involve family in performance of ADLs  - Assess for home care needs following discharge   - Consider OT consult to assist with ADL evaluation and planning for discharge  - Provide patient education as appropriate  Outcome: Progressing  Goal: Maintain or return mobility status to optimal level  Description: INTERVENTIONS:  - Assess patient's baseline mobility status (ambulation, transfers, stairs, etc )    - Identify cognitive and physical deficits and behaviors that affect mobility  - Identify mobility aids required to assist with transfers and/or ambulation (gait belt, sit-to-stand, lift, walker, cane, etc )  - [place] fall precautions as indicated by assessment  - Record patient progress and toleration of activity level on Mobility SBAR; progress patient to next Phase/Stage  - Instruct patient to call for assistance with activity based on assessment  - Consider rehabilitation consult to assist with strengthening/weightbearing, etc   Outcome: Progressing     Problem: DISCHARGE PLANNING  Goal: Discharge to home or other facility with appropriate resources  Description: INTERVENTIONS:  - Identify barriers to discharge w/patient and caregiver  - Arrange for needed discharge resources and transportation as appropriate  - Identify discharge learning needs (meds, wound care, etc )  - Arrange for interpretive services to assist at discharge as needed  - Refer to Case Management Department for coordinating discharge planning if the patient needs post-hospital services based on physician/advanced practitioner order or complex needs related to functional status, cognitive ability, or social support system  Outcome: Progressing     Problem: Knowledge Deficit  Goal: Patient/family/caregiver demonstrates understanding of disease process, treatment plan, medications, and discharge instructions  Description: Complete learning assessment and assess knowledge base    Interventions:  - Provide teaching at level of understanding  - Provide teaching via preferred learning methods  Outcome: Progressing     Problem: CARDIOVASCULAR - ADULT  Goal: Maintains optimal cardiac output and hemodynamic stability  Description: INTERVENTIONS:  - Monitor I/O, vital signs and rhythm  - Monitor for S/S and trends of decreased cardiac output  - Administer and titrate ordered vasoactive medications to optimize hemodynamic stability  - Assess quality of pulses, skin color and temperature  - Assess for signs of decreased coronary artery perfusion  - Instruct patient to report change in severity of symptoms  Outcome: Progressing  Goal: Absence of cardiac dysrhythmias or at baseline rhythm  Description: INTERVENTIONS:  - Continuous cardiac monitoring, vital signs, obtain 12 lead EKG if ordered  - Administer antiarrhythmic and heart rate control medications as ordered  - Monitor electrolytes and administer replacement therapy as ordered  Outcome: Progressing

## 2021-01-11 NOTE — PROGRESS NOTES
Progress Note - Fidel Like 1970, 44 y o  female MRN: [id number]    Unit/Bed#: E4 -01 Encounter: [id number]    Primary Care Provider: No primary care provider on file  Date and time admitted to hospital: 1/6/2021  4:34 PM        * Acute respiratory failure with hypoxia (HCC)  Assessment & Plan  · Acute respiratory failure with hypoxia secondary to do CHF/bilateral pleural effusions  · Will have IR evaluate for thoracentesis given slow response with diuretics    Acute systolic congestive heart failure (HCC)  Assessment & Plan  Weight (last 2 days)     Date/Time   Weight    01/10/21 0600   53 6 (118 17)    01/09/21 0600   55 (121 25)          · Acute systolic CHF new diagnosis  Cardiac catheterization negative for occlusive disease  · Diuretics held today due to catheterization  · Holding ACE-inhibitor secondary to kidney injury  Continue hydralazine/isosorbide    Elevated troponin  Assessment & Plan  · Non MI elevation troponin secondary to decompensated CHF    Results from last 7 days   Lab Units 01/07/21  1202 01/07/21  0127 01/06/21  2230 01/06/21  1653   TROPONIN I ng/mL 0 09* 0 13* 0 14* 0 10*       MAXI (acute kidney injury) ([facility] [address] )  Assessment & Plan  · MAXI on CKD 3 the setting of decompensated CHF  · Cardiology and nephrology following    Monitor after catheterization    Results from last 7 days   Lab Units 01/11/21  0520 01/10/21  0519 01/09/21  1126 01/08/21  0458 01/07/21  0444 01/06/21  1653   BUN mg/dL 42* 39* 39* 33* 29* 27*   CREATININE mg/dL 1 61* 1 63* 1 55* 1 63* 1 69* 1 87*   EGFR ml/min/1 73sq m 37 36 38 36 34 30       Hypokalemia  Assessment & Plan  · Hypokalemia being repleted in the setting of diuretic    Results from last 7 days   Lab Units 01/11/21  0520 01/10/21  0519 01/09/21  1126 01/08/21  0458 01/07/21  0444 01/06/21  1653   POTASSIUM mmol/L 3 7 3 3* 3 9 3 9 2 9* 3 3*       Hypertensive urgency  Assessment & Plan  · Accelerated hypertension now improved  · Continue carvedilol and monitor with the addition of hydralazine/isosorbide    Anxiety  Assessment & Plan  · Anxiety mood stable on quetiapine and paroxetine      VTE Pharmacologic Prophylaxis:   Moderate Risk (Score 3-4) - Pharmacological DVT Prophylaxis Ordered: Heparin  Patient Centered Rounds: I have performed bedside rounds with nursing staff today  Discussions with Specialists or Other Care Team Provider:  Case management    Education and Discussions with Family / Patient:     Time Spent for Care: 25 mins  More than 50% of total time spent on counseling and coordination of care as described above  Current Length of Stay: 5 day(s)  Current Patient Status: Inpatient   Certification Statement: The patient will continue to require additional inpatient hospital stay due to CHF  Discharge Plan / Estimated Discharge Date: Anticipate discharge in 48-72 hrs to home  Code Status: Level 1 - Full Code      Subjective:   Patient seen and examined  Less short of breath  When for catheterization today  No chest pain  Objective:   Vitals: Blood pressure 151/95, pulse 71, temperature 98 7 °F (37 1 °C), temperature source Temporal, resp  rate 18, height 5' 6" (1 676 m), weight 53 6 kg (118 lb 2 7 oz), SpO2 93 %  Physical Exam  Vitals signs reviewed  Constitutional:       General: She is not in acute distress  Appearance: Normal appearance  HENT:      Head: Atraumatic  Eyes:      General: No scleral icterus  Cardiovascular:      Rate and Rhythm: Regular rhythm  Heart sounds: Normal heart sounds  Pulmonary:      Breath sounds: Decreased breath sounds present  No wheezing  Abdominal:      General: Bowel sounds are normal       Palpations: Abdomen is soft  Tenderness: There is no guarding or rebound  Musculoskeletal:         General: Swelling present  Skin:     General: Skin is warm  Neurological:      Mental Status: She is alert and oriented to person, place, and time   Mental status is at baseline  Psychiatric:         Mood and Affect: Mood normal        Additional Data:   Labs:  Results from last 7 days   Lab Units 01/11/21  0520 01/07/21  1202 01/06/21  1653   WBC Thousand/uL 7 88 10 42* 10 78*   HEMOGLOBIN g/dL 12 3 13 0 13 3   HEMATOCRIT % 37 2 38 9 39 8   MCV fL 98 99* 97   PLATELETS Thousands/uL 252 328 361   INR   --   --  0 95     Results from last 7 days   Lab Units 01/11/21  0520 01/10/21  0519 01/09/21  1126 01/08/21  0458 01/07/21  0444 01/06/21  1653   SODIUM mmol/L 140 141 139 144 141 142   POTASSIUM mmol/L 3 7 3 3* 3 9 3 9 2 9* 3 3*   CHLORIDE mmol/L 106 104 103 109* 107 106   CO2 mmol/L 27 32 33* 27 22 28   ANION GAP mmol/L 7 5 3* 8 12 8   BUN mg/dL 42* 39* 39* 33* 29* 27*   CREATININE mg/dL 1 61* 1 63* 1 55* 1 63* 1 69* 1 87*   CALCIUM mg/dL 8 1* 8 0* 8 2* 8 1* 7 9* 8 1*   ALBUMIN g/dL  --   --   --   --  1 9* 2 3*   TOTAL BILIRUBIN mg/dL  --   --   --   --  0 30 0 31   ALK PHOS U/L  --   --   --   --  86 102   ALT U/L  --   --   --   --  28 33   AST U/L  --   --   --   --  24 27   EGFR ml/min/1 73sq m 37 36 38 36 34 30   GLUCOSE RANDOM mg/dL 86 93 97 101 97 101     Results from last 7 days   Lab Units 01/07/21  0444   MAGNESIUM mg/dL 2 2     Results from last 7 days   Lab Units 01/07/21  1202 01/07/21  0127 01/06/21  2230   TROPONIN I ng/mL 0 09* 0 13* 0 14*     Results from last 7 days   Lab Units 01/06/21  1653   NT-PRO BNP pg/mL 96,433*              Results from last 7 days   Lab Units 01/07/21  0444   HEMOGLOBIN A1C % 5 3     Results from last 7 days   Lab Units 01/09/21  0718   TSH 3RD GENERATON uIU/mL 1 624     * I Have Reviewed All Lab Data Listed Above      Cultures:   Results from last 7 days   Lab Units 01/06/21  1712   INFLUENZA A PCR  Negative       Results from last 7 days   Lab Units 01/06/21  1712   SARS-COV-2  Negative   INFLUENZA A PCR  Negative   INFLUENZA B PCR  Negative   RSV PCR  Negative           Lines/Drains:  Invasive Devices     Peripheral Intravenous Line Peripheral IV 01/08/21 Left;Ventral (anterior) Forearm 3 days              Telemetry:   Telemetry Orders (From admission, onward)             48 Hour Telemetry Monitoring  Continuous x 48 hours     Question:  Reason for 48 Hour Telemetry  Answer:  Acute Decompensated CHF (continuous diuretic infusion or total diuretic dose > 200 mg daily, associated electrolyte derangement, ionotropic drip, history of ventricular arrhythmia, or new EF <35%)                  Imaging:  Imaging Reports Reviewed Today Include:   Xr Chest Portable    Result Date: 1/7/2021  Impression: Interstitial edema with effusions and atelectasis, corresponding with the chest CT one day earlier  Workstation performed: [id number]     Xr Chest Pa & Lateral    Result Date: 1/10/2021  Impression: Improved vascular clarity with persistent bilateral pleural effusions and left greater than right basilar atelectasis  Workstation performed: [id number]     Xr Ankle 3+ Vw Right    Result Date: 1/11/2021  Impression: Unchanged alignment at the distal fibular fracture with findings of ongoing healing/remodeling  Workstation performed: [id number]     Cta Ed Chest Pe Study    Result Date: 1/6/2021  Impression: No pulmonary embolus  Bilateral moderate pleural effusions  Posterior bibasilar atelectasis/infiltrates   Questionable hilar and subcarinal lymphadenopathy limited by lack of IV contrast  Workstation performed: [id number]     Scheduled Meds:  Current Facility-Administered Medications   Medication Dose Route Frequency Provider Last Rate    acetaminophen  650 mg Oral Q4H PRN Daina Pradhan PA-C      atorvastatin  40 mg Oral Daily With [employer], DO      carvedilol  12 5 mg Oral BID With Meals Rujul Trejo, DO      heparin (porcine)  5,000 Units Subcutaneous Q8H [facility] Sugey Soliz PA-C      hydrALAZINE  25 mg Oral TID after meals Rujul Trejo, DO      isosorbide dinitrate  10 mg Oral TID after meals Shannon Bazzi PA-C      Labetalol HCl  10 mg Intravenous Q6H PRN Viola Page MD      nicotine  1 patch Transdermal Daily Sugey Soliz PA-C      ondansetron  4 mg Intravenous Q6H PRN Rosmery Agosto PA-C      PARoxetine  60 mg Oral Daily Sugey Soliz PA-C      potassium chloride  20 mEq Oral BID With Meals Jose,       promethazine  12 5 mg Intravenous Q6H PRN Jose,       QUEtiapine  100 mg Oral HS ALISHA Garcia DO  [facility] Internal Medicine  Hospitalist    ** Please Note: This note has been constructed using a voice recognition system   ** Midline

## 2021-01-11 NOTE — ASSESSMENT & PLAN NOTE
Weight (last 2 days)     Date/Time   Weight    01/10/21 0600   53 6 (118 17)    01/09/21 0600   55 (121 25)          · Acute systolic CHF new diagnosis  Cardiac catheterization negative for occlusive disease  · Diuretics held today due to catheterization  · Holding ACE-inhibitor secondary to kidney injury    Continue hydralazine/isosorbide

## 2021-01-11 NOTE — ASSESSMENT & PLAN NOTE
· Non MI elevation troponin secondary to decompensated CHF    Results from last 7 days   Lab Units 01/07/21  1202 01/07/21  0127 01/06/21  2230 01/06/21  1653   TROPONIN I ng/mL 0 09* 0 13* 0 14* 0 10*

## 2021-01-11 NOTE — CONSULTS
Orthopaedic Surgery - Consultation  Arleen Tyson (16 y o  female)   : 1970   MRN: [id number]  Date: 2021   Encounter: [id number]   Unit/Bed#: E4 -01    Consulting Physician:  Wendy Lambert PA-C  Requesting Physician: James Dias DO  Reason for Consult / Principal Problem:   Right ankle distal fibula fracture sustained 2020    Assessment / Plan  Right ankle distal fibula fracture    ·   X-rays taken today of the right ankle show no displacement of the fracture with some callus formation noted  ·   Continue with cam walker boot while ambulating or weight-bearing  Patient may remove boot while  Nonweightbearing  ·   Continue range-of-motion exercises for the right ankle as tolerated  ·   Weightbearing as tolerated in the boot  ·   Follow-up outpatient with Dr Tameka Crabtree in 2 weeks for repeat x-ray and re-evaluation  History of Present Illness  Arleen Tyson is a 44 y o  female who presents  [place] secondary to shortness of breath currently undergoing treatment for pleural effusions, MAXI, hypokalemia and hypertension with planned cardiac catheterization today  She did suffer a right ankle fracture on 2020  That was being treated conservatively with a CAM walker boot conservatively  At this time she states she has no pain in the right ankle  She has been using the walker boot for transfers and feels that she tolerates moving well  She also has been out of the Cam walker boot while in bed and doing some range-of-motion exercise  She denies any distal numbness or tingling at this time      Review of Systems  Shortness of breath: yes    Medical, Surgical, Family, and Social History    Past Medical History:   Diagnosis Date    Anxiety     Depression     Osteoarthritis        Past Surgical History:   Procedure Laterality Date    ECTOPIC PREGNANCY SURGERY      HI TOTAL HIP ARTHROPLASTY Left 3/5/2018    Procedure: ARTHROPLASTY HIP TOTAL; Surgeon: Luis Enrique Garcia DO;  Location: [facility];  Service: Orthopedics    WISDOM TOOTH EXTRACTION      x1       Family History   Problem Relation Age of Onset    Cancer Family     Cancer Father     Atrial fibrillation Father     Hypertension Father        Social History     Occupational History    Not on file   Tobacco Use    Smoking status: Heavy Tobacco Smoker     Packs/day: 0 50     Years: 20 00     Pack years: 10 00     Types: Cigarettes    Smokeless tobacco: Never Used    Tobacco comment: Working on Reducing for surgery      Substance and Sexual Activity    Alcohol use: Yes     Frequency: Monthly or less     Drinks per session: 1 or 2     Comment: social drinker    Drug use: No    Sexual activity: Not Currently       No Known Allergies    Current Facility-Administered Medications   Medication Dose Route Frequency    acetaminophen (TYLENOL) tablet 650 mg  650 mg Oral Q4H PRN    atorvastatin (LIPITOR) tablet 40 mg  40 mg Oral Daily With Dinner    carvedilol (COREG) tablet 12 5 mg  12 5 mg Oral BID With Meals    heparin (porcine) subcutaneous injection 5,000 Units  5,000 Units Subcutaneous Q8H [address]    hydrALAZINE (APRESOLINE) tablet 25 mg  25 mg Oral TID after meals    isosorbide dinitrate (ISORDIL) tablet 10 mg  10 mg Oral TID after meals    Labetalol HCl (NORMODYNE) injection 10 mg  10 mg Intravenous Q6H PRN    nicotine (NICODERM CQ) 14 mg/24hr TD 24 hr patch 1 patch  1 patch Transdermal Daily    ondansetron (ZOFRAN) injection 4 mg  4 mg Intravenous Q6H PRN    PARoxetine (PAXIL) tablet 60 mg  60 mg Oral Daily    potassium chloride (K-DUR,KLOR-CON) CR tablet 20 mEq  20 mEq Oral BID With Meals    promethazine (PHENERGAN) injection 12 5 mg  12 5 mg Intravenous Q6H PRN    QUEtiapine (SEROquel) tablet 100 mg  100 mg Oral HS    sodium chloride 0 9 % infusion  50 mL/hr Intravenous Continuous     Medications Prior to Admission   Medication    Glucos-Chondroit-Hyaluron-MSM (GLUCOSAMINE CHONDROITIN JOINT PO)    methylphenidate (RITALIN) 20 MG tablet    PARoxetine (PAXIL) 30 mg tablet    QUEtiapine (SEROquel) 100 mg tablet       Vitals  Temp:  [97 °F (36 1 °C)-98 7 °F (37 1 °C)] 98 7 °F (37 1 °C)  HR:  [66-84] 80  Resp:  [18-19] 18  BP: (121-156)/() 156/107  Body mass index is 19 07 kg/m²  I/O last 24 hours: In: 1040 8 [P O :960; I V :80 8]  Out: 1800 [Urine:1800]    Physical Exam  General:  Alert & oriented x3, no distress, appears stated age  [de-identified]:  EOMI, eyes clear, hearing intact, mucous membranes moist  Neck:  Supple, non-tender, trachea midline, no lymphadenopathy  Cardiovascular:  Regular rate, no discernable arrhythmia  Pulmonary:  Regular rate, respirations non-labored   Abdominal:  Soft, non-tender    Right Foot & Ankle Exam  Alignment:  Normal ankle alignment  Inspection:  No swelling  No edema  No ecchymosis  No deformity  Palpation:  No tenderness  ROM:  Normal ankle ROM  Strength:  5/5 AT, GSC, PT, and peroneals  Stability:  No objective ankle instablity  (-) Anterior  neutral and PF  (-) Talar Tilt  Tests:  No pertinent positive or negative tests  Neurovascular:  Sensation intact in DP/SP/Stuart/Sa/T nerve distributions  Sensation intact in all digital nerve distributions  Toes warm and perfused  Gait:  Not tested  Imaging  I have personally reviewed pertinent films in PACS  XR of right ankle - From 01/11/2021 show no change in position of fracture from previous x-rays  Mild callus formation is noted      Lab Results  (I have personally reviewed pertinent lab results )  Results from last 7 days   Lab Units 01/11/21  0520 01/07/21  1202 01/06/21  1653   WBC Thousand/uL 7 88 10 42* 10 78*   HEMOGLOBIN g/dL 12 3 13 0 13 3   HEMATOCRIT % 37 2 38 9 39 8   PLATELETS Thousands/uL 252 328 361   RBC Million/uL 3 80* 3 95 4 09   MCV fL 98 99* 97   MCH pg 32 4 32 9 32 5   MCHC g/dL 33 1 33 4 33 4   RDW % 13 6 13 7 13 6   MPV fL 10 4 9 9 9 7   NRBC AUTO /100 WBCs 0 --  0     Results from last 7 days   Lab Units 01/06/21  1653   PTT seconds 32   INR  0 95     Results from last 7 days   Lab Units 01/11/21  0520 01/10/21  0519 01/09/21  1126 01/08/21  0458 01/07/21  0444 01/06/21  1653   POTASSIUM mmol/L 3 7 3 3* 3 9 3 9 2 9* 3 3*   CHLORIDE mmol/L 106 104 103 109* 107 106   CO2 mmol/L 27 32 33* 27 22 28   BUN mg/dL 42* 39* 39* 33* 29* 27*   CREATININE mg/dL 1 61* 1 63* 1 55* 1 63* 1 69* 1 87*   EGFR ml/min/1 73sq m 37 36 38 36 34 30   CALCIUM mg/dL 8 1* 8 0* 8 2* 8 1* 7 9* 8 1*   ALT U/L  --   --   --   --  28 33   AST U/L  --   --   --   --  24 27                 Code Status  Level 1 - Full Code    Counseling / Coordination of Care  Total floor / unit time spent today 20 minutes  Greater than 50% of total time was spent with the patient and / or family counseling and / or coordination of care      Carlo Carlos PA-C

## 2021-01-11 NOTE — ASSESSMENT & PLAN NOTE
· Acute respiratory failure with hypoxia secondary to do CHF/bilateral pleural effusions  · Will have IR evaluate for thoracentesis given slow response with diuretics

## 2021-01-11 NOTE — PROGRESS NOTES
NEPHROLOGY PROGRESS NOTE   Zakia Duran 44 y o  female MRN: [id number]  Unit/Bed#: E4 -01 Encounter: [id number]      ASSESSMENT & PLAN     MAXI POA with with hematuria and proteinuria  o Baseline creatinine 0 7-1 0 from 2018  o Admission creatinine 1 8  o Urine microscopy shows 2-4 RBCs, WBCs with 2 pressor protein, persistent microscopic hematuria along with proteinuria and 2008  o No renal imaging  o Likely an element of cardiorenal syndrome and fluid overload but likely has an underlying glomerular nephritis with hematuria and proteinuria  o Found to have 9 9 g  o Renal function is trending downward  o Serologic workup improving  o C3 97 5 C4 29  o ANCA pending  o Anti-double-stranded DNA pending  o KAYLA negative  o Urine protein to creatinine ratio 9 98 g from January 9, will repeat this and also do a 24 hour urine protein to creatinine ratio  o Rheumatoid factor is elevated at 20  o Hepatitis panel negative  o IV normal saline 4 hours post cardiac catheterization  o If acutely short of breath can give a dose of Lasix today     Fluid overload with acute systolic and diastolic congestive heart failure  o EF of 25%  o Carvedilol hydralazine, isosorbide  o Diuresed per Cardiology     Hypertension  o Continue to hold ACE-inhibitor/are  o Continue hydralazine Imdur and carvedilol per Cardiology and monitor blood pressure avoid hypotension    SUBJECTIVE:    Patient was seen post cardiac catheterization  Urinating well  No acute complaints    OBJECTIVE:  Current Weight: Weight - Scale: 53 6 kg (118 lb 2 7 oz)  Vitals:    01/11/21 1300   BP: (!) 136/103   Pulse: 74   Resp:    Temp:    SpO2:        Intake/Output Summary (Last 24 hours) at 1/11/2021 1417  Last data filed at 1/11/2021 1025  Gross per 24 hour   Intake 560 83 ml   Output 500 ml   Net 60 83 ml     Weight (last 2 days)     Date/Time   Weight    01/10/21 0600   53 6 (118 17)    01/09/21 0600   55 (121 25)              General: conscious, cooperative, in not acute distress  Eyes: conjunctivae pink, anicteric sclerae  ENT: lips and mucous membranes moist  Neck: supple, no JVD  Chest:  No respiratory distress, no accessory muscle use  CVS: distinct S1 & S2, normal rate, regular rhythm  Abdomen: soft, non-tender, non-distended, normoactive bowel sounds  Extremities: no edema of both legs  Skin: no rash  Neuro: awake, alert, oriented      Medications:    Current Facility-Administered Medications:     acetaminophen (TYLENOL) tablet 650 mg, 650 mg, Oral, Q4H PRN, Catarino Quiñones PA-C    atorvastatin (LIPITOR) tablet 40 mg, 40 mg, Oral, Daily With Samir Juan J, DO, 40 mg at 01/10/21 1617    carvedilol (COREG) tablet 12 5 mg, 12 5 mg, Oral, BID With Meals, Rujul Trejo, DO, 12 5 mg at 01/11/21 0839    heparin (porcine) subcutaneous injection 5,000 Units, 5,000 Units, Subcutaneous, Q8H [facility], Sugey Soliz PA-C, 5,000 Units at 01/11/21 0522    hydrALAZINE (APRESOLINE) tablet 25 mg, 25 mg, Oral, TID after meals, Rujul Trejo, DO, 25 mg at 01/11/21 1216    isosorbide dinitrate (ISORDIL) tablet 10 mg, 10 mg, Oral, TID after meals, Imtiaz Bazzi PA-C, 10 mg at 01/11/21 1216    Labetalol HCl (NORMODYNE) injection 10 mg, 10 mg, Intravenous, Q6H PRN, Debbie Vergara MD, Stopped at 01/07/21 1214    nicotine (NICODERM CQ) 14 mg/24hr TD 24 hr patch 1 patch, 1 patch, Transdermal, Daily, Sugey Soliz PA-C, 1 patch at 01/11/21 0840    ondansetron (ZOFRAN) injection 4 mg, 4 mg, Intravenous, Q6H PRN, Sugey Soliz PA-C, 4 mg at 01/07/21 1546    PARoxetine (PAXIL) tablet 60 mg, 60 mg, Oral, Daily, Sugey Soliz PA-C, 60 mg at 01/11/21 0839    potassium chloride (K-DUR,KLOR-CON) CR tablet 20 mEq, 20 mEq, Oral, BID With Meals, [facility], 20 mEq at 01/11/21 0839    promethazine (PHENERGAN) injection 12 5 mg, 12 5 mg, Intravenous, Q6H PRN, [facility]    QUEtiapine (SEROquel) tablet 100 mg, 100 mg, Oral, HS, Sugey Soliz PA-C, 100 mg at 01/10/21 2151       Lab Results:   Results from last 7 days   Lab Units 01/11/21  0520 01/10/21  0519 01/09/21  1126 01/08/21  0458 01/07/21  1202 01/07/21  0444 01/06/21  2107 01/06/21  1653   WBC Thousand/uL 7 88  --   --   --  10 42*  --   --  10 78*   HEMOGLOBIN g/dL 12 3  --   --   --  13 0  --   --  13 3   HEMATOCRIT % 37 2  --   --   --  38 9  --   --  39 8   PLATELETS Thousands/uL 252  --   --   --  328  --   --  361   POTASSIUM mmol/L 3 7 3 3* 3 9 3 9  --  2 9*  --  3 3*   CHLORIDE mmol/L 106 104 103 109*  --  107  --  106   CO2 mmol/L 27 32 33* 27  --  22  --  28   BUN mg/dL 42* 39* 39* 33*  --  29*  --  27*   CREATININE mg/dL 1 61* 1 63* 1 55* 1 63*  --  1 69*  --  1 87*   CALCIUM mg/dL 8 1* 8 0* 8 2* 8 1*  --  7 9*  --  8 1*   MAGNESIUM mg/dL  --   --   --   --   --  2 2  --   --    ALK PHOS U/L  --   --   --   --   --  86  --  102   ALT U/L  --   --   --   --   --  28  --  33   AST U/L  --   --   --   --   --  24  --  27   LEUKOCYTES UA   --   --   --   --   --   --  Negative  --    BLOOD UA   --   --   --   --   --   --  Small*  --        Portions of the record may have been created with voice recognition software  Occasional wrong word or "sound a like" substitutions may have occurred due to the inherent limitations of voice recognition software  Read the chart carefully and recognize, using context, where substitutions have occurred  If you have any questions, please contact the dictating provider

## 2021-01-11 NOTE — UTILIZATION REVIEW
Continued Stay Review    Date: 1/11/2021                    Current Patient Class: IP  Current Level of Care: [facility]    HPI:50 y o  female initially admitted on  1/6  With acute respiratory failure with hypoxia, acute CHF, B/L pleural effusion, hypertensive urgency, elevated troponin, MAXI and hypokalemia:  Downgraded to [facility] 1/08    Assessment/Plan:   1/10:  Down 9 lb since admission and has put out 6+ L  Renal function, electrolytes are stable  I down to 3 L today  Hold  IV Lasix this afternoon and tomorrow ( Lasix 40 IV bid)  O2 decreased from 4 L to 3 L NC  Nonsustained VT noted on telemetry, 10+ beats, patient agreeable for LifeVest   If EF remains < 35% after 3 months would send for ICD  Plan for cardiac catheterization tomorrow    1/11 Remains on O2 @ 3 L nc  + cough this am  Cardio follopwing for diuresis and plan is for cardiac cath today  If no substantial improvement with ongoing diuresis, could consider bilateral thoracentesis in Interventional Radiology    Cardiac Cath: CORONARY CIRCULATION:  Left main: Normal   Circumflex: Normal   HEMODYNAMICS:  Hemodynamic assessment demonstrated mildly elevated LVEDP  Per Nephrology: seen post cath - IV NSS x 4 hrs  If acutely short of breath can give a dose of Lasix today  Continue to hold ACE-inhibitor/are  Continue hydralazine Imdur and carvedilol per Cardiology and monitor blood pressure avoid hypotension     Pertinent Labs/Diagnostic Results:     1/11  R Ankle Xray:  Unchanged alignment at the distal fibular fracture with findings of ongoing healing/remodeling      1/10 CXR: Improved vascular clarity with persistent bilateral pleural effusions and left greater than right basilar atelectasis      Results from last 7 days   Lab Units 01/06/21  1712   SARS-COV-2  Negative     Results from last 7 days   Lab Units 01/11/21  0520 01/07/21  1202 01/06/21  1653   WBC Thousand/uL 7 88 10 42* 10 78*   HEMOGLOBIN g/dL 12 3 13 0 13 3   HEMATOCRIT % 37 2 38 9 39 8 PLATELETS Thousands/uL 252 328 361   NEUTROS ABS Thousands/µL 4 80  --  6 54     Results from last 7 days   Lab Units 01/11/21  0520 01/10/21  0519 01/09/21  1126 01/08/21  0458 01/07/21  0444   SODIUM mmol/L 140 141 139 144 141   POTASSIUM mmol/L 3 7 3 3* 3 9 3 9 2 9*   CHLORIDE mmol/L 106 104 103 109* 107   CO2 mmol/L 27 32 33* 27 22   ANION GAP mmol/L 7 5 3* 8 12   BUN mg/dL 42* 39* 39* 33* 29*   CREATININE mg/dL 1 61* 1 63* 1 55* 1 63* 1 69*   EGFR ml/min/1 73sq m 37 36 38 36 34   CALCIUM mg/dL 8 1* 8 0* 8 2* 8 1* 7 9*   MAGNESIUM mg/dL  --   --   --   --  2 2     Results from last 7 days   Lab Units 01/07/21  0444 01/06/21  1653   AST U/L 24 27   ALT U/L 28 33   ALK PHOS U/L 86 102   TOTAL PROTEIN g/dL 5 3* 6 4   ALBUMIN g/dL 1 9* 2 3*   TOTAL BILIRUBIN mg/dL 0 30 0 31     Results from last 7 days   Lab Units 01/11/21  0520 01/10/21  0519 01/09/21  1126 01/08/21  0458 01/07/21  0444 01/06/21  1653   GLUCOSE RANDOM mg/dL 86 93 97 101 97 101     Results from last 7 days   Lab Units 01/07/21  0444   HEMOGLOBIN A1C % 5 3   EAG mg/dl 105     Results from last 7 days   Lab Units 01/07/21  1202 01/07/21  0127 01/06/21  2230 01/06/21  1653   TROPONIN I ng/mL 0 09* 0 13* 0 14* 0 10*     Results from last 7 days   Lab Units 01/06/21  1653   PROTIME seconds 12 5   INR  0 95   PTT seconds 32     Results from last 7 days   Lab Units 01/09/21  0718   TSH 3RD GENERATON uIU/mL 1 624     Results from last 7 days   Lab Units 01/06/21  1653   NT-PRO BNP pg/mL 96,433*     Results from last 7 days   Lab Units 01/09/21  0718   FERRITIN ng/mL 89     Results from last 7 days   Lab Units 01/09/21  1126   HEP B S AG  Non-reactive   HEP C AB  Non-reactive   HEP B C IGM  Non-reactive   HEP B C TOTAL AB  Non-reactive     Results from last 7 days   Lab Units 01/09/21  1739 01/06/21  2107   CLARITY UA   --  Clear   COLOR UA   --  Yellow   SPEC GRAV UA   --  1 025   PH UA   --  7 0   GLUCOSE UA mg/dl  --  Negative   KETONES UA mg/dl  -- Negative   BLOOD UA   --  Small*   PROTEIN UA mg/dl  --  100 (2+)*   NITRITE UA   --  Negative   BILIRUBIN UA   --  Negative   UROBILINOGEN UA E U /dl  --  0 2   LEUKOCYTES UA   --  Negative   WBC UA /hpf  --  2-4   RBC UA /hpf  --  2-4   BACTERIA UA /hpf  --  Occasional   EPITHELIAL CELLS WET PREP /hpf  --  Occasional   CREATININE UR mg/dL 40 3  --    PROTEIN UR mg/dL 402  --    PROT/CREAT RATIO UR  9 98*  --      Results from last 7 days   Lab Units 01/06/21  1712   INFLUENZA A PCR  Negative   INFLUENZA B PCR  Negative   RSV PCR  Negative     Vital Signs:   01/11/21 1400 -- 70 -- 164/101Abnormal  -- -- -- --   01/11/21 1300 -- 74 -- 136/103Abnormal  -- -- -- --   01/11/21 1200 98 7 °F (37 1 °C) 80 -- 156/107Abnormal  -- -- -- --   01/11/21 1130 -- 72 18 144/105Abnormal  -- -- -- --   01/11/21 1100 -- 68 -- 143/104Abnormal  -- -- -- --   01/11/21 1045 -- 68 -- 136/94 -- -- -- --   01/11/21 1030 -- 68 -- 127/98 -- -- -- --   01/11/21 1015 -- 72 -- 121/92 93 % 3 L/min Nasal cannula --   01/11/21 0727 97 7 °F (36 5 °C) 81 18 153/109Abnormal  93 % -- Nasal cannula Lying   01/10/21 2300 97 1 °F (36 2 °C) 84 18 142/102Abnormal  97 % -- -- Lying   01/10/21 1645 97 °F (36 1 °C)  66 19 137/95 95 % 3 L/min Nasal cannula Lying   01/10/21 1137 96 7 °F (35 9 °C) 73 18 131/98 95 % 3 L/min Nasal cannula Sitting   01/10/21 0926 96 6 °F (35 9 °C) 80 18 140/100 96 % 4 L/min Nasal cannula Lying       Medications:   Scheduled Medications:  atorvastatin, 40 mg, Oral, Daily With Dinner  carvedilol, 12 5 mg, Oral, BID With Meals  heparin (porcine), 5,000 Units, Subcutaneous, Q8H [facility]ATHY  hydrALAZINE, 25 mg, Oral, TID after meals  isosorbide dinitrate, 10 mg, Oral, TID after meals  nicotine, 1 patch, Transdermal, Daily  PARoxetine, 60 mg, Oral, Daily  potassium chloride, 20 mEq, Oral, BID With Meals  QUEtiapine, 100 mg, Oral, HS    Continuous IV Infusions:     PRN Meds:  acetaminophen, 650 mg, Oral, Q4H PRN  Labetalol HCl, 10 mg, Intravenous, Q6H PRN  ondansetron, 4 mg, Intravenous, Q6H PRN  promethazine, 12 5 mg, Intravenous, Q6H PRN    Discharge Plan: TBD    Network Utilization Review Department  ATTENTION: Please call with any questions or concerns to [phone] and carefully listen to the prompts so that you are directed to the right person  All voicemails are confidential   Rahul Pronto all requests for admission clinical reviews, approved or denied determinations and any other requests to dedicated fax number below belonging to the campus where the patient is receiving treatment   List of dedicated fax numbers for the Facilities:  [address] DENIALS (Administrative/Medical Necessity) [phone]   [address] (Maternity/NICU/Pediatrics) [phone]   [address] Lidia Dubois [address] ([address]) [address] [place] [address] Rafaela Tran 14[address] [place] [address] 951 [phone]

## 2021-01-11 NOTE — ASSESSMENT & PLAN NOTE
· Hypokalemia being repleted in the setting of diuretic    Results from last 7 days   Lab Units 01/11/21  0520 01/10/21  0519 01/09/21  1126 01/08/21  0458 01/07/21  0444 01/06/21  1653   POTASSIUM mmol/L 3 7 3 3* 3 9 3 9 2 9* 3 3*

## 2021-01-11 NOTE — PROGRESS NOTES
Progress Note - Pulmonary   Raegan Hoffman 44 y o  female MRN: [id number]  Unit/Bed#: E4 -01 Encounter: [id number]    Assessment:  Acute hypoxic respiratory failure  2/2 acute systolic congestive heart failure with bilateral pleural effusions  Tobacco abuse with nicotine dependency  Abnormal chest CT with pulmonary nodules  Elevated troponin    Plan:  Acute hypoxic respiratory failure secondary to acute CHF  Oxygen requirements are slowly improving currently saturating well on 3 L by nasal cannula  Titrate to maintain SpO2 greater than 88% and determine oxygen needs prior to discharge  Cardiology is following, defer diuresis per their service  Most recently done chest x-ray with improving vascular congestion  No need for thoracentesis at this time  Patient will need follow-up chest CT as an outpatient for the pulmonary nodules given her history of tobacco abuse  Smoking cessation counseling provided: I spent 3 minutes discussing with the patient concerning nicotine dependency  They are still smoking cigarettes and thinking about quitting  I offered them multiple choice in terms of cessation means and she would like to be discharged on nicotine patches  Cardiac catheterization to be performed today    Outpatient pulmonary follow-up when discharged    Subjective:   Patient denies any significant dyspnea currently  No chest pain  She is having some cough this morning with clear sputum    12 point review of systems otherwise negative  Objective:     Vitals: Blood pressure (!) 143/109, pulse 68, temperature 97 7 °F (36 5 °C), temperature source Temporal, resp  rate 18, height 5' 6" (1 676 m), weight 53 6 kg (118 lb 2 7 oz), SpO2 93 %  ,Body mass index is 19 07 kg/m²        Intake/Output Summary (Last 24 hours) at 1/11/2021 1121  Last data filed at 1/11/2021 1025  Gross per 24 hour   Intake 920 83 ml   Output 1300 ml   Net -379 17 ml       Invasive Devices     Peripheral Intravenous Line Peripheral IV 01/08/21 Left;Ventral (anterior) Forearm 3 days                 Physical Exam:   General appearance: alert and oriented, in no acute distress  Head: Normocephalic, without obvious abnormality, atraumatic  Eyes: EOMI  No discharge bilaterally  No scleral icterus  Neck: supple, symmetrical, trachea midline  Lungs:  Decreased at the bases  Heart: regular rate  Abdomen:  No appreciable distension or tenderness  Extremities: No edema   Skin: No lesions or pallor noted  No rash  Neurologic: Grossly normal     Labs: I have personally reviewed pertinent lab results  Imaging and other studies: I have personally reviewed pertinent reports

## 2021-01-11 NOTE — ASSESSMENT & PLAN NOTE
· MAXI on CKD 3 the setting of decompensated CHF  · Cardiology and nephrology following    Monitor after catheterization    Results from last 7 days   Lab Units 01/11/21  0520 01/10/21  0519 01/09/21  1126 01/08/21  0458 01/07/21  0444 01/06/21  1653   BUN mg/dL 42* 39* 39* 33* 29* 27*   CREATININE mg/dL 1 61* 1 63* 1 55* 1 63* 1 69* 1 87*   EGFR ml/min/1 73sq m 37 36 38 36 34 30

## 2021-01-11 NOTE — PLAN OF CARE
Problem: Potential for Falls  Goal: Patient will remain free of falls  Description: INTERVENTIONS:  - Assess patient frequently for physical needs  -  Identify cognitive and physical deficits and behaviors that affect risk of falls  -  [place] fall precautions as indicated by assessment   - Educate patient/family on patient safety including physical limitations  - Instruct patient to call for assistance with activity based on assessment  - Modify environment to reduce risk of injury  - Consider OT/PT consult to assist with strengthening/mobility  Outcome: Progressing     Problem: PAIN - ADULT  Goal: Verbalizes/displays adequate comfort level or baseline comfort level  Description: Interventions:  - Encourage patient to monitor pain and request assistance  - Assess pain using appropriate pain scale  - Administer analgesics based on type and severity of pain and evaluate response  - Implement non-pharmacological measures as appropriate and evaluate response  - Consider cultural and social influences on pain and pain management  - Notify physician/advanced practitioner if interventions unsuccessful or patient reports new pain  Outcome: Progressing     Problem: SAFETY ADULT  Goal: Patient will remain free of falls  Description: INTERVENTIONS:  - Assess patient frequently for physical needs  -  Identify cognitive and physical deficits and behaviors that affect risk of falls    -  [place] fall precautions as indicated by assessment   - Educate patient/family on patient safety including physical limitations  - Instruct patient to call for assistance with activity based on assessment  - Modify environment to reduce risk of injury  - Consider OT/PT consult to assist with strengthening/mobility  Outcome: Progressing  Goal: Maintain or return to baseline ADL function  Description: INTERVENTIONS:  -  Assess patient's ability to carry out ADLs; assess patient's baseline for ADL function and identify physical deficits which impact ability to perform ADLs (bathing, care of mouth/teeth, toileting, grooming, dressing, etc )  - Assess/evaluate cause of self-care deficits   - Assess range of motion  - Assess patient's mobility; develop plan if impaired  - Assess patient's need for assistive devices and provide as appropriate  - Encourage maximum independence but intervene and supervise when necessary  - Involve family in performance of ADLs  - Assess for home care needs following discharge   - Consider OT consult to assist with ADL evaluation and planning for discharge  - Provide patient education as appropriate  Outcome: Progressing  Goal: Maintain or return mobility status to optimal level  Description: INTERVENTIONS:  - Assess patient's baseline mobility status (ambulation, transfers, stairs, etc )    - Identify cognitive and physical deficits and behaviors that affect mobility  - Identify mobility aids required to assist with transfers and/or ambulation (gait belt, sit-to-stand, lift, walker, cane, etc )  - [place] fall precautions as indicated by assessment  - Record patient progress and toleration of activity level on Mobility SBAR; progress patient to next Phase/Stage  - Instruct patient to call for assistance with activity based on assessment  - Consider rehabilitation consult to assist with strengthening/weightbearing, etc   Outcome: Progressing     Problem: DISCHARGE PLANNING  Goal: Discharge to home or other facility with appropriate resources  Description: INTERVENTIONS:  - Identify barriers to discharge w/patient and caregiver  - Arrange for needed discharge resources and transportation as appropriate  - Identify discharge learning needs (meds, wound care, etc )  - Arrange for interpretive services to assist at discharge as needed  - Refer to Case Management Department for coordinating discharge planning if the patient needs post-hospital services based on physician/advanced practitioner order or complex needs related to functional status, cognitive ability, or social support system  Outcome: Progressing     Problem: Knowledge Deficit  Goal: Patient/family/caregiver demonstrates understanding of disease process, treatment plan, medications, and discharge instructions  Description: Complete learning assessment and assess knowledge base    Interventions:  - Provide teaching at level of understanding  - Provide teaching via preferred learning methods  Outcome: Progressing     Problem: CARDIOVASCULAR - ADULT  Goal: Maintains optimal cardiac output and hemodynamic stability  Description: INTERVENTIONS:  - Monitor I/O, vital signs and rhythm  - Monitor for S/S and trends of decreased cardiac output  - Administer and titrate ordered vasoactive medications to optimize hemodynamic stability  - Assess quality of pulses, skin color and temperature  - Assess for signs of decreased coronary artery perfusion  - Instruct patient to report change in severity of symptoms  Outcome: Progressing  Goal: Absence of cardiac dysrhythmias or at baseline rhythm  Description: INTERVENTIONS:  - Continuous cardiac monitoring, vital signs, obtain 12 lead EKG if ordered  - Administer antiarrhythmic and heart rate control medications as ordered  - Monitor electrolytes and administer replacement therapy as ordered  Outcome: Progressing

## 2021-01-11 NOTE — ASSESSMENT & PLAN NOTE
· Accelerated hypertension now improved  · Continue carvedilol and monitor with the addition of hydralazine/isosorbide

## 2021-01-12 ENCOUNTER — APPOINTMENT (INPATIENT)
Dept: RADIOLOGY | Facility: HOSPITAL | Age: 51
DRG: 286 | End: 2021-01-12
Attending: INTERNAL MEDICINE
Payer: COMMERCIAL

## 2021-01-12 LAB
ALBUMIN SERPL BCP-MCNC: 1.8 G/DL (ref 3.5–5)
ALBUMIN SERPL ELPH-MCNC: 2.57 G/DL (ref 3.5–5)
ALBUMIN SERPL ELPH-MCNC: 50.3 % (ref 52–65)
ALBUMIN UR ELPH-MCNC: 76.8 %
ALP SERPL-CCNC: 71 U/L (ref 46–116)
ALPHA1 GLOB MFR UR ELPH: 6.5 %
ALPHA1 GLOB SERPL ELPH-MCNC: 0.37 G/DL (ref 0.1–0.4)
ALPHA1 GLOB SERPL ELPH-MCNC: 7.2 % (ref 2.5–5)
ALPHA2 GLOB MFR UR ELPH: 5.2 %
ALPHA2 GLOB SERPL ELPH-MCNC: 0.85 G/DL (ref 0.4–1.2)
ALPHA2 GLOB SERPL ELPH-MCNC: 16.6 % (ref 7–13)
ALT SERPL W P-5'-P-CCNC: 19 U/L (ref 12–78)
ANION GAP SERPL CALCULATED.3IONS-SCNC: 3 MMOL/L (ref 4–13)
AST SERPL W P-5'-P-CCNC: 24 U/L (ref 5–45)
B-GLOBULIN MFR UR ELPH: 5.8 %
BETA GLOB ABNORMAL SERPL ELPH-MCNC: 0.29 G/DL (ref 0.4–0.8)
BETA1 GLOB SERPL ELPH-MCNC: 5.6 % (ref 5–13)
BETA2 GLOB SERPL ELPH-MCNC: 6.6 % (ref 2–8)
BETA2+GAMMA GLOB SERPL ELPH-MCNC: 0.34 G/DL (ref 0.2–0.5)
BILIRUB SERPL-MCNC: 0.19 MG/DL (ref 0.2–1)
BUN SERPL-MCNC: 40 MG/DL (ref 5–25)
C-ANCA TITR SER IF: NORMAL TITER
CALCIUM ALBUM COR SERPL-MCNC: 9.9 MG/DL (ref 8.3–10.1)
CALCIUM SERPL-MCNC: 8.1 MG/DL (ref 8.3–10.1)
CHLORIDE SERPL-SCNC: 109 MMOL/L (ref 100–108)
CO2 SERPL-SCNC: 29 MMOL/L (ref 21–32)
CREAT SERPL-MCNC: 1.48 MG/DL (ref 0.6–1.3)
CREAT UR-MCNC: 86.5 MG/DL
DSDNA AB SER-ACNC: 1 IU/ML (ref 0–9)
EOSINOPHIL NFR FLD MANUAL: 2 %
ERYTHROCYTE [DISTWIDTH] IN BLOOD BY AUTOMATED COUNT: 13.8 % (ref 11.6–15.1)
GAMMA GLOB ABNORMAL SERPL ELPH-MCNC: 0.7 G/DL (ref 0.5–1.6)
GAMMA GLOB MFR UR ELPH: 5.7 %
GAMMA GLOB SERPL ELPH-MCNC: 13.7 % (ref 12–22)
GBM AB SER IA-ACNC: 3 UNITS (ref 0–20)
GFR SERPL CREATININE-BSD FRML MDRD: 40 ML/MIN/1.73SQ M
GLUCOSE SERPL-MCNC: 89 MG/DL (ref 65–140)
HCT VFR BLD AUTO: 34.1 % (ref 34.8–46.1)
HGB BLD-MCNC: 11.2 G/DL (ref 11.5–15.4)
HISTIOCYTES NFR FLD: 4 %
IGG/ALB SER: 1.01 {RATIO} (ref 1.1–1.8)
KAPPA LC FREE SER-MCNC: 75.7 MG/L (ref 3.3–19.4)
KAPPA LC FREE/LAMBDA FREE SER: 1.85 {RATIO} (ref 0.26–1.65)
LAMBDA LC FREE SERPL-MCNC: 41 MG/L (ref 5.7–26.3)
LDH FLD L TO P-CCNC: 63 U/L
LDH SERPL-CCNC: 259 U/L (ref 81–234)
LYMPHOCYTES NFR BLD AUTO: 80 %
MCH RBC QN AUTO: 32.5 PG (ref 26.8–34.3)
MCHC RBC AUTO-ENTMCNC: 32.8 G/DL (ref 31.4–37.4)
MCV RBC AUTO: 99 FL (ref 82–98)
MONO+MESO NFR FLD MANUAL: 2 %
MONOCYTES NFR BLD AUTO: 2 %
MYELOPEROXIDASE AB SER IA-ACNC: <9 U/ML (ref 0–9)
NEUTS SEG NFR BLD AUTO: 10 %
P-ANCA ATYPICAL TITR SER IF: NORMAL TITER
P-ANCA TITR SER IF: NORMAL TITER
PH BODY FLUID: 7.8
PLATELET # BLD AUTO: 235 THOUSANDS/UL (ref 149–390)
PMV BLD AUTO: 9.8 FL (ref 8.9–12.7)
POTASSIUM SERPL-SCNC: 4.6 MMOL/L (ref 3.5–5.3)
PROT FLD-MCNC: <2 G/DL
PROT PATTERN SERPL ELPH-IMP: ABNORMAL
PROT PATTERN UR ELPH-IMP: ABNORMAL
PROT SERPL-MCNC: 5.1 G/DL (ref 6.4–8.2)
PROT SERPL-MCNC: 5.2 G/DL (ref 6.4–8.2)
PROT UR-MCNC: 407 MG/DL
PROT UR-MCNC: 793 MG/DL
PROT/CREAT UR: 9.17 MG/G{CREAT} (ref 0–0.1)
PROTEINASE3 AB SER IA-ACNC: <3.5 U/ML (ref 0–3.5)
RBC # BLD AUTO: 3.45 MILLION/UL (ref 3.81–5.12)
SITE: NORMAL
SODIUM SERPL-SCNC: 141 MMOL/L (ref 136–145)
TOTAL CELLS COUNTED SPEC: 100
WBC # BLD AUTO: 6.23 THOUSAND/UL (ref 4.31–10.16)
WBC # FLD MANUAL: 152 /UL

## 2021-01-12 PROCEDURE — 88305 TISSUE EXAM BY PATHOLOGIST: CPT | Performed by: PATHOLOGY

## 2021-01-12 PROCEDURE — 0W9B3ZX DRAINAGE OF LEFT PLEURAL CAVITY, PERCUTANEOUS APPROACH, DIAGNOSTIC: ICD-10-PCS | Performed by: RADIOLOGY

## 2021-01-12 PROCEDURE — 99232 SBSQ HOSP IP/OBS MODERATE 35: CPT | Performed by: INTERNAL MEDICINE

## 2021-01-12 PROCEDURE — 83986 ASSAY PH BODY FLUID NOS: CPT | Performed by: INTERNAL MEDICINE

## 2021-01-12 PROCEDURE — 84156 ASSAY OF PROTEIN URINE: CPT | Performed by: NURSE PRACTITIONER

## 2021-01-12 PROCEDURE — 32555 ASPIRATE PLEURA W/ IMAGING: CPT | Performed by: RADIOLOGY

## 2021-01-12 PROCEDURE — 88112 CYTOPATH CELL ENHANCE TECH: CPT | Performed by: PATHOLOGY

## 2021-01-12 PROCEDURE — 80053 COMPREHEN METABOLIC PANEL: CPT | Performed by: INTERNAL MEDICINE

## 2021-01-12 PROCEDURE — 85027 COMPLETE CBC AUTOMATED: CPT | Performed by: INTERNAL MEDICINE

## 2021-01-12 PROCEDURE — 32555 ASPIRATE PLEURA W/ IMAGING: CPT

## 2021-01-12 PROCEDURE — 99233 SBSQ HOSP IP/OBS HIGH 50: CPT | Performed by: INTERNAL MEDICINE

## 2021-01-12 PROCEDURE — 84157 ASSAY OF PROTEIN OTHER: CPT | Performed by: INTERNAL MEDICINE

## 2021-01-12 PROCEDURE — 83615 LACTATE (LD) (LDH) ENZYME: CPT | Performed by: INTERNAL MEDICINE

## 2021-01-12 PROCEDURE — 87070 CULTURE OTHR SPECIMN AEROBIC: CPT | Performed by: INTERNAL MEDICINE

## 2021-01-12 PROCEDURE — 87205 SMEAR GRAM STAIN: CPT | Performed by: INTERNAL MEDICINE

## 2021-01-12 PROCEDURE — 82570 ASSAY OF URINE CREATININE: CPT | Performed by: NURSE PRACTITIONER

## 2021-01-12 PROCEDURE — 89051 BODY FLUID CELL COUNT: CPT | Performed by: INTERNAL MEDICINE

## 2021-01-12 RX ORDER — FUROSEMIDE 10 MG/ML
40 INJECTION INTRAMUSCULAR; INTRAVENOUS
Status: DISCONTINUED | OUTPATIENT
Start: 2021-01-12 | End: 2021-01-13

## 2021-01-12 RX ORDER — LIDOCAINE WITH 8.4% SOD BICARB 0.9%(10ML)
SYRINGE (ML) INJECTION CODE/TRAUMA/SEDATION MEDICATION
Status: COMPLETED | OUTPATIENT
Start: 2021-01-12 | End: 2021-01-12

## 2021-01-12 RX ORDER — CARVEDILOL 25 MG/1
25 TABLET ORAL 2 TIMES DAILY WITH MEALS
Status: DISCONTINUED | OUTPATIENT
Start: 2021-01-12 | End: 2021-01-12

## 2021-01-12 RX ORDER — HYDRALAZINE HYDROCHLORIDE 25 MG/1
25 TABLET, FILM COATED ORAL
Status: DISCONTINUED | OUTPATIENT
Start: 2021-01-12 | End: 2021-01-13

## 2021-01-12 RX ORDER — CARVEDILOL 25 MG/1
25 TABLET ORAL 2 TIMES DAILY WITH MEALS
Status: DISCONTINUED | OUTPATIENT
Start: 2021-01-12 | End: 2021-01-19 | Stop reason: HOSPADM

## 2021-01-12 RX ADMIN — Medication 10 ML: at 13:36

## 2021-01-12 RX ADMIN — ACETAMINOPHEN 650 MG: 325 TABLET ORAL at 16:37

## 2021-01-12 RX ADMIN — QUETIAPINE FUMARATE 100 MG: 25 TABLET ORAL at 21:16

## 2021-01-12 RX ADMIN — ONDANSETRON 4 MG: 2 INJECTION INTRAMUSCULAR; INTRAVENOUS at 16:44

## 2021-01-12 RX ADMIN — ISOSORBIDE DINITRATE 10 MG: 10 TABLET ORAL at 16:38

## 2021-01-12 RX ADMIN — ISOSORBIDE DINITRATE 10 MG: 10 TABLET ORAL at 10:41

## 2021-01-12 RX ADMIN — POTASSIUM CHLORIDE 20 MEQ: 1500 TABLET, EXTENDED RELEASE ORAL at 10:42

## 2021-01-12 RX ADMIN — CARVEDILOL 25 MG: 25 TABLET, FILM COATED ORAL at 16:38

## 2021-01-12 RX ADMIN — FUROSEMIDE 40 MG: 10 INJECTION, SOLUTION INTRAMUSCULAR; INTRAVENOUS at 17:45

## 2021-01-12 RX ADMIN — HEPARIN SODIUM 5000 UNITS: 5000 INJECTION INTRAVENOUS; SUBCUTANEOUS at 14:40

## 2021-01-12 RX ADMIN — HEPARIN SODIUM 5000 UNITS: 5000 INJECTION INTRAVENOUS; SUBCUTANEOUS at 21:16

## 2021-01-12 RX ADMIN — ATORVASTATIN CALCIUM 40 MG: 40 TABLET, FILM COATED ORAL at 16:45

## 2021-01-12 RX ADMIN — HYDRALAZINE HYDROCHLORIDE 25 MG: 25 TABLET ORAL at 10:41

## 2021-01-12 RX ADMIN — ISOSORBIDE DINITRATE 10 MG: 10 TABLET ORAL at 14:41

## 2021-01-12 RX ADMIN — ACETAMINOPHEN 650 MG: 325 TABLET ORAL at 21:18

## 2021-01-12 RX ADMIN — Medication 1 PATCH: at 10:43

## 2021-01-12 RX ADMIN — HEPARIN SODIUM 5000 UNITS: 5000 INJECTION INTRAVENOUS; SUBCUTANEOUS at 05:58

## 2021-01-12 RX ADMIN — PAROXETINE 60 MG: 20 TABLET, FILM COATED ORAL at 10:41

## 2021-01-12 RX ADMIN — CARVEDILOL 12.5 MG: 12.5 TABLET, FILM COATED ORAL at 10:42

## 2021-01-12 RX ADMIN — HYDRALAZINE HYDROCHLORIDE 25 MG: 25 TABLET ORAL at 16:38

## 2021-01-12 NOTE — PLAN OF CARE
Problem: Potential for Falls  Goal: Patient will remain free of falls  Description: INTERVENTIONS:  - Assess patient frequently for physical needs  -  Identify cognitive and physical deficits and behaviors that affect risk of falls    -  [place] fall precautions as indicated by assessment   - Educate patient/family on patient safety including physical limitations  - Instruct patient to call for assistance with activity based on assessment  - Modify environment to reduce risk of injury  - Consider OT/PT consult to assist with strengthening/mobility  Outcome: Progressing

## 2021-01-12 NOTE — DISCHARGE INSTRUCTIONS
Thoracentesis   WHAT YOU NEED TO KNOW:   A thoracentesis is a procedure to remove extra fluid or air from between your lungs and your inner chest wall  Air or fluid buildup may make it hard for you to breathe  A thoracentesis allows your lungs to expand fully so you can breathe more easily  DISCHARGE INSTRUCTIONS:     Small amount of shoulder pain and bloody sputum is normal after a Thoracentesis  Rest:  Rest when you feel it is needed  Slowly start to do more each day  Return to your daily activities as directed  Resume your normal diet  Small sips of flat soda will help mild nausea  Do not smoke: If you smoke, it is never too late to quit  Ask for information about how to stop smoking if you need help  Contact Interventional Radiology at [phone] Josee PATIENTS: Contact Interventional Radiology at [phone]) Shivam Mcknight PATIENTS: Contact Interventional Radiology at [phone]) if:   · You have a fever  · Your puncture site is red, warm, swollen, or draining pus  · You have questions or concerns about your procedure, medicine, or care  Seek care immediately or call 911 if:   · Severe chest pain with inspiration and shortness of breath    · Large amounts of blood in your sputum    Follow up with your healthcare provider as directed

## 2021-01-12 NOTE — ASSESSMENT & PLAN NOTE
· MAXI on CKD 3 the setting of decompensated CHF  · Has significant proteinuria currently undergoing workup per nephrology including 24 hour urine collection  · Cardiology and nephrology following    Stable after catheterization and restarted diuretics    Results from last 7 days   Lab Units 01/13/21  0524 01/12/21  0502 01/11/21  0520 01/10/21  0519 01/09/21  1126 01/08/21  0458 01/07/21  0444   BUN mg/dL 42* 40* 42* 39* 39* 33* 29*   CREATININE mg/dL 1 72* 1 48* 1 61* 1 63* 1 55* 1 63* 1 69*   EGFR ml/min/1 73sq m 34 40 37 36 38 36 34

## 2021-01-12 NOTE — PROGRESS NOTES
Cardiology   MD Lorelei Barrera MD Ather Mansoor, MD Dorien Plummer, DO, Maisie Closs, DO, [facility] [place]  -------------------------------------------------------------------  [facility]  [address] [place] [id number]  [place]  [phone]        Progress Note - Cardiology   Jody Carl 44 y o  female MRN: [id number]  Unit/Bed#: E4 -01 Encounter: [id number]        Assessment/Recommendations/Discussion:   1  Acute systolic and diastolic congestive heart failure  2  Nonischemic cardiomyopathy, ejection fraction 25%  3  Hypertension  4  Non MI related troponin elevation secondary to CHF  5  Ongoing tobacco use  6  Acute kidney injury  7  Dyslipidemia    · Will start p o  Furosemide today  Normal coronary angiography yesterday  · Wean off oxygen as able  Suspect may have a component of COPD  · Continue hydralazine, isosorbide, carvedilol as cardiomyopathy regimen  · Life vest denied  Paperwork in progress  · Increase carvedilol to 25 mg p o  B i d   · =================================  Addendum:  Pt now declining LifeVest after insurance denied it  She does not want to go through anymore paperwork to get this done  She is aware of the potential risks of life-threatening ventricular dysrhythmia          Subjective:  Patient seen and examined, feels well, no complaints          Physical Exam:  GEN:  NAD  HEENT:  MMM, NCAT, pink conjunctiva, EOMI, nonicteric sclera  CV:  NO JVD/HJR, RR, NO M/R/G, +S1/S2, NO PARASTERNAL HEAVE/THRILL, NO LE EDEMA, NO HEPATIC SYSTOLIC PULSATION, WARM EXTREMITIES  RESP:  CTAB/L, distant heart sounds  ABD:  SOFT, NT, NO GROSS ORGANOMEGALY        Vitals:   /94 (BP Location: Right arm)   Pulse 81   Temp (!) 96 5 °F (35 8 °C) (Temporal)   Resp 18   Ht 5' 6" (1 676 m)   Wt 55 kg (121 lb 4 1 oz)   SpO2 94%   BMI 19 57 kg/m²   Vitals:    01/10/21 0600 01/12/21 0600   Weight: 53 6 kg (118 lb 2 7 oz) 55 kg (121 lb 4 1 oz)       Intake/Output Summary (Last 24 hours) at 1/12/2021 1111  Last data filed at 1/12/2021 0606  Gross per 24 hour   Intake 207 5 ml   Output 700 ml   Net -492 5 ml       TELEMETRY: NSVT, SR  Lab Results:  Results from last 7 days   Lab Units 01/12/21  0502   WBC Thousand/uL 6 23   HEMOGLOBIN g/dL 11 2*   HEMATOCRIT % 34 1*   PLATELETS Thousands/uL 235     Results from last 7 days   Lab Units 01/12/21  0502   POTASSIUM mmol/L 4 6   CHLORIDE mmol/L 109*   CO2 mmol/L 29   BUN mg/dL 40*   CREATININE mg/dL 1 48*   CALCIUM mg/dL 8 1*   ALK PHOS U/L 71   ALT U/L 19   AST U/L 24     Results from last 7 days   Lab Units 01/12/21  0502   POTASSIUM mmol/L 4 6   CHLORIDE mmol/L 109*   CO2 mmol/L 29   BUN mg/dL 40*   CREATININE mg/dL 1 48*   CALCIUM mg/dL 8 1*           Medications:    Current Facility-Administered Medications:     acetaminophen (TYLENOL) tablet 650 mg, 650 mg, Oral, Q4H PRN, Abhinav Gonzalez PA-C    atorvastatin (LIPITOR) tablet 40 mg, 40 mg, Oral, Daily With Marita Shearer, , 40 mg at 01/11/21 1703    carvedilol (COREG) tablet 12 5 mg, 12 5 mg, Oral, BID With Meals, Rujul Trejo, DO, 12 5 mg at 01/12/21 1042    heparin (porcine) subcutaneous injection 5,000 Units, 5,000 Units, Subcutaneous, Q8H [facility], Sugey Soliz PA-C, 5,000 Units at 01/12/21 0558    hydrALAZINE (APRESOLINE) tablet 25 mg, 25 mg, Oral, TID after meals, Rujul Trejo, DO, 25 mg at 01/12/21 1041    isosorbide dinitrate (ISORDIL) tablet 10 mg, 10 mg, Oral, TID after meals, Chacorta Bazzi PA-C, 10 mg at 01/12/21 1041    Labetalol HCl (NORMODYNE) injection 10 mg, 10 mg, Intravenous, Q6H PRN, Elly Last MD, Stopped at 01/07/21 1214    nicotine (NICODERM CQ) 14 mg/24hr TD 24 hr patch 1 patch, 1 patch, Transdermal, Daily, Sugey Soliz PA-C, 1 patch at 01/12/21 1043    ondansetron (ZOFRAN) injection 4 mg, 4 mg, Intravenous, Q6H PRN, Sugey Soliz PA-C, 4 mg at 01/07/21 1546    PARoxetine (PAXIL) tablet 60 mg, 60 mg, Oral, Daily, Sugey Soliz PA-C, 60 mg at 01/12/21 1041    potassium chloride (K-DUR,KLOR-CON) CR tablet 20 mEq, 20 mEq, Oral, BID With Meals, Lucero Acron, DO, 20 mEq at 01/12/21 1042    promethazine (PHENERGAN) injection 12 5 mg, 12 5 mg, Intravenous, Q6H PRN, Lucero Acron, DO    QUEtiapine (SEROquel) tablet 100 mg, 100 mg, Oral, HS, Sugey Cerdar, PA-C, 100 mg at 01/11/21 3327    This note was completed in part utilizing [email or web address] Direct Software  Grammatical errors, random word insertions, spelling mistakes, and incomplete sentences may be an occasional consequence of this system secondary to software limitations, ambient noise, and hardware issues  If you have any questions or concerns about the content, text, or information contained within the body of this dictation, please contact the provider for clarification

## 2021-01-12 NOTE — PLAN OF CARE
Problem: Potential for Falls  Goal: Patient will remain free of falls  Description: INTERVENTIONS:  - Assess patient frequently for physical needs  -  Identify cognitive and physical deficits and behaviors that affect risk of falls  -  [place] fall precautions as indicated by assessment   - Educate patient/family on patient safety including physical limitations  - Instruct patient to call for assistance with activity based on assessment  - Modify environment to reduce risk of injury  - Consider OT/PT consult to assist with strengthening/mobility  Outcome: Progressing     Problem: PAIN - ADULT  Goal: Verbalizes/displays adequate comfort level or baseline comfort level  Description: Interventions:  - Encourage patient to monitor pain and request assistance  - Assess pain using appropriate pain scale  - Administer analgesics based on type and severity of pain and evaluate response  - Implement non-pharmacological measures as appropriate and evaluate response  - Consider cultural and social influences on pain and pain management  - Notify physician/advanced practitioner if interventions unsuccessful or patient reports new pain  Outcome: Progressing     Problem: SAFETY ADULT  Goal: Patient will remain free of falls  Description: INTERVENTIONS:  - Assess patient frequently for physical needs  -  Identify cognitive and physical deficits and behaviors that affect risk of falls    -  [place] fall precautions as indicated by assessment   - Educate patient/family on patient safety including physical limitations  - Instruct patient to call for assistance with activity based on assessment  - Modify environment to reduce risk of injury  - Consider OT/PT consult to assist with strengthening/mobility  Outcome: Progressing  Goal: Maintain or return to baseline ADL function  Description: INTERVENTIONS:  -  Assess patient's ability to carry out ADLs; assess patient's baseline for ADL function and identify physical deficits which impact ability to perform ADLs (bathing, care of mouth/teeth, toileting, grooming, dressing, etc )  - Assess/evaluate cause of self-care deficits   - Assess range of motion  - Assess patient's mobility; develop plan if impaired  - Assess patient's need for assistive devices and provide as appropriate  - Encourage maximum independence but intervene and supervise when necessary  - Involve family in performance of ADLs  - Assess for home care needs following discharge   - Consider OT consult to assist with ADL evaluation and planning for discharge  - Provide patient education as appropriate  Outcome: Progressing  Goal: Maintain or return mobility status to optimal level  Description: INTERVENTIONS:  - Assess patient's baseline mobility status (ambulation, transfers, stairs, etc )    - Identify cognitive and physical deficits and behaviors that affect mobility  - Identify mobility aids required to assist with transfers and/or ambulation (gait belt, sit-to-stand, lift, walker, cane, etc )  - [place] fall precautions as indicated by assessment  - Record patient progress and toleration of activity level on Mobility SBAR; progress patient to next Phase/Stage  - Instruct patient to call for assistance with activity based on assessment  - Consider rehabilitation consult to assist with strengthening/weightbearing, etc   Outcome: Progressing     Problem: DISCHARGE PLANNING  Goal: Discharge to home or other facility with appropriate resources  Description: INTERVENTIONS:  - Identify barriers to discharge w/patient and caregiver  - Arrange for needed discharge resources and transportation as appropriate  - Identify discharge learning needs (meds, wound care, etc )  - Arrange for interpretive services to assist at discharge as needed  - Refer to Case Management Department for coordinating discharge planning if the patient needs post-hospital services based on physician/advanced practitioner order or complex needs related to functional status, cognitive ability, or social support system  Outcome: Progressing     Problem: Knowledge Deficit  Goal: Patient/family/caregiver demonstrates understanding of disease process, treatment plan, medications, and discharge instructions  Description: Complete learning assessment and assess knowledge base    Interventions:  - Provide teaching at level of understanding  - Provide teaching via preferred learning methods  Outcome: Progressing     Problem: CARDIOVASCULAR - ADULT  Goal: Maintains optimal cardiac output and hemodynamic stability  Description: INTERVENTIONS:  - Monitor I/O, vital signs and rhythm  - Monitor for S/S and trends of decreased cardiac output  - Administer and titrate ordered vasoactive medications to optimize hemodynamic stability  - Assess quality of pulses, skin color and temperature  - Assess for signs of decreased coronary artery perfusion  - Instruct patient to report change in severity of symptoms  Outcome: Progressing  Goal: Absence of cardiac dysrhythmias or at baseline rhythm  Description: INTERVENTIONS:  - Continuous cardiac monitoring, vital signs, obtain 12 lead EKG if ordered  - Administer antiarrhythmic and heart rate control medications as ordered  - Monitor electrolytes and administer replacement therapy as ordered  Outcome: Progressing

## 2021-01-12 NOTE — CASE MANAGEMENT
Cm reviewed pt care coordination rounds with Dr Rabia Kimball  Pt is not medically cleared for d/c  Pt is a tentative dc in 48 hrs  Cm received call from [facility] from [facility] about denial of a lifevest  A Peer to Peer or an alternative is required  Cm notified [facility] of above  Was informed that pt refused lifevest at this time  Cm will follow up for final medical clearance and dcp needs

## 2021-01-12 NOTE — PROGRESS NOTES
Progress Note - Arleen Tyson 1970, 44 y o  female MRN: [id number]    Unit/Bed#: E4 -01 Encounter: [id number]    Primary Care Provider: No primary care provider on file  Date and time admitted to hospital: 1/6/2021  4:34 PM        * Acute respiratory failure with hypoxia (HCC)  Assessment & Plan  · Acute respiratory failure with hypoxia secondary to do CHF/bilateral pleural effusions  · Appreciate IR evaluation and thoracentesis  Wean oxygen when able  · Restart furosemide    Acute systolic congestive heart failure (HCC)  Assessment & Plan  Weight (last 2 days)     Date/Time   Weight    01/12/21 0600   55 (121 25)    01/10/21 0600   53 6 (118 17)          · Acute systolic CHF new diagnosis  Cardiac catheterization negative for occlusive disease  · Diuretics held due to catheterization a will restart tonight  · Holding ACE-inhibitor secondary to kidney injury  Continue hydralazine/isosorbide  · Currently has not been approved by insurance for life vest    Elevated troponin  Assessment & Plan  · Non MI elevation troponin secondary to decompensated CHF    Results from last 7 days   Lab Units 01/07/21  1202 01/07/21  0127 01/06/21  2230 01/06/21  1653   TROPONIN I ng/mL 0 09* 0 13* 0 14* 0 10*       MAXI (acute kidney injury) ([facility] [address] )  Assessment & Plan  · MAXI on CKD 3 the setting of decompensated CHF  · Cardiology and nephrology following    Stable after catheterization and will restart diuretics    Results from last 7 days   Lab Units 01/12/21  0502 01/11/21  0520 01/10/21  0519 01/09/21  1126 01/08/21  0458 01/07/21  0444 01/06/21  1653   BUN mg/dL 40* 42* 39* 39* 33* 29* 27*   CREATININE mg/dL 1 48* 1 61* 1 63* 1 55* 1 63* 1 69* 1 87*   EGFR ml/min/1 73sq m 40 37 36 38 36 34 30       Hypokalemia  Assessment & Plan  · Hypokalemia being repleted in the setting of diuretic    Results from last 7 days   Lab Units 01/12/21  0502 01/11/21  0520 01/10/21  0519 01/09/21  1126 01/08/21  0458 01/07/21  0444 01/06/21  1653   POTASSIUM mmol/L 4 6 3 7 3 3* 3 9 3 9 2 9* 3 3*       Hypertensive urgency  Assessment & Plan  · Accelerated hypertension now improved  · Continue carvedilol and monitor with the addition of hydralazine/isosorbide    Anxiety  Assessment & Plan  · Anxiety mood stable on quetiapine and paroxetine      VTE Pharmacologic Prophylaxis:   High Risk (Score >/= 5) - Pharmacological DVT Prophylaxis Ordered: Heparin  Sequential Compression Devices Ordered  Patient Centered Rounds: I have performed bedside rounds with nursing staff today  Discussions with Specialists or Other Care Team Provider:  Case management and nephrology    Education and Discussions with Family / Patient:     Time Spent for Care: 25 mins  More than 50% of total time spent on counseling and coordination of care as described above  Current Length of Stay: 6 day(s)  Current Patient Status: Inpatient   Certification Statement: The patient will continue to require additional inpatient hospital stay due to CHF  Discharge Plan / Estimated Discharge Date: Anticipate discharge in 48 hrs to home  Code Status: Level 1 - Full Code      Subjective:   Patient seen and examined  Feeling better after thoracentesis  No chest pain    Objective:   Vitals: Blood pressure 121/87, pulse 66, temperature 97 6 °F (36 4 °C), temperature source Temporal, resp  rate 18, height 5' 6" (1 676 m), weight 55 kg (121 lb 4 1 oz), SpO2 96 %  Physical Exam  Vitals signs reviewed  Constitutional:       General: She is not in acute distress  Appearance: Normal appearance  HENT:      Head: Atraumatic  Eyes:      General: No scleral icterus  Cardiovascular:      Rate and Rhythm: Regular rhythm  Heart sounds: Normal heart sounds  Pulmonary:      Breath sounds: Decreased breath sounds present  No wheezing  Abdominal:      General: Bowel sounds are normal       Palpations: Abdomen is soft  Tenderness: There is no guarding or rebound  Musculoskeletal:         General: Swelling present  Skin:     General: Skin is warm  Neurological:      Mental Status: She is alert and oriented to person, place, and time  Mental status is at baseline  Additional Data:   Labs:  Results from last 7 days   Lab Units 01/12/21  0502 01/11/21  0520 01/07/21  1202 01/06/21  1653   WBC Thousand/uL 6 23 7 88 10 42* 10 78*   HEMOGLOBIN g/dL 11 2* 12 3 13 0 13 3   HEMATOCRIT % 34 1* 37 2 38 9 39 8   MCV fL 99* 98 99* 97   PLATELETS Thousands/uL 235 252 328 361   INR   --   --   --  0 95     Results from last 7 days   Lab Units 01/12/21  0502 01/11/21  0520 01/10/21  0519 01/09/21  1126 01/08/21  0458 01/07/21  0444 01/06/21  1653   SODIUM mmol/L 141 140 141 139 144 141 142   POTASSIUM mmol/L 4 6 3 7 3 3* 3 9 3 9 2 9* 3 3*   CHLORIDE mmol/L 109* 106 104 103 109* 107 106   CO2 mmol/L 29 27 32 33* 27 22 28   ANION GAP mmol/L 3* 7 5 3* 8 12 8   BUN mg/dL 40* 42* 39* 39* 33* 29* 27*   CREATININE mg/dL 1 48* 1 61* 1 63* 1 55* 1 63* 1 69* 1 87*   CALCIUM mg/dL 8 1* 8 1* 8 0* 8 2* 8 1* 7 9* 8 1*   ALBUMIN g/dL 1 8*  --   --   --   --  1 9* 2 3*   TOTAL BILIRUBIN mg/dL 0 19*  --   --   --   --  0 30 0 31   ALK PHOS U/L 71  --   --   --   --  86 102   ALT U/L 19  --   --   --   --  28 33   AST U/L 24  --   --   --   --  24 27   EGFR ml/min/1 73sq m 40 37 36 38 36 34 30   GLUCOSE RANDOM mg/dL 89 86 93 97 101 97 101     Results from last 7 days   Lab Units 01/07/21  0444   MAGNESIUM mg/dL 2 2     Results from last 7 days   Lab Units 01/07/21  1202 01/07/21  0127 01/06/21  2230   TROPONIN I ng/mL 0 09* 0 13* 0 14*     Results from last 7 days   Lab Units 01/06/21  1653   NT-PRO BNP pg/mL 96,433*              Results from last 7 days   Lab Units 01/07/21  0444   HEMOGLOBIN A1C % 5 3     Results from last 7 days   Lab Units 01/09/21  0718   TSH 3RD GENERATON uIU/mL 1 624     * I Have Reviewed All Lab Data Listed Above      Cultures:   Results from last 7 days   Lab Units 01/06/21  1712   INFLUENZA A PCR  Negative       Results from last 7 days   Lab Units 01/06/21  1712   SARS-COV-2  Negative   INFLUENZA A PCR  Negative   INFLUENZA B PCR  Negative   RSV PCR  Negative           Lines/Drains:  Invasive Devices     Peripheral Intravenous Line            Peripheral IV 01/12/21 Dorsal (posterior); Left Forearm less than 1 day              Telemetry:   Telemetry Orders (From admission, onward)             48 Hour Telemetry Monitoring  Continuous x 48 hours     Question:  Reason for 48 Hour Telemetry  Answer:  Acute Decompensated CHF (continuous diuretic infusion or total diuretic dose > 200 mg daily, associated electrolyte derangement, ionotropic drip, history of ventricular arrhythmia, or new EF <35%)                  Imaging:  Imaging Reports Reviewed Today Include:   Xr Chest Portable    Result Date: 1/7/2021  Impression: Interstitial edema with effusions and atelectasis, corresponding with the chest CT one day earlier  Workstation performed: [id number]     Xr Chest Pa & Lateral    Result Date: 1/10/2021  Impression: Improved vascular clarity with persistent bilateral pleural effusions and left greater than right basilar atelectasis  Workstation performed: [id number]     Xr Ankle 3+ Vw Right    Result Date: 1/11/2021  Impression: Unchanged alignment at the distal fibular fracture with findings of ongoing healing/remodeling  Workstation performed: [id number]     Cta Ed Chest Pe Study    Result Date: 1/6/2021  Impression: No pulmonary embolus  Bilateral moderate pleural effusions  Posterior bibasilar atelectasis/infiltrates   Questionable hilar and subcarinal lymphadenopathy limited by lack of IV contrast  Workstation performed: [id number]     Scheduled Meds:  Current Facility-Administered Medications   Medication Dose Route Frequency Provider Last Rate    acetaminophen  650 mg Oral Q4H PRN Trish Huber PA-C      atorvastatin  40 mg Oral Daily With Wicron, DO      carvedilol  25 mg Oral BID With Meals GAGE Mccord      furosemide  40 mg Intravenous BID (diuretic) Kathy Schmidt DO      heparin (porcine)  5,000 Units Subcutaneous [facility] Sugey Soliz PA-C      hydrALAZINE  25 mg Oral TID after meals GAGE Mccord      isosorbide dinitrate  10 mg Oral TID after meals Lei Bazzi PA-C      Labetalol HCl  10 mg Intravenous Q6H PRN Jenny Monsalve MD      nicotine  1 patch Transdermal Daily Sugey Soliz PA-C      ondansetron  4 mg Intravenous Q6H PRN Anton Stockton PA-C      PARoxetine  60 mg Oral Daily Sugey Soliz PA-C      promethazine  12 5 mg Intravenous Q6H PRN Jay Lovett DO      QUEtiapine  100 mg Oral HS ALISHA Duran DO  [facility] Internal Medicine  Hospitalist    ** Please Note: This note has been constructed using a voice recognition system   **

## 2021-01-12 NOTE — ASSESSMENT & PLAN NOTE
· Acute respiratory failure with hypoxia secondary to do CHF/bilateral pleural effusions  · Appreciate IR evaluation and thoracentesis    Wean oxygen when able  · Restart furosemide

## 2021-01-12 NOTE — ASSESSMENT & PLAN NOTE
· MAXI on CKD 3 the setting of decompensated CHF  · Cardiology and nephrology following    Stable after catheterization and will restart diuretics    Results from last 7 days   Lab Units 01/12/21  0502 01/11/21  0520 01/10/21  0519 01/09/21  1126 01/08/21  0458 01/07/21  0444 01/06/21  1653   BUN mg/dL 40* 42* 39* 39* 33* 29* 27*   CREATININE mg/dL 1 48* 1 61* 1 63* 1 55* 1 63* 1 69* 1 87*   EGFR ml/min/1 73sq m 40 37 36 38 36 34 30

## 2021-01-12 NOTE — PROGRESS NOTES
NEPHROLOGY PROGRESS NOTE   Jody Beverage 44 y o  female MRN: [id number]  Unit/Bed#: E4 -01 Encounter: [id number]      ASSESSMENT/PLAN:  Acute kidney injury (POA):  - etiology suspect secondary to cardiorenal syndrome with volume overload + possibly underlying glomerular nephritis with hematuria and proteinuria  - upon review medical records, baseline creatinine 0 7-1 0 mg/dL in 2018   - creatinine 1 87 mg/dL upon admission on 1/6    - most recent creatinine 1 48 mg/dL today  - monitor for CARLIE as patient is status post cardiac catheterization yesterday, 1/11  Received IV fluid hydration pre and post catheterization  - plan for diuretics to be restarted per Cardiology, would recommend to initiate post thoracentesis  - proteinuria: Most recent urine protein creatinine ratio 9 98  Will repeat + collect 24 hour urine protein  - SPEP revealed no monoclonal bands, UPEP revealed nonselective proteinuria- no monoclonal bands, GBM negative, C3 97 5 C4 29, dsDNA negative, KAYLA negative, RF positive- RF 20, hepatitis panel negative  - free light chain ratio, ANCA Pending   - avoid NSAIDs, nephrotoxic agents, IV contrast   - adjust medications to appropriate GFR  - monitor volume status with strict intake/output  Daily weight  - will check BMP, magnesium, phosphorus in a m  Electrolytes, acid/base:  - noted potassium of 4 6 and currently on standing dose K-Dur 20 mEq b i d    - will discontinue supplements for now  - will continue to monitor with repeat lab studies  Blood pressure, hypertension:  - blood pressure stable were recently  - patient is not on antihypertensive medications as outpatient  - currently on:  Carvedilol 25 mg b i d  + hydralazine 25 mg t i d  + Isordil 10 mg t i d   - recommendations: Maintain hold parameters on antihypertensives  - optimize hemodynamics; avoid hypotension fluctuations of blood pressure   - maintain MAP > 65       H/H:  - most recent hemoglobin 11 2 grams/deciliter   - goal hemoglobin greater than 8 grams/deciliter  - recommend PRBC transfusion for hemoglobin less than 7  Volume status/CHF:  - most recent echocardiogram revealed EF of 25%  - chest x-ray completed on 01/10 revealed improved vascular clarity with persistent bilateral pleural effusions and left greater than right basilar atelectasis  - plan for thoracentesis today  - status post cardiac catheterization yesterday, 1/11:  Normal coronary angiography per Cardiology  - would suggest starting oral diuretic post thoracentesis  - continue to monitor volume status closely  - cardiology following  SUBJECTIVE:  Patient is resting in bed comfortably  Patient is without current shortness of breath or chest pain  Patient states she is eating well       OBJECTIVE:  Current Weight: Weight - Scale: 55 kg (121 lb 4 1 oz)  Vitals:    01/12/21 1353   BP:    Pulse:    Resp: 16   Temp:    SpO2: 97%       Intake/Output Summary (Last 24 hours) at 1/12/2021 1358  Last data filed at 1/12/2021 1350  Gross per 24 hour   Intake 207 5 ml   Output 1330 ml   Net -1122 5 ml       General:  No acute distress  Skin:  Warm, no rash  Eyes:  Sclerae anicteric  ENT:  Moist lips mucous membranes  Neck:  Supple trachea midline  Chest:  Clear breath sounds bilaterally   CVS:  Regular rate regular rhythm  Abdomen:  Soft, nontender, normoactive bowel sounds  Extremities:  No overt edema   Neuro:  Awake and interactive  Psych:  Appropriate affect      Medications:  Scheduled Meds:  Current Facility-Administered Medications   Medication Dose Route Frequency Provider Last Rate    acetaminophen  650 mg Oral Q4H PRN Holley Tavares PA-C      atorvastatin  40 mg Oral Daily With [employer], DO      carvedilol  25 mg Oral BID With Meals Rujul Trejo, DO      heparin (porcine)  5,000 Units Subcutaneous Q8H [facility] Sugey Soliz PA-C      hydrALAZINE  25 mg Oral TID after meals Rujul Trejo, DO      isosorbide dinitrate  10 mg Oral TID after meals Leah Bazzi PA-C      Labetalol HCl  10 mg Intravenous Q6H PRN Nadira Coughlin MD      nicotine  1 patch Transdermal Daily Sugey Soliz PA-C      ondansetron  4 mg Intravenous Q6H PRN Karen Barker PA-C      PARoxetine  60 mg Oral Daily Sugey Soliz PA-C      potassium chloride  20 mEq Oral BID With Meals [facility],       promethazine  12 5 mg Intravenous Q6H PRN [facility],       QUEtiapine  100 mg Oral HS Sugey Soliz PA-C         PRN Meds:   acetaminophen    Labetalol HCl    ondansetron    promethazine    Continuous Infusions:     Laboratory Results:  Results from last 7 days   Lab Units 01/12/21  0502 01/11/21  0520 01/10/21  0519 01/09/21  1126 01/08/21  0458 01/07/21  1202 01/07/21  0444 01/06/21  1653   WBC Thousand/uL 6 23 7 88  --   --   --  10 42*  --  10 78*   HEMOGLOBIN g/dL 11 2* 12 3  --   --   --  13 0  --  13 3   HEMATOCRIT % 34 1* 37 2  --   --   --  38 9  --  39 8   PLATELETS Thousands/uL 235 252  --   --   --  328  --  361   SODIUM mmol/L 141 140 141 139 144  --  141 142   POTASSIUM mmol/L 4 6 3 7 3 3* 3 9 3 9  --  2 9* 3 3*   CHLORIDE mmol/L 109* 106 104 103 109*  --  107 106   CO2 mmol/L 29 27 32 33* 27  --  22 28   BUN mg/dL 40* 42* 39* 39* 33*  --  29* 27*   CREATININE mg/dL 1 48* 1 61* 1 63* 1 55* 1 63*  --  1 69* 1 87*   CALCIUM mg/dL 8 1* 8 1* 8 0* 8 2* 8 1*  --  7 9* 8 1*   MAGNESIUM mg/dL  --   --   --   --   --   --  2 2  --

## 2021-01-12 NOTE — ASSESSMENT & PLAN NOTE
Weight (last 2 days)     Date/Time   Weight    01/12/21 0600   55 (121 25)    01/10/21 0600   53 6 (118 17)          · Acute systolic CHF new diagnosis  Cardiac catheterization negative for occlusive disease  · Diuretics held due to catheterization a will restart tonight  · Holding ACE-inhibitor secondary to kidney injury    Continue hydralazine/isosorbide  · Currently has not been approved by insurance for life vest

## 2021-01-12 NOTE — ASSESSMENT & PLAN NOTE
· Hypokalemia being repleted in the setting of diuretic    Results from last 7 days   Lab Units 01/12/21  0502 01/11/21  0520 01/10/21  0519 01/09/21  1126 01/08/21  0458 01/07/21  0444 01/06/21  1653   POTASSIUM mmol/L 4 6 3 7 3 3* 3 9 3 9 2 9* 3 3*

## 2021-01-13 ENCOUNTER — APPOINTMENT (INPATIENT)
Dept: RADIOLOGY | Facility: HOSPITAL | Age: 51
DRG: 286 | End: 2021-01-13
Payer: COMMERCIAL

## 2021-01-13 LAB
ALBUMIN SERPL BCP-MCNC: 1.8 G/DL (ref 3.5–5)
ALP SERPL-CCNC: 76 U/L (ref 46–116)
ALT SERPL W P-5'-P-CCNC: 20 U/L (ref 12–78)
ANION GAP SERPL CALCULATED.3IONS-SCNC: 4 MMOL/L (ref 4–13)
AST SERPL W P-5'-P-CCNC: 21 U/L (ref 5–45)
BILIRUB SERPL-MCNC: 0.26 MG/DL (ref 0.2–1)
BUN SERPL-MCNC: 42 MG/DL (ref 5–25)
CALCIUM ALBUM COR SERPL-MCNC: 10.1 MG/DL (ref 8.3–10.1)
CALCIUM SERPL-MCNC: 8.3 MG/DL (ref 8.3–10.1)
CHLORIDE SERPL-SCNC: 107 MMOL/L (ref 100–108)
CO2 SERPL-SCNC: 27 MMOL/L (ref 21–32)
CREAT SERPL-MCNC: 1.72 MG/DL (ref 0.6–1.3)
ERYTHROCYTE [DISTWIDTH] IN BLOOD BY AUTOMATED COUNT: 13.3 % (ref 11.6–15.1)
FOLATE SERPL-MCNC: 6.2 NG/ML (ref 3.1–17.5)
GFR SERPL CREATININE-BSD FRML MDRD: 34 ML/MIN/1.73SQ M
GLUCOSE SERPL-MCNC: 105 MG/DL (ref 65–140)
HCT VFR BLD AUTO: 34.5 % (ref 34.8–46.1)
HGB BLD-MCNC: 11.2 G/DL (ref 11.5–15.4)
MCH RBC QN AUTO: 32.7 PG (ref 26.8–34.3)
MCHC RBC AUTO-ENTMCNC: 32.5 G/DL (ref 31.4–37.4)
MCV RBC AUTO: 101 FL (ref 82–98)
PLATELET # BLD AUTO: 228 THOUSANDS/UL (ref 149–390)
PMV BLD AUTO: 10.3 FL (ref 8.9–12.7)
POTASSIUM SERPL-SCNC: 4.6 MMOL/L (ref 3.5–5.3)
PROT SERPL-MCNC: 5.3 G/DL (ref 6.4–8.2)
RBC # BLD AUTO: 3.42 MILLION/UL (ref 3.81–5.12)
SODIUM SERPL-SCNC: 138 MMOL/L (ref 136–145)
VIT B12 SERPL-MCNC: 259 PG/ML (ref 100–900)
WBC # BLD AUTO: 9.27 THOUSAND/UL (ref 4.31–10.16)

## 2021-01-13 PROCEDURE — 99232 SBSQ HOSP IP/OBS MODERATE 35: CPT | Performed by: INTERNAL MEDICINE

## 2021-01-13 PROCEDURE — 85027 COMPLETE CBC AUTOMATED: CPT | Performed by: INTERNAL MEDICINE

## 2021-01-13 PROCEDURE — 82607 VITAMIN B-12: CPT | Performed by: INTERNAL MEDICINE

## 2021-01-13 PROCEDURE — 82746 ASSAY OF FOLIC ACID SERUM: CPT | Performed by: INTERNAL MEDICINE

## 2021-01-13 PROCEDURE — 97530 THERAPEUTIC ACTIVITIES: CPT

## 2021-01-13 PROCEDURE — 99232 SBSQ HOSP IP/OBS MODERATE 35: CPT

## 2021-01-13 PROCEDURE — 80053 COMPREHEN METABOLIC PANEL: CPT | Performed by: INTERNAL MEDICINE

## 2021-01-13 PROCEDURE — 71046 X-RAY EXAM CHEST 2 VIEWS: CPT

## 2021-01-13 RX ORDER — HYDRALAZINE HYDROCHLORIDE 10 MG/1
10 TABLET, FILM COATED ORAL
Status: DISCONTINUED | OUTPATIENT
Start: 2021-01-13 | End: 2021-01-19 | Stop reason: HOSPADM

## 2021-01-13 RX ORDER — FUROSEMIDE 40 MG/1
40 TABLET ORAL DAILY
Status: DISCONTINUED | OUTPATIENT
Start: 2021-01-14 | End: 2021-01-14

## 2021-01-13 RX ORDER — OXYCODONE HYDROCHLORIDE 5 MG/1
5 TABLET ORAL EVERY 4 HOURS PRN
Status: DISCONTINUED | OUTPATIENT
Start: 2021-01-13 | End: 2021-01-19 | Stop reason: HOSPADM

## 2021-01-13 RX ADMIN — ACETAMINOPHEN 650 MG: 325 TABLET ORAL at 13:25

## 2021-01-13 RX ADMIN — Medication 1 PATCH: at 08:16

## 2021-01-13 RX ADMIN — HYDRALAZINE HYDROCHLORIDE 10 MG: 10 TABLET, FILM COATED ORAL at 18:03

## 2021-01-13 RX ADMIN — ACETAMINOPHEN 650 MG: 325 TABLET ORAL at 21:42

## 2021-01-13 RX ADMIN — CARVEDILOL 25 MG: 25 TABLET, FILM COATED ORAL at 08:17

## 2021-01-13 RX ADMIN — HEPARIN SODIUM 5000 UNITS: 5000 INJECTION INTRAVENOUS; SUBCUTANEOUS at 05:49

## 2021-01-13 RX ADMIN — QUETIAPINE FUMARATE 100 MG: 25 TABLET ORAL at 21:42

## 2021-01-13 RX ADMIN — PAROXETINE 60 MG: 20 TABLET, FILM COATED ORAL at 08:17

## 2021-01-13 RX ADMIN — ATORVASTATIN CALCIUM 40 MG: 40 TABLET, FILM COATED ORAL at 18:03

## 2021-01-13 RX ADMIN — OXYCODONE HYDROCHLORIDE 5 MG: 5 TABLET ORAL at 13:35

## 2021-01-13 RX ADMIN — CARVEDILOL 25 MG: 25 TABLET, FILM COATED ORAL at 18:03

## 2021-01-13 RX ADMIN — OXYCODONE HYDROCHLORIDE 5 MG: 5 TABLET ORAL at 19:54

## 2021-01-13 RX ADMIN — HEPARIN SODIUM 5000 UNITS: 5000 INJECTION INTRAVENOUS; SUBCUTANEOUS at 21:42

## 2021-01-13 RX ADMIN — ISOSORBIDE DINITRATE 10 MG: 10 TABLET ORAL at 08:17

## 2021-01-13 RX ADMIN — ISOSORBIDE DINITRATE 10 MG: 10 TABLET ORAL at 11:45

## 2021-01-13 RX ADMIN — HEPARIN SODIUM 5000 UNITS: 5000 INJECTION INTRAVENOUS; SUBCUTANEOUS at 13:25

## 2021-01-13 RX ADMIN — ISOSORBIDE DINITRATE 10 MG: 10 TABLET ORAL at 18:03

## 2021-01-13 NOTE — ASSESSMENT & PLAN NOTE
Weight (last 2 days)     Date/Time   Weight    01/13/21 0600   54 (119 05)    01/12/21 0600   55 (121 25)          · Acute systolic CHF new diagnosis  Cardiac catheterization negative for occlusive disease  · Diuretics held due to catheterization a will restart tonight  · Holding ACE-inhibitor secondary to kidney injury    Continue hydralazine/isosorbide  · Currently has not been approved by insurance for life vest  · Furosemide changed to oral today

## 2021-01-13 NOTE — PHYSICAL THERAPY NOTE
PHYSICAL THERAPY TREAT        Patient Name: Arleen TOVAR Date: 1/13/2021   Time: 3949-3993  *Error in flowsheet of 2289*     01/13/21 1357   PT Last Visit   PT Visit Date 01/13/21   Note Type   Note Type Treatment   Pain Assessment   Pain Assessment Tool 0-10   Pain Score 4   Pain Location/Orientation Orientation: Right;Location: Leg   Patient's Stated Pain Goal No pain   Hospital Pain Intervention(s) Repositioned; Ambulation/increased activity; Emotional support; Rest   Restrictions/Precautions   Weight Bearing Precautions Per Order Yes   RLE Weight Bearing Per Order WBAT   Braces or Orthoses CAM Boot   Other Precautions Pain;O2  (1 5L O2, 93-95% during session )   General   Chart Reviewed Yes   Additional Pertinent History pt s/p lillieis 1/11/21  Pt on 1 5L O2    Response to Previous Treatment Patient with no complaints from previous session  Cognition   Overall Cognitive Status WFL   Arousal/Participation Cooperative   Following Commands Follows all commands and directions without difficulty   Subjective   Subjective pt agreeable to therapy    Bed Mobility   Supine to Sit 7  Independent   Sit to Supine 7  Independent   Transfers   Sit to Stand 7  Independent   Stand to Sit 7  Independent   Ambulation/Elevation   Gait pattern Excessively slow; Short stride   Gait Assistance 7  Independent   Assistive Device None   Distance 10'x9  (in room)   Balance   Static Sitting Normal   Dynamic Sitting Good   Static Standing Good   Dynamic Standing Fair +   Ambulatory Fair +   Endurance Deficit   Endurance Deficit Yes   Endurance Deficit Description O2 between 93-95% on 1 5L O2   Post ambulatory BP of 144/88, HR 90   Activity Tolerance   Activity Tolerance Patient tolerated treatment well;Patient limited by fatigue   Nurse Ferny Cooper 20 RN    Exercises   Heelslides Supine;10 reps;Bilateral  (2 sets )   Hip Abduction Supine;10 reps;Bilateral  (2 sets )   Knee AROM Short Arc Quad Supine;10 reps;Bilateral  (2 sets )   Balance training  dyanmic ambulatory balance activity including gait, gait w/ change in speed, gait w/ head turns, gait w/ eyes closed, stepping over obstacles, and backwards  Required S during gait w/ head turns and eyes closed  Patricia Barthel Ax1 w/ backwards gait  A required d/t unsteadiness   Assessment   Prognosis Fair   Problem List Decreased strength;Decreased endurance;Decreased mobility;Pain   Assessment [employer] was seen for a f/u session per pt POC  Pt tolerated supine therex well  Continues to function independently for bed mobility and transfers  Increased ambulation to 75' in room w/ CAM boot on RLE  N Required S for gait w/ head turns and eyes closed  Min Ax1 for backwards gait d/t unsteadiness  SPO2 between 93-95% during session on 1 5L and post ambulatory vitals of 144/88  End of session pt returned to supine in bed, w/all needs in reach  Barriers to Discharge Inaccessible home environment   Barriers to Discharge Comments DANIELITO    Goals   Patient Goals to go home    STG Expiration Date 01/17/21   Short Term Goal #1 1  Pt will ambulate >150'x1 w/ least restrictive AD for community/household distances  2  Pt will increase activity tolerance to 45 minutes  5  Pt will negotiate stairs at Mod I for safe d/c home  3  Increase Barthel  by MCID value of 35 to facilitate independence   4  PT for ongoing patient and family/caregiver education, DME needs and d/c planning in order to promote highest level of function in least restrictive environment  PT Treatment Day 2   Plan   Treatment/Interventions Functional transfer training;LE strengthening/ROM; Elevations; Therapeutic exercise; Endurance training;Patient/family training;Equipment eval/education; Bed mobility;Gait training;Continued evaluation;Spoke to nursing   Progress Progressing toward goals   PT Frequency 2-3x/wk   Recommendation   PT Discharge Recommendation Return to previous environment with social support   PT - OK to Discharge Yes   Additional Comments when medically stable    AM-PAC Basic Mobility Inpatient   Turning in Bed Without Bedrails 4   Lying on Back to Sitting on Edge of Flat Bed 4   Moving Bed to Chair 4   Standing Up From Chair 4   Walk in Room 4   Climb 3-5 Stairs 4   Basic Mobility Inpatient Raw Score 24   Basic Mobility Standardized Score 57 68     Lottie Mayo, SPT

## 2021-01-13 NOTE — ASSESSMENT & PLAN NOTE
· Non MI elevation troponin secondary to decompensated CHF    Results from last 7 days   Lab Units 01/07/21  1202 01/07/21  0127 01/06/21  2230   TROPONIN I ng/mL 0 09* 0 13* 0 14*

## 2021-01-13 NOTE — ASSESSMENT & PLAN NOTE
· Hypokalemia being repleted in the setting of diuretic    Results from last 7 days   Lab Units 01/13/21  0524 01/12/21  0502 01/11/21  0520 01/10/21  0519 01/09/21  1126 01/08/21  0458 01/07/21  0444   POTASSIUM mmol/L 4 6 4 6 3 7 3 3* 3 9 3 9 2 9*

## 2021-01-13 NOTE — PROGRESS NOTES
Progress Note - Cardiology   Mortimer Hesselbach 44 y o  female MRN: [id number]  Unit/Bed#: E4 -01 Encounter: [id number]      Assessment/Recommendations/Discussion:   1  Acute systolic and diastolic congestive heart failure  2  Nonischemic cardiomyopathy, ejection fraction 25%  3  Hypertension  4  Non MI related troponin elevation secondary to CHF  5  Ongoing tobacco use  6  Acute kidney injury  7  Dyslipidemia    PLAN  Darlene Vasquez Yesterday she underwent successful ultrasound-guided left-sided thoracentesis with 630 mL of fluid removed   She states she has some pain with taking a deep breath but overall feels well   Cardiac catheterization showed no significant obstructive epicardial coronary stenosis, mildly elevated left ventricular end-diastolic pressure was noted   Slight bump in creatinine today is noted  It seems her usual creatinine runs around 1 6, she is 1 72 today   Still requiring a small amount oxygen, would titrate off if possible   Continue Lipitor 40, carvedilol 25 b i d , hydralazine 25 t i d , isosorbide dinitrate 10 mg t i d    Heart rate is controlled   Would switch to p o  Lasix 40 mg    Check chest x-ray with PA and left lateral   Life vest is going to require preauthorization  Subjective:   HPI  Having some pain with taking a deep breath status post thoracentesis, otherwise feels good, has no lower extremity edema      Review of Systems: As noted in HPI  Rest of ROS is negative      Vitals:   /84 (BP Location: Right arm)   Pulse 79   Temp 97 6 °F (36 4 °C) (Temporal)   Resp 20   Ht 5' 6" (1 676 m)   Wt 54 kg (119 lb 0 8 oz)   SpO2 90%   BMI 19 21 kg/m²   Vitals:    01/12/21 0600 01/13/21 0600   Weight: 55 kg (121 lb 4 1 oz) 54 kg (119 lb 0 8 oz)       Intake/Output Summary (Last 24 hours) at 1/13/2021 1105  Last data filed at 1/13/2021 0549  Gross per 24 hour   Intake 150 ml   Output 1030 ml   Net -880 ml       Physical Exam:  General appearance: alert and oriented, in no acute distress  Head: Normocephalic, without obvious abnormality, atraumatic  Eyes: conjunctivae/corneas clear  Anicteric    Neck: no adenopathy, no carotid bruit, no JVD  Lungs:  Decreased breath sounds bilaterally due to poor inspiratory effort  Heart: regular rate and rhythm, S1, S2 normal, no murmur, no click, rub or gallop  Abdomen:  soft, non-tender; bowel sounds normal; no masses,  no organomegaly  Extremities: extremities normal, warm and well-perfused; no cyanosis, clubbing, or edema  Skin: Skin color, texture, turgor normal  No rashes or lesions     TELEMETRY:  Sinus rhythm with PVCs intermittent  Lab Results:  Results from last 7 days   Lab Units 01/13/21  0524   WBC Thousand/uL 9 27   HEMOGLOBIN g/dL 11 2*   HEMATOCRIT % 34 5*   PLATELETS Thousands/uL 228     Results from last 7 days   Lab Units 01/13/21  0524   POTASSIUM mmol/L 4 6   CHLORIDE mmol/L 107   CO2 mmol/L 27   BUN mg/dL 42*   CREATININE mg/dL 1 72*   CALCIUM mg/dL 8 3   ALK PHOS U/L 76   ALT U/L 20   AST U/L 21     Results from last 7 days   Lab Units 01/13/21  0524   POTASSIUM mmol/L 4 6   CHLORIDE mmol/L 107   CO2 mmol/L 27   BUN mg/dL 42*   CREATININE mg/dL 1 72*   CALCIUM mg/dL 8 3           Medications:    Current Facility-Administered Medications:     acetaminophen (TYLENOL) tablet 650 mg, 650 mg, Oral, Q4H PRN, Samuel Lowery PA-C, 650 mg at 01/12/21 2118    atorvastatin (LIPITOR) tablet 40 mg, 40 mg, Oral, Daily With Job Lone, DO, 40 mg at 01/12/21 1645    carvedilol (COREG) tablet 25 mg, 25 mg, Oral, BID With Meals, GAGE Mccord, 25 mg at 01/13/21 0817    furosemide (LASIX) injection 40 mg, 40 mg, Intravenous, BID (diuretic), Luis Angel James DO, 40 mg at 01/12/21 1745    heparin (porcine) subcutaneous injection 5,000 Units, 5,000 Units, Subcutaneous, Q8H [address], Sugey Soliz PA-C, 5,000 Units at 01/13/21 0549    hydrALAZINE (APRESOLINE) tablet 25 mg, 25 mg, Oral, TID after meals, GAGE Mccord, 25 mg at 01/12/21 1638    isosorbide dinitrate (ISORDIL) tablet 10 mg, 10 mg, Oral, TID after meals, Catherine Bazzi PA-C, 10 mg at 01/13/21 0817    Labetalol HCl (NORMODYNE) injection 10 mg, 10 mg, Intravenous, Q6H PRN, Pino Weston MD, Stopped at 01/07/21 1214    nicotine (NICODERM CQ) 14 mg/24hr TD 24 hr patch 1 patch, 1 patch, Transdermal, Daily, Sugey Soliz PA-C, 1 patch at 01/13/21 0816    ondansetron (ZOFRAN) injection 4 mg, 4 mg, Intravenous, Q6H PRN, Sugey Soliz PA-C, 4 mg at 01/12/21 1644    PARoxetine (PAXIL) tablet 60 mg, 60 mg, Oral, Daily, Sugey Soliz PA-C, 60 mg at 01/13/21 0817    promethazine (PHENERGAN) injection 12 5 mg, 12 5 mg, Intravenous, Q6H PRN, Aron Cedeño DO    QUEtiapine (SEROquel) tablet 100 mg, 100 mg, Oral, HS, Sugey Soliz PA-C, 100 mg at 01/12/21 2116    This note was completed in part utilizing M-Modal Fluency Direct Software  Grammatical errors, random word insertions, spelling mistakes, and incomplete sentences may be an occasional consequence of this system secondary to software limitations, ambient noise, and hardware issues  If you have any questions or concerns about the content, text, or information contained within the body of this dictation, please contact the provider for clarification      Loraine Morrissey DO, [facility]  1/13/2021 11:05 AM

## 2021-01-13 NOTE — ASSESSMENT & PLAN NOTE
· Acute respiratory failure with hypoxia secondary to do CHF/bilateral pleural effusions  · Appreciate IR evaluation and left-sided thoracentesis    Wean oxygen when able currently at 2 L  · Restarted furosemide but transitioned to oral today

## 2021-01-13 NOTE — PLAN OF CARE
Problem: PHYSICAL THERAPY ADULT  Goal: Performs mobility at highest level of function for planned discharge setting  See evaluation for individualized goals  Description: Treatment/Interventions: Functional transfer training, LE strengthening/ROM, Elevations, Therapeutic exercise, Endurance training, Patient/family training, Equipment eval/education, Bed mobility, Gait training, Continued evaluation, Spoke to nursing          See flowsheet documentation for full assessment, interventions and recommendations  Outcome: Progressing  Note: Prognosis: Fair  Problem List: Decreased strength, Decreased endurance, Decreased mobility, Pain  Assessment: Racheal Lynn was seen for a f/u session per pt POC  Pt tolerated supine therex well  Continues to function independently for bed mobility and transfers  Increased ambulation to 75' in room w/ CAM boot on RLE  N Required S for gait w/ head turns and eyes closed  Min Ax1 for backwards gait d/t unsteadiness  SPO2 between 93-95% during session on 1 5L and post ambulatory vitals of 144/88  End of session pt returned to supine in bed, w/all needs in reach  Barriers to Discharge: Inaccessible home environment  Barriers to Discharge Comments: DANIELITO   PT Discharge Recommendation: Return to previous environment with social support     PT - OK to Discharge: Yes    See flowsheet documentation for full assessment

## 2021-01-13 NOTE — PLAN OF CARE
Problem: Potential for Falls  Goal: Patient will remain free of falls  Description: INTERVENTIONS:  - Assess patient frequently for physical needs  -  Identify cognitive and physical deficits and behaviors that affect risk of falls  -  [place] fall precautions as indicated by assessment   - Educate patient/family on patient safety including physical limitations  - Instruct patient to call for assistance with activity based on assessment  - Modify environment to reduce risk of injury  - Consider OT/PT consult to assist with strengthening/mobility  Outcome: Progressing     Problem: PAIN - ADULT  Goal: Verbalizes/displays adequate comfort level or baseline comfort level  Description: Interventions:  - Encourage patient to monitor pain and request assistance  - Assess pain using appropriate pain scale  - Administer analgesics based on type and severity of pain and evaluate response  - Implement non-pharmacological measures as appropriate and evaluate response  - Consider cultural and social influences on pain and pain management  - Notify physician/advanced practitioner if interventions unsuccessful or patient reports new pain  Outcome: Progressing     Problem: SAFETY ADULT  Goal: Patient will remain free of falls  Description: INTERVENTIONS:  - Assess patient frequently for physical needs  -  Identify cognitive and physical deficits and behaviors that affect risk of falls    -  [place] fall precautions as indicated by assessment   - Educate patient/family on patient safety including physical limitations  - Instruct patient to call for assistance with activity based on assessment  - Modify environment to reduce risk of injury  - Consider OT/PT consult to assist with strengthening/mobility  Outcome: Progressing  Goal: Maintain or return to baseline ADL function  Description: INTERVENTIONS:  -  Assess patient's ability to carry out ADLs; assess patient's baseline for ADL function and identify physical deficits which impact ability to perform ADLs (bathing, care of mouth/teeth, toileting, grooming, dressing, etc )  - Assess/evaluate cause of self-care deficits   - Assess range of motion  - Assess patient's mobility; develop plan if impaired  - Assess patient's need for assistive devices and provide as appropriate  - Encourage maximum independence but intervene and supervise when necessary  - Involve family in performance of ADLs  - Assess for home care needs following discharge   - Consider OT consult to assist with ADL evaluation and planning for discharge  - Provide patient education as appropriate  Outcome: Progressing  Goal: Maintain or return mobility status to optimal level  Description: INTERVENTIONS:  - Assess patient's baseline mobility status (ambulation, transfers, stairs, etc )    - Identify cognitive and physical deficits and behaviors that affect mobility  - Identify mobility aids required to assist with transfers and/or ambulation (gait belt, sit-to-stand, lift, walker, cane, etc )  - [place] fall precautions as indicated by assessment  - Record patient progress and toleration of activity level on Mobility SBAR; progress patient to next Phase/Stage  - Instruct patient to call for assistance with activity based on assessment  - Consider rehabilitation consult to assist with strengthening/weightbearing, etc   Outcome: Progressing     Problem: DISCHARGE PLANNING  Goal: Discharge to home or other facility with appropriate resources  Description: INTERVENTIONS:  - Identify barriers to discharge w/patient and caregiver  - Arrange for needed discharge resources and transportation as appropriate  - Identify discharge learning needs (meds, wound care, etc )  - Arrange for interpretive services to assist at discharge as needed  - Refer to Case Management Department for coordinating discharge planning if the patient needs post-hospital services based on physician/advanced practitioner order or complex needs related to functional status, cognitive ability, or social support system  Outcome: Progressing     Problem: Knowledge Deficit  Goal: Patient/family/caregiver demonstrates understanding of disease process, treatment plan, medications, and discharge instructions  Description: Complete learning assessment and assess knowledge base    Interventions:  - Provide teaching at level of understanding  - Provide teaching via preferred learning methods  Outcome: Progressing     Problem: CARDIOVASCULAR - ADULT  Goal: Maintains optimal cardiac output and hemodynamic stability  Description: INTERVENTIONS:  - Monitor I/O, vital signs and rhythm  - Monitor for S/S and trends of decreased cardiac output  - Administer and titrate ordered vasoactive medications to optimize hemodynamic stability  - Assess quality of pulses, skin color and temperature  - Assess for signs of decreased coronary artery perfusion  - Instruct patient to report change in severity of symptoms  Outcome: Progressing  Goal: Absence of cardiac dysrhythmias or at baseline rhythm  Description: INTERVENTIONS:  - Continuous cardiac monitoring, vital signs, obtain 12 lead EKG if ordered  - Administer antiarrhythmic and heart rate control medications as ordered  - Monitor electrolytes and administer replacement therapy as ordered  Outcome: Progressing

## 2021-01-13 NOTE — PROGRESS NOTES
Progress Note - Diamond Acevedo 1970, 44 y o  female MRN: [id number]    Unit/Bed#: E4 -01 Encounter: [id number]    Primary Care Provider: No primary care provider on file  Date and time admitted to hospital: 1/6/2021  4:34 PM        * Acute respiratory failure with hypoxia (HCC)  Assessment & Plan  · Acute respiratory failure with hypoxia secondary to do CHF/bilateral pleural effusions  · Appreciate IR evaluation and left-sided thoracentesis  Wean oxygen when able currently at 2 L  · Restarted furosemide but transitioned to oral today    Acute systolic congestive heart failure [facility])  Assessment & Plan  Weight (last 2 days)     Date/Time   Weight    01/13/21 0600   54 (119 05)    01/12/21 0600   55 (121 25)          · Acute systolic CHF new diagnosis  Cardiac catheterization negative for occlusive disease  · Diuretics held due to catheterization a will restart tonight  · Holding ACE-inhibitor secondary to kidney injury  Continue hydralazine/isosorbide  · Currently has not been approved by insurance for life vest  · Furosemide changed to oral today    Closed fracture of distal end of right fibula  Assessment & Plan  · Distal fibula fracture follows with orthopedics prior to admission    Elevated troponin  Assessment & Plan  · Non MI elevation troponin secondary to decompensated CHF    Results from last 7 days   Lab Units 01/07/21  1202 01/07/21  0127 01/06/21  2230   TROPONIN I ng/mL 0 09* 0 13* 0 14*       MAXI (acute kidney injury) ([facility] [address] )  Assessment & Plan  · MAXI on CKD 3 the setting of decompensated CHF  · Has significant proteinuria currently undergoing workup per nephrology including 24 hour urine collection  · Cardiology and nephrology following    Stable after catheterization and restarted diuretics    Results from last 7 days   Lab Units 01/13/21  0524 01/12/21  0502 01/11/21  0520 01/10/21  0519 01/09/21  1126 01/08/21  0458 01/07/21  0444   BUN mg/dL 42* 40* 42* 39* 39* 33* 29* CREATININE mg/dL 1 72* 1 48* 1 61* 1 63* 1 55* 1 63* 1 69*   EGFR ml/min/1 73sq m 34 40 37 36 38 36 34       Hypokalemia  Assessment & Plan  · Hypokalemia being repleted in the setting of diuretic    Results from last 7 days   Lab Units 01/13/21  0524 01/12/21  0502 01/11/21  0520 01/10/21  0519 01/09/21  1126 01/08/21  0458 01/07/21  0444   POTASSIUM mmol/L 4 6 4 6 3 7 3 3* 3 9 3 9 2 9*       Hypertensive urgency  Assessment & Plan  · Accelerated hypertension now improved  · Continue carvedilol and monitor with the addition of hydralazine/isosorbide    Anxiety  Assessment & Plan  · Anxiety mood stable on quetiapine and paroxetine      VTE Pharmacologic Prophylaxis:   High Risk (Score >/= 5) - Pharmacological DVT Prophylaxis Ordered: Heparin  Sequential Compression Devices Ordered  Patient Centered Rounds: I have performed bedside rounds with nursing staff today  Discussions with Specialists or Other Care Team Provider:  Nephrology    Education and Discussions with Family / Patient: discussed with mother on telephone    Time Spent for Care: 25 mins  More than 50% of total time spent on counseling and coordination of care as described above  Current Length of Stay: 7 day(s)  Current Patient Status: Inpatient   Certification Statement: The patient will continue to require additional inpatient hospital stay due to CHF, hypoxia  Discharge Plan / Estimated Discharge Date: 24-48 hours    Code Status: Level 1 - Full Code      Subjective:   Patient seen and examined  Having left-sided rib/chest pain with deep inspiration    Objective:   Vitals: Blood pressure 152/97, pulse 81, temperature (!) 97 4 °F (36 3 °C), temperature source Temporal, resp  rate 20, height 5' 6" (1 676 m), weight 54 kg (119 lb 0 8 oz), SpO2 94 %  Physical Exam  Vitals signs reviewed  Constitutional:       General: She is not in acute distress  HENT:      Head: Atraumatic  Eyes:      General: No scleral icterus       Extraocular Movements: Extraocular movements intact  Cardiovascular:      Rate and Rhythm: Regular rhythm  Heart sounds: Normal heart sounds  Pulmonary:      Breath sounds: Decreased breath sounds present  No wheezing  Abdominal:      General: Bowel sounds are normal       Palpations: Abdomen is soft  Tenderness: There is no guarding or rebound  Musculoskeletal:         General: No swelling  Skin:     General: Skin is warm  Neurological:      Mental Status: She is alert and oriented to person, place, and time  Mental status is at baseline     Psychiatric:         Mood and Affect: Mood normal        Additional Data:   Labs:  Results from last 7 days   Lab Units 01/13/21  0524 01/12/21  0502 01/11/21  0520 01/07/21  1202   WBC Thousand/uL 9 27 6 23 7 88 10 42*   HEMOGLOBIN g/dL 11 2* 11 2* 12 3 13 0   HEMATOCRIT % 34 5* 34 1* 37 2 38 9   MCV fL 101* 99* 98 99*   PLATELETS Thousands/uL 228 235 252 328     Results from last 7 days   Lab Units 01/13/21  0524 01/12/21  0502 01/11/21  0520 01/10/21  0519 01/09/21  1126 01/08/21  0458 01/07/21  0444   SODIUM mmol/L 138 141 140 141 139 144 141   POTASSIUM mmol/L 4 6 4 6 3 7 3 3* 3 9 3 9 2 9*   CHLORIDE mmol/L 107 109* 106 104 103 109* 107   CO2 mmol/L 27 29 27 32 33* 27 22   ANION GAP mmol/L 4 3* 7 5 3* 8 12   BUN mg/dL 42* 40* 42* 39* 39* 33* 29*   CREATININE mg/dL 1 72* 1 48* 1 61* 1 63* 1 55* 1 63* 1 69*   CALCIUM mg/dL 8 3 8 1* 8 1* 8 0* 8 2* 8 1* 7 9*   ALBUMIN g/dL 1 8* 1 8*  --   --   --   --  1 9*   TOTAL BILIRUBIN mg/dL 0 26 0 19*  --   --   --   --  0 30   ALK PHOS U/L 76 71  --   --   --   --  86   ALT U/L 20 19  --   --   --   --  28   AST U/L 21 24  --   --   --   --  24   EGFR ml/min/1 73sq m 34 40 37 36 38 36 34   GLUCOSE RANDOM mg/dL 105 89 86 93 97 101 97     Results from last 7 days   Lab Units 01/07/21  0444   MAGNESIUM mg/dL 2 2     Results from last 7 days   Lab Units 01/07/21  1202 01/07/21  0127 01/06/21  2230   TROPONIN I ng/mL 0 09* 0 13* 0 14*                  Results from last 7 days   Lab Units 01/07/21  0444   HEMOGLOBIN A1C % 5 3     Results from last 7 days   Lab Units 01/09/21  0718   TSH 3RD GENERATON uIU/mL 1 624     * I Have Reviewed All Lab Data Listed Above  Cultures:   Results from last 7 days   Lab Units 01/12/21  1359   GRAM STAIN RESULT  1+ Polys  No organisms seen   BODY FLUID CULTURE, STERILE  No growth                 Lines/Drains:  Invasive Devices     Peripheral Intravenous Line            Peripheral IV 01/12/21 Dorsal (posterior); Left Forearm 1 day              Telemetry:   Telemetry Orders (From admission, onward)             48 Hour Telemetry Monitoring  Continuous x 48 hours     Question:  Reason for 48 Hour Telemetry  Answer:  Acute Decompensated CHF (continuous diuretic infusion or total diuretic dose > 200 mg daily, associated electrolyte derangement, ionotropic drip, history of ventricular arrhythmia, or new EF <35%)                  Imaging:  Imaging Reports Reviewed Today Include:   Xr Chest Portable    Result Date: 1/7/2021  Impression: Interstitial edema with effusions and atelectasis, corresponding with the chest CT one day earlier  Workstation performed: [id number]     Xr Chest Pa & Lateral    Result Date: 1/10/2021  Impression: Improved vascular clarity with persistent bilateral pleural effusions and left greater than right basilar atelectasis  Workstation performed: [id number]     Xr Ankle 3+ Vw Right    Result Date: 1/11/2021  Impression: Unchanged alignment at the distal fibular fracture with findings of ongoing healing/remodeling  Workstation performed: [id number]     Cta Ed Chest Pe Study    Result Date: 1/6/2021  Impression: No pulmonary embolus  Bilateral moderate pleural effusions  Posterior bibasilar atelectasis/infiltrates   Questionable hilar and subcarinal lymphadenopathy limited by lack of IV contrast  Workstation performed: [id number]     Scheduled Meds:  Current Facility-Administered Medications Medication Dose Route Frequency Provider Last Rate    acetaminophen  650 mg Oral Q4H PRN Tod Andrews PA-C      atorvastatin  40 mg Oral Daily With [employer], DO      carvedilol  25 mg Oral BID With Meals GAGE Mccord      [START ON 1/14/2021] furosemide  40 mg Oral Daily Nichole Alonzo,       heparin (porcine)  5,000 Units Subcutaneous Q8H [address] Sugey Soliz PA-C      hydrALAZINE  10 mg Oral TID after meals GAGE Mccord      isosorbide dinitrate  10 mg Oral TID after meals Lei Bazzi PA-C      Labetalol HCl  10 mg Intravenous Q6H PRN Sam Sharma MD      nicotine  1 patch Transdermal Daily Sugey Soliz PA-C      ondansetron  4 mg Intravenous Q6H PRN Tod Andrews PA-C      oxyCODONE  5 mg Oral Q4H PRN Tommy Gorman DO      PARoxetine  60 mg Oral Daily Sugey Soliz PA-C      promethazine  12 5 mg Intravenous Q6H PRN Brianna Bernal DO      QUEtiapine  100 mg Oral HS ALISHA Lopez DO  [facility] Internal Medicine  Hospitalist    ** Please Note: This note has been constructed using a voice recognition system   **

## 2021-01-13 NOTE — PLAN OF CARE
Problem: Potential for Falls  Goal: Patient will remain free of falls  Description: INTERVENTIONS:  - Assess patient frequently for physical needs  -  Identify cognitive and physical deficits and behaviors that affect risk of falls  -  [place] fall precautions as indicated by assessment   - Educate patient/family on patient safety including physical limitations  - Instruct patient to call for assistance with activity based on assessment  - Modify environment to reduce risk of injury  - Consider OT/PT consult to assist with strengthening/mobility  Outcome: Progressing     Problem: PAIN - ADULT  Goal: Verbalizes/displays adequate comfort level or baseline comfort level  Description: Interventions:  - Encourage patient to monitor pain and request assistance  - Assess pain using appropriate pain scale  - Administer analgesics based on type and severity of pain and evaluate response  - Implement non-pharmacological measures as appropriate and evaluate response  - Consider cultural and social influences on pain and pain management  - Notify physician/advanced practitioner if interventions unsuccessful or patient reports new pain  Outcome: Progressing     Problem: SAFETY ADULT  Goal: Patient will remain free of falls  Description: INTERVENTIONS:  - Assess patient frequently for physical needs  -  Identify cognitive and physical deficits and behaviors that affect risk of falls    -  [place] fall precautions as indicated by assessment   - Educate patient/family on patient safety including physical limitations  - Instruct patient to call for assistance with activity based on assessment  - Modify environment to reduce risk of injury  - Consider OT/PT consult to assist with strengthening/mobility  Outcome: Progressing  Goal: Maintain or return to baseline ADL function  Description: INTERVENTIONS:  -  Assess patient's ability to carry out ADLs; assess patient's baseline for ADL function and identify physical deficits which impact ability to perform ADLs (bathing, care of mouth/teeth, toileting, grooming, dressing, etc )  - Assess/evaluate cause of self-care deficits   - Assess range of motion  - Assess patient's mobility; develop plan if impaired  - Assess patient's need for assistive devices and provide as appropriate  - Encourage maximum independence but intervene and supervise when necessary  - Involve family in performance of ADLs  - Assess for home care needs following discharge   - Consider OT consult to assist with ADL evaluation and planning for discharge  - Provide patient education as appropriate  Outcome: Progressing  Goal: Maintain or return mobility status to optimal level  Description: INTERVENTIONS:  - Assess patient's baseline mobility status (ambulation, transfers, stairs, etc )    - Identify cognitive and physical deficits and behaviors that affect mobility  - Identify mobility aids required to assist with transfers and/or ambulation (gait belt, sit-to-stand, lift, walker, cane, etc )  - [place] fall precautions as indicated by assessment  - Record patient progress and toleration of activity level on Mobility SBAR; progress patient to next Phase/Stage  - Instruct patient to call for assistance with activity based on assessment  - Consider rehabilitation consult to assist with strengthening/weightbearing, etc   Outcome: Progressing     Problem: DISCHARGE PLANNING  Goal: Discharge to home or other facility with appropriate resources  Description: INTERVENTIONS:  - Identify barriers to discharge w/patient and caregiver  - Arrange for needed discharge resources and transportation as appropriate  - Identify discharge learning needs (meds, wound care, etc )  - Arrange for interpretive services to assist at discharge as needed  - Refer to Case Management Department for coordinating discharge planning if the patient needs post-hospital services based on physician/advanced practitioner order or complex needs related to functional status, cognitive ability, or social support system  Outcome: Progressing     Problem: Knowledge Deficit  Goal: Patient/family/caregiver demonstrates understanding of disease process, treatment plan, medications, and discharge instructions  Description: Complete learning assessment and assess knowledge base    Interventions:  - Provide teaching at level of understanding  - Provide teaching via preferred learning methods  Outcome: Progressing     Problem: CARDIOVASCULAR - ADULT  Goal: Maintains optimal cardiac output and hemodynamic stability  Description: INTERVENTIONS:  - Monitor I/O, vital signs and rhythm  - Monitor for S/S and trends of decreased cardiac output  - Administer and titrate ordered vasoactive medications to optimize hemodynamic stability  - Assess quality of pulses, skin color and temperature  - Assess for signs of decreased coronary artery perfusion  - Instruct patient to report change in severity of symptoms  Outcome: Progressing  Goal: Absence of cardiac dysrhythmias or at baseline rhythm  Description: INTERVENTIONS:  - Continuous cardiac monitoring, vital signs, obtain 12 lead EKG if ordered  - Administer antiarrhythmic and heart rate control medications as ordered  - Monitor electrolytes and administer replacement therapy as ordered  Outcome: Progressing

## 2021-01-13 NOTE — PROGRESS NOTES
NEPHROLOGY PROGRESS NOTE   Miguel Pace 44 y o  female MRN: [id number]  Unit/Bed#: E4 -01 Encounter: [id number]      ASSESSMENT/PLAN:  Acute kidney injury (POA):  - etiology suspect secondary to cardiorenal syndrome with volume overload + possibly underlying glomerular nephritis with hematuria and proteinuria  - upon review medical records, baseline creatinine 0 7-1 0 mg/dL in 2018   - creatinine 1 87 mg/dL upon admission on 1/6    - most recent creatinine 1 72 mg/dL today  - elevation suspect secondary to hemodynamic perturbations +/- CARLIE secondary to cardiac catheterization on 1/11               - status post furosemide 40 mg IV yesterday evening, 1/12  Plan to transition to oral diuretics with furosemide 40 mg daily per Cardiology  - proteinuria: Most recent urine protein creatinine ratio 9 17 (9 98 prior)  Order placed for 24 hour urine protein creatinine    - SPEP revealed no monoclonal bands, UPEP revealed nonselective proteinuria- no monoclonal bands, GBM negative, C3 97 5 C4 29, dsDNA negative, KAYLA negative,  free light chain ratio 1 85, hepatitis panel negative, RF positive- RF 20   - ANCA Pending   - avoid NSAIDs, nephrotoxic agents, IV contrast   - adjust medications to appropriate GFR  - monitor volume status with strict intake/output  Daily weight  - will check BMP, magnesium, phosphorus in a m      Electrolytes, acid/base:  - overall stable and acceptable  - will continue to monitor with repeat lab studies      Blood pressure, hypertension:  - blood pressure with fluctuations  - patient is not on antihypertensive medications as outpatient  - currently on: Carvedilol 25 mg b i d  + hydralazine 25 mg t i d  + Isordil 10 mg t i d   - recommendations: maintain hold parameters on antihypertensives   Will decrease hydralazine 25 mg t i d  to 10 mg t i d   - optimize hemodynamics; avoid hypotension fluctuations of blood pressure   - maintain MAP > 65       H/H:  - most recent hemoglobin 11 2 grams/deciliter   - goal hemoglobin greater than 8 grams/deciliter  - recommend PRBC transfusion for hemoglobin less than 7       Volume status/CHF:  - most recent echocardiogram revealed EF of 25%  - chest x-ray completed on 01/10 revealed improved vascular clarity with persistent bilateral pleural effusions and left greater than right basilar atelectasis  - status post thoracentesis yesterday, 1/12 with 630 mL fluid removal from left lung    - status post cardiac catheterization on 1/11: Normal coronary angiography per Cardiology  - diuretics as above  - continue to monitor volume status closely  - cardiology following  SUBJECTIVE:  Patient resting in bed  Patient with pain from thoracentesis site  Patient with shortness of breath suspect due to pain she is experiencing  She is eating and drinking well       OBJECTIVE:  Current Weight: Weight - Scale: 54 kg (119 lb 0 8 oz)  Vitals:    01/13/21 1325   BP:    Pulse:    Resp:    Temp:    SpO2: 93%       Intake/Output Summary (Last 24 hours) at 1/13/2021 1336  Last data filed at 1/13/2021 0549  Gross per 24 hour   Intake 150 ml   Output 1030 ml   Net -880 ml       General:  Acute distress with pain  Skin:  Warm, no rash  Eyes:  Sclerae anicteric  ENT:  Moist lips mucous membranes  Neck:  Supple trachea midline  Chest:  Diminished breath sounds   CVS:  Regular rate regular rhythm  Abdomen:  Soft, nontender, normoactive bowel sounds  Extremities:  No overt bilateral lower extremity edema   Neuro:  Awake, interactive  Psych:  Appropriate affect, in pain upon examination      Medications:  Scheduled Meds:  Current Facility-Administered Medications   Medication Dose Route Frequency Provider Last Rate    acetaminophen  650 mg Oral Q4H PRN Juan Pablo Stanton PA-C      atorvastatin  40 mg Oral Daily With Kamida, DO      carvedilol  25 mg Oral BID With Meals GAGE Mccord      [START ON 1/14/2021] furosemide  40 mg Oral Daily Yassine Fernando,       heparin (porcine)  5,000 Units Subcutaneous [facility] Sugey Soliz PA-C      hydrALAZINE  25 mg Oral TID after meals GAGE Mccord      isosorbide dinitrate  10 mg Oral TID after meals Lei Bazzi PA-C      Labetalol HCl  10 mg Intravenous Q6H PRN Rafiq Power MD      nicotine  1 patch Transdermal Daily Sugey Soliz PA-C      ondansetron  4 mg Intravenous Q6H PRN Yaneth Michelle PA-C      oxyCODONE  5 mg Oral Q4H PRN Yesica Stewart DO      PARoxetine  60 mg Oral Daily Sugey Soliz PA-C      promethazine  12 5 mg Intravenous Q6H PRN Cristela Rubinstein, DO      QUEtiapine  100 mg Oral HS Sugey Soliz PA-C         PRN Meds:   acetaminophen    Labetalol HCl    ondansetron    oxyCODONE    promethazine    Continuous Infusions:     Laboratory Results:  Results from last 7 days   Lab Units 01/13/21  0524 01/12/21  0502 01/11/21  0520 01/10/21  0519 01/09/21  1126 01/08/21  0458 01/07/21  1202 01/07/21  0444 01/06/21  1653   WBC Thousand/uL 9 27 6 23 7 88  --   --   --  10 42*  --  10 78*   HEMOGLOBIN g/dL 11 2* 11 2* 12 3  --   --   --  13 0  --  13 3   HEMATOCRIT % 34 5* 34 1* 37 2  --   --   --  38 9  --  39 8   PLATELETS Thousands/uL 228 235 252  --   --   --  328  --  361   SODIUM mmol/L 138 141 140 141 139 144  --  141 142   POTASSIUM mmol/L 4 6 4 6 3 7 3 3* 3 9 3 9  --  2 9* 3 3*   CHLORIDE mmol/L 107 109* 106 104 103 109*  --  107 106   CO2 mmol/L 27 29 27 32 33* 27  --  22 28   BUN mg/dL 42* 40* 42* 39* 39* 33*  --  29* 27*   CREATININE mg/dL 1 72* 1 48* 1 61* 1 63* 1 55* 1 63*  --  1 69* 1 87*   CALCIUM mg/dL 8 3 8 1* 8 1* 8 0* 8 2* 8 1*  --  7 9* 8 1*   MAGNESIUM mg/dL  --   --   --   --   --   --   --  2 2  --

## 2021-01-14 PROBLEM — D53.9 MACROCYTIC ANEMIA: Status: ACTIVE | Noted: 2021-01-14

## 2021-01-14 LAB
ANION GAP SERPL CALCULATED.3IONS-SCNC: 3 MMOL/L (ref 4–13)
BUN SERPL-MCNC: 40 MG/DL (ref 5–25)
CALCIUM SERPL-MCNC: 8.2 MG/DL (ref 8.3–10.1)
CHLORIDE SERPL-SCNC: 107 MMOL/L (ref 100–108)
CO2 SERPL-SCNC: 28 MMOL/L (ref 21–32)
CREAT 24H UR-MRATE: 1.1 G/24HR (ref 0.6–1.8)
CREAT SERPL-MCNC: 1.69 MG/DL (ref 0.6–1.3)
ERYTHROCYTE [DISTWIDTH] IN BLOOD BY AUTOMATED COUNT: 13.2 % (ref 11.6–15.1)
GFR SERPL CREATININE-BSD FRML MDRD: 34 ML/MIN/1.73SQ M
GLUCOSE SERPL-MCNC: 94 MG/DL (ref 65–140)
HCT VFR BLD AUTO: 32.9 % (ref 34.8–46.1)
HGB BLD-MCNC: 10.7 G/DL (ref 11.5–15.4)
MAGNESIUM SERPL-MCNC: 2.1 MG/DL (ref 1.6–2.6)
MCH RBC QN AUTO: 32.7 PG (ref 26.8–34.3)
MCHC RBC AUTO-ENTMCNC: 32.5 G/DL (ref 31.4–37.4)
MCV RBC AUTO: 101 FL (ref 82–98)
PHOSPHATE SERPL-MCNC: 4 MG/DL (ref 2.7–4.5)
PLATELET # BLD AUTO: 191 THOUSANDS/UL (ref 149–390)
PMV BLD AUTO: 10.2 FL (ref 8.9–12.7)
POTASSIUM SERPL-SCNC: 4.4 MMOL/L (ref 3.5–5.3)
PROT 24H UR-MCNC: 6972 MG/24 HRS (ref 40–150)
RBC # BLD AUTO: 3.27 MILLION/UL (ref 3.81–5.12)
SODIUM SERPL-SCNC: 138 MMOL/L (ref 136–145)
SPECIMEN VOL UR: 1200 ML
SPECIMEN VOL UR: 1200 ML
WBC # BLD AUTO: 5.67 THOUSAND/UL (ref 4.31–10.16)

## 2021-01-14 PROCEDURE — 80048 BASIC METABOLIC PNL TOTAL CA: CPT | Performed by: PHYSICIAN ASSISTANT

## 2021-01-14 PROCEDURE — 99232 SBSQ HOSP IP/OBS MODERATE 35: CPT

## 2021-01-14 PROCEDURE — 99232 SBSQ HOSP IP/OBS MODERATE 35: CPT | Performed by: INTERNAL MEDICINE

## 2021-01-14 PROCEDURE — 83735 ASSAY OF MAGNESIUM: CPT | Performed by: PHYSICIAN ASSISTANT

## 2021-01-14 PROCEDURE — 82570 ASSAY OF URINE CREATININE: CPT | Performed by: NURSE PRACTITIONER

## 2021-01-14 PROCEDURE — 85027 COMPLETE CBC AUTOMATED: CPT | Performed by: INTERNAL MEDICINE

## 2021-01-14 PROCEDURE — 84156 ASSAY OF PROTEIN URINE: CPT | Performed by: NURSE PRACTITIONER

## 2021-01-14 PROCEDURE — 99232 SBSQ HOSP IP/OBS MODERATE 35: CPT | Performed by: NURSE PRACTITIONER

## 2021-01-14 PROCEDURE — 84100 ASSAY OF PHOSPHORUS: CPT | Performed by: INTERNAL MEDICINE

## 2021-01-14 RX ORDER — FOLIC ACID 1 MG/1
1 TABLET ORAL DAILY
Status: DISCONTINUED | OUTPATIENT
Start: 2021-01-15 | End: 2021-01-19 | Stop reason: HOSPADM

## 2021-01-14 RX ORDER — FUROSEMIDE 20 MG/1
20 TABLET ORAL DAILY
Status: DISCONTINUED | OUTPATIENT
Start: 2021-01-15 | End: 2021-01-15

## 2021-01-14 RX ADMIN — PAROXETINE 60 MG: 20 TABLET, FILM COATED ORAL at 08:59

## 2021-01-14 RX ADMIN — FUROSEMIDE 40 MG: 40 TABLET ORAL at 08:59

## 2021-01-14 RX ADMIN — QUETIAPINE FUMARATE 100 MG: 25 TABLET ORAL at 21:39

## 2021-01-14 RX ADMIN — ISOSORBIDE DINITRATE 10 MG: 10 TABLET ORAL at 17:24

## 2021-01-14 RX ADMIN — ISOSORBIDE DINITRATE 10 MG: 10 TABLET ORAL at 08:59

## 2021-01-14 RX ADMIN — ATORVASTATIN CALCIUM 40 MG: 40 TABLET, FILM COATED ORAL at 17:24

## 2021-01-14 RX ADMIN — OXYCODONE HYDROCHLORIDE 5 MG: 5 TABLET ORAL at 17:28

## 2021-01-14 RX ADMIN — ISOSORBIDE DINITRATE 10 MG: 10 TABLET ORAL at 13:10

## 2021-01-14 RX ADMIN — Medication 1 PATCH: at 08:59

## 2021-01-14 RX ADMIN — HYDRALAZINE HYDROCHLORIDE 10 MG: 10 TABLET, FILM COATED ORAL at 08:59

## 2021-01-14 RX ADMIN — HEPARIN SODIUM 5000 UNITS: 5000 INJECTION INTRAVENOUS; SUBCUTANEOUS at 05:40

## 2021-01-14 RX ADMIN — CARVEDILOL 25 MG: 25 TABLET, FILM COATED ORAL at 08:59

## 2021-01-14 RX ADMIN — HEPARIN SODIUM 5000 UNITS: 5000 INJECTION INTRAVENOUS; SUBCUTANEOUS at 21:42

## 2021-01-14 RX ADMIN — HYDRALAZINE HYDROCHLORIDE 10 MG: 10 TABLET, FILM COATED ORAL at 17:24

## 2021-01-14 RX ADMIN — HEPARIN SODIUM 5000 UNITS: 5000 INJECTION INTRAVENOUS; SUBCUTANEOUS at 13:10

## 2021-01-14 RX ADMIN — OXYCODONE HYDROCHLORIDE 5 MG: 5 TABLET ORAL at 09:02

## 2021-01-14 RX ADMIN — HYDRALAZINE HYDROCHLORIDE 10 MG: 10 TABLET, FILM COATED ORAL at 13:10

## 2021-01-14 RX ADMIN — CARVEDILOL 25 MG: 25 TABLET, FILM COATED ORAL at 17:24

## 2021-01-14 NOTE — ASSESSMENT & PLAN NOTE
Weight (last 2 days)     Date/Time   Weight    01/14/21 0600   55 (121 25)    01/13/21 0600   54 (119 05)    01/12/21 0600   55 (121 25)          · Acute systolic CHF new diagnosis  Cardiac catheterization negative for occlusive disease  · Diuretics held due to catheterization has been restarted  · Holding ACE-inhibitor secondary to kidney injury    Continue hydralazine/isosorbide  · Currently has not been approved by insurance for life vest awaiting prior authorization

## 2021-01-14 NOTE — UTILIZATION REVIEW
Continued Stay Review    Date:  1/14/2021                        Current Patient Class:  IP Current Level of Care: [facility]    HPI:50 y o  female initially admitted on  1/6  With acute respiratory failure with hypoxia, acute CHF, B/L pleural effusion, hypertensive urgency, elevated troponin, MAXI and hypokalemia:  Downgraded to [facility] 1/08  Cardiac Cath 1/11    Assessment/Plan:    1/12: Thoracentesis today (see below) Wean off oxygen as able  Suspect may have a component of COPD  Restart furosemide    1/14: Reports some Left pleuritic pain since procedure - small pneumothorax which is likely causing her discomfort  Continiue O2 to maintain sats > 88%  Check CXR today  Deep breathing cough, OOB as tolerated, IS q 1 hr  will need ambulatory pulse ox prior to discharge    Per Cardio: From a volume standpoint she appears to be euvolemic  Continue with p o  Lasix  Decrease dose to 20 mg daily  Continue with hydralazine, Imdur, carvedilol    Pertinent Labs/Diagnostic Results:     1/14 CXR - Pending    1/13 CXR: There is a tiny left apical pneumothorax  Significant decrease in the amount of left pleural fluid following thoracentesis  Persistent right pleural effusion  Decreasing vascular congestion and interstitial prominence  1/12 Thoracentesis: Left -630 mL of fluid was removed  Right effusion is small and was not drained      Results from last 7 days   Lab Units 01/14/21 0313 01/13/21  0524 01/12/21  0502 01/11/21  0520   WBC Thousand/uL 5 67 9 27 6 23 7 88   HEMOGLOBIN g/dL 10 7* 11 2* 11 2* 12 3   HEMATOCRIT % 32 9* 34 5* 34 1* 37 2   PLATELETS Thousands/uL 191 228 235 252   NEUTROS ABS Thousands/µL  --   --   --  4 80     Results from last 7 days   Lab Units 01/14/21 0313 01/13/21  0524 01/12/21  0502 01/11/21  0520 01/10/21  0519   SODIUM mmol/L 138 138 141 140 141   POTASSIUM mmol/L 4 4 4 6 4 6 3 7 3 3*   CHLORIDE mmol/L 107 107 109* 106 104   CO2 mmol/L 28 27 29 27 32   ANION GAP mmol/L 3* 4 3* 7 5   BUN mg/dL 40* 42* 40* 42* 39*   CREATININE mg/dL 1 69* 1 72* 1 48* 1 61* 1 63*   EGFR ml/min/1 73sq m 34 34 40 37 36   CALCIUM mg/dL 8 2* 8 3 8 1* 8 1* 8 0*   MAGNESIUM mg/dL 2 1  --   --   --   --    PHOSPHORUS mg/dL 4 0  --   --   --   --      Results from last 7 days   Lab Units 01/13/21  0524 01/12/21  0502 01/10/21  0519   AST U/L 21 24  --    ALT U/L 20 19  --    ALK PHOS U/L 76 71  --    TOTAL PROTEIN g/dL 5 3* 5 2* 5 1*   ALBUMIN g/dL 1 8* 1 8*  --    TOTAL BILIRUBIN mg/dL 0 26 0 19*  --      Results from last 7 days   Lab Units 01/14/21  0313 01/13/21  0524 01/12/21  0502 01/11/21  0520 01/10/21  0519 01/09/21  1126 01/08/21  0458   GLUCOSE RANDOM mg/dL 94 105 89 86 93 97 101       Results from last 7 days   Lab Units 01/09/21  0718   TSH 3RD GENERATON uIU/mL 1 624     Results from last 7 days   Lab Units 01/09/21  0718   FERRITIN ng/mL 89     Results from last 7 days   Lab Units 01/09/21  1126   HEP B S AG  Non-reactive   HEP C AB  Non-reactive   HEP B C IGM  Non-reactive   HEP B C TOTAL AB  Non-reactive     Results from last 7 days   Lab Units 01/12/21  1623 01/09/21  1739   CREATININE UR mg/dL 86 5 40 3   PROTEIN UR mg/dL 793 402   PROT/CREAT RATIO UR  9 17* 9 98*     Results from last 7 days   Lab Units 01/12/21  1359   GRAM STAIN RESULT  1+ Polys  No organisms seen   BODY FLUID CULTURE, STERILE  No growth     Results from last 7 days   Lab Units 01/12/21  1359   TOTAL COUNTED  100   WBC FLUID /ul 152     Vital Signs:   01/14/21 1100  97 9 °F (36 6 °C)  68  18  117/77  --  92 %  28  2 L/min  Nasal cannula  Lying   01/14/21 0816  97 6 °F (36 4 °C)  77  18  124/64  89  93 %  28  2 L/min  Nasal cannula  Lying   01/14/21 0327  97 5 °F (36 4 °C)  70  20  129/71  --  92 %  --  --  Nasal cannula  Lying     Medications:   Scheduled Medications:  atorvastatin, 40 mg, Oral, Daily With Dinner  carvedilol, 25 mg, Oral, BID With Meals  [START ON 1/15/2021] furosemide, 20 mg, Oral, Daily  heparin (porcine), 5,000 Units, Subcutaneous, Q8H CATHY  hydrALAZINE, 10 mg, Oral, TID after meals  isosorbide dinitrate, 10 mg, Oral, TID after meals  nicotine, 1 patch, Transdermal, Daily  PARoxetine, 60 mg, Oral, Daily  QUEtiapine, 100 mg, Oral, HS    Continuous IV Infusions:     PRN Meds:  acetaminophen, 650 mg, Oral, Q4H PRN  Labetalol HCl, 10 mg, Intravenous, Q6H PRN  ondansetron, 4 mg, Intravenous, Q6H PRN  oxyCODONE, 5 mg, Oral, Q4H PRN x 2 1/13  & x 1 1/4  promethazine, 12 5 mg, Intravenous, Q6H PRN    Discharge Plan: TBD; Anticipate home once medically cleared    Network Utilization Review Department  ATTENTION: Please call with any questions or concerns to [phone] and carefully listen to the prompts so that you are directed to the right person  All voicemails are confidential   Alfredo [facility] all requests for admission clinical reviews, approved or denied determinations and any other requests to dedicated fax number below belonging to the campus where the patient is receiving treatment   List of dedicated fax numbers for the Facilities:  [address] DENIALS (Administrative/Medical Necessity) [phone]   [address] (Maternity/NICU/Pediatrics) [phone]   [address] Lidia Dubois 3765 ([facility] [address] [address] [address] Rafaela Tran 14[address] [place] [address] [phone]

## 2021-01-14 NOTE — PLAN OF CARE
Problem: Potential for Falls  Goal: Patient will remain free of falls  Description: INTERVENTIONS:  - Assess patient frequently for physical needs  -  Identify cognitive and physical deficits and behaviors that affect risk of falls  -  [place] fall precautions as indicated by assessment   - Educate patient/family on patient safety including physical limitations  - Instruct patient to call for assistance with activity based on assessment  - Modify environment to reduce risk of injury  - Consider OT/PT consult to assist with strengthening/mobility  Outcome: Progressing     Problem: PAIN - ADULT  Goal: Verbalizes/displays adequate comfort level or baseline comfort level  Description: Interventions:  - Encourage patient to monitor pain and request assistance  - Assess pain using appropriate pain scale  - Administer analgesics based on type and severity of pain and evaluate response  - Implement non-pharmacological measures as appropriate and evaluate response  - Consider cultural and social influences on pain and pain management  - Notify physician/advanced practitioner if interventions unsuccessful or patient reports new pain  Outcome: Progressing     Problem: SAFETY ADULT  Goal: Patient will remain free of falls  Description: INTERVENTIONS:  - Assess patient frequently for physical needs  -  Identify cognitive and physical deficits and behaviors that affect risk of falls    -  [place] fall precautions as indicated by assessment   - Educate patient/family on patient safety including physical limitations  - Instruct patient to call for assistance with activity based on assessment  - Modify environment to reduce risk of injury  - Consider OT/PT consult to assist with strengthening/mobility  Outcome: Progressing  Goal: Maintain or return to baseline ADL function  Description: INTERVENTIONS:  -  Assess patient's ability to carry out ADLs; assess patient's baseline for ADL function and identify physical deficits which impact ability to perform ADLs (bathing, care of mouth/teeth, toileting, grooming, dressing, etc )  - Assess/evaluate cause of self-care deficits   - Assess range of motion  - Assess patient's mobility; develop plan if impaired  - Assess patient's need for assistive devices and provide as appropriate  - Encourage maximum independence but intervene and supervise when necessary  - Involve family in performance of ADLs  - Assess for home care needs following discharge   - Consider OT consult to assist with ADL evaluation and planning for discharge  - Provide patient education as appropriate  Outcome: Progressing  Goal: Maintain or return mobility status to optimal level  Description: INTERVENTIONS:  - Assess patient's baseline mobility status (ambulation, transfers, stairs, etc )    - Identify cognitive and physical deficits and behaviors that affect mobility  - Identify mobility aids required to assist with transfers and/or ambulation (gait belt, sit-to-stand, lift, walker, cane, etc )  - [place] fall precautions as indicated by assessment  - Record patient progress and toleration of activity level on Mobility SBAR; progress patient to next Phase/Stage  - Instruct patient to call for assistance with activity based on assessment  - Consider rehabilitation consult to assist with strengthening/weightbearing, etc   Outcome: Progressing     Problem: DISCHARGE PLANNING  Goal: Discharge to home or other facility with appropriate resources  Description: INTERVENTIONS:  - Identify barriers to discharge w/patient and caregiver  - Arrange for needed discharge resources and transportation as appropriate  - Identify discharge learning needs (meds, wound care, etc )  - Arrange for interpretive services to assist at discharge as needed  - Refer to Case Management Department for coordinating discharge planning if the patient needs post-hospital services based on physician/advanced practitioner order or complex needs related to functional status, cognitive ability, or social support system  Outcome: Progressing     Problem: Knowledge Deficit  Goal: Patient/family/caregiver demonstrates understanding of disease process, treatment plan, medications, and discharge instructions  Description: Complete learning assessment and assess knowledge base    Interventions:  - Provide teaching at level of understanding  - Provide teaching via preferred learning methods  Outcome: Progressing     Problem: CARDIOVASCULAR - ADULT  Goal: Maintains optimal cardiac output and hemodynamic stability  Description: INTERVENTIONS:  - Monitor I/O, vital signs and rhythm  - Monitor for S/S and trends of decreased cardiac output  - Administer and titrate ordered vasoactive medications to optimize hemodynamic stability  - Assess quality of pulses, skin color and temperature  - Assess for signs of decreased coronary artery perfusion  - Instruct patient to report change in severity of symptoms  Outcome: Progressing  Goal: Absence of cardiac dysrhythmias or at baseline rhythm  Description: INTERVENTIONS:  - Continuous cardiac monitoring, vital signs, obtain 12 lead EKG if ordered  - Administer antiarrhythmic and heart rate control medications as ordered  - Monitor electrolytes and administer replacement therapy as ordered  Outcome: Progressing     Problem: Prexisting or High Potential for Compromised Skin Integrity  Goal: Skin integrity is maintained or improved  Description: INTERVENTIONS:  - Identify patients at risk for skin breakdown  - Assess and monitor skin integrity  - Assess and monitor nutrition and hydration status  - Monitor labs   - Assess for incontinence   - Turn and reposition patient  - Assist with mobility/ambulation  - Relieve pressure over bony prominences  - Avoid friction and shearing  - Provide appropriate hygiene as needed including keeping skin clean and dry  - Evaluate need for skin moisturizer/barrier cream  - Collaborate with interdisciplinary team   - Patient/family teaching  - Consider wound care consult   Outcome: Progressing

## 2021-01-14 NOTE — ASSESSMENT & PLAN NOTE
· Hypokalemia being repleted in the setting of diuretic    Results from last 7 days   Lab Units 01/14/21  0313 01/13/21  0524 01/12/21  0502 01/11/21  0520 01/10/21  0519 01/09/21  1126 01/08/21  0458   POTASSIUM mmol/L 4 4 4 6 4 6 3 7 3 3* 3 9 3 9

## 2021-01-14 NOTE — PROGRESS NOTES
Progress Note - Pulmonary   Romanian Nim 44 y o  female MRN: [id number]  Unit/Bed#: E4 -01 Encounter: [id number]    Assessment/Plan:    1  Acute hypoxic respiratory failure likely secondary to multifaceted as listed below       -  currently 2 L-94%- patient does not wear home O2       -  maintain saturations greater than 88%       -  pulmonary toileting:  Deep breathing cough, OOB as tolerated, IS q 1 hr       -  will need ambulatory pulse ox prior to discharge    2  B/L transudative pleural effusion s/p right-sided thoracentesis 1/12/2021       - 630 ml  clear transudative fluid       -  cultures negative, pathology pending    3  Small left-sided apical pneumothorax       -  likely secondary to recent thoracentesis       -  patient would benefit from NRB and serial imaging       -  CXR ordered for a m        -  advise on signs and symptoms to patient of increasing pneumothorax    4  New acute grade 3 systolic heart failure with mild PHTN likely WHO group II and III       -  S/p cardiac catheterization- no significant coronary stenosis       -  Echo- 1/11/2021 EF 25%         PA pressure 40 mmHg       -  cardiology following       -  continue Lasix p o  40 mg daily       -  will need LifeVest    5  Active tobacco abuse with suspected COPD       -  patient appears in contemplation stage as she states she does want to quit smoking       -  encourage in educated on smoking cessation       -  would recommend formal PFTs, pulmonary follow-up, tobacco cessation class       -  home regimen:  Would recommend D/C on ; LABA/LAMA, Proventil 2 puffs q 6h p r n     6  Bilateral pulmonary nodules       -  will need annual surveillance       -  nodules remain < 3 mm      - outpatient follow-up per discharge instructions    Chief Complaint:    "I am having some left-sided chest pain"    Subjective:    Susy Espinosa was comfortably sitting in her bed  She does report since procedure she is having some left pleuritic pain    I did report that she did have a small pneumothorax which is likely causing her discomfort  No significant overnight events reported  Patient currently denies any fevers, chills hemoptysis, headaches, night sweats, pleuritic chest pain, or palpitations  Objective:    Vitals: Blood pressure 124/64, pulse 77, temperature 97 6 °F (36 4 °C), temperature source Temporal, resp  rate 18, height 5' 6" (1 676 m), weight 55 kg (121 lb 4 1 oz), SpO2 93 %  2L,Body mass index is 19 57 kg/m²  Intake/Output Summary (Last 24 hours) at 1/14/2021 1051  Last data filed at 1/14/2021 0544  Gross per 24 hour   Intake 240 ml   Output 150 ml   Net 90 ml       Invasive Devices     Peripheral Intravenous Line            Peripheral IV 01/12/21 Dorsal (posterior); Left Forearm 2 days                Physical Exam:   Physical Exam  Constitutional:       General: She is not in acute distress  Appearance: Normal appearance  She is normal weight  She is not ill-appearing  HENT:      Head: Normocephalic and atraumatic  Nose: Nose normal  No congestion or rhinorrhea  Mouth/Throat:      Mouth: Mucous membranes are dry  Pharynx: No oropharyngeal exudate or posterior oropharyngeal erythema  Neck:      Musculoskeletal: Normal range of motion and neck supple  No neck rigidity or muscular tenderness  Cardiovascular:      Rate and Rhythm: Normal rate and regular rhythm  Pulses: Normal pulses  Heart sounds: Normal heart sounds  No murmur  No friction rub  No gallop  Pulmonary:      Effort: No tachypnea, bradypnea, accessory muscle usage or respiratory distress  Breath sounds: No stridor, decreased air movement or transmitted upper airway sounds  Decreased breath sounds present  No wheezing, rhonchi or rales  Comments: Diminished aeration throughout, left-sided pleuritic chest pain  Chest:      Chest wall: No tenderness  Abdominal:      General: Abdomen is flat   Bowel sounds are normal  There is no distension  Palpations: Abdomen is soft  There is no mass  Musculoskeletal: Normal range of motion  General: No swelling or tenderness  Skin:     General: Skin is warm and dry  Coloration: Skin is not jaundiced or pale  Neurological:      General: No focal deficit present  Mental Status: She is alert and oriented to person, place, and time  Mental status is at baseline  Psychiatric:         Mood and Affect: Mood normal          Behavior: Behavior normal          Labs:    I have personally reviewed pertinent lab results CBC:   Lab Results   Component Value Date    WBC 5 67 01/14/2021    HGB 10 7 (L) 01/14/2021    HCT 32 9 (L) 01/14/2021     (H) 01/14/2021     01/14/2021    MCH 32 7 01/14/2021    MCHC 32 5 01/14/2021    RDW 13 2 01/14/2021    MPV 10 2 01/14/2021   , CMP:   Lab Results   Component Value Date    SODIUM 138 01/14/2021    K 4 4 01/14/2021     01/14/2021    CO2 28 01/14/2021    BUN 40 (H) 01/14/2021    CREATININE 1 69 (H) 01/14/2021    CALCIUM 8 2 (L) 01/14/2021    EGFR 34 01/14/2021       Imaging and other studies: I have personally reviewed pertinent films in PACS     Chest x-ray- 1/13/2021- tiny left apical pneumothorax

## 2021-01-14 NOTE — ASSESSMENT & PLAN NOTE
· Accelerated hypertension now improved  · Continue carvedilol hydralazine and isosorbide started during hospitalization

## 2021-01-14 NOTE — PHYSICAL THERAPY NOTE
01/14/21 1232   Note Type   Cancel Reasons Other   attempted PT this am- pt refused 2* wanting to sleep more- agreed to try later - returned after lunch - pt more awake reports that she was only OOB today to go to the [facility]   Reports that she does not put her CAM boot on for this - educated in rec for use of boot for proper healing   Pt then became irritated, stating " now I won't do therapy- I don't need any lectures!  I am fine"

## 2021-01-14 NOTE — ASSESSMENT & PLAN NOTE
· Acute respiratory failure with hypoxia secondary to do CHF/bilateral pleural effusions  · Appreciate IR evaluation and left-sided thoracentesis  Wean oxygen when able currently at 2 L but will anticipate discharge home with oxygen  · Did have small pneumothorax postprocedure and was re-evaluated by pulmonology

## 2021-01-14 NOTE — PROGRESS NOTES
Progress Note - Cardiology   Hero Clock 44 y o  female MRN: [id number]  Unit/Bed#: E4 -01 Encounter: [id number]      Assessment/Recommendations/Discussion:   1  Acute systolic and diastolic congestive heart failure  2  Nonischemic cardiomyopathy, ejection fraction 25%  3  Hypertension  4  Non MI related troponin elevation secondary to CHF  5  Ongoing tobacco use, probable COPD  6  Acute kidney injury  7  Dyslipidemia    PLAN   From a volume standpoint she appears to be euvolemic   Chest x-ray yesterday showed a tiny left apical pneumothorax, significant decrease the amount of left pleural fluid following thoracentesis, persistent right-sided pleural effusion with decreasing vascular congestion and interstitial prominence   Creatinine has been stable   Continue with p o  Lasix  Decrease dose to 20 mg daily   Blood pressure is stable, 124/64   Continue with hydralazine, Imdur, carvedilol   We will need to touch base with case management regarding LifeVest authorization  She had refused this earlier in her hospitalization but states she is now agreeable   Recommend ambulatory pulse ox monitoring prior to discharge  She may need oxygen on discharge   With the exception of her oxygen and a life vest issues, she is otherwise stable from cardiovascular standpoint for discharge  There is a possibility LifeVest can be fitted as a outpatient if it is covered  Subjective:   HPI  Feeling well  Has some pain with deep inspiration, otherwise feels okay  Denies chest pain or lower extremity edema      Review of Systems: As noted in HPI  Rest of ROS is negative      Vitals:   /64 (BP Location: Right arm)   Pulse 77   Temp 97 6 °F (36 4 °C) (Temporal)   Resp 18   Ht 5' 6" (1 676 m)   Wt 55 kg (121 lb 4 1 oz)   SpO2 93%   BMI 19 57 kg/m²   Vitals:    01/13/21 0600 01/14/21 0600   Weight: 54 kg (119 lb 0 8 oz) 55 kg (121 lb 4 1 oz)       Intake/Output Summary (Last 24 hours) at 1/14/2021 [place] filed at 1/14/2021 0544  Gross per 24 hour   Intake 240 ml   Output 150 ml   Net 90 ml       Physical Exam:  General appearance: alert and oriented, in no acute distress, chronically ill-appearing female  Head: Normocephalic, without obvious abnormality, atraumatic  Eyes: conjunctivae/corneas clear  Anicteric    Neck: no adenopathy, no carotid bruit, no JVD  Lungs: clear to auscultation bilaterally  Heart: regular rate and rhythm, S1, S2 normal, no murmur, no click, rub or gallop  Abdomen:  soft, non-tender; bowel sounds normal; no masses,  no organomegaly  Extremities: extremities normal, warm and well-perfused; no cyanosis, clubbing, or edema  Skin: Skin color, texture, turgor normal  No rashes or lesions     Lab Results:  Results from last 7 days   Lab Units 01/14/21  0313   WBC Thousand/uL 5 67   HEMOGLOBIN g/dL 10 7*   HEMATOCRIT % 32 9*   PLATELETS Thousands/uL 191     Results from last 7 days   Lab Units 01/14/21  0313 01/13/21  0524   POTASSIUM mmol/L 4 4 4 6   CHLORIDE mmol/L 107 107   CO2 mmol/L 28 27   BUN mg/dL 40* 42*   CREATININE mg/dL 1 69* 1 72*   CALCIUM mg/dL 8 2* 8 3   ALK PHOS U/L  --  76   ALT U/L  --  20   AST U/L  --  21     Results from last 7 days   Lab Units 01/14/21  0313   POTASSIUM mmol/L 4 4   CHLORIDE mmol/L 107   CO2 mmol/L 28   BUN mg/dL 40*   CREATININE mg/dL 1 69*   CALCIUM mg/dL 8 2*           Medications:    Current Facility-Administered Medications:     acetaminophen (TYLENOL) tablet 650 mg, 650 mg, Oral, Q4H PRN, Sugey Soliz PA-C, 650 mg at 01/13/21 2142    atorvastatin (LIPITOR) tablet 40 mg, 40 mg, Oral, Daily With Hammad Kg, DO, 40 mg at 01/13/21 1803    carvedilol (COREG) tablet 25 mg, 25 mg, Oral, BID With Meals, GAGE Mccord, 25 mg at 01/14/21 0859    furosemide (LASIX) tablet 40 mg, 40 mg, Oral, Daily, Kt Adams DO, 40 mg at 01/14/21 0859    heparin (porcine) subcutaneous injection 5,000 Units, 5,000 Units, Subcutaneous, Q8H [facility], Sugey Soliz PA-C, 5,000 Units at 01/14/21 0540    hydrALAZINE (APRESOLINE) tablet 10 mg, 10 mg, Oral, TID after meals, GAGE Mccord, 10 mg at 01/14/21 0859    isosorbide dinitrate (ISORDIL) tablet 10 mg, 10 mg, Oral, TID after meals, Shantanu Amira Bazzi PA-C, 10 mg at 01/14/21 0859    Labetalol HCl (NORMODYNE) injection 10 mg, 10 mg, Intravenous, Q6H PRN, Jhony Aden MD, Stopped at 01/07/21 1214    nicotine (NICODERM CQ) 14 mg/24hr TD 24 hr patch 1 patch, 1 patch, Transdermal, Daily, Sugey Soliz PA-C, 1 patch at 01/14/21 0859    ondansetron (ZOFRAN) injection 4 mg, 4 mg, Intravenous, Q6H PRN, Sugey Soliz PA-C, 4 mg at 01/12/21 1644    oxyCODONE (ROXICODONE) IR tablet 5 mg, 5 mg, Oral, Q4H PRN, Luis Angel James DO, 5 mg at 01/14/21 0902    PARoxetine (PAXIL) tablet 60 mg, 60 mg, Oral, Daily, Sugey Soliz PA-C, 60 mg at 01/14/21 0859    promethazine (PHENERGAN) injection 12 5 mg, 12 5 mg, Intravenous, Q6H PRN, Janet Hanson DO    QUEtiapine (SEROquel) tablet 100 mg, 100 mg, Oral, HS, Sugey Soliz PA-C, 100 mg at 01/13/21 2142    This note was completed in part utilizing M-Modal Fluency Direct Software  Grammatical errors, random word insertions, spelling mistakes, and incomplete sentences may be an occasional consequence of this system secondary to software limitations, ambient noise, and hardware issues  If you have any questions or concerns about the content, text, or information contained within the body of this dictation, please contact the provider for clarification      Worthy DO [place], [facility] [place]  1/14/2021 11:12 AM

## 2021-01-14 NOTE — PROGRESS NOTES
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 proteinuria  - upon review medical records, baseline creatinine 0 7-1 0 mg/dL in 2018   - creatinine 1 87 mg/dL upon admission on 1/6    - most recent creatinine 1 69 mg/dL today               - elevation suspect secondary to hemodynamic perturbations +/- CARLIE secondary to cardiac catheterization on 1/11               - transitioned to oral diuretics yesterday, 1/13; furosemide decreased to 20 mg daily per Cardiology to start tomorrow, 1/15    - proteinuria: Most recent urine protein creatinine ratio 9 17 (9 98 prior)  Order placed for 24 hour urine protein creatinine    - ANCA negative, SPEP revealed no monoclonal bands, UPEP revealed nonselective proteinuria- no monoclonal bands, GBM negative, C3 97 5 C4 29, dsDNA negative, KAYLA negative,  free light chain ratio 1 85, hepatitis panel negative, RF positive- RF 20   - avoid NSAIDs, nephrotoxic agents, IV contrast   - adjust medications to appropriate GFR  - monitor volume status with strict intake/output  Daily weight  - will check BMP, magnesium, phosphorus in a m      Electrolytes, acid/base:  - overall stable and acceptable  Lisandro Daub continue to monitor with repeat lab studies      Blood pressure, hypertension:  - blood pressure with fluctuations  - patient is not on antihypertensive medications as outpatient  - currently on: Carvedilol 25 mg b i d  + hydralazine 10 mg t i d  + Isordil 10 mg t i d   - recommendations: maintain hold parameters on antihypertensives     - optimize hemodynamics; avoid hypotension fluctuations of blood pressure   - maintain MAP > 65       H/H:  - most recent hemoglobin 10 7 grams/deciliter   - goal hemoglobin greater than 8 grams/deciliter   - recommend PRBC transfusion for hemoglobin less than 7       Volume status/CHF:  - most recent echocardiogram revealed EF of 25%  - chest x-ray completed on 01/13 revealed significant decrease in the amount of left pleural fluid following thoracentesis  Persistent right pleural effusion  Decreasing vascular congestion and interstitial prominence               - status post thoracentesis on 1/12 with 630 mL fluid removal from left lung    - status post cardiac catheterization on 1/11: Normal coronary angiography per Cardiology  - diuretics as above  - continue to monitor volume status closely  - cardiology following  SUBJECTIVE:  Patient was resting in bed upon exam   Patient reports improvement in shortness of breath  Patient denies pain, nausea, diarrhea, or vomiting  Patient reports she is eating and drinking well       OBJECTIVE:  Current Weight: Weight - Scale: 55 kg (121 lb 4 1 oz)  Vitals:    01/14/21 1100   BP: 117/77   Pulse: 68   Resp: 18   Temp: 97 9 °F (36 6 °C)   SpO2: 92%       Intake/Output Summary (Last 24 hours) at 1/14/2021 1457  Last data filed at 1/14/2021 1159  Gross per 24 hour   Intake 240 ml   Output 950 ml   Net -710 ml       General:  Not in acute distress  Skin:  Pale, warm, intact  Eyes:  Sclerae anicteric  ENT:  Mucous membranes moist, intact  Neck:  Supple, trachea midline  Chest:  Breath sounds clear bilaterally  CVS:  Regular rate, regular rhythm  Abdomen:  Soft, nondistended  Extremities:  Negative for lower extremity edema  Neuro:  Intact, awake and interactive  Psych:  Cooperative, interactive      Medications:  Scheduled Meds:  Current Facility-Administered Medications   Medication Dose Route Frequency Provider Last Rate    acetaminophen  650 mg Oral Q4H PRN Ariane Garcia PA-C      atorvastatin  40 mg Oral Daily With [employer], DO      carvedilol  25 mg Oral BID With Meals GAGE Mccord      [START ON 1/15/2021] furosemide  20 mg Oral Daily Ledon Runner, DO      heparin (porcine)  5,000 Units Subcutaneous [facility] Sugey Soliz PA-C      hydrALAZINE  10 mg Oral TID after meals GAGE Mccord      isosorbide dinitrate  10 mg Oral TID after meals Lei Bazzi PA-C      Labetalol HCl  10 mg Intravenous Q6H PRN Dandre Munson MD      nicotine  1 patch Transdermal Daily Sugey Soliz PA-C      ondansetron  4 mg Intravenous Q6H PRN Katerina Gracia PA-C      oxyCODONE  5 mg Oral Q4H PRN Helen Garcia DO      PARoxetine  60 mg Oral Daily Sugey Soliz PA-C      promethazine  12 5 mg Intravenous Q6H PRN Sandy Philip DO      QUEtiapine  100 mg Oral HS Sugey Soliz PA-C         PRN Meds:   acetaminophen    Labetalol HCl    ondansetron    oxyCODONE    promethazine    Continuous Infusions:     Laboratory Results:  Results from last 7 days   Lab Units 01/14/21  0313 01/13/21  0524 01/12/21  0502 01/11/21  0520 01/10/21  0519 01/09/21  1126 01/08/21  0458   WBC Thousand/uL 5 67 9 27 6 23 7 88  --   --   --    HEMOGLOBIN g/dL 10 7* 11 2* 11 2* 12 3  --   --   --    HEMATOCRIT % 32 9* 34 5* 34 1* 37 2  --   --   --    PLATELETS Thousands/uL 191 228 235 252  --   --   --    SODIUM mmol/L 138 138 141 140 141 139 144   POTASSIUM mmol/L 4 4 4 6 4 6 3 7 3 3* 3 9 3 9   CHLORIDE mmol/L 107 107 109* 106 104 103 109*   CO2 mmol/L 28 27 29 27 32 33* 27   BUN mg/dL 40* 42* 40* 42* 39* 39* 33*   CREATININE mg/dL 1 69* 1 72* 1 48* 1 61* 1 63* 1 55* 1 63*   CALCIUM mg/dL 8 2* 8 3 8 1* 8 1* 8 0* 8 2* 8 1*   MAGNESIUM mg/dL 2 1  --   --   --   --   --   --    PHOSPHORUS mg/dL 4 0  --   --   --   --   --   --

## 2021-01-14 NOTE — PLAN OF CARE
Problem: Potential for Falls  Goal: Patient will remain free of falls  Description: INTERVENTIONS:  - Assess patient frequently for physical needs  -  Identify cognitive and physical deficits and behaviors that affect risk of falls  -  [place] fall precautions as indicated by assessment   - Educate patient/family on patient safety including physical limitations  - Instruct patient to call for assistance with activity based on assessment  - Modify environment to reduce risk of injury  - Consider OT/PT consult to assist with strengthening/mobility  Outcome: Progressing     Problem: PAIN - ADULT  Goal: Verbalizes/displays adequate comfort level or baseline comfort level  Description: Interventions:  - Encourage patient to monitor pain and request assistance  - Assess pain using appropriate pain scale  - Administer analgesics based on type and severity of pain and evaluate response  - Implement non-pharmacological measures as appropriate and evaluate response  - Consider cultural and social influences on pain and pain management  - Notify physician/advanced practitioner if interventions unsuccessful or patient reports new pain  Outcome: Progressing     Problem: SAFETY ADULT  Goal: Patient will remain free of falls  Description: INTERVENTIONS:  - Assess patient frequently for physical needs  -  Identify cognitive and physical deficits and behaviors that affect risk of falls    -  [place] fall precautions as indicated by assessment   - Educate patient/family on patient safety including physical limitations  - Instruct patient to call for assistance with activity based on assessment  - Modify environment to reduce risk of injury  - Consider OT/PT consult to assist with strengthening/mobility  Outcome: Progressing  Goal: Maintain or return to baseline ADL function  Description: INTERVENTIONS:  -  Assess patient's ability to carry out ADLs; assess patient's baseline for ADL function and identify physical deficits which impact ability to perform ADLs (bathing, care of mouth/teeth, toileting, grooming, dressing, etc )  - Assess/evaluate cause of self-care deficits   - Assess range of motion  - Assess patient's mobility; develop plan if impaired  - Assess patient's need for assistive devices and provide as appropriate  - Encourage maximum independence but intervene and supervise when necessary  - Involve family in performance of ADLs  - Assess for home care needs following discharge   - Consider OT consult to assist with ADL evaluation and planning for discharge  - Provide patient education as appropriate  Outcome: Progressing  Goal: Maintain or return mobility status to optimal level  Description: INTERVENTIONS:  - Assess patient's baseline mobility status (ambulation, transfers, stairs, etc )    - Identify cognitive and physical deficits and behaviors that affect mobility  - Identify mobility aids required to assist with transfers and/or ambulation (gait belt, sit-to-stand, lift, walker, cane, etc )  - [place] fall precautions as indicated by assessment  - Record patient progress and toleration of activity level on Mobility SBAR; progress patient to next Phase/Stage  - Instruct patient to call for assistance with activity based on assessment  - Consider rehabilitation consult to assist with strengthening/weightbearing, etc   Outcome: Progressing     Problem: DISCHARGE PLANNING  Goal: Discharge to home or other facility with appropriate resources  Description: INTERVENTIONS:  - Identify barriers to discharge w/patient and caregiver  - Arrange for needed discharge resources and transportation as appropriate  - Identify discharge learning needs (meds, wound care, etc )  - Arrange for interpretive services to assist at discharge as needed  - Refer to Case Management Department for coordinating discharge planning if the patient needs post-hospital services based on physician/advanced practitioner order or complex needs related to functional status, cognitive ability, or social support system  Outcome: Progressing     Problem: Knowledge Deficit  Goal: Patient/family/caregiver demonstrates understanding of disease process, treatment plan, medications, and discharge instructions  Description: Complete learning assessment and assess knowledge base    Interventions:  - Provide teaching at level of understanding  - Provide teaching via preferred learning methods  Outcome: Progressing     Problem: CARDIOVASCULAR - ADULT  Goal: Maintains optimal cardiac output and hemodynamic stability  Description: INTERVENTIONS:  - Monitor I/O, vital signs and rhythm  - Monitor for S/S and trends of decreased cardiac output  - Administer and titrate ordered vasoactive medications to optimize hemodynamic stability  - Assess quality of pulses, skin color and temperature  - Assess for signs of decreased coronary artery perfusion  - Instruct patient to report change in severity of symptoms  Outcome: Progressing  Goal: Absence of cardiac dysrhythmias or at baseline rhythm  Description: INTERVENTIONS:  - Continuous cardiac monitoring, vital signs, obtain 12 lead EKG if ordered  - Administer antiarrhythmic and heart rate control medications as ordered  - Monitor electrolytes and administer replacement therapy as ordered  Outcome: Progressing     Problem: Prexisting or High Potential for Compromised Skin Integrity  Goal: Skin integrity is maintained or improved  Description: INTERVENTIONS:  - Identify patients at risk for skin breakdown  - Assess and monitor skin integrity  - Assess and monitor nutrition and hydration status  - Monitor labs   - Assess for incontinence   - Turn and reposition patient  - Assist with mobility/ambulation  - Relieve pressure over bony prominences  - Avoid friction and shearing  - Provide appropriate hygiene as needed including keeping skin clean and dry  - Evaluate need for skin moisturizer/barrier cream  - Collaborate with interdisciplinary team   - Patient/family teaching  - Consider wound care consult   Outcome: Progressing

## 2021-01-14 NOTE — PROGRESS NOTES
Progress Note - Miguel Pace 1970, 44 y o  female MRN: [id number]    Unit/Bed#: E4 -01 Encounter: [id number]    Primary Care Provider: No primary care provider on file  Date and time admitted to hospital: 1/6/2021  4:34 PM        * Acute respiratory failure with hypoxia (HCC)  Assessment & Plan  · Acute respiratory failure with hypoxia secondary to do CHF/bilateral pleural effusions  · Appreciate IR evaluation and left-sided thoracentesis  Wean oxygen when able currently at 2 L but will anticipate discharge home with oxygen  · Did have small pneumothorax postprocedure and was re-evaluated by pulmonology  Acute systolic congestive heart failure ([facility] )  Assessment & Plan  Weight (last 2 days)     Date/Time   Weight    01/14/21 0600   55 (121 25)    01/13/21 0600   54 (119 05)    01/12/21 0600   55 (121 25)          · Acute systolic CHF new diagnosis  Cardiac catheterization negative for occlusive disease  · Diuretics held due to catheterization has been restarted  · Holding ACE-inhibitor secondary to kidney injury    Continue hydralazine/isosorbide  · Currently has not been approved by insurance for life vest awaiting prior authorization    Closed fracture of distal end of right fibula  Assessment & Plan  · Distal fibula fracture follows with orthopedics prior to admission    Elevated troponin  Assessment & Plan  · Non MI elevation troponin secondary to decompensated CHF    MAXI (acute kidney injury) ([facility] )  Assessment & Plan  · MAXI on CKD 3 the setting of decompensated CHF  · Has significant proteinuria 9 g per day currently undergoing workup per nephrology  · Remained stable after catheterization and restarting of diuretics    Results from last 7 days   Lab Units 01/14/21  0313 01/13/21  0524 01/12/21  0502 01/11/21  0520 01/10/21  0519 01/09/21  1126 01/08/21  0458   BUN mg/dL 40* 42* 40* 42* 39* 39* 33*   CREATININE mg/dL 1 69* 1 72* 1 48* 1 61* 1 63* 1 55* 1 63*   EGFR ml/min/1 73sq m 34 34 40 37 36 38 36       Hypokalemia  Assessment & Plan  · Hypokalemia being repleted in the setting of diuretic    Results from last 7 days   Lab Units 01/14/21  0313 01/13/21  0524 01/12/21  0502 01/11/21  0520 01/10/21  0519 01/09/21  1126 01/08/21  0458   POTASSIUM mmol/L 4 4 4 6 4 6 3 7 3 3* 3 9 3 9       Hypertensive urgency  Assessment & Plan  · Accelerated hypertension now improved  · Continue carvedilol hydralazine and isosorbide started during hospitalization    Anxiety  Assessment & Plan  · Anxiety mood stable on quetiapine and paroxetine      VTE Pharmacologic Prophylaxis:   Moderate Risk (Score 3-4) - Pharmacological DVT Prophylaxis Ordered: Heparin  Patient Centered Rounds: I have performed bedside rounds with nursing staff today  Discussions with Specialists or Other Care Team Provider:  Nephrology and case management    Education and Discussions with Family / Patient:  Discussed with mother yesterday    Time Spent for Care: 25 mins  More than 50% of total time spent on counseling and coordination of care as described above  Current Length of Stay: 8 day(s)  Current Patient Status: Inpatient   Certification Statement: The patient will continue to require additional inpatient hospital stay due to awaiting Life Vest and will need home oxygen evaluation at time of discharge  Discharge Plan / Estimated Discharge Date: 24-48 hours    Code Status: Level 1 - Full Code      Subjective:   Patient seen and examined  Feeling low bit better  Still having some pain    Objective:   Vitals: Blood pressure 131/91, pulse 75, temperature 97 8 °F (36 6 °C), temperature source Temporal, resp  rate 18, height 5' 6" (1 676 m), weight 55 kg (121 lb 4 1 oz), SpO2 92 %  Physical Exam  Vitals signs reviewed  Constitutional:       General: She is not in acute distress  Appearance: Normal appearance  HENT:      Head: Atraumatic  Eyes:      General: No scleral icterus       Extraocular Movements: Extraocular movements intact  Cardiovascular:      Rate and Rhythm: Regular rhythm  Heart sounds: Normal heart sounds  Pulmonary:      Breath sounds: Decreased breath sounds present  No wheezing  Abdominal:      General: Bowel sounds are normal       Palpations: Abdomen is soft  Tenderness: There is no guarding or rebound  Musculoskeletal:         General: No swelling  Skin:     General: Skin is warm  Neurological:      Mental Status: She is alert and oriented to person, place, and time  Mental status is at baseline     Psychiatric:         Mood and Affect: Mood normal        Additional Data:   Labs:  Results from last 7 days   Lab Units 01/14/21 0313 01/13/21  0524 01/12/21  0502 01/11/21  0520   WBC Thousand/uL 5 67 9 27 6 23 7 88   HEMOGLOBIN g/dL 10 7* 11 2* 11 2* 12 3   HEMATOCRIT % 32 9* 34 5* 34 1* 37 2   MCV fL 101* 101* 99* 98   PLATELETS Thousands/uL 191 228 235 252     Results from last 7 days   Lab Units 01/14/21 0313 01/13/21  0524 01/12/21  0502 01/11/21  0520 01/10/21  0519 01/09/21  1126 01/08/21  0458   SODIUM mmol/L 138 138 141 140 141 139 144   POTASSIUM mmol/L 4 4 4 6 4 6 3 7 3 3* 3 9 3 9   CHLORIDE mmol/L 107 107 109* 106 104 103 109*   CO2 mmol/L 28 27 29 27 32 33* 27   ANION GAP mmol/L 3* 4 3* 7 5 3* 8   BUN mg/dL 40* 42* 40* 42* 39* 39* 33*   CREATININE mg/dL 1 69* 1 72* 1 48* 1 61* 1 63* 1 55* 1 63*   CALCIUM mg/dL 8 2* 8 3 8 1* 8 1* 8 0* 8 2* 8 1*   ALBUMIN g/dL  --  1 8* 1 8*  --   --   --   --    TOTAL BILIRUBIN mg/dL  --  0 26 0 19*  --   --   --   --    ALK PHOS U/L  --  76 71  --   --   --   --    ALT U/L  --  20 19  --   --   --   --    AST U/L  --  21 24  --   --   --   --    EGFR ml/min/1 73sq m 34 34 40 37 36 38 36   GLUCOSE RANDOM mg/dL 94 105 89 86 93 97 101     Results from last 7 days   Lab Units 01/14/21  0313   MAGNESIUM mg/dL 2 1   PHOSPHORUS mg/dL 4 0                          Results from last 7 days   Lab Units 01/09/21  0718   TSH 3RD GENERATON uIU/mL 1 624 * I Have Reviewed All Lab Data Listed Above  Cultures:   Results from last 7 days   Lab Units 01/12/21  1359   GRAM STAIN RESULT  1+ Polys  No organisms seen   BODY FLUID CULTURE, STERILE  No growth                 Lines/Drains:  Invasive Devices     Peripheral Intravenous Line            Peripheral IV 01/12/21 Dorsal (posterior); Left Forearm 2 days              Telemetry:      Imaging:  Imaging Reports Reviewed Today Include:   Xr Chest Portable    Result Date: 1/7/2021  Impression: Interstitial edema with effusions and atelectasis, corresponding with the chest CT one day earlier  Workstation performed: [id number]     Xr Chest Pa & Lateral    Result Date: 1/10/2021  Impression: Improved vascular clarity with persistent bilateral pleural effusions and left greater than right basilar atelectasis  Workstation performed: [id number]     Xr Ankle 3+ Vw Right    Result Date: 1/11/2021  Impression: Unchanged alignment at the distal fibular fracture with findings of ongoing healing/remodeling  Workstation performed: [id number]     Cta Ed Chest Pe Study    Result Date: 1/6/2021  Impression: No pulmonary embolus  Bilateral moderate pleural effusions  Posterior bibasilar atelectasis/infiltrates   Questionable hilar and subcarinal lymphadenopathy limited by lack of IV contrast  Workstation performed: [id number]     Scheduled Meds:  Current Facility-Administered Medications   Medication Dose Route Frequency Provider Last Rate    acetaminophen  650 mg Oral Q4H PRN Jose Miguel Navarro PA-C      atorvastatin  40 mg Oral Daily With [employer], DO      carvedilol  25 mg Oral BID With Meals GAGE Mccord      [START ON 1/15/2021] furosemide  20 mg Oral Daily Catalinarivka Spencer, DO      heparin (porcine)  5,000 Units Subcutaneous Q8H [facility] & half-way Sugey Soliz PA-C      hydrALAZINE  10 mg Oral TID after meals GAGE Mccord      isosorbide dinitrate  10 mg Oral TID after meals Mika Bazzi PA-C      Labetalol HCl  10 mg Intravenous Q6H PRN Theodora Nina MD      nicotine  1 patch Transdermal Daily Sugey Soliz PA-C      ondansetron  4 mg Intravenous Q6H PRN Samuel Lowery PA-C      oxyCODONE  5 mg Oral Q4H PRN Christy Bhakta, DO      PARoxetine  60 mg Oral Daily Sugey Soliz PA-C      promethazine  12 5 mg Intravenous Q6H PRN Keily Olivas,       QUEtiapine  100 mg Oral HS ALISHA Valdez, DO  [facility]  Hospitalist    ** Please Note: This note has been constructed using a voice recognition system   **

## 2021-01-14 NOTE — ASSESSMENT & PLAN NOTE
· Macrocytic anemia with borderline low M33 and folic acid    Supplements added    Results from last 7 days   Lab Units 01/15/21  0459 01/14/21  0313 01/13/21  0524 01/12/21  0502 01/11/21  0520   HEMOGLOBIN g/dL 11 4* 10 7* 11 2* 11 2* 12 3

## 2021-01-14 NOTE — ASSESSMENT & PLAN NOTE
· MAXI on CKD 3 the setting of decompensated CHF  · Has significant proteinuria 9 g per day currently undergoing workup per nephrology  · Remained stable after catheterization and restarting of diuretics    Results from last 7 days   Lab Units 01/14/21  0313 01/13/21  0524 01/12/21  0502 01/11/21  0520 01/10/21  0519 01/09/21  1126 01/08/21  0458   BUN mg/dL 40* 42* 40* 42* 39* 39* 33*   CREATININE mg/dL 1 69* 1 72* 1 48* 1 61* 1 63* 1 55* 1 63*   EGFR ml/min/1 73sq m 34 34 40 37 36 38 36

## 2021-01-15 ENCOUNTER — APPOINTMENT (INPATIENT)
Dept: RADIOLOGY | Facility: HOSPITAL | Age: 51
DRG: 286 | End: 2021-01-15
Payer: COMMERCIAL

## 2021-01-15 LAB
ANION GAP SERPL CALCULATED.3IONS-SCNC: 4 MMOL/L (ref 4–13)
BACTERIA SPEC BFLD CULT: NO GROWTH
BUN SERPL-MCNC: 38 MG/DL (ref 5–25)
CALCIUM SERPL-MCNC: 8.6 MG/DL (ref 8.3–10.1)
CHLORIDE SERPL-SCNC: 107 MMOL/L (ref 100–108)
CO2 SERPL-SCNC: 27 MMOL/L (ref 21–32)
CREAT SERPL-MCNC: 1.65 MG/DL (ref 0.6–1.3)
ERYTHROCYTE [DISTWIDTH] IN BLOOD BY AUTOMATED COUNT: 13.3 % (ref 11.6–15.1)
GFR SERPL CREATININE-BSD FRML MDRD: 35 ML/MIN/1.73SQ M
GLUCOSE SERPL-MCNC: 85 MG/DL (ref 65–140)
GRAM STN SPEC: NORMAL
GRAM STN SPEC: NORMAL
HCT VFR BLD AUTO: 35.4 % (ref 34.8–46.1)
HGB BLD-MCNC: 11.4 G/DL (ref 11.5–15.4)
MAGNESIUM SERPL-MCNC: 2.1 MG/DL (ref 1.6–2.6)
MCH RBC QN AUTO: 32.3 PG (ref 26.8–34.3)
MCHC RBC AUTO-ENTMCNC: 32.2 G/DL (ref 31.4–37.4)
MCV RBC AUTO: 100 FL (ref 82–98)
PHOSPHATE SERPL-MCNC: 3.5 MG/DL (ref 2.7–4.5)
PLATELET # BLD AUTO: 213 THOUSANDS/UL (ref 149–390)
PMV BLD AUTO: 10.1 FL (ref 8.9–12.7)
POTASSIUM SERPL-SCNC: 4.3 MMOL/L (ref 3.5–5.3)
RBC # BLD AUTO: 3.53 MILLION/UL (ref 3.81–5.12)
SODIUM SERPL-SCNC: 138 MMOL/L (ref 136–145)
WBC # BLD AUTO: 6.88 THOUSAND/UL (ref 4.31–10.16)

## 2021-01-15 PROCEDURE — 80048 BASIC METABOLIC PNL TOTAL CA: CPT | Performed by: NURSE PRACTITIONER

## 2021-01-15 PROCEDURE — 99232 SBSQ HOSP IP/OBS MODERATE 35: CPT | Performed by: INTERNAL MEDICINE

## 2021-01-15 PROCEDURE — 71045 X-RAY EXAM CHEST 1 VIEW: CPT

## 2021-01-15 PROCEDURE — 84100 ASSAY OF PHOSPHORUS: CPT | Performed by: NURSE PRACTITIONER

## 2021-01-15 PROCEDURE — 99232 SBSQ HOSP IP/OBS MODERATE 35: CPT

## 2021-01-15 PROCEDURE — 85027 COMPLETE CBC AUTOMATED: CPT | Performed by: INTERNAL MEDICINE

## 2021-01-15 PROCEDURE — 83735 ASSAY OF MAGNESIUM: CPT | Performed by: NURSE PRACTITIONER

## 2021-01-15 RX ORDER — FUROSEMIDE 20 MG/1
20 TABLET ORAL
Status: DISCONTINUED | OUTPATIENT
Start: 2021-01-15 | End: 2021-01-16

## 2021-01-15 RX ADMIN — OXYCODONE HYDROCHLORIDE 5 MG: 5 TABLET ORAL at 09:10

## 2021-01-15 RX ADMIN — HYDRALAZINE HYDROCHLORIDE 10 MG: 10 TABLET, FILM COATED ORAL at 17:29

## 2021-01-15 RX ADMIN — CARVEDILOL 25 MG: 25 TABLET, FILM COATED ORAL at 09:10

## 2021-01-15 RX ADMIN — ISOSORBIDE DINITRATE 10 MG: 10 TABLET ORAL at 17:29

## 2021-01-15 RX ADMIN — Medication 1 PATCH: at 09:10

## 2021-01-15 RX ADMIN — HEPARIN SODIUM 5000 UNITS: 5000 INJECTION INTRAVENOUS; SUBCUTANEOUS at 13:55

## 2021-01-15 RX ADMIN — CARVEDILOL 25 MG: 25 TABLET, FILM COATED ORAL at 16:10

## 2021-01-15 RX ADMIN — HYDRALAZINE HYDROCHLORIDE 10 MG: 10 TABLET, FILM COATED ORAL at 09:09

## 2021-01-15 RX ADMIN — FUROSEMIDE 20 MG: 20 TABLET ORAL at 16:10

## 2021-01-15 RX ADMIN — HEPARIN SODIUM 5000 UNITS: 5000 INJECTION INTRAVENOUS; SUBCUTANEOUS at 06:10

## 2021-01-15 RX ADMIN — CYANOCOBALAMIN TAB 500 MCG 1000 MCG: 500 TAB at 09:10

## 2021-01-15 RX ADMIN — OXYCODONE HYDROCHLORIDE 5 MG: 5 TABLET ORAL at 02:15

## 2021-01-15 RX ADMIN — HEPARIN SODIUM 5000 UNITS: 5000 INJECTION INTRAVENOUS; SUBCUTANEOUS at 21:20

## 2021-01-15 RX ADMIN — FOLIC ACID 1 MG: 1 TABLET ORAL at 09:09

## 2021-01-15 RX ADMIN — FUROSEMIDE 20 MG: 20 TABLET ORAL at 09:09

## 2021-01-15 RX ADMIN — OXYCODONE HYDROCHLORIDE 5 MG: 5 TABLET ORAL at 16:10

## 2021-01-15 RX ADMIN — PAROXETINE 60 MG: 20 TABLET, FILM COATED ORAL at 09:09

## 2021-01-15 RX ADMIN — ISOSORBIDE DINITRATE 10 MG: 10 TABLET ORAL at 13:55

## 2021-01-15 RX ADMIN — ATORVASTATIN CALCIUM 40 MG: 40 TABLET, FILM COATED ORAL at 16:10

## 2021-01-15 RX ADMIN — QUETIAPINE FUMARATE 100 MG: 25 TABLET ORAL at 21:19

## 2021-01-15 RX ADMIN — HYDRALAZINE HYDROCHLORIDE 10 MG: 10 TABLET, FILM COATED ORAL at 13:55

## 2021-01-15 RX ADMIN — OXYCODONE HYDROCHLORIDE 5 MG: 5 TABLET ORAL at 21:19

## 2021-01-15 RX ADMIN — ISOSORBIDE DINITRATE 10 MG: 10 TABLET ORAL at 09:10

## 2021-01-15 NOTE — UTILIZATION REVIEW
Continued Stay Review    Date: 1/15/2021                     Current Patient Class: IP  Current Level of Care: Med/Surg  HPI:50 y o  female initially admitted on 1/6 with Acute systolic and diastolic congestive heart failure  Assessment/Plan: Feels like her breathing is fine  Continues on O2 @ 2L nc  Has some JVD, Lasix increased today to b i d  Pt with proteinuria, plan for renal biopsy as op per nephrology  Given significant proteinuria, reduced EF of 25%, nonischemic cardiomyopathy, restrictive physiology on echo, small pericardial effusion, will need op cardiac MRI for further eval  Continue with hydralazine, Imdur, carvedilol  Awaiting LifeVest   Will try to d/c O2 today    Check ambulatory pulse ox     Pertinent Labs/Diagnostic Results:     Results from last 7 days   Lab Units 01/15/21  0459 01/14/21  0313 01/13/21  0524 01/12/21  0502 01/11/21  0520   WBC Thousand/uL 6 88 5 67 9 27 6 23 7 88   HEMOGLOBIN g/dL 11 4* 10 7* 11 2* 11 2* 12 3   HEMATOCRIT % 35 4 32 9* 34 5* 34 1* 37 2   PLATELETS Thousands/uL 213 191 228 235 252   NEUTROS ABS Thousands/µL  --   --   --   --  4 80     Results from last 7 days   Lab Units 01/15/21  0459 01/14/21  0313 01/13/21  0524 01/12/21  0502 01/11/21  0520   SODIUM mmol/L 138 138 138 141 140   POTASSIUM mmol/L 4 3 4 4 4 6 4 6 3 7   CHLORIDE mmol/L 107 107 107 109* 106   CO2 mmol/L 27 28 27 29 27   ANION GAP mmol/L 4 3* 4 3* 7   BUN mg/dL 38* 40* 42* 40* 42*   CREATININE mg/dL 1 65* 1 69* 1 72* 1 48* 1 61*   EGFR ml/min/1 73sq m 35 34 34 40 37   CALCIUM mg/dL 8 6 8 2* 8 3 8 1* 8 1*   MAGNESIUM mg/dL 2 1 2 1  --   --   --    PHOSPHORUS mg/dL 3 5 4 0  --   --   --      Results from last 7 days   Lab Units 01/13/21 0524 01/12/21  0502 01/10/21  0519   AST U/L 21 24  --    ALT U/L 20 19  --    ALK PHOS U/L 76 71  --    TOTAL PROTEIN g/dL 5 3* 5 2* 5 1*   ALBUMIN g/dL 1 8* 1 8*  --    TOTAL BILIRUBIN mg/dL 0 26 0 19*  --      Results from last 7 days   Lab Units 01/15/21  0459 01/14/21  0313 01/13/21  0524 01/12/21  0502 01/11/21  0520 01/10/21  0519 01/09/21  1126   GLUCOSE RANDOM mg/dL 85 94 105 89 86 93 97       Results from last 7 days   Lab Units 01/12/21  1623 01/09/21  1739   CREATININE UR mg/dL 86 5 40 3   PROTEIN UR mg/dL 793 402   PROT/CREAT RATIO UR  9 17* 9 98*       Vital Signs:   Date/Time  Temp  Pulse  Resp  BP  MAP (mmHg)  SpO2  Calculated FIO2 (%) - Nasal Cannula  Nasal Cannula O2 Flow Rate (L/min)  O2 Device   01/15/21 1500  97 °F (36 1 °C)Abnormal   71  18  149/94  115  94 %  --  --  --   01/15/21 1300  97 3 °F (36 3 °C)Abnormal   76  --  137/89  --  --  --  --  --   01/15/21 0845  --  --  --  --  --  --  28  2 L/min  --   01/15/21 0715  97 7 °F (36 5 °C)  75  16  137/79  101  93 %  --  --  Nasal cannula   01/14/21 2351  97 5 °F (36 4 °C)  78  18  127/88  --  90 %  28  2 L/min  Nasal cannula   01/14/21 1900  97 °F (36 1 °C)Abnormal   74  16  124/82  --  93 %  28  2 L/min  Nasal cannula   01/14/21 1541  97 8 °F (36 6 °C)  75  18  131/91  --  92 %  28  2 L/min  Nasal cannula   01/14/21 1100  97 9 °F (36 6 °C)  68  18  117/77  --  92 %  28  2 L/min  Nasal cannula   01/14/21 0905  --  --  --  --  --  --  28  2 L/min  Nasal cannula   01/14/21 0816  97 6 °F (36 4 °C)  77  18  124/64  89  93 %  28  2 L/min  Nasal cannula   01/14/21 0327  97 5 °F (36 4 °C)  70  20  129/71  --  92 %  --  --  Nasal cannula   01/13/21 2322  97 5 °F (36 4 °C)  74  20  114/68  --  91 %  28  2 L/min  Nasal cannula   01/13/21 1941  97 4 °F (36 3 °C)Abnormal   80  20  134/88  106  93 %  28  2 L/min  Nasal cannula   01/13/21 1529  97 4 °F (36 3 °C)Abnormal   81  20  152/97  110  94 %  28  2 L/min  Nasal cannula   01/13/21 1325  --  --  --  --  --  93 %  26  1 5 L/min  Nasal cannula   01/13/21 1106  97 7 °F (36 5 °C)  81  20  117/73  --  92 %  --  --  Nasal cannula         Medications:   Scheduled Medications:  atorvastatin, 40 mg, Oral, Daily With Dinner  carvedilol, 25 mg, Oral, BID With Meals  cyanocobalamin, 1,000 mcg, Oral, Daily  folic acid, 1 mg, Oral, Daily  furosemide, 20 mg, Oral, BID (diuretic)  heparin (porcine), 5,000 Units, Subcutaneous, Q8H CATHY  hydrALAZINE, 10 mg, Oral, TID after meals  isosorbide dinitrate, 10 mg, Oral, TID after meals  nicotine, 1 patch, Transdermal, Daily  PARoxetine, 60 mg, Oral, Daily  QUEtiapine, 100 mg, Oral, HS         PRN Meds:  acetaminophen, 650 mg, Oral, Q4H PRN  Labetalol HCl, 10 mg, Intravenous, Q6H PRN  ondansetron, 4 mg, Intravenous, Q6H PRN  oxyCODONE, 5 mg, Oral, Q4H PRN 1/14 x2, 1/15 x2  promethazine, 12 5 mg, Intravenous, Q6H PRN        Discharge Plan: TBD    Network Utilization Review Department  ATTENTION: Please call with any questions or concerns to [phone] and carefully listen to the prompts so that you are directed to the right person  All voicemails are confidential   Marilia Cuellar all requests for admission clinical reviews, approved or denied determinations and any other requests to dedicated fax number below belonging to the campus where the patient is receiving treatment   List of dedicated fax numbers for the Facilities:  [address] DENIALS (Administrative/Medical Necessity) [phone]   [address] (Maternity/NICU/Pediatrics) [phone] [address] Lidia Dubois 6257 (  Jessie Faustin "[facility]" 103) [address] [address] [place] [address] Rafaela Tran [address] [place]) [address] [place] [address] 951 [phone]

## 2021-01-15 NOTE — PROGRESS NOTES
Progress Note - Cardiology   Ramakrishna Mauricio 44 y o  female MRN: [id number]  Unit/Bed#: E4 -01 Encounter: [id number]      Assessment/Recommendations/Discussion:   1  Acute systolic and diastolic congestive heart failure  2  Nonischemic cardiomyopathy, ejection fraction 25%  3  Hypertension  4  Non MI related troponin elevation secondary to CHF  5  Ongoing tobacco use, probable COPD  6  Acute kidney injury  7  Dyslipidemia    PLAN   Has some JVD, Lasix increased today to b i d  Doreen Balderrama with Nephrology  Patient with significant proteinuria  Plan for outpatient renal biopsy   Given significant proteinuria, reduced ejection fraction of 25%, nonischemic cardiomyopathy, restrictive physiology on echo, small pericardial effusion, will need outpatient cardiac MRI for further evaluation  The order for this was placed in her discharge orders   SPEP negative for monoclonal bands, likely not AL amyloid, however this does not rule out the possibility of ATTR cardiac amyloidosis  Would check cardiac MRI  If this suggests findings consistent with amyloid, would consider then performing 99m technetium pyrophosphate cardiac scintigraphy scan   Continue with hydralazine, Imdur, carvedilol   Awaiting LifeVest   Will be placed either today or tomorrow   Would try to discontinue oxygen today  Check ambulatory pulse ox    Subjective:   HPI  Feels like her breathing is fine  She denies shortness of breath  She states the oxygen she is wearing came off throughout the night and she felt no different  Awaiting LifeVest      Review of Systems: As noted in HPI  Rest of ROS is negative      Vitals:   /89 (BP Location: Left arm)   Pulse 76   Temp (!) 97 3 °F (36 3 °C) (Temporal)   Resp 16   Ht 5' 6" (1 676 m)   Wt 54 5 kg (120 lb 2 4 oz)   SpO2 93%   BMI 19 39 kg/m²   Vitals:    01/14/21 0600 01/15/21 0500   Weight: 55 kg (121 lb 4 1 oz) 54 5 kg (120 lb 2 4 oz)       Intake/Output Summary (Last 24 hours) at 1/15/2021 1354  Last data filed at 1/15/2021 1040  Gross per 24 hour   Intake 1200 ml   Output 1200 ml   Net 0 ml       Physical Exam:  General appearance: alert and oriented, in no acute distress  Head: Normocephalic, without obvious abnormality, atraumatic  Eyes: conjunctivae/corneas clear  Anicteric  Neck: no adenopathy, no carotid bruit, + JVD  Lungs: clear to auscultation bilaterally  Heart: regular rate and rhythm, S1, S2 normal, no murmur, no click, rub or gallop  Abdomen:  soft, non-tender; bowel sounds normal; no masses,  no organomegaly  Extremities: extremities normal, warm and well-perfused; no cyanosis, clubbing, or edema  Skin: Skin color, texture, turgor normal  No rashes or lesions     Lab Results:  Results from last 7 days   Lab Units 01/15/21  0459   WBC Thousand/uL 6 88   HEMOGLOBIN g/dL 11 4*   HEMATOCRIT % 35 4   PLATELETS Thousands/uL 213     Results from last 7 days   Lab Units 01/15/21  0459  01/13/21  0524   POTASSIUM mmol/L 4 3   < > 4 6   CHLORIDE mmol/L 107   < > 107   CO2 mmol/L 27   < > 27   BUN mg/dL 38*   < > 42*   CREATININE mg/dL 1 65*   < > 1 72*   CALCIUM mg/dL 8 6   < > 8 3   ALK PHOS U/L  --   --  76   ALT U/L  --   --  20   AST U/L  --   --  21    < > = values in this interval not displayed       Results from last 7 days   Lab Units 01/15/21  0459   POTASSIUM mmol/L 4 3   CHLORIDE mmol/L 107   CO2 mmol/L 27   BUN mg/dL 38*   CREATININE mg/dL 1 65*   CALCIUM mg/dL 8 6           Medications:    Current Facility-Administered Medications:     acetaminophen (TYLENOL) tablet 650 mg, 650 mg, Oral, Q4H PRN, Sugey Soliz PA-C, 650 mg at 01/13/21 2142    atorvastatin (LIPITOR) tablet 40 mg, 40 mg, Oral, Daily With Thaddeus Million, DO, 40 mg at 01/14/21 1724    carvedilol (COREG) tablet 25 mg, 25 mg, Oral, BID With Meals, GAGE Mccord, 25 mg at 01/15/21 0910    cyanocobalamin (VITAMIN B-12) tablet 1,000 mcg, 1,000 mcg, Oral, Daily, Luis Angel James DO, 1,000 mcg at 00/25/19 6397    folic acid (FOLVITE) tablet 1 mg, 1 mg, Oral, Daily, Luis Angel James DO, 1 mg at 01/15/21 0909    furosemide (LASIX) tablet 20 mg, 20 mg, Oral, BID (diuretic), Marcos Gottlieb DO    heparin (porcine) subcutaneous injection 5,000 Units, 5,000 Units, Subcutaneous, Q8H [facility], Sugey Soliz PA-C, 5,000 Units at 01/15/21 0610    hydrALAZINE (APRESOLINE) tablet 10 mg, 10 mg, Oral, TID after meals, GAGE Mccord, 10 mg at 01/15/21 0909    isosorbide dinitrate (ISORDIL) tablet 10 mg, 10 mg, Oral, TID after meals, Anabel Bazzi PA-C, 10 mg at 01/15/21 0910    Labetalol HCl (NORMODYNE) injection 10 mg, 10 mg, Intravenous, Q6H PRN, Lisa Jones MD, Stopped at 01/07/21 1214    nicotine (NICODERM CQ) 14 mg/24hr TD 24 hr patch 1 patch, 1 patch, Transdermal, Daily, Sugey Soliz PA-C, 1 patch at 01/15/21 0910    ondansetron (ZOFRAN) injection 4 mg, 4 mg, Intravenous, Q6H PRN, Sugey Soliz PA-C, 4 mg at 01/12/21 1644    oxyCODONE (ROXICODONE) IR tablet 5 mg, 5 mg, Oral, Q4H PRN, Luis Angel James DO, 5 mg at 01/15/21 0910    PARoxetine (PAXIL) tablet 60 mg, 60 mg, Oral, Daily, Sugey Soliz PA-C, 60 mg at 01/15/21 0909    promethazine (PHENERGAN) injection 12 5 mg, 12 5 mg, Intravenous, Q6H PRN, Therese Young DO    QUEtiapine (SEROquel) tablet 100 mg, 100 mg, Oral, HS, Sugey Soliz PA-C, 100 mg at 01/14/21 6479    This note was completed in part utilizing [employer] Software  Grammatical errors, random word insertions, spelling mistakes, and incomplete sentences may be an occasional consequence of this system secondary to software limitations, ambient noise, and hardware issues  If you have any questions or concerns about the content, text, or information contained within the body of this dictation, please contact the provider for clarification      Martín Delgado DO, [facility]  1/15/2021 1:54 PM

## 2021-01-15 NOTE — PLAN OF CARE
Problem: Potential for Falls  Goal: Patient will remain free of falls  Description: INTERVENTIONS:  - Assess patient frequently for physical needs  -  Identify cognitive and physical deficits and behaviors that affect risk of falls  -  [place] fall precautions as indicated by assessment   - Educate patient/family on patient safety including physical limitations  - Instruct patient to call for assistance with activity based on assessment  - Modify environment to reduce risk of injury  - Consider OT/PT consult to assist with strengthening/mobility  Outcome: Progressing     Problem: PAIN - ADULT  Goal: Verbalizes/displays adequate comfort level or baseline comfort level  Description: Interventions:  - Encourage patient to monitor pain and request assistance  - Assess pain using appropriate pain scale  - Administer analgesics based on type and severity of pain and evaluate response  - Implement non-pharmacological measures as appropriate and evaluate response  - Consider cultural and social influences on pain and pain management  - Notify physician/advanced practitioner if interventions unsuccessful or patient reports new pain  Outcome: Progressing     Problem: SAFETY ADULT  Goal: Patient will remain free of falls  Description: INTERVENTIONS:  - Assess patient frequently for physical needs  -  Identify cognitive and physical deficits and behaviors that affect risk of falls    -  [place] fall precautions as indicated by assessment   - Educate patient/family on patient safety including physical limitations  - Instruct patient to call for assistance with activity based on assessment  - Modify environment to reduce risk of injury  - Consider OT/PT consult to assist with strengthening/mobility  Outcome: Progressing  Goal: Maintain or return to baseline ADL function  Description: INTERVENTIONS:  -  Assess patient's ability to carry out ADLs; assess patient's baseline for ADL function and identify physical deficits which impact ability to perform ADLs (bathing, care of mouth/teeth, toileting, grooming, dressing, etc )  - Assess/evaluate cause of self-care deficits   - Assess range of motion  - Assess patient's mobility; develop plan if impaired  - Assess patient's need for assistive devices and provide as appropriate  - Encourage maximum independence but intervene and supervise when necessary  - Involve family in performance of ADLs  - Assess for home care needs following discharge   - Consider OT consult to assist with ADL evaluation and planning for discharge  - Provide patient education as appropriate  Outcome: Progressing  Goal: Maintain or return mobility status to optimal level  Description: INTERVENTIONS:  - Assess patient's baseline mobility status (ambulation, transfers, stairs, etc )    - Identify cognitive and physical deficits and behaviors that affect mobility  - Identify mobility aids required to assist with transfers and/or ambulation (gait belt, sit-to-stand, lift, walker, cane, etc )  - [place] fall precautions as indicated by assessment  - Record patient progress and toleration of activity level on Mobility SBAR; progress patient to next Phase/Stage  - Instruct patient to call for assistance with activity based on assessment  - Consider rehabilitation consult to assist with strengthening/weightbearing, etc   Outcome: Progressing     Problem: DISCHARGE PLANNING  Goal: Discharge to home or other facility with appropriate resources  Description: INTERVENTIONS:  - Identify barriers to discharge w/patient and caregiver  - Arrange for needed discharge resources and transportation as appropriate  - Identify discharge learning needs (meds, wound care, etc )  - Arrange for interpretive services to assist at discharge as needed  - Refer to Case Management Department for coordinating discharge planning if the patient needs post-hospital services based on physician/advanced practitioner order or complex needs related to functional status, cognitive ability, or social support system  Outcome: Progressing     Problem: Knowledge Deficit  Goal: Patient/family/caregiver demonstrates understanding of disease process, treatment plan, medications, and discharge instructions  Description: Complete learning assessment and assess knowledge base    Interventions:  - Provide teaching at level of understanding  - Provide teaching via preferred learning methods  Outcome: Progressing     Problem: CARDIOVASCULAR - ADULT  Goal: Maintains optimal cardiac output and hemodynamic stability  Description: INTERVENTIONS:  - Monitor I/O, vital signs and rhythm  - Monitor for S/S and trends of decreased cardiac output  - Administer and titrate ordered vasoactive medications to optimize hemodynamic stability  - Assess quality of pulses, skin color and temperature  - Assess for signs of decreased coronary artery perfusion  - Instruct patient to report change in severity of symptoms  Outcome: Progressing  Goal: Absence of cardiac dysrhythmias or at baseline rhythm  Description: INTERVENTIONS:  - Continuous cardiac monitoring, vital signs, obtain 12 lead EKG if ordered  - Administer antiarrhythmic and heart rate control medications as ordered  - Monitor electrolytes and administer replacement therapy as ordered  Outcome: Progressing     Problem: Prexisting or High Potential for Compromised Skin Integrity  Goal: Skin integrity is maintained or improved  Description: INTERVENTIONS:  - Identify patients at risk for skin breakdown  - Assess and monitor skin integrity  - Assess and monitor nutrition and hydration status  - Monitor labs   - Assess for incontinence   - Turn and reposition patient  - Assist with mobility/ambulation  - Relieve pressure over bony prominences  - Avoid friction and shearing  - Provide appropriate hygiene as needed including keeping skin clean and dry  - Evaluate need for skin moisturizer/barrier cream  - Collaborate with interdisciplinary team   - Patient/family teaching  - Consider wound care consult   Outcome: Progressing

## 2021-01-15 NOTE — PROGRESS NOTES
Progress Note - Miguel Pace 44 y o  female MRN: [id number]    Unit/Bed#: E4 -01 Encounter: [id number]      Subjective: The patient is feeling reasonably well  She slept well  She is eating well  She has mild chest pain which she attributes to recent thoracentesis  Her breathing is much improved since the time of admission  She had no PND orthopnea overnight  She has no abdominal pain, nausea, or vomiting  Physical Exam:   Temp:  [97 °F (36 1 °C)-97 8 °F (36 6 °C)] 97 7 °F (36 5 °C)  HR:  [74-78] 75  Resp:  [16-18] 16  BP: (124-137)/(79-91) 137/79    Gen:  Well-developed, frail, in no distress  Neck:  Supple  No lymphadenopathy, goiter, or bruit  Heart:  Regular rhythm  No murmur, gallop, or rub  Lungs:  Diminished breath sounds at the bases  Few rales at the bases  No wheezing or rhonchi  Abd:  Regular rhythm  I could hear no murmur, gallop, or rub  Extremities:  No clubbing, cyanosis, or edema  No calf tenderness  Neuro:  Alert and oriented  No focal sign  Skin:  Warm and dry      LABS:   CBC:   Lab Results   Component Value Date    WBC 6 88 01/15/2021    HGB 11 4 (L) 01/15/2021    HCT 35 4 01/15/2021     (H) 01/15/2021     01/15/2021    MCH 32 3 01/15/2021    MCHC 32 2 01/15/2021    RDW 13 3 01/15/2021    MPV 10 1 01/15/2021   , CMP:   Lab Results   Component Value Date    SODIUM 138 01/15/2021    K 4 3 01/15/2021     01/15/2021    CO2 27 01/15/2021    BUN 38 (H) 01/15/2021    CREATININE 1 65 (H) 01/15/2021    CALCIUM 8 6 01/15/2021    EGFR 35 01/15/2021     Chest x-ray showed tiny left apical pneumothorax    Bilateral airspace opacities were noted presumed to be related to pulmonary edema      Patient Active Problem List   Diagnosis    Primary osteoarthritis of one hip, left    Pre-operative clearance    Anxiety    Current smoker    Painless hematuria    Mild anemia    Status post total hip replacement, left    Hypertensive urgency    Acute respiratory failure with hypoxia (HCC)    Hypokalemia    MAXI (acute kidney injury) ([address] )    Bilateral pleural effusion    Acute systolic congestive heart failure (HCC)    Elevated troponin    ADHD    Closed fracture of distal end of right fibula    Macrocytic anemia       Assessment/Plan:  1  Acute systolic congestive heart failure, improved  2  Nonischemic cardiomyopathy  3  Acute respiratory failure secondary to 1   4  Non MI troponin elevation secondary to CHF  5  Bilateral pleural effusions secondary to CHF   6  Acute kidney injury superimposed on chronic kidney disease stage 3, improved  7  Hypertension  8  Anxiety, stable on current medications  9  Acute hypoxic respiratory failure secondary to 1  Clinically, the patient appears euvolemic  Her chest x-ray findings are lagging behind her clinical status  She is agreeable to a life vest   Arrangements are in process for this  I discussed the situation with case management and Cardiology  The patient's creatinine has improved somewhat      VTE Pharmacologic Prophylaxis: Heparin  VTE Mechanical Prophylaxis: reason for no mechanical VTE prophylaxis CHF

## 2021-01-15 NOTE — PROGRESS NOTES
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 hematuria and proteinuria  - upon review medical records, baseline creatinine 0 7-1 0 mg/dL in 2018   - creatinine 1 87 mg/dL upon admission on 1/6    - most recent creatinine 1 65 mg/dL today               - elevation suspect secondary to hemodynamic perturbations +/- CARLIE secondary to cardiac catheterization on 1/11               - transitioned to oral diuretics on 1/13; furosemide decreased to 20 mg daily per Cardiology to start on 1/15    -[place]: Most recent urine protein creatinine ratio 9 17 (9 98 prior)  Order placed for 24 hour urine protein creatinine    - ANCA negative, SPEP revealed no monoclonal bands, UPEP revealed nonselective proteinuria- no monoclonal bands, GBM negative, C3 97 5 C4 29, dsDNA negative, KAYLA negative,  free light chain ratio 1 85, hepatitis panel negative, RF positive- RF 20   - avoid NSAIDs, nephrotoxic agents, IV contrast   - adjust medications to appropriate GFR  - monitor volume status with strict intake/output  Daily weight  - will check BMP in a m      Electrolytes, acid/base:  - overall stable and acceptable  [facility] continue to monitor with repeat lab studies      Blood pressure, hypertension:  - blood pressure stable  - patient is not on antihypertensive medications as outpatient  - currently on: Carvedilol 25 mg b i d  + hydralazine 10 mg t i d  + Isordil 10 mg t i d  + furosemide 20 mg daily     - recommendations: maintain hold parameters on antihypertensives    - optimize hemodynamics; avoid hypotension fluctuations of blood pressure   - maintain MAP > 65       H/H:  - most recent hemoglobin 11 4 grams/deciliter   - goal hemoglobin greater than 8 grams/deciliter   - recommend PRBC transfusion for hemoglobin less than 7       Volume status/CHF:  - most recent echocardiogram revealed EF of 25%  - chest x-ray completed on 01/13 revealed significant decrease in the amount of left pleural fluid following thoracentesis  Persistent right pleural effusion  Decreasing vascular congestion and interstitial prominence               - status post thoracentesis on 1/12 with 630 mL fluid removal from left lung    - status post cardiac catheterization on 1/11: Normal coronary angiography per Cardiology  - diuretics as above; cardiology following   - awaiting LifeVest      Other medical problems:  - small left-sided apical pneumothorax, suspect post recent thoracentesis  - active tobacco abuse  - bilateral pulmonary nodules     SUBJECTIVE:  Patient resting in bed  Patient states her pain feels much better today  She has been eating and drinking well      OBJECTIVE:  Current Weight: Weight - Scale: 54 5 kg (120 lb 2 4 oz)  Vitals:    01/15/21 0715   BP: 137/79   Pulse: 75   Resp: 16   Temp: 97 7 °F (36 5 °C)   SpO2: 93%       Intake/Output Summary (Last 24 hours) at 1/15/2021 1140  Last data filed at 1/15/2021 9659  Gross per 24 hour   Intake 1200 ml   Output 2000 ml   Net -800 ml       General:  No acute distress  Skin:  Warm, no rash  Eyes:  Sclerae anicteric  ENT:  Moist lips mucous membranes  Neck:  Supple trachea midline  Chest:  Diminished breath sounds   CVS:  Regular rate regular rhythm  Abdomen:  Soft, nontender, normoactive bowel sounds  Extremities:  No edema   Neuro:  Awake and interactive  Psych:  Appropriate affect      Medications:  Scheduled Meds:  Current Facility-Administered Medications   Medication Dose Route Frequency Provider Last Rate    acetaminophen  650 mg Oral Q4H PRN Rosmery Agosto PA-C      atorvastatin  40 mg Oral Daily [employer], DO      carvedilol  25 mg Oral BID With Meals GAGE Mccord      cyanocobalamin  1,000 mcg Oral Daily [place] Ramey, DO      folic acid  1 mg Oral Daily Luis Angel James, DO      furosemide  20 mg Oral Daily Kayleigh Henson DO      heparin (porcine)  5,000 Units Subcutaneous [facility] Sugey Soliz PA-C      hydrALAZINE  10 mg Oral TID after meals GAGE Mccord      isosorbide dinitrate  10 mg Oral TID after meals Lei Bazzi PA-C      Labetalol HCl  10 mg Intravenous Q6H PRN Isis Maria MD      nicotine  1 patch Transdermal Daily Sugey Soliz PA-C      ondansetron  4 mg Intravenous Q6H PRN David Levi PA-C      oxyCODONE  5 mg Oral Q4H PRN Adelita Ramey DO      PARoxetine  60 mg Oral Daily Sugey Soliz PA-C      promethazine  12 5 mg Intravenous Q6H PRN Parmjit Salainsley,       QUEtiapine  100 mg Oral HS Sugey Soliz PA-C         PRN Meds:   acetaminophen    Labetalol HCl    ondansetron    oxyCODONE    promethazine    Continuous Infusions:     Laboratory Results:  Results from last 7 days   Lab Units 01/15/21  0459 01/14/21  0313 01/13/21  0524 01/12/21  0502 01/11/21  0520 01/10/21  0519 01/09/21  1126   WBC Thousand/uL 6 88 5 67 9 27 6 23 7 88  --   --    HEMOGLOBIN g/dL 11 4* 10 7* 11 2* 11 2* 12 3  --   --    HEMATOCRIT % 35 4 32 9* 34 5* 34 1* 37 2  --   --    PLATELETS Thousands/uL 213 191 228 235 252  --   --    SODIUM mmol/L 138 138 138 141 140 141 139   POTASSIUM mmol/L 4 3 4 4 4 6 4 6 3 7 3 3* 3 9   CHLORIDE mmol/L 107 107 107 109* 106 104 103   CO2 mmol/L 27 28 27 29 27 32 33*   BUN mg/dL 38* 40* 42* 40* 42* 39* 39*   CREATININE mg/dL 1 65* 1 69* 1 72* 1 48* 1 61* 1 63* 1 55*   CALCIUM mg/dL 8 6 8 2* 8 3 8 1* 8 1* 8 0* 8 2*   MAGNESIUM mg/dL 2 1 2 1  --   --   --   --   --    PHOSPHORUS mg/dL 3 5 4 0  --   --   --   --   --

## 2021-01-15 NOTE — PLAN OF CARE
Problem: Potential for Falls  Goal: Patient will remain free of falls  Description: INTERVENTIONS:  - Assess patient frequently for physical needs  -  Identify cognitive and physical deficits and behaviors that affect risk of falls  -  [place] fall precautions as indicated by assessment   - Educate patient/family on patient safety including physical limitations  - Instruct patient to call for assistance with activity based on assessment  - Modify environment to reduce risk of injury  - Consider OT/PT consult to assist with strengthening/mobility  Outcome: Progressing     Problem: PAIN - ADULT  Goal: Verbalizes/displays adequate comfort level or baseline comfort level  Description: Interventions:  - Encourage patient to monitor pain and request assistance  - Assess pain using appropriate pain scale  - Administer analgesics based on type and severity of pain and evaluate response  - Implement non-pharmacological measures as appropriate and evaluate response  - Consider cultural and social influences on pain and pain management  - Notify physician/advanced practitioner if interventions unsuccessful or patient reports new pain  Outcome: Progressing     Problem: SAFETY ADULT  Goal: Patient will remain free of falls  Description: INTERVENTIONS:  - Assess patient frequently for physical needs  -  Identify cognitive and physical deficits and behaviors that affect risk of falls    -  [place] fall precautions as indicated by assessment   - Educate patient/family on patient safety including physical limitations  - Instruct patient to call for assistance with activity based on assessment  - Modify environment to reduce risk of injury  - Consider OT/PT consult to assist with strengthening/mobility  Outcome: Progressing  Goal: Maintain or return to baseline ADL function  Description: INTERVENTIONS:  -  Assess patient's ability to carry out ADLs; assess patient's baseline for ADL function and identify physical deficits which impact ability to perform ADLs (bathing, care of mouth/teeth, toileting, grooming, dressing, etc )  - Assess/evaluate cause of self-care deficits   - Assess range of motion  - Assess patient's mobility; develop plan if impaired  - Assess patient's need for assistive devices and provide as appropriate  - Encourage maximum independence but intervene and supervise when necessary  - Involve family in performance of ADLs  - Assess for home care needs following discharge   - Consider OT consult to assist with ADL evaluation and planning for discharge  - Provide patient education as appropriate  Outcome: Progressing  Goal: Maintain or return mobility status to optimal level  Description: INTERVENTIONS:  - Assess patient's baseline mobility status (ambulation, transfers, stairs, etc )    - Identify cognitive and physical deficits and behaviors that affect mobility  - Identify mobility aids required to assist with transfers and/or ambulation (gait belt, sit-to-stand, lift, walker, cane, etc )  - [place] fall precautions as indicated by assessment  - Record patient progress and toleration of activity level on Mobility SBAR; progress patient to next Phase/Stage  - Instruct patient to call for assistance with activity based on assessment  - Consider rehabilitation consult to assist with strengthening/weightbearing, etc   Outcome: Progressing     Problem: DISCHARGE PLANNING  Goal: Discharge to home or other facility with appropriate resources  Description: INTERVENTIONS:  - Identify barriers to discharge w/patient and caregiver  - Arrange for needed discharge resources and transportation as appropriate  - Identify discharge learning needs (meds, wound care, etc )  - Arrange for interpretive services to assist at discharge as needed  - Refer to Case Management Department for coordinating discharge planning if the patient needs post-hospital services based on physician/advanced practitioner order or complex needs related to functional status, cognitive ability, or social support system  Outcome: Progressing     Problem: Knowledge Deficit  Goal: Patient/family/caregiver demonstrates understanding of disease process, treatment plan, medications, and discharge instructions  Description: Complete learning assessment and assess knowledge base    Interventions:  - Provide teaching at level of understanding  - Provide teaching via preferred learning methods  Outcome: Progressing     Problem: CARDIOVASCULAR - ADULT  Goal: Maintains optimal cardiac output and hemodynamic stability  Description: INTERVENTIONS:  - Monitor I/O, vital signs and rhythm  - Monitor for S/S and trends of decreased cardiac output  - Administer and titrate ordered vasoactive medications to optimize hemodynamic stability  - Assess quality of pulses, skin color and temperature  - Assess for signs of decreased coronary artery perfusion  - Instruct patient to report change in severity of symptoms  Outcome: Progressing  Goal: Absence of cardiac dysrhythmias or at baseline rhythm  Description: INTERVENTIONS:  - Continuous cardiac monitoring, vital signs, obtain 12 lead EKG if ordered  - Administer antiarrhythmic and heart rate control medications as ordered  - Monitor electrolytes and administer replacement therapy as ordered  Outcome: Progressing     Problem: Prexisting or High Potential for Compromised Skin Integrity  Goal: Skin integrity is maintained or improved  Description: INTERVENTIONS:  - Identify patients at risk for skin breakdown  - Assess and monitor skin integrity  - Assess and monitor nutrition and hydration status  - Monitor labs   - Assess for incontinence   - Turn and reposition patient  - Assist with mobility/ambulation  - Relieve pressure over bony prominences  - Avoid friction and shearing  - Provide appropriate hygiene as needed including keeping skin clean and dry  - Evaluate need for skin moisturizer/barrier cream  - Collaborate with interdisciplinary team   - Patient/family teaching  - Consider wound care consult   Outcome: Progressing

## 2021-01-15 NOTE — PROGRESS NOTES
Progress Note - Pulmonary   Shae Jacobs 44 y o  female MRN: [id number]  Unit/Bed#: E4 -01 Encounter: [id number]      Assessment / Plan:  1  Acute hypoxic respiratory failure likely multifactorial  · Continue maintain SpO2 greater than or equal to 88%  Patient did not wear oxygen prior to this hospital stay  She is currently requiring 2 liters/minute nasal cannula and tolerating it well  · Continue to encourage pulmonary toileting including cough and deep breathing exercises, out of bed as tolerated, increase today is able  2  Bilateral transudative pleural effusion status post right-sided thoracentesis 01/12/2021  · Patient had 630 mL of clear transit of fluid removed from her right lung  · Cultures are negative  Pathology is pending  3  Small left apical pneumothorax  · Repeat chest x-ray this a m  Shows stable tiny left apical pneumothorax  Patient unfortunately is experiencing some pain in her left upper chest area which I do suspect is likely secondary to her thoracentesis verses her pneumothorax  · It is suspected that her small pneumothorax is secondary to her recent thoracentesis  · Continue with non-rebreather and serial chest x-ray imaging  · RN was advised on signs and symptoms of increasing pneumothorax  Patient was educated on signs/symptoms of worsening pneumothorax    4  New acute grade 3 systolic heart failure with mild pulmonary hypertension likely who group 2 and 3  · Patient is status post cardiac catheterization without any significant coronary stenosis  · Pulmonary artery pressures of 40 mmHg  · Echocardiogram on 01/11/2021 showed ejection fraction of 25%  Cardiology is following  Appreciate their input  · Continue Lasix 40 mg p o  Q day  · Patient will need LifeVest at discharge    5  Active tobacco abuse with suspected COPD of unknown severity  · Patient is in contemplation stage  As she does want to quit smoking    · Patient was educated on importance of smoking cessation  · Patient will need PFTs, pulmonary follow-up, and continued tobacco cessation classes as an outpatient  · At discharge patient should be sent home on a Lama/laba inhaler, and Proventil 2 puffs p r n     6  Bilateral pulmonary nodules  · Patient will need annual surveillance   · nodules are less than 3 mm    Subjective:   Miss Arby Carrel is laying in bed upon my assessment today  She does report that she is having some slight pain in her left upper chest wall  It is worse with deep breathing  She otherwise offers no complaints and denies any other significant events overnight  She is very eager for discharge home  Objective:   Vitals: Blood pressure 137/79, pulse 75, temperature 97 7 °F (36 5 °C), temperature source Temporal, resp  rate 16, height 5' 6" (1 676 m), weight 54 5 kg (120 lb 2 4 oz), SpO2 93 %  , 3 L per nasal cannula, Body mass index is 19 39 kg/m²  Intake/Output Summary (Last 24 hours) at 1/15/2021 0910  Last data filed at 1/15/2021 0611  Gross per 24 hour   Intake 1200 ml   Output 2000 ml   Net -800 ml         Physical Exam  Gen: Awake, alert, oriented x 3, no acute distress  HEENT: Mucous membranes moist, no oral lesions, no thrush  NECK: No accessory muscle use, JVP not elevated  Cardiac: Regular, single S1, single S2, no murmurs, no rubs, no gallops  Lungs:  Breath sounds are decreased bilaterally throughout all lung fields without any wheezes, rales, rhonchi  Abdomen: normoactive bowel sounds, soft nontender, nondistended, no rebound or rigidity, no guarding  Extremities: no cyanosis, no clubbing, no edema    Labs: I have personally reviewed pertinent lab results  , ABG: No results found for: PHART, PAH1UYQ, PO2ART, QOW3NNV, O6INSXPI, BEART, SOURCE, BNP: No results found for: BNP, CBC:   Lab Results   Component Value Date    WBC 6 88 01/15/2021    HGB 11 4 (L) 01/15/2021    HCT 35 4 01/15/2021     (H) 01/15/2021     01/15/2021    MCH 32 3 01/15/2021    MCHC 32 2 01/15/2021    RDW 13 3 01/15/2021    MPV 10 1 01/15/2021   , CMP:   Lab Results   Component Value Date    SODIUM 138 01/15/2021    K 4 3 01/15/2021     01/15/2021    CO2 27 01/15/2021    BUN 38 (H) 01/15/2021    CREATININE 1 65 (H) 01/15/2021    CALCIUM 8 6 01/15/2021    EGFR 35 01/15/2021   , PT/INR: No results found for: PT, INR, Troponin: No results found for: TROPONINI     Imaging and other studies: I have personally reviewed pertinent reports  CXR from this AM  FINDINGS:     Cardiomediastinal silhouette appears unremarkable      There is a stable tiny left apical pneumothorax  There are significantly increased bilateral airspace opacities, most pronounced in the left upper lung  There is vascular congestion  There are faint Kerley  B lines  There is a small left pleural   effusion      No acute osseous abnormalities      IMPRESSION:     Stable tiny left apical pneumothorax      Progressive bilateral airspace opacities likely due to pulmonary edema  Concurrent pneumonia not excluded        CXR 1/13/21:   FINDINGS:     Cardiomediastinal silhouette appears stable and unremarkable      There has been a decrease in the amount of left pleural effusion following thoracentesis  A tiny left apical pneumothorax is noted  Persistent right pleural effusion  Decreasing interstitial prominence and vascular congestion      S-shaped thoracolumbar scoliosis  No acute osseous abnormalities      IMPRESSION:     There is a tiny left apical pneumothorax  Significant decrease in the amount of left pleural fluid following thoracentesis  Persistent right pleural effusion      Decreasing vascular congestion and interstitial prominence      Billy Gilliam PA-C

## 2021-01-15 NOTE — CASE MANAGEMENT
Cm reviewed pt care coordination rounds with Dr Fatoumata Fuller  Pt is medically stable awaiting for cards clearance  Cm was informed by Pawan Fuller that a fitter will come at [phone] for lifevest fitting today  Cm notify care team  Anticipating d/c today or tomorrow  Cm department will f/u

## 2021-01-16 ENCOUNTER — APPOINTMENT (INPATIENT)
Dept: RADIOLOGY | Facility: HOSPITAL | Age: 51
DRG: 286 | End: 2021-01-16
Payer: COMMERCIAL

## 2021-01-16 LAB
ANION GAP SERPL CALCULATED.3IONS-SCNC: 5 MMOL/L (ref 4–13)
BUN SERPL-MCNC: 38 MG/DL (ref 5–25)
CALCIUM SERPL-MCNC: 8.7 MG/DL (ref 8.3–10.1)
CHLORIDE SERPL-SCNC: 105 MMOL/L (ref 100–108)
CO2 SERPL-SCNC: 26 MMOL/L (ref 21–32)
CREAT SERPL-MCNC: 1.68 MG/DL (ref 0.6–1.3)
GFR SERPL CREATININE-BSD FRML MDRD: 35 ML/MIN/1.73SQ M
GLUCOSE SERPL-MCNC: 73 MG/DL (ref 65–140)
POTASSIUM SERPL-SCNC: 4.6 MMOL/L (ref 3.5–5.3)
SODIUM SERPL-SCNC: 136 MMOL/L (ref 136–145)

## 2021-01-16 PROCEDURE — 99233 SBSQ HOSP IP/OBS HIGH 50: CPT | Performed by: INTERNAL MEDICINE

## 2021-01-16 PROCEDURE — 71045 X-RAY EXAM CHEST 1 VIEW: CPT

## 2021-01-16 PROCEDURE — 80048 BASIC METABOLIC PNL TOTAL CA: CPT | Performed by: NURSE PRACTITIONER

## 2021-01-16 PROCEDURE — 99232 SBSQ HOSP IP/OBS MODERATE 35: CPT | Performed by: INTERNAL MEDICINE

## 2021-01-16 RX ORDER — FUROSEMIDE 20 MG/1
20 TABLET ORAL
Status: DISCONTINUED | OUTPATIENT
Start: 2021-01-16 | End: 2021-01-18

## 2021-01-16 RX ADMIN — HYDRALAZINE HYDROCHLORIDE 10 MG: 10 TABLET, FILM COATED ORAL at 13:28

## 2021-01-16 RX ADMIN — HEPARIN SODIUM 5000 UNITS: 5000 INJECTION INTRAVENOUS; SUBCUTANEOUS at 21:07

## 2021-01-16 RX ADMIN — HEPARIN SODIUM 5000 UNITS: 5000 INJECTION INTRAVENOUS; SUBCUTANEOUS at 13:26

## 2021-01-16 RX ADMIN — CARVEDILOL 25 MG: 25 TABLET, FILM COATED ORAL at 09:34

## 2021-01-16 RX ADMIN — PAROXETINE 60 MG: 20 TABLET, FILM COATED ORAL at 09:33

## 2021-01-16 RX ADMIN — FUROSEMIDE 20 MG: 20 TABLET ORAL at 16:18

## 2021-01-16 RX ADMIN — OXYCODONE HYDROCHLORIDE 5 MG: 5 TABLET ORAL at 21:07

## 2021-01-16 RX ADMIN — CYANOCOBALAMIN TAB 500 MCG 1000 MCG: 500 TAB at 09:33

## 2021-01-16 RX ADMIN — ISOSORBIDE DINITRATE 10 MG: 10 TABLET ORAL at 16:20

## 2021-01-16 RX ADMIN — ISOSORBIDE DINITRATE 10 MG: 10 TABLET ORAL at 13:28

## 2021-01-16 RX ADMIN — ATORVASTATIN CALCIUM 40 MG: 40 TABLET, FILM COATED ORAL at 16:19

## 2021-01-16 RX ADMIN — QUETIAPINE FUMARATE 100 MG: 25 TABLET ORAL at 21:07

## 2021-01-16 RX ADMIN — OXYCODONE HYDROCHLORIDE 5 MG: 5 TABLET ORAL at 16:22

## 2021-01-16 RX ADMIN — HYDRALAZINE HYDROCHLORIDE 10 MG: 10 TABLET, FILM COATED ORAL at 09:33

## 2021-01-16 RX ADMIN — FUROSEMIDE 20 MG: 20 TABLET ORAL at 13:15

## 2021-01-16 RX ADMIN — FOLIC ACID 1 MG: 1 TABLET ORAL at 09:33

## 2021-01-16 RX ADMIN — CARVEDILOL 25 MG: 25 TABLET, FILM COATED ORAL at 16:19

## 2021-01-16 RX ADMIN — HEPARIN SODIUM 5000 UNITS: 5000 INJECTION INTRAVENOUS; SUBCUTANEOUS at 05:30

## 2021-01-16 RX ADMIN — ISOSORBIDE DINITRATE 10 MG: 10 TABLET ORAL at 09:34

## 2021-01-16 RX ADMIN — HYDRALAZINE HYDROCHLORIDE 10 MG: 10 TABLET, FILM COATED ORAL at 16:20

## 2021-01-16 RX ADMIN — OXYCODONE HYDROCHLORIDE 5 MG: 5 TABLET ORAL at 09:45

## 2021-01-16 RX ADMIN — FUROSEMIDE 20 MG: 20 TABLET ORAL at 09:33

## 2021-01-16 RX ADMIN — Medication 1 PATCH: at 09:35

## 2021-01-16 NOTE — ASSESSMENT & PLAN NOTE
· Hypokalemia being repleted in the setting of diuretic    Results from last 7 days   Lab Units 01/16/21  0647 01/15/21  0459 01/14/21  0313 01/13/21  0524 01/12/21  0502 01/11/21  0520 01/10/21  0519   POTASSIUM mmol/L 4 6 4 3 4 4 4 6 4 6 3 7 3 3*

## 2021-01-16 NOTE — PROGRESS NOTES
[facility] PROGRESS NOTE   Jody Beverage 44 y o  female MRN: [id number]  Unit/Bed#: E4 -01 Encounter: [id number]  Reason for Consult:  Acute kidney injury    ASSESSMENT and PLAN:  Patient admitted on 01/06/2021 shortness of breath for 5 days, hypertensive urgency, bilateral pleural effusions, acute kidney injury and new onset CHF  Nephrology following for management of acute kidney injury  1  Acute kidney injury (POA):  · Baseline creatinine 0 71 mg/dL in 2018  · Patient reports chronic use of NSAIDs:  Taking ibuprofen twice a day for approximately 2 years prior to admission  She stopped on the day of admission  · Creatinine 1 87 on admission 1/6  Creatinine initially improved and increased to 1 7 approximately 48 hours after cardiac catheterization  · Now creatinine trending down and currently 1 65   · Currently on Lasix 20 mg p o  T i d   · Weight 128 lb on admission currently down to 119 lb  · Blood pressure acceptable  · Etiology:  Cardiorenal also concern for intrinsic process/glomerulonephritis  Consistently taking NSAIDs for 2 years which may cause acute interstitial nephritis/nephrotic syndrome  · Workup:    · Urine protein creatinine ratio > 9 g  24 hour urine for protein approximately 7 g  · Urinalysis:  2+ protein, 2-4 RBCs  Prior urinalysis February 2018 showed 3+ protein  · Serologies:  Negative workup except for positive rheumatoid factor  · ANCA negative, SPEP and UPEP no monoclonal bands, anti-GBM negative, complements normal, double-stranded DNA negative, KAYLA negative, free light chain ratio 1 85, hepatitis panel negative, RF positive/RF 20  · Plan/recommendations:  · After patient stabilized will need to consider renal biopsy  · Monitor renal function, avoid hypotension  Blood pressure acceptable at this time  · Await a m  Labs  2  Electrolytes/acid-base:  · Waiting a m  Labs  3   Blood pressure/hypertension/volume status:  · Bilateral pleural effusion:  Left Thoracentesis 1/12,  630 mL removed  · Antihypertensive regime:  Carvedilol 25 mg twice a day, hydralazine 10 mg t i d , isosorbide 10 mg t i d  Furosemide 20 mg daily  4  Acute CHF:    · Ejection fraction 25%  · Chest x-ray 1/13 decreased pleural fluid following thoracentesis left side, persistent right pleural effusion  Vascular congestion improving  · Chest x-ray 1/16:  Hazy opacity mid and lower left hemothorax, likely recurrent effusion  Persistent opacity medial left base due to atelectasis  Improvement/decrease in trace left apical pneumothorax  · Cardiology planning life vest  · Cardiology managing diuretics  5  Other:  Active tobacco abuse, bilateral pulmonary nodules      SUBJECTIVE / 24H INTERVAL HISTORY:  No acute complaints  Tired taking a rest   Able to sleep lying flat in bed without shortness of breath  Eating well  Eating 3 meals a day which is very unusual for her  OBJECTIVE:  Current Weight: Weight - Scale: 54 3 kg (119 lb 11 4 oz)  Vitals:    01/16/21 0537 01/16/21 0732 01/16/21 1236 01/16/21 1328   BP:  157/76  123/75   BP Location:  Right arm  Left arm   Pulse:  70  77   Resp:  16     Temp:  97 5 °F (36 4 °C)     TempSrc:  Temporal     SpO2:  98% 94%    Weight: 54 3 kg (119 lb 11 4 oz)      Height:           Intake/Output Summary (Last 24 hours) at 1/16/2021 1334  Last data filed at 1/16/2021 1301  Gross per 24 hour   Intake 120 ml   Output 1800 ml   Net -1680 ml     General: NAD, comfortably lying in bed  Chronically ill-appearing  Appears much older than stated age  Skin: no rash, pale  Eyes: anicteric sclera  ENT: moist mucous membrane  Neck: supple  Chest:  Decrease left lower lobe  no ronchii, no wheeze, no rubs, no rales  Normal effort at rest  CVS: s1s2, no murmur, no gallop, no rub    Normal rate  Abdomen: soft, nontender, nl sounds  Extremities: no edema LE b/l  : no flores  Neuro: AAOX3  Psych: normal affect  Medications:    Current Facility-Administered Medications:   acetaminophen (TYLENOL) tablet 650 mg, 650 mg, Oral, Q4H PRN, Austen Wilson PA-C, 650 mg at 01/13/21 2142    atorvastatin (LIPITOR) tablet 40 mg, 40 mg, Oral, Daily With Tram Chávez DO, 40 mg at 01/15/21 1610    carvedilol (COREG) tablet 25 mg, 25 mg, Oral, BID With Meals, GAGE Mccord, 25 mg at 01/16/21 0934    cyanocobalamin (VITAMIN B-12) tablet 1,000 mcg, 1,000 mcg, Oral, Daily, Luis Angel James DO, 1,000 mcg at 23/14/65 4418    folic acid (FOLVITE) tablet 1 mg, 1 mg, Oral, Daily, Luis Angel James DO, 1 mg at 01/16/21 0178    furosemide (LASIX) tablet 20 mg, 20 mg, Oral, TID (diuretic), Prashant Chambers DO, 20 mg at 01/16/21 1315    heparin (porcine) subcutaneous injection 5,000 Units, 5,000 Units, Subcutaneous, Q8H [address], Sugey Soliz PA-C, 5,000 Units at 01/16/21 1326    hydrALAZINE (APRESOLINE) tablet 10 mg, 10 mg, Oral, TID after meals, GAGE Mccord, 10 mg at 01/16/21 1328    isosorbide dinitrate (ISORDIL) tablet 10 mg, 10 mg, Oral, TID after meals, Karol Bazzi PA-C, 10 mg at 01/16/21 1328    Labetalol HCl (NORMODYNE) injection 10 mg, 10 mg, Intravenous, Q6H PRN, Breanne Christian MD, Stopped at 01/07/21 1214    nicotine (NICODERM CQ) 14 mg/24hr TD 24 hr patch 1 patch, 1 patch, Transdermal, Daily, Sugey Soliz PA-C, 1 patch at 01/16/21 0935    ondansetron (ZOFRAN) injection 4 mg, 4 mg, Intravenous, Q6H PRN, Sugey Soliz PA-C, 4 mg at 01/12/21 1644    oxyCODONE (ROXICODONE) IR tablet 5 mg, 5 mg, Oral, Q4H PRN, Luis Angel James DO, 5 mg at 01/16/21 0945    PARoxetine (PAXIL) tablet 60 mg, 60 mg, Oral, Daily, Sugey Soliz PA-C, 60 mg at 01/16/21 0933    promethazine (PHENERGAN) injection 12 5 mg, 12 5 mg, Intravenous, Q6H PRN, Rocío Woodson DO    QUEtiapine (SEROquel) tablet 100 mg, 100 mg, Oral, HS, Sugey Soliz PA-C, 100 mg at 01/15/21 2116    Laboratory Results:  Results from last 7 days   Lab Units 01/15/21  0459 01/14/21  0313 01/13/21  0571 01/12/21  0502 01/11/21  0520 01/10/21  0519   WBC Thousand/uL 6 88 5 67 9 27 6 23 7 88  --    HEMOGLOBIN g/dL 11 4* 10 7* 11 2* 11 2* 12 3  --    HEMATOCRIT % 35 4 32 9* 34 5* 34 1* 37 2  --    PLATELETS Thousands/uL 213 191 228 235 252  --    POTASSIUM mmol/L 4 3 4 4 4 6 4 6 3 7 3 3*   CHLORIDE mmol/L 107 107 107 109* 106 104   CO2 mmol/L 27 28 27 29 27 32   BUN mg/dL 38* 40* 42* 40* 42* 39*   CREATININE mg/dL 1 65* 1 69* 1 72* 1 48* 1 61* 1 63*   CALCIUM mg/dL 8 6 8 2* 8 3 8 1* 8 1* 8 0*   MAGNESIUM mg/dL 2 1 2 1  --   --   --   --    PHOSPHORUS mg/dL 3 5 4 0  --   --   --   --

## 2021-01-16 NOTE — ASSESSMENT & PLAN NOTE
· Acute respiratory failure with hypoxia secondary to do CHF/bilateral pleural effusions  · Appreciate IR evaluation and left-sided thoracentesis  Currently on room air  · Did have small pneumothorax postprocedure and was re-evaluated by pulmonology    · Unfortunately due to reaccumulation lasix increased to 20 mg t i d

## 2021-01-16 NOTE — PROGRESS NOTES
Progress Note - Keisha Clark 1970, 44 y o  female MRN: [id number]    Unit/Bed#: E4 -01 Encounter: [id number]    Primary Care Provider: No primary care provider on file  Date and time admitted to hospital: 1/6/2021  4:34 PM        * Acute respiratory failure with hypoxia (HCC)  Assessment & Plan  · Acute respiratory failure with hypoxia secondary to do CHF/bilateral pleural effusions  · Appreciate IR evaluation and left-sided thoracentesis  Currently on room air  · Did have small pneumothorax postprocedure and was re-evaluated by pulmonology  · Unfortunately due to reaccumulation lasix increased to 20 mg t i d     Acute systolic congestive heart failure [facility])  Assessment & Plan  Weight (last 2 days)     Date/Time   Weight    01/16/21 0537   54 3 (119 71)    01/15/21 0500   54 5 (120 15)    01/14/21 0600   55 (121 25)            · Acute systolic CHF new diagnosis  Cardiac catheterization negative for occlusive disease  · Diuretics held due to catheterization has been restarted  · Holding ACE-inhibitor secondary to kidney injury  Continue hydralazine/isosorbide  · Life vest delivered at bedside    Macrocytic anemia  Assessment & Plan  · Macrocytic anemia with borderline low S28 and folic acid    Supplements added    Results from last 7 days   Lab Units 01/15/21  0459 01/14/21  0313 01/13/21  0524 01/12/21  0502 01/11/21  0520   HEMOGLOBIN g/dL 11 4* 10 7* 11 2* 11 2* 12 3       Closed fracture of distal end of right fibula  Assessment & Plan  · Distal fibula fracture follows with orthopedics prior to admission    Elevated troponin  Assessment & Plan  · Non MI elevation troponin secondary to decompensated CHF    MAXI (acute kidney injury) ([facility] [address] )  Assessment & Plan  · MAXI on CKD 3 the setting of decompensated CHF  · Has significant proteinuria 9 g per day currently undergoing workup per nephrology  · Remained stable after catheterization and restarting of diuretics    Results from last 7 days   Lab Units 01/16/21  6621 01/15/21  0459 01/14/21  0313 01/13/21  0524 01/12/21  0502 01/11/21  0520 01/10/21  0519   BUN mg/dL 38* 38* 40* 42* 40* 42* 39*   CREATININE mg/dL 1 68* 1 65* 1 69* 1 72* 1 48* 1 61* 1 63*   EGFR ml/min/1 73sq m 35 35 34 34 40 37 36       Hypokalemia  Assessment & Plan  · Hypokalemia being repleted in the setting of diuretic    Results from last 7 days   Lab Units 01/16/21  0647 01/15/21  0459 01/14/21  0313 01/13/21  0524 01/12/21  0502 01/11/21  0520 01/10/21  0519   POTASSIUM mmol/L 4 6 4 3 4 4 4 6 4 6 3 7 3 3*       Hypertensive urgency  Assessment & Plan  · Accelerated hypertension now improved  · Continue carvedilol hydralazine and isosorbide started during hospitalization    Anxiety  Assessment & Plan  · Anxiety mood stable on quetiapine and paroxetine      VTE Pharmacologic Prophylaxis:   High Risk (Score >/= 5) - Pharmacological DVT Prophylaxis Ordered: Heparin  Sequential Compression Devices Ordered  Patient Centered Rounds: I have performed bedside rounds with nursing staff today  Discussions with Specialists or Other Care Team Provider:     Education and Discussions with Family / Patient:  Discussed with mother on telephone    Time Spent for Care: 25 mins  More than 50% of total time spent on counseling and coordination of care as described above  Current Length of Stay: 10 day(s)  Current Patient Status: Inpatient   Certification Statement: The patient will continue to require additional inpatient hospital stay due to CHF  Discharge Plan / Estimated Discharge Date: 24-48 hours when cleared by cardiology    Code Status: Level 1 - Full Code      Subjective:   Patient seen and examined  No new complaints, achieved room air today    Objective:   Vitals: Blood pressure 132/85, pulse 75, temperature 97 5 °F (36 4 °C), temperature source Temporal, resp  rate 18, height 5' 6" (1 676 m), weight 54 3 kg (119 lb 11 4 oz), SpO2 95 %  Physical Exam  Vitals signs reviewed  Constitutional:       General: She is not in acute distress  Appearance: Normal appearance  Eyes:      General: No scleral icterus  Cardiovascular:      Rate and Rhythm: Regular rhythm  Heart sounds: Normal heart sounds  Pulmonary:      Breath sounds: Decreased breath sounds present  No wheezing  Comments: Crackles bibasilar  Abdominal:      General: Bowel sounds are normal       Palpations: Abdomen is soft  Tenderness: There is no guarding or rebound  Musculoskeletal:         General: No swelling  Skin:     General: Skin is warm  Neurological:      Mental Status: She is alert and oriented to person, place, and time  Mental status is at baseline     Psychiatric:         Mood and Affect: Mood normal        Additional Data:   Labs:  Results from last 7 days   Lab Units 01/15/21  0459 01/14/21  0313 01/13/21  0524 01/12/21  0502 01/11/21  0520   WBC Thousand/uL 6 88 5 67 9 27 6 23 7 88   HEMOGLOBIN g/dL 11 4* 10 7* 11 2* 11 2* 12 3   HEMATOCRIT % 35 4 32 9* 34 5* 34 1* 37 2   MCV fL 100* 101* 101* 99* 98   PLATELETS Thousands/uL 213 191 228 235 252     Results from last 7 days   Lab Units 01/16/21  0647 01/15/21  0459 01/14/21  0313 01/13/21  0524 01/12/21  0502 01/11/21  0520 01/10/21  0519   SODIUM mmol/L 136 138 138 138 141 140 141   POTASSIUM mmol/L 4 6 4 3 4 4 4 6 4 6 3 7 3 3*   CHLORIDE mmol/L 105 107 107 107 109* 106 104   CO2 mmol/L 26 27 28 27 29 27 32   ANION GAP mmol/L 5 4 3* 4 3* 7 5   BUN mg/dL 38* 38* 40* 42* 40* 42* 39*   CREATININE mg/dL 1 68* 1 65* 1 69* 1 72* 1 48* 1 61* 1 63*   CALCIUM mg/dL 8 7 8 6 8 2* 8 3 8 1* 8 1* 8 0*   ALBUMIN g/dL  --   --   --  1 8* 1 8*  --   --    TOTAL BILIRUBIN mg/dL  --   --   --  0 26 0 19*  --   --    ALK PHOS U/L  --   --   --  76 71  --   --    ALT U/L  --   --   --  20 19  --   --    AST U/L  --   --   --  21 24  --   --    EGFR ml/min/1 73sq m 35 35 34 34 40 37 36   GLUCOSE RANDOM mg/dL 73 85 94 105 89 86 93     Results from last 7 days   Lab Units 01/15/21  0459 01/14/21  0313   MAGNESIUM mg/dL 2 1 2 1   PHOSPHORUS mg/dL 3 5 4 0                              * I Have Reviewed All Lab Data Listed Above  Cultures:   Results from last 7 days   Lab Units 01/12/21  1359   GRAM STAIN RESULT  1+ Polys  No organisms seen   BODY FLUID CULTURE, STERILE  No growth                 Lines/Drains:  Invasive Devices     Peripheral Intravenous Line            Peripheral IV 01/12/21 Dorsal (posterior); Left Forearm 4 days              Telemetry:      Imaging:  Imaging Reports Reviewed Today Include:   Xr Chest Portable    Result Date: 1/7/2021  Impression: Interstitial edema with effusions and atelectasis, corresponding with the chest CT one day earlier  Workstation performed: [id number]     Xr Chest Pa & Lateral    Result Date: 1/10/2021  Impression: Improved vascular clarity with persistent bilateral pleural effusions and left greater than right basilar atelectasis  Workstation performed: [id number]     Xr Ankle 3+ Vw Right    Result Date: 1/11/2021  Impression: Unchanged alignment at the distal fibular fracture with findings of ongoing healing/remodeling  Workstation performed: [id number]     Cta Ed Chest Pe Study    Result Date: 1/6/2021  Impression: No pulmonary embolus  Bilateral moderate pleural effusions  Posterior bibasilar atelectasis/infiltrates   Questionable hilar and subcarinal lymphadenopathy limited by lack of IV contrast  Workstation performed: [id number]     Scheduled Meds:  Current Facility-Administered Medications   Medication Dose Route Frequency Provider Last Rate    acetaminophen  650 mg Oral Q4H PRN Connye Spray, PA-C      atorvastatin  40 mg Oral Daily With [employer], DO      carvedilol  25 mg Oral BID With Meals GAGE Mccord      cyanocobalamin  1,000 mcg Oral Daily Luis Angel James, DO      folic acid  1 mg Oral Daily Luis Angel James, DO      furosemide  20 mg Oral TID (diuretic) Beauty Batters, DO      heparin (porcine)  5,000 Units Subcutaneous [facility] Sugey Soliz PA-C      hydrALAZINE  10 mg Oral TID after meals GAGE Mccord      isosorbide dinitrate  10 mg Oral TID after meals Lei Bazzi PA-C      Labetalol HCl  10 mg Intravenous Q6H PRN Isis Maria MD      nicotine  1 patch Transdermal Daily Sugey Soliz PA-C      ondansetron  4 mg Intravenous Q6H PRN David Levi PA-C      oxyCODONE  5 mg Oral Q4H PRN Adelita Ramey DO      PARoxetine  60 mg Oral Daily Sugey Soliz PA-C      promethazine  12 5 mg Intravenous Q6H PRN Parmjit Reddy DO      QUEtiapine  100 mg Oral HS ALISHA Hinton DO  [facility]  Hospitalist    ** Please Note: This note has been constructed using a voice recognition system   **

## 2021-01-16 NOTE — PLAN OF CARE
Problem: Potential for Falls  Goal: Patient will remain free of falls  Description: INTERVENTIONS:  - Assess patient frequently for physical needs  -  Identify cognitive and physical deficits and behaviors that affect risk of falls    -  [place] fall precautions as indicated by assessment   - Educate patient/family on patient safety including physical limitations  - Instruct patient to call for assistance with activity based on assessment  - Modify environment to reduce risk of injury  - Consider OT/PT consult to assist with strengthening/mobility  Outcome: Progressing

## 2021-01-16 NOTE — PROGRESS NOTES
Cardiology Progress Note   Si Second, MD Juaquin Hernandez DO, MD Jeremy Loera DO, Gabriela Mars DO, [facility]  ----------------------------------------------------------------  [address] [place] [address]    Mika Salas 44 y o  female MRN: [id number]  Unit/Bed#: E4 -21 Encounter: [id number]      ASSESSMENT:   · Acute systolic and diastolic congestive heart failure  · Nonischemic cardiomyopathy, ejection fraction 25%  · Hypertension  · Non MI related troponin elevation secondary to CHF  · Left-sided pleural effusion s/p thoracentesis with 630 mL removed, January 12, 2021  · CAD s/p cath w/ minimal luminal irregularities LAD and RCA, January 2021  · LVEF 25%, global hypokinesis, grade 3 diastolic dysfunction, mild LA dilatation, mild MR, trace AR, mild TR w/ PASP 40 mmHg, small circumferential pericardial effusion, January 2021  · Ongoing tobacco use, probable COPD  · Acute kidney injury  · Dyslipidemia  · Nephrotic range proteinuria with 6 9 g in 24 hours    PLAN:  Weight trends are slowly heading in the right direction the patient feels somewhat improved  She still has elevation in neck veins to valve +6  Does still level of shortness of breath and her oxygen saturation is 90% on room air  Chest x-ray shows reaccumulation left-sided effusion status post thoracentesis  Increased Lasix to 20 mg t i d   Keep potassium greater than 4 and magnesium greater than 2  Continue carvedilol, hydralazine and isosorbide  Statin therapy with minimal atherosclerosis of the coronary arteries  Continue to titrate off oxygen as tolerated  Check ambulatory pulse ox  Nephrology and Pulmonary input appreciated    Signed: Juaquin Donahue DO, [facility]      History of Present Illness:  Patient seen and examined  Denies chest pain, pressure, tightness or squeezing  Admits to improved shortness of breath, but she still feels dyspneic    Denies lower extremity swelling, orthopnea or paroxysmal nocturnal dyspnea  Review of Systems:  Review of Systems   Constitution: Negative for decreased appetite, fever, weight gain and weight loss  HENT: Negative for congestion and sore throat  Eyes: Negative for visual disturbance  Cardiovascular: Positive for dyspnea on exertion  Negative for chest pain, leg swelling, near-syncope and palpitations  Respiratory: Positive for shortness of breath  Negative for cough  Hematologic/Lymphatic: Negative for bleeding problem  Skin: Negative for rash  Musculoskeletal: Negative for myalgias and neck pain  Gastrointestinal: Negative for abdominal pain and nausea  Neurological: Negative for light-headedness and weakness  Psychiatric/Behavioral: Negative for depression  No Known Allergies    No current facility-administered medications on file prior to encounter        Current Outpatient Medications on File Prior to Encounter   Medication Sig    Glucos-Chondroit-Hyaluron-MSM (GLUCOSAMINE CHONDROITIN JOINT PO) Take 1 tablet by mouth as needed    methylphenidate (RITALIN) 20 MG tablet TK 1 T PO BID    PARoxetine (PAXIL) 30 mg tablet Take 60 mg by mouth daily     QUEtiapine (SEROquel) 100 mg tablet Take 100 mg by mouth daily at bedtime        Current Facility-Administered Medications   Medication Dose Route Frequency Provider Last Rate    acetaminophen  650 mg Oral Q4H PRN Catarino Quiñones PA-C      atorvastatin  40 mg Oral Daily With [employer], DO      carvedilol  25 mg Oral BID With Meals GAGE cMcord      cyanocobalamin  1,000 mcg Oral Daily Colt Camarena, DO      folic acid  1 mg Oral Daily Luis Angel James, DO      furosemide  20 mg Oral BID (diuretic) Mitali Zafar, DO      heparin (porcine)  5,000 Units Subcutaneous Q8H [address] Sugey Soliz PA-C      hydrALAZINE  10 mg Oral TID after meals GAGE Mccord      isosorbide dinitrate  10 mg Oral TID after meals Imtiaz Butt ALISHA Bazzi      Labetalol HCl  10 mg Intravenous Q6H PRN Monica Mattehw MD      nicotine  1 patch Transdermal Daily Sugey Soliz PA-C      ondansetron  4 mg Intravenous Q6H PRN Donavan Haywood PA-C      oxyCODONE  5 mg Oral Q4H PRN Daya Liza, DO      PARoxetine  60 mg Oral Daily Sugey Soliz PA-C      promethazine  12 5 mg Intravenous Q6H PRN Priscille Gramanda, DO      QUEtiapine  100 mg Oral HS Sugey Soliz PA-C              Vitals:    01/15/21 2344 01/16/21 0300 01/16/21 0537 01/16/21 0732   BP: 118/73 110/75  157/76   BP Location: Right arm Left arm  Right arm   Pulse: 80 75  70   Resp: 17 18  16   Temp: 98 1 °F (36 7 °C) (!) 97 4 °F (36 3 °C)  97 5 °F (36 4 °C)   TempSrc: Temporal Temporal  Temporal   SpO2: 92% 90%  98%   Weight:   54 3 kg (119 lb 11 4 oz)    Height:             Intake/Output Summary (Last 24 hours) at 1/16/2021 1201  Last data filed at 1/16/2021 0531  Gross per 24 hour   Intake 120 ml   Output 800 ml   Net -680 ml       Weight change: -0 2 kg (-7 1 oz)    PHYSICAL EXAMINATION:  Gen: Awake, Alert, NAD  HEENT: AT/NC, Anicteric, mmm  Neck: Supple, No elevated JVP  Resp: CTA bilaterally no w/r/r  CV: RRR +S1, S2, No m/r/g  Abd: Soft, NT/ND + BS  Ext: warm, no LE edema bilaterally  Neuro: Follows commands, moves all extermities  Psych: Appropriate affect    Lab Results:  Results from last 7 days   Lab Units 01/15/21  0459   WBC Thousand/uL 6 88   HEMOGLOBIN g/dL 11 4*   HEMATOCRIT % 35 4   PLATELETS Thousands/uL 213     Results from last 7 days   Lab Units 01/15/21  0459  01/13/21  0524   POTASSIUM mmol/L 4 3   < > 4 6   CHLORIDE mmol/L 107   < > 107   CO2 mmol/L 27   < > 27   BUN mg/dL 38*   < > 42*   CREATININE mg/dL 1 65*   < > 1 72*   CALCIUM mg/dL 8 6   < > 8 3   ALK PHOS U/L  --   --  76   ALT U/L  --   --  20   AST U/L  --   --  21    < > = values in this interval not displayed       No results found for: TROPONINT                Tele: SR, rare VPCs and single ventricular couplet    This note was completed in part utilizing M-Modal Fluency Direct Software  Grammatical errors, random word insertions, spelling mistakes, and incomplete sentences may be an occasional consequence of this system secondary to software limitations, ambient noise, and hardware issues  If you have any questions or concerns about the content, text, or information contained within the body of this dictation, please contact the provider for clarification

## 2021-01-16 NOTE — ASSESSMENT & PLAN NOTE
· MAXI on CKD 3 the setting of decompensated CHF  · Has significant proteinuria 9 g per day currently undergoing workup per nephrology  · Remained stable after catheterization and restarting of diuretics    Results from last 7 days   Lab Units 01/16/21  0647 01/15/21  0459 01/14/21  0313 01/13/21  0524 01/12/21  0502 01/11/21  0520 01/10/21  0519   BUN mg/dL 38* 38* 40* 42* 40* 42* 39*   CREATININE mg/dL 1 68* 1 65* 1 69* 1 72* 1 48* 1 61* 1 63*   EGFR ml/min/1 73sq m 35 35 34 34 40 37 36

## 2021-01-16 NOTE — ASSESSMENT & PLAN NOTE
Weight (last 2 days)     Date/Time   Weight    01/16/21 0537   54 3 (119 71)    01/15/21 0500   54 5 (120 15)    01/14/21 0600   55 (121 25)            · Acute systolic CHF new diagnosis  Cardiac catheterization negative for occlusive disease  · Diuretics held due to catheterization has been restarted  · Holding ACE-inhibitor secondary to kidney injury    Continue hydralazine/isosorbide  · Life vest delivered at bedside

## 2021-01-16 NOTE — PROGRESS NOTES
Progress Note - Pulmonary   Jt Trevino 44 y o  female MRN: [id number]  Unit/Bed#: E4 -01 Encounter: [id number]    Assessment/Plan:    1  Acute hypoxic respiratory failure likely multifactorial  1  Currently requiring 1 L nasal cannula  No oxygen requirement at baseline  2  Titrate oxygen maintain SpO2 greater than or equal to 88%  3  Pulmonary toilet:  Increase activity as tolerated for a bed as tolerated, cough and deep breathing as encouraged  2  Bilateral transudative pleural effusion status post left pleural effusion  1  S/p right-sided thoracentesis 01/12/2021 which yielded 630 mL clear transudative fluid  2  Cultures and cytology negative  3  Chest x-ray 01/16/2021 with review opacity in the lower left hemithorax lately recurrent effusion  4  Continue diuretics per primary team/Cardiology  3  Small left apical pneumothorax  1  Repeat chest x-ray 01/16/2021 with decreasing left apical pneumothorax  2  Continue supplemental oxygen  3  Continue serial chest x-ray imaging  4  New acute grade 3 systolic heart failure with mild pulmonary hypertension likely who group 2/3  1  Status post cardiac catheterization without significant coronary stenosis  2  Pulmonary artery pressure is 40 mm Hg  3  Echocardiogram 01/11/2021 with EF 25%  4  Treatment per Cardiology recommendations including Lasix 40 mg IV p o  Daily  5  Patient will need LifeVest at discharge  5  Nicotine dependence with suspected COPD of unknown severity without acute exacerbation  1  Smoking cessation encouraged  2  Continue nicotine replacement therapy while inpatient  3  Patient would benefit from PFTs and pulmonary follow-up for continued smoking cessation recommendations  4  At discharge patient patient should be sent home on Lama/Laba inhaler and Proventil 2 puffs q 6 hours p r n   6  Bilateral pulmonary nodules  1   Would benefit from annual surveillance given smoking history    Chief Complaint:    "I feel ok "    Subjective:    Patient was seen examined today  Upon entering the room, patient is seen lying in bed on 1 L nasal cannula no acute distress  She does report that she has some sleepy in her left chest that is worse with deep inspiration  Denies cough, sputum production, hemoptysis, wheeze, chest tightness  No fevers, chills, dizziness or headache  Denies nausea, vomiting abdominal pain, diarrhea  No leg swelling  Objective:    Vitals: Blood pressure 157/76, pulse 70, temperature 97 5 °F (36 4 °C), temperature source Temporal, resp  rate 16, height 5' 6" (1 676 m), weight 54 3 kg (119 lb 11 4 oz), SpO2 98 %  1L NC,Body mass index is 19 32 kg/m²  Intake/Output Summary (Last 24 hours) at 1/16/2021 1036  Last data filed at 1/16/2021 0531  Gross per 24 hour   Intake 120 ml   Output 800 ml   Net -680 ml       Invasive Devices     Peripheral Intravenous Line            Peripheral IV 01/12/21 Dorsal (posterior); Left Forearm 4 days                Physical Exam:     Physical Exam  Vitals signs and nursing note reviewed  Constitutional:       General: She is not in acute distress  Appearance: Normal appearance  HENT:      Head: Normocephalic and atraumatic  Right Ear: External ear normal       Left Ear: External ear normal       Nose: Nose normal       Mouth/Throat:      Mouth: Mucous membranes are moist       Pharynx: Oropharynx is clear  Eyes:      Extraocular Movements: Extraocular movements intact  Conjunctiva/sclera: Conjunctivae normal       Pupils: Pupils are equal, round, and reactive to light  Neck:      Musculoskeletal: Normal range of motion and neck supple  No muscular tenderness  Cardiovascular:      Rate and Rhythm: Normal rate and regular rhythm  Pulses: Normal pulses  Heart sounds: No murmur  Pulmonary:      Effort: No respiratory distress  Breath sounds: Rales (Bibasilar) present  No wheezing     Abdominal:      General: Bowel sounds are normal  Palpations: Abdomen is soft  There is no mass  Tenderness: There is no abdominal tenderness  Hernia: No hernia is present  Musculoskeletal: Normal range of motion  General: No tenderness or deformity  Right lower leg: No edema  Left lower leg: No edema  Skin:     General: Skin is warm and dry  Neurological:      General: No focal deficit present  Mental Status: She is alert and oriented to person, place, and time  Mental status is at baseline  Psychiatric:         Mood and Affect: Mood normal          Behavior: Behavior normal          Thought Content: Thought content normal          Judgment: Judgment normal          Labs: I have personally reviewed pertinent lab results  , ABG: No results found for: PHART, QJH6JXI, PO2ART, NYK3MCP, H0OCEOQK, BEART, SOURCE, BNP: No results found for: BNP, CBC: No results found for: WBC, HGB, HCT, MCV, PLT, ADJUSTEDWBC, MCH, MCHC, RDW, MPV, NRBC, CMP: No results found for: SODIUM, K, CL, CO2, ANIONGAP, BUN, CREATININE, GLUCOSE, CALCIUM, AST, ALT, ALKPHOS, PROT, BILITOT, EGFR, PT/INR: No results found for: PT, INR, Troponin: No results found for: TROPONINI      Imaging and other studies: I have personally reviewed pertinent reports  and I have personally reviewed pertinent films in PACS     Chest x-ray 01/16/2021 with increasing opacity in left mid and lower lung fields likely related to recurring effusion  Decreased small apical left pneumothorax

## 2021-01-16 NOTE — PLAN OF CARE
Problem: Potential for Falls  Goal: Patient will remain free of falls  Description: INTERVENTIONS:  - Assess patient frequently for physical needs  -  Identify cognitive and physical deficits and behaviors that affect risk of falls  -  [place] fall precautions as indicated by assessment   - Educate patient/family on patient safety including physical limitations  - Instruct patient to call for assistance with activity based on assessment  - Modify environment to reduce risk of injury  - Consider OT/PT consult to assist with strengthening/mobility  Outcome: Progressing     Problem: PAIN - ADULT  Goal: Verbalizes/displays adequate comfort level or baseline comfort level  Description: Interventions:  - Encourage patient to monitor pain and request assistance  - Assess pain using appropriate pain scale  - Administer analgesics based on type and severity of pain and evaluate response  - Implement non-pharmacological measures as appropriate and evaluate response  - Consider cultural and social influences on pain and pain management  - Notify physician/advanced practitioner if interventions unsuccessful or patient reports new pain  Outcome: Progressing     Problem: SAFETY ADULT  Goal: Patient will remain free of falls  Description: INTERVENTIONS:  - Assess patient frequently for physical needs  -  Identify cognitive and physical deficits and behaviors that affect risk of falls    -  [place] fall precautions as indicated by assessment   - Educate patient/family on patient safety including physical limitations  - Instruct patient to call for assistance with activity based on assessment  - Modify environment to reduce risk of injury  - Consider OT/PT consult to assist with strengthening/mobility  Outcome: Progressing  Goal: Maintain or return to baseline ADL function  Description: INTERVENTIONS:  -  Assess patient's ability to carry out ADLs; assess patient's baseline for ADL function and identify physical deficits which impact ability to perform ADLs (bathing, care of mouth/teeth, toileting, grooming, dressing, etc )  - Assess/evaluate cause of self-care deficits   - Assess range of motion  - Assess patient's mobility; develop plan if impaired  - Assess patient's need for assistive devices and provide as appropriate  - Encourage maximum independence but intervene and supervise when necessary  - Involve family in performance of ADLs  - Assess for home care needs following discharge   - Consider OT consult to assist with ADL evaluation and planning for discharge  - Provide patient education as appropriate  Outcome: Progressing  Goal: Maintain or return mobility status to optimal level  Description: INTERVENTIONS:  - Assess patient's baseline mobility status (ambulation, transfers, stairs, etc )    - Identify cognitive and physical deficits and behaviors that affect mobility  - Identify mobility aids required to assist with transfers and/or ambulation (gait belt, sit-to-stand, lift, walker, cane, etc )  - [place] fall precautions as indicated by assessment  - Record patient progress and toleration of activity level on Mobility SBAR; progress patient to next Phase/Stage  - Instruct patient to call for assistance with activity based on assessment  - Consider rehabilitation consult to assist with strengthening/weightbearing, etc   Outcome: Progressing     Problem: DISCHARGE PLANNING  Goal: Discharge to home or other facility with appropriate resources  Description: INTERVENTIONS:  - Identify barriers to discharge w/patient and caregiver  - Arrange for needed discharge resources and transportation as appropriate  - Identify discharge learning needs (meds, wound care, etc )  - Arrange for interpretive services to assist at discharge as needed  - Refer to Case Management Department for coordinating discharge planning if the patient needs post-hospital services based on physician/advanced practitioner order or complex needs related to functional status, cognitive ability, or social support system  Outcome: Progressing     Problem: Knowledge Deficit  Goal: Patient/family/caregiver demonstrates understanding of disease process, treatment plan, medications, and discharge instructions  Description: Complete learning assessment and assess knowledge base    Interventions:  - Provide teaching at level of understanding  - Provide teaching via preferred learning methods  Outcome: Progressing     Problem: CARDIOVASCULAR - ADULT  Goal: Maintains optimal cardiac output and hemodynamic stability  Description: INTERVENTIONS:  - Monitor I/O, vital signs and rhythm  - Monitor for S/S and trends of decreased cardiac output  - Administer and titrate ordered vasoactive medications to optimize hemodynamic stability  - Assess quality of pulses, skin color and temperature  - Assess for signs of decreased coronary artery perfusion  - Instruct patient to report change in severity of symptoms  Outcome: Progressing  Goal: Absence of cardiac dysrhythmias or at baseline rhythm  Description: INTERVENTIONS:  - Continuous cardiac monitoring, vital signs, obtain 12 lead EKG if ordered  - Administer antiarrhythmic and heart rate control medications as ordered  - Monitor electrolytes and administer replacement therapy as ordered  Outcome: Progressing     Problem: Prexisting or High Potential for Compromised Skin Integrity  Goal: Skin integrity is maintained or improved  Description: INTERVENTIONS:  - Identify patients at risk for skin breakdown  - Assess and monitor skin integrity  - Assess and monitor nutrition and hydration status  - Monitor labs   - Assess for incontinence   - Turn and reposition patient  - Assist with mobility/ambulation  - Relieve pressure over bony prominences  - Avoid friction and shearing  - Provide appropriate hygiene as needed including keeping skin clean and dry  - Evaluate need for skin moisturizer/barrier cream  - Collaborate with interdisciplinary team   - Patient/family teaching  - Consider wound care consult   Outcome: Progressing

## 2021-01-17 ENCOUNTER — APPOINTMENT (INPATIENT)
Dept: ULTRASOUND IMAGING | Facility: HOSPITAL | Age: 51
DRG: 286 | End: 2021-01-17
Payer: COMMERCIAL

## 2021-01-17 ENCOUNTER — APPOINTMENT (INPATIENT)
Dept: RADIOLOGY | Facility: HOSPITAL | Age: 51
DRG: 286 | End: 2021-01-17
Payer: COMMERCIAL

## 2021-01-17 LAB
ANION GAP SERPL CALCULATED.3IONS-SCNC: 7 MMOL/L (ref 4–13)
BUN SERPL-MCNC: 43 MG/DL (ref 5–25)
CALCIUM SERPL-MCNC: 9.1 MG/DL (ref 8.3–10.1)
CHLORIDE SERPL-SCNC: 104 MMOL/L (ref 100–108)
CO2 SERPL-SCNC: 28 MMOL/L (ref 21–32)
CREAT SERPL-MCNC: 1.73 MG/DL (ref 0.6–1.3)
ERYTHROCYTE [DISTWIDTH] IN BLOOD BY AUTOMATED COUNT: 13.1 % (ref 11.6–15.1)
GFR SERPL CREATININE-BSD FRML MDRD: 33 ML/MIN/1.73SQ M
GLUCOSE SERPL-MCNC: 82 MG/DL (ref 65–140)
HCT VFR BLD AUTO: 37.2 % (ref 34.8–46.1)
HGB BLD-MCNC: 12.6 G/DL (ref 11.5–15.4)
MCH RBC QN AUTO: 33 PG (ref 26.8–34.3)
MCHC RBC AUTO-ENTMCNC: 33.9 G/DL (ref 31.4–37.4)
MCV RBC AUTO: 97 FL (ref 82–98)
PLATELET # BLD AUTO: 302 THOUSANDS/UL (ref 149–390)
PMV BLD AUTO: 9.6 FL (ref 8.9–12.7)
POTASSIUM SERPL-SCNC: 4.1 MMOL/L (ref 3.5–5.3)
RBC # BLD AUTO: 3.82 MILLION/UL (ref 3.81–5.12)
SODIUM SERPL-SCNC: 139 MMOL/L (ref 136–145)
WBC # BLD AUTO: 6.69 THOUSAND/UL (ref 4.31–10.16)

## 2021-01-17 PROCEDURE — 76770 US EXAM ABDO BACK WALL COMP: CPT

## 2021-01-17 PROCEDURE — 85027 COMPLETE CBC AUTOMATED: CPT | Performed by: INTERNAL MEDICINE

## 2021-01-17 PROCEDURE — 99232 SBSQ HOSP IP/OBS MODERATE 35: CPT | Performed by: PHYSICIAN ASSISTANT

## 2021-01-17 PROCEDURE — 99233 SBSQ HOSP IP/OBS HIGH 50: CPT | Performed by: INTERNAL MEDICINE

## 2021-01-17 PROCEDURE — NC001 PR NO CHARGE: Performed by: RADIOLOGY

## 2021-01-17 PROCEDURE — 71045 X-RAY EXAM CHEST 1 VIEW: CPT

## 2021-01-17 PROCEDURE — 99232 SBSQ HOSP IP/OBS MODERATE 35: CPT | Performed by: INTERNAL MEDICINE

## 2021-01-17 PROCEDURE — 99232 SBSQ HOSP IP/OBS MODERATE 35: CPT | Performed by: STUDENT IN AN ORGANIZED HEALTH CARE EDUCATION/TRAINING PROGRAM

## 2021-01-17 PROCEDURE — 80048 BASIC METABOLIC PNL TOTAL CA: CPT | Performed by: NURSE PRACTITIONER

## 2021-01-17 RX ORDER — SODIUM CHLORIDE 9 MG/ML
75 INJECTION, SOLUTION INTRAVENOUS CONTINUOUS
Status: DISCONTINUED | OUTPATIENT
Start: 2021-01-18 | End: 2021-01-17

## 2021-01-17 RX ORDER — SODIUM CHLORIDE 9 MG/ML
75 INJECTION, SOLUTION INTRAVENOUS CONTINUOUS
Status: DISCONTINUED | OUTPATIENT
Start: 2021-01-17 | End: 2021-01-17

## 2021-01-17 RX ADMIN — FUROSEMIDE 20 MG: 20 TABLET ORAL at 12:11

## 2021-01-17 RX ADMIN — HYDRALAZINE HYDROCHLORIDE 10 MG: 10 TABLET, FILM COATED ORAL at 12:11

## 2021-01-17 RX ADMIN — CARVEDILOL 25 MG: 25 TABLET, FILM COATED ORAL at 08:46

## 2021-01-17 RX ADMIN — ISOSORBIDE DINITRATE 10 MG: 10 TABLET ORAL at 08:46

## 2021-01-17 RX ADMIN — QUETIAPINE FUMARATE 100 MG: 25 TABLET ORAL at 21:09

## 2021-01-17 RX ADMIN — OXYCODONE HYDROCHLORIDE 5 MG: 5 TABLET ORAL at 21:09

## 2021-01-17 RX ADMIN — ACETAMINOPHEN 650 MG: 325 TABLET ORAL at 12:11

## 2021-01-17 RX ADMIN — HYDRALAZINE HYDROCHLORIDE 10 MG: 10 TABLET, FILM COATED ORAL at 08:46

## 2021-01-17 RX ADMIN — FUROSEMIDE 20 MG: 20 TABLET ORAL at 17:30

## 2021-01-17 RX ADMIN — ISOSORBIDE DINITRATE 10 MG: 10 TABLET ORAL at 12:11

## 2021-01-17 RX ADMIN — CYANOCOBALAMIN TAB 500 MCG 1000 MCG: 500 TAB at 08:46

## 2021-01-17 RX ADMIN — HEPARIN SODIUM 5000 UNITS: 5000 INJECTION INTRAVENOUS; SUBCUTANEOUS at 14:28

## 2021-01-17 RX ADMIN — FUROSEMIDE 20 MG: 20 TABLET ORAL at 06:09

## 2021-01-17 RX ADMIN — HEPARIN SODIUM 5000 UNITS: 5000 INJECTION INTRAVENOUS; SUBCUTANEOUS at 06:09

## 2021-01-17 RX ADMIN — FOLIC ACID 1 MG: 1 TABLET ORAL at 08:50

## 2021-01-17 RX ADMIN — ATORVASTATIN CALCIUM 40 MG: 40 TABLET, FILM COATED ORAL at 17:30

## 2021-01-17 RX ADMIN — Medication 1 PATCH: at 08:50

## 2021-01-17 RX ADMIN — HEPARIN SODIUM 5000 UNITS: 5000 INJECTION INTRAVENOUS; SUBCUTANEOUS at 21:09

## 2021-01-17 RX ADMIN — ISOSORBIDE DINITRATE 10 MG: 10 TABLET ORAL at 17:30

## 2021-01-17 RX ADMIN — PAROXETINE 60 MG: 20 TABLET, FILM COATED ORAL at 08:46

## 2021-01-17 NOTE — PROGRESS NOTES
Cardiology Progress Note   MD Shane Whiting MD Deronda Billow, DO, [facility]  MD Lyly Mauricio DO, Kina Jones DO, [facility]  ----------------------------------------------------------------  [address] [address]    Mary Blanco 44 y o  female MRN: [id number]  Unit/Bed#: E4 -01 Encounter: [id number]      ASSESSMENT:   · Acute systolic and diastolic congestive heart failure  · Nonischemic cardiomyopathy, ejection fraction 25%  · Hypertension  · Non MI related troponin elevation secondary to CHF  · Left-sided pleural effusion s/p thoracentesis with 630 mL removed and residual small apical pneumothorax, January 12, 2021  · CAD s/p cath w/ minimal luminal irregularities LAD and RCA, January 2021  · LVEF 25%, global hypokinesis, grade 3 diastolic dysfunction, mild LA dilatation, mild MR, trace AR, mild TR w/ PASP 40 mmHg, small circumferential pericardial effusion, January 2021  · Ongoing tobacco use, probable COPD  · Acute kidney injury  · Dyslipidemia  · Nephrotic range proteinuria with 6 9 g in 24 hours    PLAN:  She is feeling better overall and is now off of oxygen  Weight trends shows use down 5 lb since yesterday  Continue with oral diuretic  Chest x-ray shows residual left-sided pleural effusion even after thoracentesis  Pulmonary following  The patient is planned for biopsy in the coming days  Patient is getting close to being is euvolemic as possible, but extravasation of fluid into the pleural space is likely in part due to hypoalbuminemia with a level of 1 8  Nephrology input appreciated  Patient has LifeVest  Reasonable for renal biopsy from CV perspective    Signed: Wes Caraballo DO, [facility]      History of Present Illness:  Patient seen and examined  Denies chest pain, pressure, tightness or squeezing  Denies lightheadedness, dizziness or palpitations    Denies lower extremity swelling, orthopnea or paroxysmal nocturnal dyspnea  She is now on room air  Review of Systems:  Review of Systems   Constitution: Negative for decreased appetite, fever, weight gain and weight loss  HENT: Negative for congestion and sore throat  Eyes: Negative for visual disturbance  Cardiovascular: Positive for dyspnea on exertion  Negative for chest pain, leg swelling, near-syncope and palpitations  Respiratory: Positive for shortness of breath  Negative for cough  Hematologic/Lymphatic: Negative for bleeding problem  Skin: Negative for rash  Musculoskeletal: Negative for myalgias and neck pain  Gastrointestinal: Negative for abdominal pain and nausea  Neurological: Negative for light-headedness and weakness  Psychiatric/Behavioral: Negative for depression  No Known Allergies    No current facility-administered medications on file prior to encounter        Current Outpatient Medications on File Prior to Encounter   Medication Sig    Glucos-Chondroit-Hyaluron-MSM (GLUCOSAMINE CHONDROITIN JOINT PO) Take 1 tablet by mouth as needed    methylphenidate (RITALIN) 20 MG tablet TK 1 T PO BID    PARoxetine (PAXIL) 30 mg tablet Take 60 mg by mouth daily     QUEtiapine (SEROquel) 100 mg tablet Take 100 mg by mouth daily at bedtime        Current Facility-Administered Medications   Medication Dose Route Frequency Provider Last Rate    acetaminophen  650 mg Oral Q4H PRN Felicita Rey PA-C      atorvastatin  40 mg Oral Daily With [employer], DO      carvedilol  25 mg Oral BID With Meals [employer]      cyanocobalamin  1,000 mcg Oral Daily Aldo Moya, DO      folic acid  1 mg Oral Daily Luis Angel James, DO      furosemide  20 mg Oral TID (diuretic) Donna Boggs, DO      heparin (porcine)  5,000 Units Subcutaneous Q8H [address] Sugey Soliz PA-C      hydrALAZINE  10 mg Oral TID after meals GAGE [place]      isosorbide dinitrate  10 mg Oral TID after meals Briana Bazzi PA-C      Labetalol HCl  10 mg Intravenous Q6H PRN Elvira Theodore MD      nicotine  1 patch Transdermal Daily Sugey Soliz PA-C      ondansetron  4 mg Intravenous Q6H PRN Trish Huber PA-C      oxyCODONE  5 mg Oral Q4H PRN Cristi Nolen, DO      PARoxetine  60 mg Oral Daily Sugey Soliz PA-C      promethazine  12 5 mg Intravenous Q6H PRN [place], DO      QUEtiapine  100 mg Oral HS Sugey Soliz PA-C              Vitals:    01/16/21 1754 01/16/21 2323 01/17/21 0600 01/17/21 0700   BP:  111/68 127/87 152/76   BP Location:  Right arm Left arm Left arm   Pulse:  80 71 77   Resp:  18     Temp:  97 9 °F (36 6 °C)  97 6 °F (36 4 °C)   TempSrc:  Temporal  Temporal   SpO2: 95% 90%  90%   Weight:   52 5 kg (115 lb 11 9 oz)    Height:             Intake/Output Summary (Last 24 hours) at 1/17/2021 1350  Last data filed at 1/17/2021 1115  Gross per 24 hour   Intake 390 ml   Output 2350 ml   Net -1960 ml       Weight change: -1 8 kg (-3 lb 15 5 oz)    PHYSICAL EXAMINATION:  Gen: Awake, Alert, NAD  HEENT: AT/NC, Anicteric, mmm  Neck: Supple, No elevated JVP  Resp:  Decreased BS left greater than right  CV: RRR +S1, S2, No m/r/g  Abd: Soft, NT/ND + BS  Ext: warm, no LE edema bilaterally  Neuro: Follows commands, moves all extermities  Psych: Appropriate affect    Lab Results:  Results from last 7 days   Lab Units 01/17/21  0609   WBC Thousand/uL 6 69   HEMOGLOBIN g/dL 12 6   HEMATOCRIT % 37 2   PLATELETS Thousands/uL 302     Results from last 7 days   Lab Units 01/17/21  0609  01/13/21  0524   POTASSIUM mmol/L 4 1   < > 4 6   CHLORIDE mmol/L 104   < > 107   CO2 mmol/L 28   < > 27   BUN mg/dL 43*   < > 42*   CREATININE mg/dL 1 73*   < > 1 72*   CALCIUM mg/dL 9 1   < > 8 3   ALK PHOS U/L  --   --  76   ALT U/L  --   --  20   AST U/L  --   --  21    < > = values in this interval not displayed       No results found for: TROPONINT                Tele: SR, rare VPCs and single ventricular couplet    This note was completed in part utilizing M-Modal Fluency Direct Software  Grammatical errors, random word insertions, spelling mistakes, and incomplete sentences may be an occasional consequence of this system secondary to software limitations, ambient noise, and hardware issues  If you have any questions or concerns about the content, text, or information contained within the body of this dictation, please contact the provider for clarification

## 2021-01-17 NOTE — PLAN OF CARE
Problem: Potential for Falls  Goal: Patient will remain free of falls  Description: INTERVENTIONS:  - Assess patient frequently for physical needs  -  Identify cognitive and physical deficits and behaviors that affect risk of falls  -  [place] fall precautions as indicated by assessment   - Educate patient/family on patient safety including physical limitations  - Instruct patient to call for assistance with activity based on assessment  - Modify environment to reduce risk of injury  - Consider OT/PT consult to assist with strengthening/mobility  Outcome: Progressing     Problem: PAIN - ADULT  Goal: Verbalizes/displays adequate comfort level or baseline comfort level  Description: Interventions:  - Encourage patient to monitor pain and request assistance  - Assess pain using appropriate pain scale  - Administer analgesics based on type and severity of pain and evaluate response  - Implement non-pharmacological measures as appropriate and evaluate response  - Consider cultural and social influences on pain and pain management  - Notify physician/advanced practitioner if interventions unsuccessful or patient reports new pain  Outcome: Progressing     Problem: SAFETY ADULT  Goal: Patient will remain free of falls  Description: INTERVENTIONS:  - Assess patient frequently for physical needs  -  Identify cognitive and physical deficits and behaviors that affect risk of falls    -  [place] fall precautions as indicated by assessment   - Educate patient/family on patient safety including physical limitations  - Instruct patient to call for assistance with activity based on assessment  - Modify environment to reduce risk of injury  - Consider OT/PT consult to assist with strengthening/mobility  Outcome: Progressing  Goal: Maintain or return to baseline ADL function  Description: INTERVENTIONS:  -  Assess patient's ability to carry out ADLs; assess patient's baseline for ADL function and identify physical deficits which impact ability to perform ADLs (bathing, care of mouth/teeth, toileting, grooming, dressing, etc )  - Assess/evaluate cause of self-care deficits   - Assess range of motion  - Assess patient's mobility; develop plan if impaired  - Assess patient's need for assistive devices and provide as appropriate  - Encourage maximum independence but intervene and supervise when necessary  - Involve family in performance of ADLs  - Assess for home care needs following discharge   - Consider OT consult to assist with ADL evaluation and planning for discharge  - Provide patient education as appropriate  Outcome: Progressing  Goal: Maintain or return mobility status to optimal level  Description: INTERVENTIONS:  - Assess patient's baseline mobility status (ambulation, transfers, stairs, etc )    - Identify cognitive and physical deficits and behaviors that affect mobility  - Identify mobility aids required to assist with transfers and/or ambulation (gait belt, sit-to-stand, lift, walker, cane, etc )  - [place] fall precautions as indicated by assessment  - Record patient progress and toleration of activity level on Mobility SBAR; progress patient to next Phase/Stage  - Instruct patient to call for assistance with activity based on assessment  - Consider rehabilitation consult to assist with strengthening/weightbearing, etc   Outcome: Progressing     Problem: DISCHARGE PLANNING  Goal: Discharge to home or other facility with appropriate resources  Description: INTERVENTIONS:  - Identify barriers to discharge w/patient and caregiver  - Arrange for needed discharge resources and transportation as appropriate  - Identify discharge learning needs (meds, wound care, etc )  - Arrange for interpretive services to assist at discharge as needed  - Refer to Case Management Department for coordinating discharge planning if the patient needs post-hospital services based on physician/advanced practitioner order or complex needs related to functional status, cognitive ability, or social support system  Outcome: Progressing     Problem: Knowledge Deficit  Goal: Patient/family/caregiver demonstrates understanding of disease process, treatment plan, medications, and discharge instructions  Description: Complete learning assessment and assess knowledge base    Interventions:  - Provide teaching at level of understanding  - Provide teaching via preferred learning methods  Outcome: Progressing     Problem: CARDIOVASCULAR - ADULT  Goal: Maintains optimal cardiac output and hemodynamic stability  Description: INTERVENTIONS:  - Monitor I/O, vital signs and rhythm  - Monitor for S/S and trends of decreased cardiac output  - Administer and titrate ordered vasoactive medications to optimize hemodynamic stability  - Assess quality of pulses, skin color and temperature  - Assess for signs of decreased coronary artery perfusion  - Instruct patient to report change in severity of symptoms  Outcome: Progressing  Goal: Absence of cardiac dysrhythmias or at baseline rhythm  Description: INTERVENTIONS:  - Continuous cardiac monitoring, vital signs, obtain 12 lead EKG if ordered  - Administer antiarrhythmic and heart rate control medications as ordered  - Monitor electrolytes and administer replacement therapy as ordered  Outcome: Progressing     Problem: Prexisting or High Potential for Compromised Skin Integrity  Goal: Skin integrity is maintained or improved  Description: INTERVENTIONS:  - Identify patients at risk for skin breakdown  - Assess and monitor skin integrity  - Assess and monitor nutrition and hydration status  - Monitor labs   - Assess for incontinence   - Turn and reposition patient  - Assist with mobility/ambulation  - Relieve pressure over bony prominences  - Avoid friction and shearing  - Provide appropriate hygiene as needed including keeping skin clean and dry  - Evaluate need for skin moisturizer/barrier cream  - Collaborate with interdisciplinary team   - Patient/family teaching  - Consider wound care consult   Outcome: Progressing

## 2021-01-17 NOTE — PLAN OF CARE
Problem: Potential for Falls  Goal: Patient will remain free of falls  Description: INTERVENTIONS:  - Assess patient frequently for physical needs  -  Identify cognitive and physical deficits and behaviors that affect risk of falls  -  [place] fall precautions as indicated by assessment   - Educate patient/family on patient safety including physical limitations  - Instruct patient to call for assistance with activity based on assessment  - Modify environment to reduce risk of injury  - Consider OT/PT consult to assist with strengthening/mobility  Outcome: Progressing     Problem: PAIN - ADULT  Goal: Verbalizes/displays adequate comfort level or baseline comfort level  Description: Interventions:  - Encourage patient to monitor pain and request assistance  - Assess pain using appropriate pain scale  - Administer analgesics based on type and severity of pain and evaluate response  - Implement non-pharmacological measures as appropriate and evaluate response  - Consider cultural and social influences on pain and pain management  - Notify physician/advanced practitioner if interventions unsuccessful or patient reports new pain  Outcome: Progressing     Problem: SAFETY ADULT  Goal: Patient will remain free of falls  Description: INTERVENTIONS:  - Assess patient frequently for physical needs  -  Identify cognitive and physical deficits and behaviors that affect risk of falls    -  [place] fall precautions as indicated by assessment   - Educate patient/family on patient safety including physical limitations  - Instruct patient to call for assistance with activity based on assessment  - Modify environment to reduce risk of injury  - Consider OT/PT consult to assist with strengthening/mobility  Outcome: Progressing  Goal: Maintain or return to baseline ADL function  Description: INTERVENTIONS:  -  Assess patient's ability to carry out ADLs; assess patient's baseline for ADL function and identify physical deficits which impact ability to perform ADLs (bathing, care of mouth/teeth, toileting, grooming, dressing, etc )  - Assess/evaluate cause of self-care deficits   - Assess range of motion  - Assess patient's mobility; develop plan if impaired  - Assess patient's need for assistive devices and provide as appropriate  - Encourage maximum independence but intervene and supervise when necessary  - Involve family in performance of ADLs  - Assess for home care needs following discharge   - Consider OT consult to assist with ADL evaluation and planning for discharge  - Provide patient education as appropriate  Outcome: Progressing  Goal: Maintain or return mobility status to optimal level  Description: INTERVENTIONS:  - Assess patient's baseline mobility status (ambulation, transfers, stairs, etc )    - Identify cognitive and physical deficits and behaviors that affect mobility  - Identify mobility aids required to assist with transfers and/or ambulation (gait belt, sit-to-stand, lift, walker, cane, etc )  - [place] fall precautions as indicated by assessment  - Record patient progress and toleration of activity level on Mobility SBAR; progress patient to next Phase/Stage  - Instruct patient to call for assistance with activity based on assessment  - Consider rehabilitation consult to assist with strengthening/weightbearing, etc   Outcome: Progressing     Problem: DISCHARGE PLANNING  Goal: Discharge to home or other facility with appropriate resources  Description: INTERVENTIONS:  - Identify barriers to discharge w/patient and caregiver  - Arrange for needed discharge resources and transportation as appropriate  - Identify discharge learning needs (meds, wound care, etc )  - Arrange for interpretive services to assist at discharge as needed  - Refer to Case Management Department for coordinating discharge planning if the patient needs post-hospital services based on physician/advanced practitioner order or complex needs related to functional status, cognitive ability, or social support system  Outcome: Progressing     Problem: Knowledge Deficit  Goal: Patient/family/caregiver demonstrates understanding of disease process, treatment plan, medications, and discharge instructions  Description: Complete learning assessment and assess knowledge base    Interventions:  - Provide teaching at level of understanding  - Provide teaching via preferred learning methods  Outcome: Progressing     Problem: CARDIOVASCULAR - ADULT  Goal: Maintains optimal cardiac output and hemodynamic stability  Description: INTERVENTIONS:  - Monitor I/O, vital signs and rhythm  - Monitor for S/S and trends of decreased cardiac output  - Administer and titrate ordered vasoactive medications to optimize hemodynamic stability  - Assess quality of pulses, skin color and temperature  - Assess for signs of decreased coronary artery perfusion  - Instruct patient to report change in severity of symptoms  Outcome: Progressing  Goal: Absence of cardiac dysrhythmias or at baseline rhythm  Description: INTERVENTIONS:  - Continuous cardiac monitoring, vital signs, obtain 12 lead EKG if ordered  - Administer antiarrhythmic and heart rate control medications as ordered  - Monitor electrolytes and administer replacement therapy as ordered  Outcome: Progressing     Problem: Prexisting or High Potential for Compromised Skin Integrity  Goal: Skin integrity is maintained or improved  Description: INTERVENTIONS:  - Identify patients at risk for skin breakdown  - Assess and monitor skin integrity  - Assess and monitor nutrition and hydration status  - Monitor labs   - Assess for incontinence   - Turn and reposition patient  - Assist with mobility/ambulation  - Relieve pressure over bony prominences  - Avoid friction and shearing  - Provide appropriate hygiene as needed including keeping skin clean and dry  - Evaluate need for skin moisturizer/barrier cream  - Collaborate with interdisciplinary team   - Patient/family teaching  - Consider wound care consult   Outcome: Progressing

## 2021-01-17 NOTE — PROGRESS NOTES
[facility] PROGRESS NOTE   Gin Pavon 44 y o  female MRN: [id number]  Unit/Bed#: E4 -01 Encounter: [id number]  Reason for Consult:  Acute kidney injury    ASSESSMENT and PLAN:  Patient admitted on 01/06/2021 shortness of breath for 5 days, hypertensive urgency, bilateral pleural effusions, acute kidney injury and new onset CHF  Nephrology following for management of acute kidney injury      1  Acute kidney injury (POA):  · Baseline creatinine 0 71 mg/dL in 2018  · Patient reports chronic use of NSAIDs:  Taking ibuprofen twice a day for approximately 2 years prior to admission  She stopped on the day of admission  · Creatinine 1 87 on admission 1/6  Creatinine initially improved and increased to 1 7 approximately 48 hours after cardiac catheterization  · Now creatinine plateauing near 1 7  ? Currently on Lasix 20 mg p o  T i d  excellent diuresis  ? Weight 128 lb on admission currently down to 115 lb  ? Blood pressure acceptable  · Etiology:  Cardiorenal also concern for intrinsic process/glomerulonephritis  Consistently taking NSAIDs for 2 years which may cause acute interstitial nephritis/nephrotic syndrome  · Workup:    ? Urine protein creatinine ratio > 9 g  24 hour urine for protein approximately 7 g  ? Urinalysis:  2+ protein, 2-4 RBCs  Prior urinalysis February 2018 showed 3+ protein  ? Serologies:  Negative workup except for positive rheumatoid factor  ? ANCA negative, SPEP and UPEP no monoclonal bands, anti-GBM negative, complements normal, double-stranded DNA negative, KAYLA negative, free light chain ratio 1 85, hepatitis panel negative, RF positive/RF 20  · Plan/recommendations:  ? Patient would like to proceed with renal biopsy  Spoke with [facility] attending who placed order for IR eval earlier today  Will need to hold subcu heparin near procedure time  Blood pressure well controlled which is essential to prevent bleeding    Last imaging of the kidneys 2018 noted to be unremarkable  Will need US prior to biopsy  ? I discussed risks of biopsy to patient  She wants to proceed before discharge  ? Continue to monitor renal function  ? Check labs in a m   2  Electrolytes/acid-base:  · Potassium acceptable, 4 1  3  Blood pressure/hypertension/volume status:  · Bilateral pleural effusion:  Left Thoracentesis 1/12,  630 mL removed  · Antihypertensive regime:  Carvedilol 25 mg twice a day, hydralazine 10 mg t i d , isosorbide 10 mg t i d  Furosemide 20 mg p o  T i d   · Blood pressure acceptable  4  Acute CHF:    · Ejection fraction 25%  · Chest x-ray 1/13 decreased pleural fluid following thoracentesis left side, persistent right pleural effusion  Vascular congestion improving  · Chest x-ray 1/16:  Hazy opacity mid and lower left hemothorax, likely recurrent effusion  Persistent opacity medial left base due to atelectasis  Improvement/decrease in trace left apical pneumothorax  · Chest x-ray 1/17:  Awaiting final read  · Cardiology planning life vest  · Cardiology managing diuretics  5  Other:  Active tobacco abuse, bilateral pulmonary nodules       DISPOSITION:  Renal biopsy ordered    SUBJECTIVE / 24H INTERVAL HISTORY:  No acute complaints  Patient would like to proceed with renal biopsy      OBJECTIVE:  Current Weight: Weight - Scale: 52 5 kg (115 lb 11 9 oz)  Vitals:    01/16/21 2323 01/17/21 0600 01/17/21 0700 01/17/21 1531   BP: 111/68 127/87 152/76 119/96   BP Location: Right arm Left arm Left arm Right arm   Pulse: 80 71 77 77   Resp: 18   18   Temp: 97 9 °F (36 6 °C)  97 6 °F (36 4 °C) 98 °F (36 7 °C)   TempSrc: Temporal  Temporal Temporal   SpO2: 90%  90% 96%   Weight:  52 5 kg (115 lb 11 9 oz)     Height:           Intake/Output Summary (Last 24 hours) at 1/17/2021 1542  Last data filed at 1/17/2021 1431  Gross per 24 hour   Intake 630 ml   Output 2550 ml   Net -1920 ml     General: NAD  Skin: no rash  Eyes: anicteric sclera  ENT: moist mucous membrane  Neck: supple  Chest:  No wheezes rhonchi or rales  Decreased breath sounds left lower lobe    Normal effort at rest  CVS: s1s2, no murmur, no gallop, no rub  Abdomen: soft, nontender, nl sounds  Extremities: no edema LE b/l  : no flores  Neuro: AAOX3  Psych: normal affect  Medications:    Current Facility-Administered Medications:     acetaminophen (TYLENOL) tablet 650 mg, 650 mg, Oral, Q4H PRN, Sugey Soliz PA-C, 650 mg at 01/17/21 1211    atorvastatin (LIPITOR) tablet 40 mg, 40 mg, Oral, Daily With Thaddeus Million, DO, 40 mg at 01/16/21 1619    carvedilol (COREG) tablet 25 mg, 25 mg, Oral, BID With Meals, GAGE Mccord, 25 mg at 01/17/21 0846    cyanocobalamin (VITAMIN B-12) tablet 1,000 mcg, 1,000 mcg, Oral, Daily, Luis Angel James DO, 1,000 mcg at 38/80/82 2533    folic acid (FOLVITE) tablet 1 mg, 1 mg, Oral, Daily, Luis Angel James DO, 1 mg at 01/17/21 0850    furosemide (LASIX) tablet 20 mg, 20 mg, Oral, TID (diuretic), Mika Leader, DO, 20 mg at 01/17/21 1211    heparin (porcine) subcutaneous injection 5,000 Units, 5,000 Units, Subcutaneous, Q8H [address], Sugey Soliz PA-C, 5,000 Units at 01/17/21 1428    hydrALAZINE (APRESOLINE) tablet 10 mg, 10 mg, Oral, TID after meals, GAGE Mccord, 10 mg at 01/17/21 1211    isosorbide dinitrate (ISORDIL) tablet 10 mg, 10 mg, Oral, TID after meals, Madyson Bazzi PA-C, 10 mg at 01/17/21 1211    Labetalol HCl (NORMODYNE) injection 10 mg, 10 mg, Intravenous, Q6H PRN, Chris Layton MD, Stopped at 01/07/21 1214    nicotine (NICODERM CQ) 14 mg/24hr TD 24 hr patch 1 patch, 1 patch, Transdermal, Daily, Sugey Soliz PA-C, 1 patch at 01/17/21 0850    ondansetron (ZOFRAN) injection 4 mg, 4 mg, Intravenous, Q6H PRN, Sugey Soliz PA-C, 4 mg at 01/12/21 1644    oxyCODONE (ROXICODONE) IR tablet 5 mg, 5 mg, Oral, Q4H PRN, Xiomara Beltran DO, 5 mg at 01/16/21 2107    PARoxetine (PAXIL) tablet 60 mg, 60 mg, Oral, Daily, Sugey Soliz PA-C, 60 mg at 01/17/21 0846    promethazine (PHENERGAN) injection 12 5 mg, 12 5 mg, Intravenous, Q6H PRN, Madhav Aw, DO    QUEtiapine (SEROquel) tablet 100 mg, 100 mg, Oral, HS, Sugey Soliz PA-C, 100 mg at 01/16/21 2102    Laboratory Results:  Results from last 7 days   Lab Units 01/17/21  0609 01/16/21  0647 01/15/21  0459 01/14/21  0313 01/13/21  0524 01/12/21  0502 01/11/21  0520   WBC Thousand/uL 6 69  --  6 88 5 67 9 27 6 23 7 88   HEMOGLOBIN g/dL 12 6  --  11 4* 10 7* 11 2* 11 2* 12 3   HEMATOCRIT % 37 2  --  35 4 32 9* 34 5* 34 1* 37 2   PLATELETS Thousands/uL 302  --  213 191 228 235 252   POTASSIUM mmol/L 4 1 4 6 4 3 4 4 4 6 4 6 3 7   CHLORIDE mmol/L 104 105 107 107 107 109* 106   CO2 mmol/L 28 26 27 28 27 29 27   BUN mg/dL 43* 38* 38* 40* 42* 40* 42*   CREATININE mg/dL 1 73* 1 68* 1 65* 1 69* 1 72* 1 48* 1 61*   CALCIUM mg/dL 9 1 8 7 8 6 8 2* 8 3 8 1* 8 1*   MAGNESIUM mg/dL  --   --  2 1 2 1  --   --   --    PHOSPHORUS mg/dL  --   --  3 5 4 0  --   --   --

## 2021-01-17 NOTE — ASSESSMENT & PLAN NOTE
· MAXI on CKD 3 the setting of decompensated CHF  · Has significant proteinuria 9 g per day currently undergoing workup per nephrology  · Remained stable after catheterization and restarting of diuretics  · Nephrology recommends kidney biopsy, patient reportedly wants it done inpatient  · IR consult for Kidney biopsy    Results from last 7 days   Lab Units 01/17/21  0609 01/16/21  0647 01/15/21  0459 01/14/21  0313 01/13/21  0524 01/12/21  0502 01/11/21  0520   BUN mg/dL 43* 38* 38* 40* 42* 40* 42*   CREATININE mg/dL 1 73* 1 68* 1 65* 1 69* 1 72* 1 48* 1 61*   EGFR ml/min/1 73sq m 33 35 35 34 34 40 37

## 2021-01-17 NOTE — CONSULTS
[facility] Record Consult  IR has been consulted to evaluate the patient, determine the appropriate procedure, and whether or not a procedure can and should be performed regarding the care of GORGE Vazquez  We were consulted by nephrology concerning Rima Just, and to possibly perform a renal core biopsy if medically appropriate for the patient  The patient is aware that a specialty consultation is taking place, and agrees to the IR Consult on their behalf  Assessment/Plan:   45 yo female presented to [address] [place] [address] in [facility] and found to have MAXI with significant proteinuria  Nephrology requesting renal biopsy  Recent coags are appropriate  WIll work on coordinating to be done as an inpatient either tomorrow or Tuesday  I spent 25 minutes in medical consultative time evaluating the medical record and imaging of GORGE Vazquez  Thank you for allowing [facility] to participate in the care of GORGE Vazquez  Please don't hesitate to call or TigerText us with any questions       Hugh Singh, DO

## 2021-01-17 NOTE — ASSESSMENT & PLAN NOTE
Weight (last 2 days)     Date/Time   Weight    01/17/21 0600   52 5 (115 74)    01/16/21 0537   54 3 (119 71)    01/15/21 0500   54 5 (120 15)            · Acute systolic CHF new diagnosis  Cardiac catheterization negative for occlusive disease  · Holding ACE-inhibitor secondary to kidney injury    Continue hydralazine/isosorbide  · Lasix titrating per cardiology  · Life vest delivered at bedside

## 2021-01-17 NOTE — ASSESSMENT & PLAN NOTE
· Macrocytic anemia with borderline low I86 and folic acid    Supplements added    Results from last 7 days   Lab Units 01/17/21  0609 01/15/21  0459 01/14/21  0313 01/13/21  0524 01/12/21  0502 01/11/21  0520   HEMOGLOBIN g/dL 12 6 11 4* 10 7* 11 2* 11 2* 12 3

## 2021-01-17 NOTE — PROGRESS NOTES
Progress Note - [facility] 1970, 44 y o  female MRN: [id number]    Unit/Bed#: E4 -01 Encounter: [id number]    Primary Care Provider: No primary care provider on file  Date and time admitted to hospital: 1/6/2021  4:34 PM        * Acute respiratory failure with hypoxia (HCC)  Assessment & Plan  · Acute respiratory failure with hypoxia secondary to do CHF/bilateral pleural effusions  · Appreciate IR evaluation and left-sided thoracentesis  Currently on room air  · Did have small pneumothorax postprocedure and was re-evaluated by pulmonology  · Unfortunately due to reaccumulation lasix increased to 20 mg t i d     Macrocytic anemia  Assessment & Plan  · Macrocytic anemia with borderline low V78 and folic acid  Supplements added    Results from last 7 days   Lab Units 01/17/21  0609 01/15/21  0459 01/14/21  0313 01/13/21  0524 01/12/21  0502 01/11/21  0520   HEMOGLOBIN g/dL 12 6 11 4* 10 7* 11 2* 11 2* 12 3       Closed fracture of distal end of right fibula  Assessment & Plan  · Distal fibula fracture follows with orthopedics prior to admission    Acute systolic congestive heart failure [facility])  Assessment & Plan  Weight (last 2 days)     Date/Time   Weight    01/17/21 0600   52 5 (115 74)    01/16/21 0537   54 3 (119 71)    01/15/21 0500   54 5 (120 15)            · Acute systolic CHF new diagnosis  Cardiac catheterization negative for occlusive disease  · Holding ACE-inhibitor secondary to kidney injury    Continue hydralazine/isosorbide  · Lasix titrating per cardiology  · Life vest delivered at bedside    MAXI (acute kidney injury) ([facility], [place] [address] )  Assessment & Plan  · MAXI on CKD 3 the setting of decompensated CHF  · Has significant proteinuria 9 g per day currently undergoing workup per nephrology  · Remained stable after catheterization and restarting of diuretics  · Nephrology recommends kidney biopsy, patient reportedly wants it done inpatient  · IR consult for Kidney biopsy    Results from last 7 days   Lab Units 21  0609 21  0647 01/15/21  0459 21  0313 21  0524 21  0502 21  0520   BUN mg/dL 43* 38* 38* 40* 42* 40* 42*   CREATININE mg/dL 1 73* 1 68* 1 65* 1 69* 1 72* 1 48* 1 61*   EGFR ml/min/1 73sq m 33 35 35 34 34 40 37       Hypertensive urgency  Assessment & Plan  · Accelerated hypertension now improved  · Continue carvedilol hydralazine and isosorbide started during hospitalization    Anxiety  Assessment & Plan  · Anxiety mood stable on quetiapine and paroxetine        VTE Pharmacologic Prophylaxis:   Pharmacologic: Heparin  Mechanical VTE Prophylaxis in Place: Yes    Patient Centered Rounds: I have performed bedside rounds with nursing staff today  Discussions with Specialists or Other Care Team Provider: Pulmonology, Cardiology and Nephrology    Education and Discussions with Family / Patient: Patient    Time Spent for Care: 30 minutes  More than 50% of total time spent on counseling and coordination of care as described above  Current Length of Stay: 11 day(s)    Current Patient Status: Inpatient   Certification Statement: The patient will continue to require additional inpatient hospital stay due to titrating diuretics, IR kidney biopsy    Discharge Plan: pending    Code Status: Level 1 - Full Code      Subjective:   Patient currently on room air  Denies any CP, dyspnea and is able to lay on her back flat sleeping overnight  Requests kidney biopsy be done inpatient vs outpatient as she does not have time when she goes back to work  Objective:     Vitals:   Temp (24hrs), Av 7 °F (36 5 °C), Min:97 5 °F (36 4 °C), Max:97 9 °F (36 6 °C)    Temp:  [97 5 °F (36 4 °C)-97 9 °F (36 6 °C)] 97 6 °F (36 4 °C)  HR:  [71-80] 77  Resp:  [18] 18  BP: (111-152)/(68-87) 152/76  SpO2:  [90 %-95 %] 90 %  Body mass index is 18 68 kg/m²  Input and Output Summary (last 24 hours):        Intake/Output Summary (Last 24 hours) at 2021 1236  Last data filed at 1/17/2021 1115  Gross per 24 hour   Intake 390 ml   Output 3350 ml   Net -2960 ml       Physical Exam:     Physical Exam  Vitals signs reviewed  Constitutional:       General: She is not in acute distress  Appearance: Normal appearance  Eyes:      General: No scleral icterus  Cardiovascular:      Rate and Rhythm: Regular rhythm  Heart sounds: Normal heart sounds  Pulmonary:      Breath sounds: Decreased breath sounds present  No wheezing  Comments: Crackles on right  Abdominal:      General: Bowel sounds are normal       Palpations: Abdomen is soft  Tenderness: There is no guarding or rebound  Musculoskeletal:         General: No swelling  Skin:     General: Skin is warm  Neurological:      Mental Status: She is alert and oriented to person, place, and time  Mental status is at baseline  Psychiatric:         Mood and Affect: Mood normal          Additional Data:     Labs:    Results from last 7 days   Lab Units 01/17/21  0609  01/11/21  0520   WBC Thousand/uL 6 69   < > 7 88   HEMOGLOBIN g/dL 12 6   < > 12 3   HEMATOCRIT % 37 2   < > 37 2   PLATELETS Thousands/uL 302   < > 252   NEUTROS PCT %  --   --  61   LYMPHS PCT %  --   --  28   MONOS PCT %  --   --  6   EOS PCT %  --   --  4    < > = values in this interval not displayed  Results from last 7 days   Lab Units 01/17/21  0609  01/13/21  0524   SODIUM mmol/L 139   < > 138   POTASSIUM mmol/L 4 1   < > 4 6   CHLORIDE mmol/L 104   < > 107   CO2 mmol/L 28   < > 27   BUN mg/dL 43*   < > 42*   CREATININE mg/dL 1 73*   < > 1 72*   ANION GAP mmol/L 7   < > 4   CALCIUM mg/dL 9 1   < > 8 3   ALBUMIN g/dL  --   --  1 8*   TOTAL BILIRUBIN mg/dL  --   --  0 26   ALK PHOS U/L  --   --  76   ALT U/L  --   --  20   AST U/L  --   --  21   GLUCOSE RANDOM mg/dL 82   < > 105    < > = values in this interval not displayed  * I Have Reviewed All Lab Data Listed Above  * Additional Pertinent Lab Tests Reviewed:  All Labs For Current Hospital Admission Reviewed    Imaging:    Xr Chest Portable    Result Date: 1/7/2021  Impression: Interstitial edema with effusions and atelectasis, corresponding with the chest CT one day earlier  Workstation performed: [id number]     Xr Chest Pa & Lateral    Result Date: 1/10/2021  Impression: Improved vascular clarity with persistent bilateral pleural effusions and left greater than right basilar atelectasis  Workstation performed: [id number]     Xr Ankle 3+ Vw Right    Result Date: 1/11/2021  Impression: Unchanged alignment at the distal fibular fracture with findings of ongoing healing/remodeling  Workstation performed: [id number]     Cta Ed Chest Pe Study    Result Date: 1/6/2021  Impression: No pulmonary embolus  Bilateral moderate pleural effusions  Posterior bibasilar atelectasis/infiltrates   Questionable hilar and subcarinal lymphadenopathy limited by lack of IV contrast  Workstation performed: [id number]     Recent Cultures (last 7 days):     Results from last 7 days   Lab Units 01/12/21  1359   GRAM STAIN RESULT  1+ Polys  No organisms seen   BODY FLUID CULTURE, STERILE  No growth       Last 24 Hours Medication List:   Current Facility-Administered Medications   Medication Dose Route Frequency Provider Last Rate    acetaminophen  650 mg Oral Q4H PRN Connye Spray PAANTONY      atorvastatin  40 mg Oral Daily With [employer], DO      carvedilol  25 mg Oral BID With Meals [email or web address]      cyanocobalamin  1,000 mcg Oral Daily Luis Angel James, DO      folic acid  1 mg Oral Daily Luis Angel James, DO      furosemide  20 mg Oral TID (diuretic) Beauty Batters, DO      heparin (porcine)  5,000 Units Subcutaneous Q8H [address] Sugey Soliz PA-C      hydrALAZINE  10 mg Oral TID after meals GAGE Mccord      isosorbide dinitrate  10 mg Oral TID after meals Lei Bazzi PA-C      Labetalol HCl  10 mg Intravenous Q6H PRN Pino Weston MD      nicotine  1 patch Transdermal Daily Abhinav Gonzalez PA-C      ondansetron  4 mg Intravenous Q6H PRN Abhinav Gonzalez PA-C      oxyCODONE  5 mg Oral Q4H PRN Sherol Indra, DO      PARoxetine  60 mg Oral Daily Sugey Soliz PA-C      promethazine  12 5 mg Intravenous Q6H PRN Lucero Acron, DO      QUEtiapine  100 mg Oral HS Abhinav Gonzalez PA-C          Today, Patient Was Seen By: Benoit Pacheco MD    ** Please Note: Dictation voice to text software may have been used in the creation of this document   **

## 2021-01-17 NOTE — PROGRESS NOTES
Progress Note - Pulmonary   Yudith Patel 44 y o  female MRN: [id number]  Unit/Bed#: E4 -01 Encounter: [id number]      Assessment / Plan:  1  Acute hypoxic respiratory failure likely multifactorial  · Continue maintain SpO2 greater than or equal to 88%  Patient did not wear oxygen prior to this hospital stay  She is currently on room air and tolerating it well  · Continue to encourage pulmonary toileting including cough and deep breathing exercises, out of bed as tolerated, increase today is able  2  Bilateral transudative pleural effusion status post right-sided thoracentesis 01/12/2021  · Patient had 630 mL of clear transit of fluid removed from her right lung  · Cultures are negative  Pathology is pending  · Repeat chest x-ray does reveal left pleural effusion is slowly returning  · Repeat chest x-ray in a m     I do suspect that patient is stable from pulmonary status  She is currently on room air and tolerating it well  She will likely need repeat imaging as an outpatient and a standing p r n  Chest x-ray order should her breathing becomes worse  She can follow up with us as an outpatient in the pulmonary office and consider repeat thoracentesis should she become symptomatic  3  Small left apical pneumothorax  · Repeat chest x-ray this a m  Revealed trace residual pneumothorax  · It is suspected that her small pneumothorax is secondary to her recent thoracentesis  · Continue with serial chest x-ray imaging  · RN was advised on signs and symptoms of increasing pneumothorax  Patient was educated on signs/symptoms of worsening pneumothorax    4  New acute grade 3 systolic heart failure with mild pulmonary hypertension likely who group 2 and 3  · Patient is status post cardiac catheterization without any significant coronary stenosis  · Pulmonary artery pressures of 40 mmHg  · Echocardiogram on 01/11/2021 showed ejection fraction of 25%  Cardiology is following    Appreciate their input   · Continue Lasix as recommended by cardiology team  · Patient will need LifeVest at discharge    5  Active tobacco abuse with suspected COPD of unknown severity  · Patient is in contemplation stage  As she does want to quit smoking  · Patient was educated on importance of smoking cessation  · Patient will need PFTs, pulmonary follow-up, and continued tobacco cessation classes as an outpatient  · At discharge patient should be sent home on a Lama/laba inhaler, and Proventil 2 puffs p r n     6  Bilateral pulmonary nodules  · Patient will need annual surveillance   · nodules are less than 3 mm    Subjective:   Miss leal is lying in bed upon my assessment today  She is annoyed is there is a loud patient in the hallway and she has been unable to get a good night's sleep for the past few nights  She reports that her breathing is significantly improving  She is getting very frustrated with her prolonged hospital stay and is very eager to get home  She denies any coughing, wheezing, sputum production, she reports that her pleuritic chest pain in her left upper chest wall has significantly improved  Rest review of systems negative      Objective:   Vitals: Blood pressure 152/76, pulse 77, temperature 97 6 °F (36 4 °C), temperature source Temporal, resp  rate 18, height 5' 6" (1 676 m), weight 52 5 kg (115 lb 11 9 oz), SpO2 90 %  , room air, Body mass index is 18 68 kg/m²        Intake/Output Summary (Last 24 hours) at 1/17/2021 0826  Last data filed at 1/17/2021 0601  Gross per 24 hour   Intake 90 ml   Output 2450 ml   Net -2360 ml         Physical Exam  Gen: Awake, alert, oriented x 3, no acute distress  HEENT: Mucous membranes moist, no oral lesions, no thrush  NECK: No accessory muscle use, JVP not elevated  Cardiac: Regular, single S1, single S2, no murmurs, no rubs, no gallops  Lungs:  Breath sounds are decreased bilaterally throughout all lung fields without any wheezes, rales, rhonchi  Abdomen: normoactive bowel sounds, soft nontender, nondistended, no rebound or rigidity, no guarding  Extremities: no cyanosis, no clubbing, no edema    Labs: I have personally reviewed pertinent lab results  , ABG: No results found for: PHART, QQH6IAA, PO2ART, XXM6XWN, E6FSHRTY, BEART, SOURCE, BNP: No results found for: BNP, CBC:   Lab Results   Component Value Date    WBC 6 69 01/17/2021    HGB 12 6 01/17/2021    HCT 37 2 01/17/2021    MCV 97 01/17/2021     01/17/2021    MCH 33 0 01/17/2021    MCHC 33 9 01/17/2021    RDW 13 1 01/17/2021    MPV 9 6 01/17/2021   , CMP:   Lab Results   Component Value Date    SODIUM 139 01/17/2021    K 4 1 01/17/2021     01/17/2021    CO2 28 01/17/2021    BUN 43 (H) 01/17/2021    CREATININE 1 73 (H) 01/17/2021    CALCIUM 9 1 01/17/2021    EGFR 33 01/17/2021   , PT/INR: No results found for: PT, INR, Troponin: No results found for: TROPONINI     Imaging and other studies: I have personally reviewed pertinent reports  CXR from this AM  FINDINGS:     Cardiomediastinal silhouette appears unremarkable      There is a stable tiny left apical pneumothorax  There are significantly increased bilateral airspace opacities, most pronounced in the left upper lung  There is vascular congestion  There are faint Kerley  B lines  There is a small left pleural   effusion      No acute osseous abnormalities      IMPRESSION:     Stable tiny left apical pneumothorax      Progressive bilateral airspace opacities likely due to pulmonary edema  Concurrent pneumonia not excluded        CXR 1/13/21:   FINDINGS:     Cardiomediastinal silhouette appears stable and unremarkable      There has been a decrease in the amount of left pleural effusion following thoracentesis  A tiny left apical pneumothorax is noted  Persistent right pleural effusion  Decreasing interstitial prominence and vascular congestion      S-shaped thoracolumbar scoliosis    No acute osseous abnormalities      IMPRESSION:     There is a tiny left apical pneumothorax  Significant decrease in the amount of left pleural fluid following thoracentesis  Persistent right pleural effusion      Decreasing vascular congestion and interstitial prominence      Javier Zaidi PA-C

## 2021-01-18 ENCOUNTER — APPOINTMENT (INPATIENT)
Dept: RADIOLOGY | Facility: HOSPITAL | Age: 51
DRG: 286 | End: 2021-01-18
Payer: COMMERCIAL

## 2021-01-18 LAB
ANION GAP SERPL CALCULATED.3IONS-SCNC: 5 MMOL/L (ref 4–13)
BASOPHILS # BLD AUTO: 0.07 THOUSANDS/ΜL (ref 0–0.1)
BASOPHILS NFR BLD AUTO: 1 % (ref 0–1)
BUN SERPL-MCNC: 54 MG/DL (ref 5–25)
CALCIUM SERPL-MCNC: 8.9 MG/DL (ref 8.3–10.1)
CHLORIDE SERPL-SCNC: 104 MMOL/L (ref 100–108)
CO2 SERPL-SCNC: 29 MMOL/L (ref 21–32)
CREAT SERPL-MCNC: 1.79 MG/DL (ref 0.6–1.3)
EOSINOPHIL # BLD AUTO: 0.42 THOUSAND/ΜL (ref 0–0.61)
EOSINOPHIL NFR BLD AUTO: 6 % (ref 0–6)
ERYTHROCYTE [DISTWIDTH] IN BLOOD BY AUTOMATED COUNT: 13.1 % (ref 11.6–15.1)
GFR SERPL CREATININE-BSD FRML MDRD: 32 ML/MIN/1.73SQ M
GLUCOSE SERPL-MCNC: 91 MG/DL (ref 65–140)
HCT VFR BLD AUTO: 38.1 % (ref 34.8–46.1)
HGB BLD-MCNC: 12.6 G/DL (ref 11.5–15.4)
IMM GRANULOCYTES # BLD AUTO: 0.02 THOUSAND/UL (ref 0–0.2)
IMM GRANULOCYTES NFR BLD AUTO: 0 % (ref 0–2)
INR PPP: 0.93 (ref 0.84–1.19)
LYMPHOCYTES # BLD AUTO: 1.99 THOUSANDS/ΜL (ref 0.6–4.47)
LYMPHOCYTES NFR BLD AUTO: 28 % (ref 14–44)
MCH RBC QN AUTO: 32.6 PG (ref 26.8–34.3)
MCHC RBC AUTO-ENTMCNC: 33.1 G/DL (ref 31.4–37.4)
MCV RBC AUTO: 99 FL (ref 82–98)
MONOCYTES # BLD AUTO: 0.55 THOUSAND/ΜL (ref 0.17–1.22)
MONOCYTES NFR BLD AUTO: 8 % (ref 4–12)
NEUTROPHILS # BLD AUTO: 4.13 THOUSANDS/ΜL (ref 1.85–7.62)
NEUTS SEG NFR BLD AUTO: 57 % (ref 43–75)
NRBC BLD AUTO-RTO: 0 /100 WBCS
PLATELET # BLD AUTO: 332 THOUSANDS/UL (ref 149–390)
PMV BLD AUTO: 9.8 FL (ref 8.9–12.7)
POTASSIUM SERPL-SCNC: 4.3 MMOL/L (ref 3.5–5.3)
PROTHROMBIN TIME: 12.3 SECONDS (ref 11.6–14.5)
RBC # BLD AUTO: 3.86 MILLION/UL (ref 3.81–5.12)
SODIUM SERPL-SCNC: 138 MMOL/L (ref 136–145)
WBC # BLD AUTO: 7.18 THOUSAND/UL (ref 4.31–10.16)

## 2021-01-18 PROCEDURE — 99232 SBSQ HOSP IP/OBS MODERATE 35: CPT | Performed by: INTERNAL MEDICINE

## 2021-01-18 PROCEDURE — 71045 X-RAY EXAM CHEST 1 VIEW: CPT

## 2021-01-18 PROCEDURE — 80048 BASIC METABOLIC PNL TOTAL CA: CPT | Performed by: STUDENT IN AN ORGANIZED HEALTH CARE EDUCATION/TRAINING PROGRAM

## 2021-01-18 PROCEDURE — 85025 COMPLETE CBC W/AUTO DIFF WBC: CPT | Performed by: STUDENT IN AN ORGANIZED HEALTH CARE EDUCATION/TRAINING PROGRAM

## 2021-01-18 PROCEDURE — 99232 SBSQ HOSP IP/OBS MODERATE 35: CPT | Performed by: FAMILY MEDICINE

## 2021-01-18 PROCEDURE — 85610 PROTHROMBIN TIME: CPT | Performed by: STUDENT IN AN ORGANIZED HEALTH CARE EDUCATION/TRAINING PROGRAM

## 2021-01-18 RX ORDER — FUROSEMIDE 20 MG/1
20 TABLET ORAL EVERY EVENING
Status: DISCONTINUED | OUTPATIENT
Start: 2021-01-18 | End: 2021-01-19 | Stop reason: HOSPADM

## 2021-01-18 RX ORDER — FUROSEMIDE 40 MG/1
40 TABLET ORAL DAILY
Status: DISCONTINUED | OUTPATIENT
Start: 2021-01-19 | End: 2021-01-19 | Stop reason: HOSPADM

## 2021-01-18 RX ADMIN — CARVEDILOL 25 MG: 25 TABLET, FILM COATED ORAL at 16:26

## 2021-01-18 RX ADMIN — ATORVASTATIN CALCIUM 40 MG: 40 TABLET, FILM COATED ORAL at 16:26

## 2021-01-18 RX ADMIN — ISOSORBIDE DINITRATE 10 MG: 10 TABLET ORAL at 11:52

## 2021-01-18 RX ADMIN — FUROSEMIDE 20 MG: 20 TABLET ORAL at 05:26

## 2021-01-18 RX ADMIN — PAROXETINE 60 MG: 20 TABLET, FILM COATED ORAL at 08:17

## 2021-01-18 RX ADMIN — HYDRALAZINE HYDROCHLORIDE 10 MG: 10 TABLET, FILM COATED ORAL at 08:17

## 2021-01-18 RX ADMIN — HYDRALAZINE HYDROCHLORIDE 10 MG: 10 TABLET, FILM COATED ORAL at 16:30

## 2021-01-18 RX ADMIN — HEPARIN SODIUM 5000 UNITS: 5000 INJECTION INTRAVENOUS; SUBCUTANEOUS at 05:26

## 2021-01-18 RX ADMIN — ACETAMINOPHEN 650 MG: 325 TABLET ORAL at 14:05

## 2021-01-18 RX ADMIN — CYANOCOBALAMIN TAB 500 MCG 1000 MCG: 500 TAB at 08:17

## 2021-01-18 RX ADMIN — FOLIC ACID 1 MG: 1 TABLET ORAL at 08:17

## 2021-01-18 RX ADMIN — CARVEDILOL 25 MG: 25 TABLET, FILM COATED ORAL at 08:17

## 2021-01-18 RX ADMIN — HYDRALAZINE HYDROCHLORIDE 10 MG: 10 TABLET, FILM COATED ORAL at 11:52

## 2021-01-18 RX ADMIN — ISOSORBIDE DINITRATE 10 MG: 10 TABLET ORAL at 08:17

## 2021-01-18 RX ADMIN — ISOSORBIDE DINITRATE 10 MG: 10 TABLET ORAL at 16:30

## 2021-01-18 RX ADMIN — FUROSEMIDE 20 MG: 20 TABLET ORAL at 17:14

## 2021-01-18 RX ADMIN — QUETIAPINE FUMARATE 100 MG: 25 TABLET ORAL at 21:07

## 2021-01-18 RX ADMIN — HEPARIN SODIUM 5000 UNITS: 5000 INJECTION INTRAVENOUS; SUBCUTANEOUS at 14:06

## 2021-01-18 RX ADMIN — Medication 1 PATCH: at 08:17

## 2021-01-18 RX ADMIN — OXYCODONE HYDROCHLORIDE 5 MG: 5 TABLET ORAL at 19:21

## 2021-01-18 NOTE — PROGRESS NOTES
CARDIOLOGY INPATIENT FOLLOW-UP PROGRESS NOTE  *-*-*-*-*-*-*-*-*-*-*-*-*-*-*-*-*-*-*-*-*-*-*-*-*-*-*-*-*-*-*-*-*-*-*-*-*-*-*-*-*-*-*-*-*-*-*-*-*-*-*-*-*-*-  TXTIVVMAR DATE: 01/18/21 9:39 AM   AUTHOR: Jojo Nicholas MD  PATIENT: Vick Ao   1970    [id number]   44 y o    female  INPATIENT HOSPITALIST PHYSICIAN: Nissa Johnson, *  DATE OF ADMISSION: 1/6/2021  4:34 PM; LENGTH OF STAY: 12 days  *-*-*-*-*-*-*-*-*-*-*-*-*-*-*-*-*-*-*-*-*-*-*-*-*-*-*-*-*-*-*-*-*-*-*-*-*-*-*-*-*-*-*-*-*-*-*-*-*-*-*-*-*-*-    CARDIOLOGY ASSESSMENT  1  Acute decompensated systolic heart failure, AHA class C, NYHA functional class 2  2  Dilated nonischemic cardiomyopathy with severely reduced left ventricular function, LVIDd 5 3 cm, EF around 25%, global hypokinesis  3  Bilateral pleural effusion, status post thoracentesis January 12, 2021  4  Nonobstructive CAD with minimal luminal irregularities in LAD and RCA  5  Chronic tobacco abuse  6  Acute kidney injury with Nephrotic range proteinuria, pending renal biopsy  7  ADHD Patient Active Problem List   Diagnosis    Primary osteoarthritis of one hip, left    Pre-operative clearance    Anxiety    Current smoker    Painless hematuria    Mild anemia    Status post total hip replacement, left    Hypertensive urgency    Acute respiratory failure with hypoxia (HCC)    Hypokalemia    MAXI (acute kidney injury) ([address] )    Bilateral pleural effusion    Acute systolic congestive heart failure (HCC)    Elevated troponin    ADHD    Closed fracture of distal end of right fibula    Macrocytic anemia        PLAN:  - transition to furosemide 40 mg in a m  and 20 mg p m  Will continue carvedilol at 25 mg twice daily, Isordil 10 mg t i d  And hydralazine 10 mg 3 times daily  - patient will be good candidate for adding Ivabradine for sinus rate control as outpatient  - may place life vest and discontinue telemetry    - workup for glomerular disease pending   - outpatient follow-up with cardiology will be arranged for 2-3 weeks following discharge  *-*-*-*-*-*-*-*-*-*-*-*-*-*-*-*-*-*-*-*-*-*-*-*-*-*-*-*-*-*-*-*-*-*-*-*-*-*-*-*-*-*-*-*-*-*-*-*-*-*-*-*-*-*-  INTERVAL CHANGES / HISTORY OF PRESENT ILLNESS:  No acute events overnight  Patient offers no new complaints  Reports no dizziness with ambulation  His scheduled for renal biopsy tomorrow  *-*-*-*-*-*-*-*-*-*-*-*-*-*-*-*-*-*-*-*-*-*-*-*-*-*-*-*-*-*-*-*-*-*-*-*-*-*-*-*-*-*-*-*-*-*-*-*-*-*-*-*-*-*  REVIEW OF SYMPTOMS:    Positive for:  Offers no new complaints  Negative for: All remaining as reviewed below and in HPI   SYSTEM SYMPTOMS REVIEWED:  General--weight change, fever, night sweats  Respiratoryl-- Wheezing, shortness of breath, cough, URI symptoms, sputum, blood  Cardiovascular--chest pain, syncope, dyspnea on exertion, edema, decline in exercise tolerance, claudication   Gastrointestinal--persistent vomiting, diarrhea, abdominal distention, blood in stool   Muscular or skeletal--joint pain or swelling   Neurologic--headaches, syncope, abnormal movement  Hematologic--history of easy bruising and bleeding   Endocrine--thyroid enlargement, heat or cold intolerance, polyuria   Psychiatric--anxiety, depression      *-*-*-*-*-*-*-*-*-*-*-*-*-*-*-*-*-*-*-*-*-*-*-*-*-*-*-*-*-*-*-*-*-*-*-*-*-*-*-*-*-*-*-*-*-*-*-*-*-*-*-*-*-*-  VITAL SIGNS:  Vitals:    21 2329 21 0520 21 0532 21 0730   BP: 129/87 139/66  120/72   BP Location: Left arm Right arm  Right arm   Pulse: 83 72  76   Resp: 17   16   Temp: (!) 97 4 °F (36 3 °C)   (!) 97 °F (36 1 °C)   TempSrc: Temporal   Temporal   SpO2: 93% 96%  92%   Weight:   52 2 kg (115 lb 1 3 oz)    Height:          Temp (24hrs), Av 5 °F (36 4 °C), Min:97 °F (36 1 °C), Max:98 °F (36 7 °C)  Current: Temperature: (!) 97 °F (36 1 °C)    Weight (last 2 days)     Date/Time   Weight    21 0532   52 2 (115 08)    21 0600   52 5 (115 74)    21 0537   54 3 (119 71) Body mass index is 18 57 kg/m²  Wt Readings from Last 3 Encounters:   01/18/21 52 2 kg (115 lb 1 3 oz)   12/11/20 57 6 kg (127 lb)   12/06/20 57 8 kg (127 lb 6 8 oz)      Intake/Output Summary (Last 24 hours) at 1/18/2021 7723  Last data filed at 1/18/2021 0501  Gross per 24 hour   Intake 390 ml   Output 2225 ml   Net -1835 ml        *-*-*-*-*-*-*-*-*-*-*-*-*-*-*-*-*-*-*-*-*-*-*-*-*-*-*-*-*-*-*-*-*-*-*-*-*-*-*-*-*-*-*-*-*-*-*-*-*-*-*-*-*-*-  PHYSICAL EXAM:  General Appearance:    Alert, cooperative, no distress, appears stated age, normal built   Head, Eyes, ENT:    No obvious abnormality, moist mucous mebranes  Neck:   Supple, no carotid bruit or JVD   Back:     Symmetric, no curvature  Lungs:     Respirations unlabored  Clear to auscultation bilaterally,    Chest wall:    No tenderness or deformity   Heart:    Regular rate and rhythm, S1 and S2 normal, no murmur, rub  or gallop  Abdomen:     Soft, non-tender, No obvious masses, or organomegaly   Extremities:   Extremities normal, no cyanosis or edema    Skin:   Skin color, texture, turgor normal, no rashes or lesions     *-*-*-*-*-*-*-*-*-*-*-*-*-*-*-*-*-*-*-*-*-*-*-*-*-*-*-*-*-*-*-*-*-*-*-*-*-*-*-*-*-*-*-*-*-*-*-*-*-*-*-*-*-*-  TELEMETRY, LAST ECG:  Telemetry reviewed    Sinus rhythm with heart rate predominantly in 90s  To ventricular triplet events early this morning   Results for orders placed or performed during the hospital encounter of 01/06/21   ECG 12 lead   Result Value    Ventricular Rate 89    Atrial Rate 89    PA Interval 148    QRSD Interval 86    QT Interval 332    QTC Interval 403    P Axis 53    QRS Axis 79    T Wave Axis 33    Narrative    Normal sinus rhythm  Possible Left atrial enlargement  Anteroseptal infarct (cited on or before 06-JAN-2021)  Nonspecific ST-t wave changes  Abnormal ECG  When compared with ECG of 06-JAN-2021 22:38, (unconfirmed)  Premature ventricular complexes are no longer Present  Questionable change in initial forces of Septal leads  Confirmed by Jenae Zamora ([id number]) on 1/7/2021 7:52:52 AM      *-*-*-*-*-*-*-*-*-*-*-*-*-*-*-*-*-*-*-*-*-*-*-*-*-*-*-*-*-*-*-*-*-*-*-*-*-*-*-*-*-*-*-*-*-*-*-*-*-*-*-*-*-*-    CURRENT SCHEDULED MEDICATIONS:    Current Facility-Administered Medications:     acetaminophen (TYLENOL) tablet 650 mg, 650 mg, Oral, Q4H PRN, Sugey Soliz PA-C, 650 mg at 01/17/21 1211    atorvastatin (LIPITOR) tablet 40 mg, 40 mg, Oral, Daily With Sam Tompkins DO, 40 mg at 01/17/21 1730    carvedilol (COREG) tablet 25 mg, 25 mg, Oral, BID With Meals, GAGE Mccord, 25 mg at 01/18/21 0817    cyanocobalamin (VITAMIN B-12) tablet 1,000 mcg, 1,000 mcg, Oral, Daily, Luis Angel James DO, 1,000 mcg at 30/58/71 9938    folic acid (FOLVITE) tablet 1 mg, 1 mg, Oral, Daily, Luis Angel James DO, 1 mg at 01/18/21 8131    furosemide (LASIX) tablet 20 mg, 20 mg, Oral, TID (diuretic), Mindy Acevedo DO, 20 mg at 01/18/21 0526    heparin (porcine) subcutaneous injection 5,000 Units, 5,000 Units, Subcutaneous, Q8H [facility] & half-way, Sugey Soliz PA-C, 5,000 Units at 01/18/21 0526    hydrALAZINE (APRESOLINE) tablet 10 mg, 10 mg, Oral, TID after meals, GAGE Mccord, 10 mg at 01/18/21 0817    isosorbide dinitrate (ISORDIL) tablet 10 mg, 10 mg, Oral, TID after meals, Maeve Bazzi PA-C, 10 mg at 01/18/21 0817    Labetalol HCl (NORMODYNE) injection 10 mg, 10 mg, Intravenous, Q6H PRN, Denisse Perera MD, Stopped at 01/07/21 1214    nicotine (NICODERM CQ) 14 mg/24hr TD 24 hr patch 1 patch, 1 patch, Transdermal, Daily, Sugey Soliz PA-C, 1 patch at 01/18/21 0817    ondansetron (ZOFRAN) injection 4 mg, 4 mg, Intravenous, Q6H PRN, Sugey Soliz PA-C, 4 mg at 01/12/21 1644    oxyCODONE (ROXICODONE) IR tablet 5 mg, 5 mg, Oral, Q4H PRN, Maryann Teague DO, 5 mg at 01/17/21 2109    PARoxetine (PAXIL) tablet 60 mg, 60 mg, Oral, Daily, Sugey Soliz PA-C, 60 mg at 01/18/21 0817    promethazine (PHENERGAN) injection 12 5 mg, 12 5 mg, Intravenous, Q6H PRN, El Dahl DO    QUEtiapine (SEROquel) tablet 100 mg, 100 mg, Oral, HS, Sugey Soliz PA-C, 100 mg at 01/17/21 7404 ALLERGIES:  No Known Allergies     *-*-*-*-*-*-*-*-*-*-*-*-*-*-*-*-*-*-*-*-*-*-*-*-*-*-*-*-*-*-*-*-*-*-*-*-*-*-*-*-*-*-*-*-*-*-*-*-*-*-*-*-*-*  LABORATORY DATA:  I have personally reviewed pertinent labs  CMP:  Results from last 7 days   Lab Units 01/18/21  0520 01/17/21  0609 01/16/21  0647  01/13/21  0524 01/12/21  0502   SODIUM mmol/L 138 139 136   < > 138 141   POTASSIUM mmol/L 4 3 4 1 4 6   < > 4 6 4 6   CHLORIDE mmol/L 104 104 105   < > 107 109*   CO2 mmol/L 29 28 26   < > 27 29   BUN mg/dL 54* 43* 38*   < > 42* 40*   CREATININE mg/dL 1 79* 1 73* 1 68*   < > 1 72* 1 48*   CALCIUM mg/dL 8 9 9 1 8 7   < > 8 3 8 1*   ALK PHOS U/L  --   --   --   --  76 71   ALT U/L  --   --   --   --  20 19   AST U/L  --   --   --   --  21 24    < > = values in this interval not displayed  Results from last 7 days   Lab Units 01/15/21  0459 01/14/21  0313   MAGNESIUM mg/dL 2 1 2 1     Results from last 7 days   Lab Units 01/15/21  0459 01/14/21  0313   PHOSPHORUS mg/dL 3 5 4 0    Cardiac Profile:       Invalid input(s): CK, CKMBP             CBC:   Results from last 7 days   Lab Units 01/18/21  0520 01/17/21  0609 01/15/21  0459   WBC Thousand/uL 7 18 6 69 6 88   HEMOGLOBIN g/dL 12 6 12 6 11 4*   HEMATOCRIT % 38 1 37 2 35 4   PLATELETS Thousands/uL 332 302 213     PT/INR:   Lab Results   Component Value Date    INR 0 93 01/18/2021   , Microbiology:   Results from last 7 days   Lab Units 01/12/21  1359   GRAM STAIN RESULT  1+ Polys  No organisms seen   BODY FLUID CULTURE, STERILE  No growth          *-*-*-*-*-*-*-*-*-*-*-*-*-*-*-*-*-*-*-*-*-*-*-*-*-*-*-*-*-*-*-*-*-*-*-*-*-*-*-*-*-*-*-*-*-*-*-*-*-*-*-*-*-*-  IMAGING STUDIES REPORTS: Imaging studies results reviewed    No procedure found    Xr Chest Pa & Lateral    Result Date: 1/14/2021  Impression There is a tiny left apical pneumothorax  Significant decrease in the amount of left pleural fluid following thoracentesis  Persistent right pleural effusion  Decreasing vascular congestion and interstitial prominence  The study was marked in [facility] for immediate notification  Workstation performed: [id number]     Xr Chest Pa & Lateral    Result Date: 1/10/2021  Impression Improved vascular clarity with persistent bilateral pleural effusions and left greater than right basilar atelectasis  Workstation performed: [id number]       *-*-*-*-*-*-*-*-*-*-*-*-*-*-*-*-*-*-*-*-*-*-*-*-*-*-*-*-*-*-*-*-*-*-*-*-*-*-*-*-*-*-*-*-*-*-*-*-*-*-*-*-*-*-  ECHOCARDIOGRAM AND OTHER CARDIAC TESTS RESULTS:  Results for orders placed during the hospital encounter of 21   Echo complete with contrast if indicated    Narrative [address] [place], [place] [address], [address] [facility]  [phone]    Transthoracic Echocardiogram  2D, M-mode, Doppler, and Color Doppler    Study date:  2021    Patient: Abdelrahman Lucas  MR number: [id number]  Account number: [de-identified]  : 1970  Age: 44 years  Gender: Female  Status: Inpatient  Location: Bedside  Height: 66 in  Weight: 127 lb  BP: 155/ 96 mmHg    Indications: Heart Failure    Diagnoses: I50 9 - Heart failure, unspecified    Sonographer:  Hayder Servin RDCS  Referring Physician:  Bailee Osei PA-C  Group:  [facility]  Interpreting Physician:  PRESTON Ingram     SUMMARY    LEFT VENTRICLE:  The ventricle was mildly dilated  Systolic function was markedly reduced by visual assessment  Ejection fraction was estimated to be 25 %  There was severe diffuse hypokinesis  Doppler parameters were consistent with a reversible restrictive pattern, indicative of decreased left ventricular diastolic compliance and/or increased left atrial pressure (grade 3 diastolic dysfunction)      LEFT ATRIUM:  The atrium was mildly dilated  MITRAL VALVE:  There was mild regurgitation  AORTIC VALVE:  There was trace regurgitation  TRICUSPID VALVE:  There was mild regurgitation  PULMONIC VALVE:  There was trace regurgitation  PERICARDIUM:  A small pericardial effusion was identified circumferential to the heart  There was no evidence of hemodynamic compromise  There was a large left pleural effusion  SUMMARY MEASUREMENTS  2D measurements:  Unspecified Anatomy:   %FS was 14 6 %  Ao Diam was 3 1 cm  Ao asc was 3 cm  EDV(Teich) was 137 3 ml   EF(Teich) was 30 8 %  ESV(Teich) was 95 ml  IVSd was 0 8 cm  LA Diam was 3 7 cm  LAAs A2C was 20 cm2  LAESV A-L A2C was 65 3 ml  LAESV MOD A2C was 57 8 ml  LALs A2C was 5 2 cm  LVEDV MOD A4C was 116 9 ml  LVEF MOD A4C was 26 8 %  LVESV MOD A4C was 85 6 ml  LVIDd was 5 3 cm  LVIDs was 4 6 cm  LVLd A4C was 7 5 cm  LVLs A4C was 6 7 cm  LVPWd was 1 cm  RAAs  A4C was 13 2 cm2  RAESV A-L was 36 4 ml   RAESV MOD was 35 ml  RALs was 4 1 cm  RVIDd was 3 6 cm  SV MOD A4C was 31 4 ml   SV(Teich) was 42 3 ml   CW measurements:  Unspecified Anatomy:   AV Env  Ti was 239 8 ms   AV VTI was 11 7 cm  AV Vmax was 0 9 m/s  AV Vmean was 0 5 m/s  AV maxPG was 3 1 mmHg  AV meanPG was 1 3 mmHg  TR Vmax was 3 m/s  TR maxPG was 37 mmHg  MM measurements:  Unspecified Anatomy:   TAPSE was 1 5 cm  PW measurements:  Unspecified Anatomy:   DVI was 0 7   E' Sept was 0 m/s  E/E' Sept was 29 1   LVOT Env  Ti was 247 4 ms  LVOT VTI was 8 4 cm  LVOT Vmax was 0 6 m/s  LVOT Vmean was 0 3 m/s  LVOT maxPG was 1 4 mmHg  LVOT meanPG was 0 6 mmHg  MV A Stanton  was 0 4 m/s  MV Dec Caroline was 7 3 m/s2  MV DecT was 144 4 ms   MV E Stanton was 1 1 m/s  MV E/A Ratio was 2 8   MV PHT was 41 9 ms  MVA By PHT was 5 3 cm2  HISTORY: PRIOR HISTORY: Smoker, MAXI, Pleural Effusion, CHF, Elevated Troponin, Anemia    PROCEDURE: The procedure was performed at the bedside  This was a routine study   The transthoracic approach was used  The study included complete 2D imaging, M-mode, complete spectral Doppler, and color Doppler  The heart rate was 90 bpm,  at the start of the study  Images were obtained from the parasternal, apical, subcostal, and suprasternal notch acoustic windows  Image quality was adequate  LEFT VENTRICLE: The ventricle was mildly dilated  Systolic function was markedly reduced by visual assessment  Ejection fraction was estimated to be 25 %  There was severe diffuse hypokinesis  DOPPLER: Doppler parameters were consistent  with a reversible restrictive pattern, indicative of decreased left ventricular diastolic compliance and/or increased left atrial pressure (grade 3 diastolic dysfunction)  RIGHT VENTRICLE: The size was normal  Systolic function was normal  Wall thickness was normal     LEFT ATRIUM: The atrium was mildly dilated  RIGHT ATRIUM: Size was normal     MITRAL VALVE: Valve structure was normal  There was mild thickening of the anterior and posterior leaflets  There was normal leaflet separation  DOPPLER: The transmitral velocity was within the normal range  There was no evidence for  stenosis  There was mild regurgitation  AORTIC VALVE: The valve was trileaflet  Leaflets exhibited normal thickness, normal cuspal separation, and sclerosis  DOPPLER: Transaortic velocity was within the normal range  There was no evidence for stenosis  There was trace  regurgitation  TRICUSPID VALVE: The valve structure was normal  There was normal leaflet separation  DOPPLER: The transtricuspid velocity was within the normal range  There was no evidence for stenosis  There was mild regurgitation  Estimated peak PA  pressure was 40 mmHg  PULMONIC VALVE: Leaflets exhibited normal thickness, no calcification, and normal cuspal separation  DOPPLER: The transpulmonic velocity was within the normal range  There was no evidence for stenosis  There was trace regurgitation      PERICARDIUM: A small pericardial effusion was identified circumferential to the heart  There was no evidence of hemodynamic compromise  There was a large left pleural effusion  AORTA: The root exhibited normal size  SYSTEMIC VEINS: IVC: The inferior vena cava was normal in size and course  Respirophasic changes were normal     SYSTEM MEASUREMENT TABLES    2D  %FS: 14 6 %  Ao Diam: 3 1 cm  Ao asc: 3 cm  EDV(Teich): 137 3 ml  EF(Teich): 30 8 %  ESV(Teich): 95 ml  IVSd: 0 8 cm  LA Diam: 3 7 cm  LAAs A2C: 20 cm2  LAESV A-L A2C: 65 3 ml  LAESV MOD A2C: 57 8 ml  LALs A2C: 5 2 cm  LVEDV MOD A4C: 116 9 ml  LVEF MOD A4C: 26 8 %  LVESV MOD A4C: 85 6 ml  LVIDd: 5 3 cm  LVIDs: 4 6 cm  LVLd A4C: 7 5 cm  LVLs A4C: 6 7 cm  LVPWd: 1 cm  RAAs A4C: 13 2 cm2  RAESV A-L: 36 4 ml  RAESV MOD: 35 ml  RALs: 4 1 cm  RVIDd: 3 6 cm  SV MOD A4C: 31 4 ml  SV(Teich): 42 3 ml    CW  AV Env  Ti: 239 8 ms  AV VTI: 11 7 cm  AV Vmax: 0 9 m/s  AV Vmean: 0 5 m/s  AV maxPG: 3 1 mmHg  AV meanP 3 mmHg  TR Vmax: 3 m/s  TR maxP mmHg    MM  TAPSE: 1 5 cm    PW  DVI: 0 7  E' Sept: 0 m/s  E/E' Sept: 29 1  LVOT Env  Ti: 247 4 ms  LVOT VTI: 8 4 cm  LVOT Vmax: 0 6 m/s  LVOT Vmean: 0 3 m/s  LVOT maxP 4 mmHg  LVOT meanP 6 mmHg  MV A Stanton: 0 4 m/s  MV Dec Day: 7 3 m/s2  MV DecT: 144 4 ms  MV E Stanton: 1 1 m/s  MV E/A Ratio: 2 8  MV PHT: 41 9 ms  MVA By PHT: 5 3 cm2    [facility]    Prepared and electronically signed by    PRESTON Jeffries  Signed 2021 13:28:36       No results found for this or any previous visit  No results found for this or any previous visit  No results found for this or any previous visit       *-*-*-*-*-*-*-*-*-*-*-*-*-*-*-*-*-*-*-*-*-*-*-*-*-*-*-*-*-*-*-*-*-*-*-*-*-*-*-*-*-*-*-*-*-*-*-*-*-*-*-*-*-*-  SIGNATURES:   [unfilled]   Renetta Chou MD

## 2021-01-18 NOTE — PROGRESS NOTES
Progress Note - Pulmonary   Diamond Acevedo 44 y o  female MRN: [id number]  Unit/Bed#: E4 -01 Encounter: [id number]      Assessment / Plan:  1  Acute hypoxic respiratory failure likely multifactorial  · Continue maintain SpO2 greater than or equal to 88%  Patient did not wear oxygen prior to this hospital stay  She is currently on room air and tolerating it well  · Continue to encourage pulmonary toileting including cough and deep breathing exercises, out of bed as tolerated, increase today is able  · Patient will need an ambulatory trial on room air at rest and with ambulation prior to discharge to see oxygen requirements  2  Bilateral transudative pleural effusion status post right-sided thoracentesis 01/12/2021  · Patient had 630 mL of clear transit of fluid removed from her right lung  · Cultures are negative  Pathology is pending  · Repeat chest x-ray does reveal small to moderate size left pleural effusion  With probable atelectasis associated  Right lung is clear  · Given patient is on room air and her chest x-ray does appear stable for pleural effusion standpoint I recommend repeating chest x-ray in 1 week and following up in the pulmonary office  3  Small left apical pneumothorax:  Resolved  · Repeat chest x-ray this a m  Shows pneumothorax has resolved  · It is suspected that her small pneumothorax is secondary to her recent thoracentesis  4  New acute grade 3 systolic heart failure with mild pulmonary hypertension likely who group 2 and 3  · Patient is status post cardiac catheterization without any significant coronary stenosis  · Pulmonary artery pressures of 40 mmHg  · Echocardiogram on 01/11/2021 showed ejection fraction of 25%  Cardiology is following  Appreciate their input  · Continue Lasix as recommended by cardiology team  · Patient will need LifeVest at discharge    5   Active tobacco abuse with suspected COPD of unknown severity  · Patient is in contemplation stage  As she does want to quit smoking  · Patient was educated on importance of smoking cessation  · Patient will need PFTs, pulmonary follow-up, and continued tobacco cessation classes as an outpatient  · At discharge patient should be sent home on a Lama/laba inhaler, and Proventil 2 puffs p r n     6  Bilateral pulmonary nodules  · Patient will need annual surveillance   · nodules are less than 3 mm    Subjective:   Miss leal is sleeping in bed upon my assessment today  She reports that last night she did not get a good night's sleep secondary to someone who was very loud on her floor  She reports that her breathing has significantly improved  She is very eager for discharge home  She is not currently on any oxygen  Rest review of systems negative      Objective:   Vitals: Blood pressure 120/72, pulse 76, temperature (!) 97 °F (36 1 °C), temperature source Temporal, resp  rate 16, height 5' 6" (1 676 m), weight 52 2 kg (115 lb 1 3 oz), SpO2 92 % , room air, Body mass index is 18 57 kg/m²  Intake/Output Summary (Last 24 hours) at 1/18/2021 1045  Last data filed at 1/18/2021 0501  Gross per 24 hour   Intake 390 ml   Output 2225 ml   Net -1835 ml         Physical Exam  Gen: Awake, alert, oriented x 3, no acute distress  HEENT: Mucous membranes moist, no oral lesions, no thrush  NECK: No accessory muscle use, JVP not elevated  Cardiac: Regular, single S1, single S2, no murmurs, no rubs, no gallops  Lungs:  Breath sounds decreased bilaterally throughout all lung fields without any wheezes, rales, rhonchi  Abdomen: normoactive bowel sounds, soft nontender, nondistended, no rebound or rigidity, no guarding  Extremities: no cyanosis, no clubbing, no edema    Labs: I have personally reviewed pertinent lab results  , ABG: No results found for: PHART, TON1DQW, PO2ART, KHV0OHF, U4PXKIBS, BEART, SOURCE, BNP: No results found for: BNP, CBC:   Lab Results   Component Value Date    WBC 7 18 01/18/2021 HGB 12 6 01/18/2021    HCT 38 1 01/18/2021    MCV 99 (H) 01/18/2021     01/18/2021    MCH 32 6 01/18/2021    MCHC 33 1 01/18/2021    RDW 13 1 01/18/2021    MPV 9 8 01/18/2021    NRBC 0 01/18/2021   , CMP:   Lab Results   Component Value Date    SODIUM 138 01/18/2021    K 4 3 01/18/2021     01/18/2021    CO2 29 01/18/2021    BUN 54 (H) 01/18/2021    CREATININE 1 79 (H) 01/18/2021    CALCIUM 8 9 01/18/2021    EGFR 32 01/18/2021   , PT/INR:   Lab Results   Component Value Date    INR 0 93 01/18/2021   , Troponin: No results found for: TROPONINI     Imaging and other studies: I have personally reviewed pertinent reports  CXR from this AM  FINDINGS:     Cardiomediastinal silhouette appears unremarkable      There is a stable tiny left apical pneumothorax  There are significantly increased bilateral airspace opacities, most pronounced in the left upper lung  There is vascular congestion  There are faint Kerley  B lines  There is a small left pleural   effusion      No acute osseous abnormalities      IMPRESSION:     Stable tiny left apical pneumothorax      Progressive bilateral airspace opacities likely due to pulmonary edema  Concurrent pneumonia not excluded        Chest x-ray 01/18/2021  FINDINGS:     Cardiomediastinal silhouette appears unremarkable      Stable findings for small to moderate-sized left pleural effusion  Probable associated atelectasis  Right lung is clear  No pneumothorax      Osseous structures appear within normal limits for patient age      IMPRESSION:     Stable findings for small to moderate-sized left pleural effusion  No pneumothorax      Lamont Okeefe PA-C

## 2021-01-18 NOTE — ASSESSMENT & PLAN NOTE
· Acute respiratory failure with hypoxia secondary to do CHF/bilateral pleural effusions  · Appreciate IR evaluation and left-sided thoracentesis  Currently on room air  · Did have small pneumothorax postprocedure and was re-evaluated by pulmonology    · Unfortunately due to reaccumulation lasix increased to 40 mg in the morning and 20 mg at night  ·

## 2021-01-18 NOTE — PROGRESS NOTES
Progress Note - Hero Clock 1970, 44 y o  female MRN: [id number]    Unit/Bed#: E4 -01 Encounter: [id number]    Primary Care Provider: No primary care provider on file  Date and time admitted to hospital: 1/6/2021  4:34 PM        Macrocytic anemia  Assessment & Plan  · Macrocytic anemia with borderline low T55 and folic acid  Supplements added    Results from last 7 days   Lab Units 01/18/21  0520 01/17/21  0609 01/15/21  0459 01/14/21  0313 01/13/21  0524 01/12/21  0502   HEMOGLOBIN g/dL 12 6 12 6 11 4* 10 7* 11 2* 11 2*       Elevated troponin  Assessment & Plan  · Non MI elevation troponin secondary to decompensated CHF    Acute systolic congestive heart failure [facility])  Assessment & Plan  Weight (last 2 days)     Date/Time   Weight    01/18/21 0532   52 2 (115 08)    01/17/21 0600   52 5 (115 74)    01/16/21 0537   54 3 (119 71)            · Acute systolic CHF new diagnosis  Cardiac catheterization negative for occlusive disease  · Holding ACE-inhibitor secondary to kidney injury    Continue hydralazine/isosorbide  · Lasix titrating per cardiology  · Life vest delivered at bedside    MAXI (acute kidney injury) ([facility] [address] )  Assessment & Plan  · MAXI on CKD 3 the setting of decompensated CHF  · Has significant proteinuria 9 g per day currently undergoing workup per nephrology  · Remained stable after catheterization and restarting of diuretics  · Nephrology recommends kidney biopsy, patient reportedly wants it done inpatient  · IR consult for Kidney biopsy    Results from last 7 days   Lab Units 01/18/21  0520 01/17/21  0609 01/16/21  0647 01/15/21  0459 01/14/21  0313 01/13/21  0524 01/12/21  0502   BUN mg/dL 54* 43* 38* 38* 40* 42* 40*   CREATININE mg/dL 1 79* 1 73* 1 68* 1 65* 1 69* 1 72* 1 48*   EGFR ml/min/1 73sq m 32 33 35 35 34 34 40       Hypertensive urgency  Assessment & Plan  · Accelerated hypertension now improved  · Continue carvedilol hydralazine and isosorbide started during hospitalization    Anxiety  Assessment & Plan  · Anxiety mood stable on quetiapine and paroxetine    * Acute respiratory failure with hypoxia (HCC)  Assessment & Plan  · Acute respiratory failure with hypoxia secondary to do CHF/bilateral pleural effusions  · Appreciate IR evaluation and left-sided thoracentesis  Currently on room air  · Did have small pneumothorax postprocedure and was re-evaluated by pulmonology  · Unfortunately due to reaccumulation lasix increased to 40 mg in the morning and 20 mg at night  ·       VTE Pharmacologic Prophylaxis:   Pharmacologic: Heparin  Mechanical VTE Prophylaxis in Place: Yes    Patient Centered Rounds: I have performed bedside rounds with nursing staff today  Discussions with Specialists or Other Care Team Provider:  Nursing/IR    Education and Discussions with Family / Patient:  Patient    Time Spent for Care: 20 minutes  More than 50% of total time spent on counseling and coordination of care as described above  Current Length of Stay: 12 day(s)    Current Patient Status: Inpatient   Certification Statement: The patient will continue to require additional inpatient hospital stay due to Renal biopsy    Discharge Plan:  24 hours    Code Status: Level 1 - Full Code      Subjective:   Patient was seen and examined  She reports doing pretty well  She says that her kidney biopsy will be done tomorrow  Objective:     Vitals:   Temp (24hrs), Av 6 °F (36 4 °C), Min:97 °F (36 1 °C), Max:98 °F (36 7 °C)    Temp:  [97 °F (36 1 °C)-98 °F (36 7 °C)] 98 °F (36 7 °C)  HR:  [72-83] 73  Resp:  [16-18] 18  BP: (119-139)/(66-96) 130/80  SpO2:  [92 %-96 %] 92 %  Body mass index is 18 57 kg/m²  Input and Output Summary (last 24 hours):        Intake/Output Summary (Last 24 hours) at 2021 1515  Last data filed at 2021 0501  Gross per 24 hour   Intake 150 ml   Output 1125 ml   Net -975 ml       Physical Exam:     Physical Exam  Constitutional:       Appearance: She is well-developed  Cardiovascular:      Rate and Rhythm: Normal rate and regular rhythm  Pulmonary:      Effort: Pulmonary effort is normal       Breath sounds: Examination of the right-middle field reveals decreased breath sounds  Examination of the right-lower field reveals decreased breath sounds  Examination of the left-lower field reveals decreased breath sounds  Decreased breath sounds present  Abdominal:      General: There is no distension  Palpations: Abdomen is soft  Skin:     General: Skin is warm  Findings: No erythema  Neurological:      Mental Status: She is alert  Cranial Nerves: No cranial nerve deficit  Psychiatric:         Behavior: Behavior normal          Additional Data:     Labs:    Results from last 7 days   Lab Units 01/18/21  0520   WBC Thousand/uL 7 18   HEMOGLOBIN g/dL 12 6   HEMATOCRIT % 38 1   PLATELETS Thousands/uL 332   NEUTROS PCT % 57   LYMPHS PCT % 28   MONOS PCT % 8   EOS PCT % 6     Results from last 7 days   Lab Units 01/18/21  0520  01/13/21  0524   SODIUM mmol/L 138   < > 138   POTASSIUM mmol/L 4 3   < > 4 6   CHLORIDE mmol/L 104   < > 107   CO2 mmol/L 29   < > 27   BUN mg/dL 54*   < > 42*   CREATININE mg/dL 1 79*   < > 1 72*   ANION GAP mmol/L 5   < > 4   CALCIUM mg/dL 8 9   < > 8 3   ALBUMIN g/dL  --   --  1 8*   TOTAL BILIRUBIN mg/dL  --   --  0 26   ALK PHOS U/L  --   --  76   ALT U/L  --   --  20   AST U/L  --   --  21   GLUCOSE RANDOM mg/dL 91   < > 105    < > = values in this interval not displayed  Results from last 7 days   Lab Units 01/18/21  0520   INR  0 93                       * I Have Reviewed All Lab Data Listed Above  * Additional Pertinent Lab Tests Reviewed:  All Labs Within Last 24 Hours Reviewed    Imaging:  Chest x-ray    Recent Cultures (last 7 days):     Results from last 7 days   Lab Units 01/12/21  1359   GRAM STAIN RESULT  1+ Polys  No organisms seen   BODY FLUID CULTURE, STERILE  No growth       Last 24 Hours Medication List:   Current Facility-Administered Medications   Medication Dose Route Frequency Provider Last Rate    acetaminophen  650 mg Oral Q4H PRN Donavan Haywood PA-C      atorvastatin  40 mg Oral Daily With [employer], DO      carvedilol  25 mg Oral BID With Meals GAGE Mccord      cyanocobalamin  1,000 mcg Oral Daily Luis Angel Jacob, DO      folic acid  1 mg Oral Daily Luis Angel James, DO      furosemide  20 mg Oral QPM Jorge Humphrey MD      [START ON 1/19/2021] furosemide  40 mg Oral Daily Jorge Humphrey MD      heparin (porcine)  5,000 Units Subcutaneous Q8H [address] Sugey Soliz PA-C      hydrALAZINE  10 mg Oral TID after meals GAGE Mccord      isosorbide dinitrate  10 mg Oral TID after meals Lei Bazzi PA-C      Labetalol HCl  10 mg Intravenous Q6H PRN Monica Matthew MD      nicotine  1 patch Transdermal Daily Sugey Soliz PA-C      ondansetron  4 mg Intravenous Q6H PRN Donavan Haywood PA-C      oxyCODONE  5 mg Oral Q4H PRN Daya Liza, DO      PARoxetine  60 mg Oral Daily Sugey Soliz PA-C      promethazine  12 5 mg Intravenous Q6H PRN Christiano Corrigan, DO      QUEtiapine  100 mg Oral HS Donavan Haywood PA-C          Today, Patient Was Seen By: Porsha Jones MD    ** Please Note: Dictation voice to text software may have been used in the creation of this document   **

## 2021-01-18 NOTE — ASSESSMENT & PLAN NOTE
Weight (last 2 days)     Date/Time   Weight    01/18/21 0532   52 2 (115 08)    01/17/21 0600   52 5 (115 74)    01/16/21 0537   54 3 (119 71)            · Acute systolic CHF new diagnosis  Cardiac catheterization negative for occlusive disease  · Holding ACE-inhibitor secondary to kidney injury    Continue hydralazine/isosorbide  · Lasix titrating per cardiology  · Life vest delivered at bedside

## 2021-01-18 NOTE — UTILIZATION REVIEW
Continued Stay Review    Date:      1/16                    Current Patient Class  :IP  Current Level of Care: MS    HPI:50 y o  female initially admitted on 1/6 for acute resp failure sec to CHF /miguel angel pleural effusions  Assessment/Plan: pt is back on RA w/ O2 sats 90-98 %    Life vest delivered to bedside  cont on hydralazine /isosorbide , lasix  Nephrology following for MAXI on CKD , remains stable   Cont w/ crackles in bases and dec BS throughout   1/16 Nephrology Note   MAXI creatine improving w/ diuresis initially   Stable  rheumotoid factor + , anti WINTER 2R pending   Free ligh chain ratio 1  8 plan to inc lasix to TID per cardiology   Recheck BMP in am   Will likely need renal bx IP vs OP       1/16 Cardiology Note   Acute CHF , EF 25 % , non MI r/t trop elevated sec to CHF   Weight trends slowly heading in right direction   Elevation in neck veins to valve +6 , O2 sat 90 % RA w/ some SOB   Inc lasix to TID , keep K >4 and mg >2  Cont carvedilol, hydralazine and isosorbide   Check amb pulse ox   1/16 Pulm Note   Acute resp failure ,Bilateral transudate pleural effusion now with recurrent of L pleural effusion   Small L apical pneumothorax  Trial diuretics see if any improvement in effusion consider repeat US   On RA no urgency to thoracentesis   Cont to follow CXR , to ensure resolution of pneumothorax  Cardiology following        Pertinent Labs/Diagnostic Results:   1/16  CXR Hazy opacity in the mid and lower left hemithorax, likely recurrent effusion    Persistent opacity in the medial left base due to atelectasis    Decrease in trace left apical pneumothorax      Results from last 7 days   Lab Units 01/15/21  0459 01/14/21  0313 01/13/21  0524   WBC Thousand/uL 6 88 5 67 9 27   HEMOGLOBIN g/dL 11 4* 10 7* 11 2*   HEMATOCRIT % 35 4 32 9* 34 5*   PLATELETS Thousands/uL 213 191 228   NEUTROS ABS Thousands/µL  --   --   --      Results from last 7 days   Lab Units 01/16/21  0647 01/15/21  0459 01/14/21  0313   SODIUM mmol/L 136 138 138   POTASSIUM mmol/L 4 6 4 3 4 4   CHLORIDE mmol/L 105 107 107   CO2 mmol/L 26 27 28   ANION GAP mmol/L 5 4 3*   BUN mg/dL 38* 38* 40*   CREATININE mg/dL 1 68* 1 65* 1 69*   EGFR ml/min/1 73sq m 35 35 34   CALCIUM mg/dL 8 7 8 6 8 2*   MAGNESIUM mg/dL  --  2 1 2 1   PHOSPHORUS mg/dL  --  3 5 4 0     Results from last 7 days   Lab Units 01/13/21  0524 01/12/21  0502   AST U/L 21 24   ALT U/L 20 19   ALK PHOS U/L 76 71   TOTAL PROTEIN g/dL 5 3* 5 2*   ALBUMIN g/dL 1 8* 1 8*   TOTAL BILIRUBIN mg/dL 0 26 0 19*     Results from last 7 days   Lab Units 01/16/21  0647 01/15/21  0459 01/14/21  0313 01/13/21  0524 01/12/21  0502   GLUCOSE RANDOM mg/dL 73 85 94 105 89     Results from last 7 days   Lab Units 01/12/21  1623   CREATININE UR mg/dL 86 5   PROTEIN UR mg/dL 793   PROT/CREAT RATIO UR  9 17*     Results from last 7 days   Lab Units 01/12/21  1359   GRAM STAIN RESULT  1+ Polys  No organisms seen   BODY FLUID CULTURE, STERILE  No growth     Results from last 7 days   Lab Units 01/12/21  1359   TOTAL COUNTED  100   WBC FLUID /ul 152       Vital Signs:  01/16/21 2323  97 9 °F (36 6 °C)  80  18  111/68  --  90 %  --  --  Nasal cannula  Lying   01/16/21 1945  --  --  --  --  --  --  --  --  None (Room air)  --   01/16/21 1754  --  --  --  --  --  95 %   --  --  None (Room air)  --   SpO2: while walking in the hallway at 01/16/21 1754   01/16/21 1548  97 5 °F (36 4 °C)  75  18  132/85  --  93 %  --  --  Nasal cannula  Lying   01/16/21 1328  --  77  --  123/75  --  --  --  --  --  Lying   01/16/21 1236  --  --  --  --  --  94 %  --  --  None (Room air)  --   01/16/21 0940  --  --  --  --  --  --  24  1 L/min  Nasal cannula  --   01/16/21 0732  97 5 °F (36 4 °C)  70  16  157/76  109  98 %  --  --  Nasal cannula  Lying   01/16/21 0300  97 4 °F (36 3 °C)Abnormal   75  18  110/75  88  90 %  --               Medications:   Scheduled Medications:  atorvastatin, 40 mg, Oral, Daily With Dinner  carvedilol, 25 mg, Oral, BID With Meals  cyanocobalamin, 1,000 mcg, Oral, Daily  folic acid, 1 mg, Oral, Daily  furosemide, 20 mg, Oral, TID  heparin (porcine), 5,000 Units, Subcutaneous, Q8H CATHY  hydrALAZINE, 10 mg, Oral, TID after meals  isosorbide dinitrate, 10 mg, Oral, TID after meals  nicotine, 1 patch, Transdermal, Daily  PARoxetine, 60 mg, Oral, Daily  QUEtiapine, 100 mg, Oral, HS      Continuous IV Infusions:     PRN Meds:  acetaminophen, 650 mg, Oral, Q4H PRN  Labetalol HCl, 10 mg, Intravenous, Q6H PRN  ondansetron, 4 mg, Intravenous, Q6H PRN  oxyCODONE, 5 mg, Oral, Q4H PRN  promethazine, 12 5 mg, Intravenous, Q6H PRN        Discharge Plan: D     Network Utilization Review Department  ATTENTION: Please call with any questions or concerns to [phone] and carefully listen to the prompts so that you are directed to the right person  All voicemails are confidential   Jayce Rodriguez all requests for admission clinical reviews, approved or denied determinations and any other requests to dedicated fax number below belonging to the campus where the patient is receiving treatment   List of dedicated fax numbers for the Facilities:  [address] DENIALS (Administrative/Medical Necessity) [phone]   [address] (Maternity/NICU/Pediatrics) [phone]   [address] [place] [address] [address] [facility] [address] Lidia Dubois 9198 (Winifred Faustin "[address]" 103) [address] [place] [address] [address] Zoie Tran [address]  [phone]

## 2021-01-18 NOTE — PLAN OF CARE
Problem: Potential for Falls  Goal: Patient will remain free of falls  Description: INTERVENTIONS:  - Assess patient frequently for physical needs  -  Identify cognitive and physical deficits and behaviors that affect risk of falls  -  [place] fall precautions as indicated by assessment   - Educate patient/family on patient safety including physical limitations  - Instruct patient to call for assistance with activity based on assessment  - Modify environment to reduce risk of injury  - Consider OT/PT consult to assist with strengthening/mobility  Outcome: Progressing     Problem: PAIN - ADULT  Goal: Verbalizes/displays adequate comfort level or baseline comfort level  Description: Interventions:  - Encourage patient to monitor pain and request assistance  - Assess pain using appropriate pain scale  - Administer analgesics based on type and severity of pain and evaluate response  - Implement non-pharmacological measures as appropriate and evaluate response  - Consider cultural and social influences on pain and pain management  - Notify physician/advanced practitioner if interventions unsuccessful or patient reports new pain  Outcome: Progressing     Problem: SAFETY ADULT  Goal: Patient will remain free of falls  Description: INTERVENTIONS:  - Assess patient frequently for physical needs  -  Identify cognitive and physical deficits and behaviors that affect risk of falls    -  [place] fall precautions as indicated by assessment   - Educate patient/family on patient safety including physical limitations  - Instruct patient to call for assistance with activity based on assessment  - Modify environment to reduce risk of injury  - Consider OT/PT consult to assist with strengthening/mobility  Outcome: Progressing  Goal: Maintain or return to baseline ADL function  Description: INTERVENTIONS:  -  Assess patient's ability to carry out ADLs; assess patient's baseline for ADL function and identify physical deficits which impact ability to perform ADLs (bathing, care of mouth/teeth, toileting, grooming, dressing, etc )  - Assess/evaluate cause of self-care deficits   - Assess range of motion  - Assess patient's mobility; develop plan if impaired  - Assess patient's need for assistive devices and provide as appropriate  - Encourage maximum independence but intervene and supervise when necessary  - Involve family in performance of ADLs  - Assess for home care needs following discharge   - Consider OT consult to assist with ADL evaluation and planning for discharge  - Provide patient education as appropriate  Outcome: Progressing  Goal: Maintain or return mobility status to optimal level  Description: INTERVENTIONS:  - Assess patient's baseline mobility status (ambulation, transfers, stairs, etc )    - Identify cognitive and physical deficits and behaviors that affect mobility  - Identify mobility aids required to assist with transfers and/or ambulation (gait belt, sit-to-stand, lift, walker, cane, etc )  - [place] fall precautions as indicated by assessment  - Record patient progress and toleration of activity level on Mobility SBAR; progress patient to next Phase/Stage  - Instruct patient to call for assistance with activity based on assessment  - Consider rehabilitation consult to assist with strengthening/weightbearing, etc   Outcome: Progressing     Problem: DISCHARGE PLANNING  Goal: Discharge to home or other facility with appropriate resources  Description: INTERVENTIONS:  - Identify barriers to discharge w/patient and caregiver  - Arrange for needed discharge resources and transportation as appropriate  - Identify discharge learning needs (meds, wound care, etc )  - Arrange for interpretive services to assist at discharge as needed  - Refer to Case Management Department for coordinating discharge planning if the patient needs post-hospital services based on physician/advanced practitioner order or complex needs related to functional status, cognitive ability, or social support system  Outcome: Progressing     Problem: Knowledge Deficit  Goal: Patient/family/caregiver demonstrates understanding of disease process, treatment plan, medications, and discharge instructions  Description: Complete learning assessment and assess knowledge base    Interventions:  - Provide teaching at level of understanding  - Provide teaching via preferred learning methods  Outcome: Progressing     Problem: CARDIOVASCULAR - ADULT  Goal: Maintains optimal cardiac output and hemodynamic stability  Description: INTERVENTIONS:  - Monitor I/O, vital signs and rhythm  - Monitor for S/S and trends of decreased cardiac output  - Administer and titrate ordered vasoactive medications to optimize hemodynamic stability  - Assess quality of pulses, skin color and temperature  - Assess for signs of decreased coronary artery perfusion  - Instruct patient to report change in severity of symptoms  Outcome: Progressing  Goal: Absence of cardiac dysrhythmias or at baseline rhythm  Description: INTERVENTIONS:  - Continuous cardiac monitoring, vital signs, obtain 12 lead EKG if ordered  - Administer antiarrhythmic and heart rate control medications as ordered  - Monitor electrolytes and administer replacement therapy as ordered  Outcome: Progressing     Problem: Prexisting or High Potential for Compromised Skin Integrity  Goal: Skin integrity is maintained or improved  Description: INTERVENTIONS:  - Identify patients at risk for skin breakdown  - Assess and monitor skin integrity  - Assess and monitor nutrition and hydration status  - Monitor labs   - Assess for incontinence   - Turn and reposition patient  - Assist with mobility/ambulation  - Relieve pressure over bony prominences  - Avoid friction and shearing  - Provide appropriate hygiene as needed including keeping skin clean and dry  - Evaluate need for skin moisturizer/barrier cream  - Collaborate with interdisciplinary team   - Patient/family teaching  - Consider wound care consult   Outcome: Progressing

## 2021-01-18 NOTE — CASE MANAGEMENT
Cm reviewed pt care coordination rounds with Dr Maria E Dowell  Pt is not medically cleared for d/c today  Pt is to undergo a renal biopsy today or jackie  Cm will follow up for final medical clearance and dcp needs

## 2021-01-18 NOTE — ASSESSMENT & PLAN NOTE
· Macrocytic anemia with borderline low S81 and folic acid    Supplements added    Results from last 7 days   Lab Units 01/18/21  0520 01/17/21  0609 01/15/21  0459 01/14/21  0313 01/13/21  0524 01/12/21  0502   HEMOGLOBIN g/dL 12 6 12 6 11 4* 10 7* 11 2* 11 2*

## 2021-01-18 NOTE — ASSESSMENT & PLAN NOTE
· MAXI on CKD 3 the setting of decompensated CHF  · Has significant proteinuria 9 g per day currently undergoing workup per nephrology  · Remained stable after catheterization and restarting of diuretics  · Nephrology recommends kidney biopsy, patient reportedly wants it done inpatient  · IR consult for Kidney biopsy    Results from last 7 days   Lab Units 01/18/21  0520 01/17/21  0609 01/16/21  0647 01/15/21  0459 01/14/21  0313 01/13/21  0524 01/12/21  0502   BUN mg/dL 54* 43* 38* 38* 40* 42* 40*   CREATININE mg/dL 1 79* 1 73* 1 68* 1 65* 1 69* 1 72* 1 48*   EGFR ml/min/1 73sq m 32 33 35 35 34 34 40

## 2021-01-18 NOTE — PROGRESS NOTES
NEPHROLOGY PROGRESS NOTE   Miguel Pace 44 y o  female MRN: [id number]  Unit/Bed#: E4 -01 Encounter: [id number]  Reason for Consult: MAXI     Plan:  -IR consulted for renal biopsy, form placed in chart  May be done as OP if patient is discharged    -creatinine has plateaued around 1 7   -serological workup so far negative   -continues on oral Lasix per Cardiology   -diuresing well   -cardiology planning on LifeVest at discharge   -will need close follow-up with Renal at discharge  ASSESSMENT/PLAN:  MAXI (POA):  Suspected cardiorenal syndrome vs intrinsic process/nephrotic syndrome   -history of chronic NSAID use for the past 2 years  -status post contrast on 01/11   -baseline creatinine 0 7-1 0   -presented with creatinine of 1 8   -creatinine has plateaued around 1 7   -initially creatinine improved with diuresis  -continues on furosemide 40 mg in the morning and 20 mg in the evening per Cardiology   -renal ultrasound:  Borderline echogenicity increased cortical medullary accentuation, mild dilatation of both renal collecting systems right greater than left versus prominent renal pyramids  -planning on renal biopsy  Subcu heparin will need to be held day of biopsy  Patient will need to be NPO after midnight   -Workup:   -UA showed 2+ protein, 2-4 RBCs  -UPCR: 9 9     -24 hour protein 6 9 g     -SPEP/UPEP negative for monoclonal banding    -C3, C4, anti-double-stranded DNA, KAYLA, anti-GBM, hepatitis panel, Anca negative    -rheumatoid factor positive    -anti PLA2R pending    -continue to avoid nephrotoxins hypotension, IV contrast   -I/O  Hypertension: blood pressure remains acceptable   -avoid hypotension or high fluctuations in blood pressure   -recommending ho  -continues on hydralazine 10 mg t i d , isosorbide 10 mg t i d , and Lasix  ld parameters on antihypertensive for systolic blood pressure less than 130 mmHg       Acute on chronic CHF:  With EF of 25%   -cardiology following   -continues on Lasix 40 mg in the morning and 20 mg in the evening   -weight is decreasing, diuresing well   -planning on LifeVest at discharge  Bilateral pleural effusions:  Status post l right-sided thoracentesis for 630 mL  -pulmonary following   -will need repeat x-ray  Other:  Tobacco abuse, bilateral pulmonary nodules  Disposition:  Requiring additional stay due to medical needs  SUBJECTIVE:  The patient is resting in bed  She states she is tired this morning  She denies chest pain or shortness of breath  She states that she feels good today  She denies nausea, vomiting, diarrhea  She denies changes to her appetite  She is urinating well      OBJECTIVE:  Current Weight: Weight - Scale: 52 2 kg (115 lb 1 3 oz)  Vitals:    01/17/21 2329 01/18/21 0520 01/18/21 0532 01/18/21 0730   BP: 129/87 139/66  120/72   BP Location: Left arm Right arm  Right arm   Pulse: 83 72  76   Resp: 17   16   Temp: (!) 97 4 °F (36 3 °C)   (!) 97 °F (36 1 °C)   TempSrc: Temporal   Temporal   SpO2: 93% 96%  92%   Weight:   52 2 kg (115 lb 1 3 oz)    Height:           Intake/Output Summary (Last 24 hours) at 1/18/2021 1144  Last data filed at 1/18/2021 0501  Gross per 24 hour   Intake 390 ml   Output 1325 ml   Net -935 ml     General: NAD  Skin: warm, dry, intact, no rash  HEENT: Moist mucous membranes, sclera anicteric, normocephalic, atraumatic  Neck: No apparent JVD appreciated  Chest: lung sounds clear B/L, on RA   CVS:Regular rate and rhythm, no murmer   Abdomen: Soft, round, non-tender, +BS  Extremities: No B/L LE edema present  Neuro: alert and oriented  Psych: appropriate mood and affect     Medications:    Current Facility-Administered Medications:     acetaminophen (TYLENOL) tablet 650 mg, 650 mg, Oral, Q4H PRN, Sugey Soliz PA-C, 650 mg at 01/17/21 1211    atorvastatin (LIPITOR) tablet 40 mg, 40 mg, Oral, Daily With Marita Shearer DO, 40 mg at 01/17/21 1730    carvedilol (COREG) tablet 25 mg, 25 mg, Oral, BID With Meals, GAGE Mccord, 25 mg at 01/18/21 0817    cyanocobalamin (VITAMIN B-12) tablet 1,000 mcg, 1,000 mcg, Oral, Daily, Luis Angel James DO, 1,000 mcg at 39/43/96 0517    folic acid (FOLVITE) tablet 1 mg, 1 mg, Oral, Daily, Luis Angel James DO, 1 mg at 01/18/21 0817    furosemide (LASIX) tablet 20 mg, 20 mg, Oral, QPM, Jorge Humphrey MD    [START ON 1/19/2021] furosemide (LASIX) tablet 40 mg, 40 mg, Oral, Daily, Jorge Humphrey MD    heparin (porcine) subcutaneous injection 5,000 Units, 5,000 Units, Subcutaneous, Q8H [address], Sugey Soliz PA-C, 5,000 Units at 01/18/21 0526    hydrALAZINE (APRESOLINE) tablet 10 mg, 10 mg, Oral, TID after meals, GAGE Mccord, 10 mg at 01/18/21 0817    isosorbide dinitrate (ISORDIL) tablet 10 mg, 10 mg, Oral, TID after meals, Catherine Bazzi PA-C, 10 mg at 01/18/21 0817    Labetalol HCl (NORMODYNE) injection 10 mg, 10 mg, Intravenous, Q6H PRN, Pino Weston MD, Stopped at 01/07/21 1214    nicotine (NICODERM CQ) 14 mg/24hr TD 24 hr patch 1 patch, 1 patch, Transdermal, Daily, Sugey Soliz PA-C, 1 patch at 01/18/21 0817    ondansetron (ZOFRAN) injection 4 mg, 4 mg, Intravenous, Q6H PRN, Sugey Soliz PA-C, 4 mg at 01/12/21 1644    oxyCODONE (ROXICODONE) IR tablet 5 mg, 5 mg, Oral, Q4H PRN, Duane Rubio DO, 5 mg at 01/17/21 2109    PARoxetine (PAXIL) tablet 60 mg, 60 mg, Oral, Daily, Sugey Soliz PA-C, 60 mg at 01/18/21 0817    promethazine (PHENERGAN) injection 12 5 mg, 12 5 mg, Intravenous, Q6H PRN, DO Jose    QUEtiapine (SEROquel) tablet 100 mg, 100 mg, Oral, HS, Sugey Soliz PA-C, 100 mg at 01/17/21 2102    Laboratory Results:  Results from last 7 days   Lab Units 01/18/21  0520 01/17/21  0609 01/16/21  0647 01/15/21  0459 01/14/21  0313 01/13/21  0524 01/12/21  0502   WBC Thousand/uL 7 18 6 69  --  6 88 5 67 9 27 6 23   HEMOGLOBIN g/dL 12 6 12 6  --  11 4* 10 7* 11 2* 11 2*   HEMATOCRIT % 38 1 37 2  --  35 4 32 9* 34 5* 34 1*   PLATELETS Thousands/uL 332 302  --  213 191 228 235   SODIUM mmol/L 138 139 136 138 138 138 141   POTASSIUM mmol/L 4 3 4 1 4 6 4 3 4 4 4 6 4 6   CHLORIDE mmol/L 104 104 105 107 107 107 109*   CO2 mmol/L 29 28 26 27 28 27 29   BUN mg/dL 54* 43* 38* 38* 40* 42* 40*   CREATININE mg/dL 1 79* 1 73* 1 68* 1 65* 1 69* 1 72* 1 48*   CALCIUM mg/dL 8 9 9 1 8 7 8 6 8 2* 8 3 8 1*   MAGNESIUM mg/dL  --   --   --  2 1 2 1  --   --    PHOSPHORUS mg/dL  --   --   --  3 5 4 0  --   --    ALK PHOS U/L  --   --   --   --   --  76 71   ALT U/L  --   --   --   --   --  20 19   AST U/L  --   --   --   --   --  21 24

## 2021-01-19 ENCOUNTER — TELEPHONE (OUTPATIENT)
Dept: PULMONOLOGY | Facility: CLINIC | Age: 51
End: 2021-01-19

## 2021-01-19 VITALS
SYSTOLIC BLOOD PRESSURE: 127 MMHG | OXYGEN SATURATION: 95 % | DIASTOLIC BLOOD PRESSURE: 92 MMHG | BODY MASS INDEX: 19.91 KG/M2 | HEART RATE: 73 BPM | RESPIRATION RATE: 16 BRPM | TEMPERATURE: 97.5 F | HEIGHT: 66 IN | WEIGHT: 123.9 LBS

## 2021-01-19 PROCEDURE — 99232 SBSQ HOSP IP/OBS MODERATE 35: CPT | Performed by: INTERNAL MEDICINE

## 2021-01-19 PROCEDURE — 99239 HOSP IP/OBS DSCHRG MGMT >30: CPT | Performed by: FAMILY MEDICINE

## 2021-01-19 RX ORDER — FUROSEMIDE 20 MG/1
20 TABLET ORAL EVERY EVENING
Qty: 30 TABLET | Refills: 0 | Status: SHIPPED | OUTPATIENT
Start: 2021-01-19 | End: 2021-02-19 | Stop reason: SDUPTHER

## 2021-01-19 RX ORDER — HYDRALAZINE HYDROCHLORIDE 10 MG/1
10 TABLET, FILM COATED ORAL
Qty: 90 TABLET | Refills: 0 | Status: SHIPPED | OUTPATIENT
Start: 2021-01-19 | End: 2021-01-28 | Stop reason: SDUPTHER

## 2021-01-19 RX ORDER — CARVEDILOL 25 MG/1
25 TABLET ORAL 2 TIMES DAILY WITH MEALS
Qty: 60 TABLET | Refills: 0 | Status: SHIPPED | OUTPATIENT
Start: 2021-01-19 | End: 2021-02-19 | Stop reason: SDUPTHER

## 2021-01-19 RX ORDER — FOLIC ACID 1 MG/1
1 TABLET ORAL DAILY
Qty: 30 TABLET | Refills: 0 | Status: SHIPPED | OUTPATIENT
Start: 2021-01-20 | End: 2021-02-19 | Stop reason: SDUPTHER

## 2021-01-19 RX ORDER — ISOSORBIDE DINITRATE 10 MG/1
10 TABLET ORAL
Qty: 90 TABLET | Refills: 0 | Status: SHIPPED | OUTPATIENT
Start: 2021-01-19 | End: 2021-02-19 | Stop reason: SDUPTHER

## 2021-01-19 RX ORDER — FUROSEMIDE 40 MG/1
40 TABLET ORAL EVERY MORNING
Qty: 30 TABLET | Refills: 0 | Status: SHIPPED | OUTPATIENT
Start: 2021-01-19 | End: 2021-02-19 | Stop reason: SDUPTHER

## 2021-01-19 RX ORDER — ATORVASTATIN CALCIUM 40 MG/1
40 TABLET, FILM COATED ORAL DAILY
Qty: 30 TABLET | Refills: 0 | Status: SHIPPED | OUTPATIENT
Start: 2021-01-19 | End: 2021-02-19 | Stop reason: SDUPTHER

## 2021-01-19 RX ADMIN — HYDRALAZINE HYDROCHLORIDE 10 MG: 10 TABLET, FILM COATED ORAL at 09:15

## 2021-01-19 RX ADMIN — ISOSORBIDE DINITRATE 10 MG: 10 TABLET ORAL at 09:15

## 2021-01-19 RX ADMIN — Medication 1 PATCH: at 09:15

## 2021-01-19 RX ADMIN — FOLIC ACID 1 MG: 1 TABLET ORAL at 09:14

## 2021-01-19 RX ADMIN — PAROXETINE 60 MG: 20 TABLET, FILM COATED ORAL at 09:15

## 2021-01-19 RX ADMIN — ISOSORBIDE DINITRATE 10 MG: 10 TABLET ORAL at 12:55

## 2021-01-19 RX ADMIN — HYDRALAZINE HYDROCHLORIDE 10 MG: 10 TABLET, FILM COATED ORAL at 12:55

## 2021-01-19 RX ADMIN — CYANOCOBALAMIN TAB 500 MCG 1000 MCG: 500 TAB at 09:15

## 2021-01-19 RX ADMIN — CARVEDILOL 25 MG: 25 TABLET, FILM COATED ORAL at 09:15

## 2021-01-19 RX ADMIN — FUROSEMIDE 40 MG: 40 TABLET ORAL at 09:15

## 2021-01-19 NOTE — TELEPHONE ENCOUNTER
DR Dominic Pickard WON'T BE IN [place] ON 2/1, CALLED PT TO RESCHEDULE HER APPT HER VOICEMAIL IS FULL   I SENT LETTER TO PT

## 2021-01-19 NOTE — ASSESSMENT & PLAN NOTE
· Hypokalemia being repleted in the setting of diuretic    Results from last 7 days   Lab Units 01/18/21  0520 01/17/21  0609 01/16/21  0647 01/15/21  0459 01/14/21  0313 01/13/21  0524   POTASSIUM mmol/L 4 3 4 1 4 6 4 3 4 4 4 6

## 2021-01-19 NOTE — ASSESSMENT & PLAN NOTE
· Accelerated hypertension now improved  · Continue carvedilol, hydralazine and isosorbide started during hospitalization

## 2021-01-19 NOTE — NURSING NOTE
IV removed  AVS reviewed with patient  Patient packed personal belongings and donned clothing  Taken to parking deck by w/c for

## 2021-01-19 NOTE — PHYSICAL THERAPY NOTE
Physical Therapy Cancellation Note      Attempted to see patient per PT POC  Pet met ambulating indep in room  Pt visibly upset, stating she is going to leave hospital AMA  Offered patient support however pt stating she just wants her d/c papers ready  Made RN aware       Crystal Childers, PT

## 2021-01-19 NOTE — DISCHARGE SUMMARY
Discharge- Dina Rust 1970, 44 y o  female MRN: [id number]    Unit/Bed#: E4 -01 Encounter: [id number]    Primary Care Provider: No primary care provider on file  Date and time admitted to hospital: 1/6/2021  4:34 PM        Macrocytic anemia  Assessment & Plan  · Macrocytic anemia with borderline low S24 and folic acid  Supplements added    Results from last 7 days   Lab Units 01/18/21  0520 01/17/21  0609 01/15/21  0459 01/14/21  0313 01/13/21  0524   HEMOGLOBIN g/dL 12 6 12 6 11 4* 10 7* 11 2*       Closed fracture of distal end of right fibula  Assessment & Plan  · Distal fibula fracture follows with orthopedics prior to admission    ADHD  Assessment & Plan  Continue Ritalin 20 mg b i d  Elevated troponin  Assessment & Plan  · Non MI elevation troponin secondary to decompensated CHF    Acute systolic congestive heart failure [facility])  Assessment & Plan  Weight (last 2 days) before discharge     Date/Time   Weight    01/19/21 0600   56 2 (123 9)    01/18/21 0532   52 2 (115 08)    01/17/21 0600   52 5 (115 74)            · Acute systolic CHF new diagnosis  Cardiac catheterization negative for occlusive disease  · Holding ACE-inhibitor secondary to kidney injury  Continue hydralazine/isosorbide  · Lasix titrating per cardiology  · Life vest delivered at bedside    Bilateral pleural effusion  Assessment & Plan  Present on CT imaging  Likely secondary to new onset CHF    Continue with diuresis and await pulmonary consultation    MAXI (acute kidney injury) ([facility] [address] )  Assessment & Plan  · MAXI on CKD 3 the setting of decompensated CHF  · Has significant proteinuria 9 g per day currently undergoing workup per nephrology  · Remained stable after catheterization and restarting of diuretics  · Nephrology recommends kidney biopsy, patient reportedly wants it done inpatient  ·  Kidney biopsy will be done on outpatient    Results from last 7 days   Lab Units 01/18/21  0520 01/17/21  6502 01/16/21  4243 01/15/21  0459 01/14/21  0313 01/13/21  0524   BUN mg/dL 54* 43* 38* 38* 40* 42*   CREATININE mg/dL 1 79* 1 73* 1 68* 1 65* 1 69* 1 72*   EGFR ml/min/1 73sq m 32 33 35 35 34 34       Hypokalemia  Assessment & Plan  · Hypokalemia being repleted in the setting of diuretic    Results from last 7 days   Lab Units 01/18/21  0520 01/17/21  0609 01/16/21  0647 01/15/21  0459 01/14/21  0313 01/13/21  0524   POTASSIUM mmol/L 4 3 4 1 4 6 4 3 4 4 4 6       Hypertensive urgency  Assessment & Plan  · Accelerated hypertension now improved  · Continue carvedilol, hydralazine and isosorbide started during hospitalization    Current smoker  Assessment & Plan  Smokes less than 1 pack per day  Counseled on cessation-provide nicotine patch    Anxiety  Assessment & Plan  · Anxiety mood stable on quetiapine and paroxetine    * Acute respiratory failure with hypoxia (HCC)  Assessment & Plan  · Acute respiratory failure with hypoxia secondary to do CHF/bilateral pleural effusions  · Appreciate IR evaluation and left-sided thoracentesis  Currently on room air  · Did have small pneumothorax postprocedure and was re-evaluated by pulmonology  · Unfortunately due to reaccumulation lasix increased to 40 mg in the morning and 20 mg at night          Discharge Summary - [place] [id number] Internal Medicine    Patient Information: Emely Jhaveri 44 y o  female MRN: [id number]  Unit/Bed#: E4 -01 Encounter: [id number]    Discharging Physician / Practitioner: Jose A Martínez MD  PCP: No primary care provider on file    Admission Date: 1/6/2021  Discharge Date: 01/19/21    Reason for Admission:  Shortness of breath    Discharge Diagnoses:     Principal Problem:    Acute respiratory failure with hypoxia [facility])  Active Problems:    Anxiety    Hypertensive urgency    Hypokalemia    MAXI (acute kidney injury) ([facility] [address] )    Acute systolic congestive heart failure (HCC)    Elevated troponin    Closed fracture of distal end of right fibula    Macrocytic anemia  Resolved Problems:    * No resolved hospital problems  *      Consultations During Hospital Stay:  · Orthopedics  · Nephrology  · IR  · Nutrition  · Pulmonology  · Cardiology    Procedures Performed:     · Cardiac catheterization  · Thoracocentesis on 01/12/2021    Significant Findings / Test Results:     · Cardiac catheterization 1/21:LVEF 25%, global hypokinesis, grade 3 diastolic dysfunction, mild LA dilatation, mild MR, trace AR, mild TR w/ PASP 40 mmHg, small circumferential pericardial effusion, January 2021  · Nephrotic range proteinuria with 6 9 g protein in 24 hours  · Chest x-ray: Moderate size left pleural effusion  · Pleural fluid cytology:  Benign    Incidental Findings:   Ultrasound kidneys;  Borderline echogenicity increased cortical medullary accentuation could be related to medical renal disease  Mild dilatation of both renal collecting systems right greater than left versus prominent renal pyramids    Test Results Pending at Discharge (will require follow up): · Anti Pla2R     Outpatient Tests Requested:  · Renal biopsy    Complications:  None    Hospital Course:     Tessie Hunter is a 44 y o  female patient who originally presented to the hospital on 1/6/2021 due to shortness of breath for the past 5 days  Patient broke her ankle about a month ago and she had sedentary lifestyle since then  Denies weight gain or lower extremity edema denies any history of heart failure  She states that her blood pressure has been high for the past month since she broke her ankle  Upon presentation to the ED her blood pressure is 230/120, acute kidney injury, elevated BNP  Hypoxic, placed on 3 L of oxygen  CT PE showed no pulmonary embolus, but has bilateral moderate pleural effusions  COVID-19 negative  Throughout the hospital stay, patient had extensive workup, including cardiac catheterization and thoracocentesis to define the etiology of her pulmonary effusions and heart failure  Cardiac catheterization revealed no ischemic illness, but 2D echo showed ejection fraction from 25% with grade 3 diastolic dysfunction  Patient will require cardiac vest upon discharge, which was arranged for her  Patient has also being started on multiple medications for CHF, including, isodril, Coreg, hydralazine and Lasix 40 and 20 mg  In terms of pulmonary effusions, thoracocentesis has been done and revealed mostly transudative fluid without malignancy  Patient has been followed by pulmonology  In addition, nephrology service has been involved due to apparent renal failure with nephrotic range proteinuria of about 7 g of protein in 24 hours  Patient will require renal biopsy to determine the etiology of current renal failure  Renal biopsy will be scheduled on the outpatient basis and, per Nephrology, it has been arranged  Condition at Discharge: stable     Discharge Day Visit / Exam:     Subjective:  Patient was seen and examined  She is quite frustrated that she was not taking for renal biopsy today  She has been NPO for half a day  Patient decided to leave the hospital without renal biopsy  Vitals: Blood Pressure: 127/92 (01/19/21 1157)  Pulse: 73 (01/19/21 1157)  Temperature: 97 5 °F (36 4 °C) (01/19/21 1157)  Temp Source: Temporal (01/19/21 1157)  Respirations: 16 (01/19/21 1157)  Height: 5' 6" (167 6 cm) (01/06/21 2318)  Weight - Scale: 56 2 kg (123 lb 14 4 oz) (01/19/21 0600)  SpO2: 95 % (01/19/21 1157)  Exam:   Physical Exam  Constitutional:       Appearance: She is well-developed  Cardiovascular:      Rate and Rhythm: Normal rate and regular rhythm  Pulmonary:      Effort: Pulmonary effort is normal       Breath sounds: Examination of the left-middle field reveals decreased breath sounds  Examination of the right-lower field reveals decreased breath sounds  Examination of the left-lower field reveals decreased breath sounds  Decreased breath sounds present        Comments: Decreased breath sounds bilaterally  Abdominal:      Palpations: Abdomen is soft  Skin:     General: Skin is warm  Findings: No erythema  Neurological:      Mental Status: She is alert  Psychiatric:         Behavior: Behavior normal          Discussion with Family:  No    Discharge instructions/Information to patient and family:   See after visit summary for information provided to patient and family  Provisions for Follow-Up Care:  See after visit summary for information related to follow-up care and any pertinent home health orders  Disposition:     Home    For Discharges to [facility] SNF:   · Not Applicable to this Patient - Not Applicable to this Patient    Planned Readmission: no     Discharge Statement:  I spent 40 minutes discharging the patient  This time was spent on the day of discharge  I had direct contact with the patient on the day of discharge  Greater than 50% of the total time was spent examining patient, answering all patient questions, arranging and discussing plan of care with patient as well as directly providing post-discharge instructions  Additional time then spent on discharge activities  Discharge Medications:  See after visit summary for reconciled discharge medications provided to patient and family        ** Please Note: This note has been constructed using a voice recognition system **

## 2021-01-19 NOTE — ASSESSMENT & PLAN NOTE
Present on CT imaging  Likely secondary to new onset CHF    Continue with diuresis and await pulmonary consultation

## 2021-01-19 NOTE — ASSESSMENT & PLAN NOTE
· Acute respiratory failure with hypoxia secondary to do CHF/bilateral pleural effusions  · Appreciate IR evaluation and left-sided thoracentesis  Currently on room air  · Did have small pneumothorax postprocedure and was re-evaluated by pulmonology    · Unfortunately due to reaccumulation lasix increased to 40 mg in the morning and 20 mg at night

## 2021-01-19 NOTE — ASSESSMENT & PLAN NOTE
· MAXI on CKD 3 the setting of decompensated CHF  · Has significant proteinuria 9 g per day currently undergoing workup per nephrology  · Remained stable after catheterization and restarting of diuretics  · Nephrology recommends kidney biopsy, patient reportedly wants it done inpatient  ·  Kidney biopsy will be done on outpatient    Results from last 7 days   Lab Units 01/18/21  0520 01/17/21  0609 01/16/21  0647 01/15/21  0459 01/14/21  0313 01/13/21  0524   BUN mg/dL 54* 43* 38* 38* 40* 42*   CREATININE mg/dL 1 79* 1 73* 1 68* 1 65* 1 69* 1 72*   EGFR ml/min/1 73sq m 32 33 35 35 34 34

## 2021-01-19 NOTE — PROGRESS NOTES
Progress Note - Pulmonary   Amanda Rogers 44 y o  female MRN: [id number]  Unit/Bed#: E4 -01 Encounter: [id number]      Assessment/Plan:    1  Acute hypoxic respiratory failure  1  Resolved oxygenating well on room air  2  Titrate oxygen as needed to maintain SpO2 greater than or equal to 88%  3  Pulmonary toilet clonus is noted above increase activity as tolerated  4  Recommend ambulatory pulse oximetry with ambulation prior to discharge  2  Acute grade 3 systolic heart failure with mild pulmonary hypertension suspect WHO groups 2 and 3  1  Cardiology following for management  2  Status post cardiac catheterization-no sign of coronary stenosis  3  PAP of 40 mmHg and EF of 25%  4  Continue Lasix per Cardiology  3  Bilateral pleural effusion status post left-sided thoracentesis  1  Thoracentesis completed on 01/12/2021 4630 mL of clear transudative fluid   2  Recommend repeat chest x-ray in 2 weeks-order placed on discharge  3  Outpatient Pulmonary follow-up as scheduled, patient was notified of this appointment and will contact our office if she is unable to keep  4  Thoracentesis cultures negative  5  Cytology negative for malignancy  4  Iatrogenic left apical pneumothorax  1  No pneumothorax seen on chest x-ray 118  5  Tobacco abuse  1  Will follow-up in our office  2  Cessation encouraged  6  Bilateral pulmonary nodules  1  Outpatient Pulmonary follow-up previously scheduled  2  Pleural fluid negative for malignancy  3  Annual surveillance  7  MAXI/Renal mass  1  -nephrology following for management  2  IR biopsy planned for tomorrow    * no further inpatient pulmonary interventions, we will sign off please call with questions or concerns    Subjective:     Sanaz Marking is seen in bed upon entering  Denies acute overnight events  Reports her breathing is stable  She was upset as her IR biopsy has been postponed till tomorrow    She denies any further questions or concerns    Objective:         Vitals: Blood pressure 142/95, pulse 74, temperature 97 8 °F (36 6 °C), temperature source Temporal, resp  rate 16, height 5' 6" (1 676 m), weight 56 2 kg (123 lb 14 4 oz), SpO2 95 % , room air, Body mass index is 20 kg/m²  Intake/Output Summary (Last 24 hours) at 1/19/2021 1124  Last data filed at 1/19/2021 0601  Gross per 24 hour   Intake 240 ml   Output 700 ml   Net -460 ml         Physical Exam  Gen: Awake, alert, oriented x 3, no acute distress  HEENT: Mucous membranes moist, no oral lesions, no thrush,   NECK:  No accessory muscle use, JVP not elevated  Cardiac: Regular, single S1, single S2, no murmurs, no rubs, no gallops  Lungs:  Clear bilaterally  Abdomen: normoactive bowel sounds, soft nontender, nondistended, no rebound or rigidity, no guarding  Extremities: no cyanosis, no clubbing, no edema    Labs: I have personally reviewed pertinent lab results  , CBC: No results found for: WBC, HGB, HCT, MCV, PLT, ADJUSTEDWBC, MCH, MCHC, RDW, MPV, NRBC, CMP: No results found for: SODIUM, K, CL, CO2, ANIONGAP, BUN, CREATININE, GLUCOSE, CALCIUM, AST, ALT, ALKPHOS, PROT, BILITOT, EGFR  Imaging and other studies: None      Velta Quails, CRNP

## 2021-01-19 NOTE — PROGRESS NOTES
NEPHROLOGY PROGRESS NOTE   Kinyarwanda Iza 44 y o  female MRN: [id number]  Unit/Bed#: E4 -01 Encounter: [id number]  Reason for Consult: MAXI (POA)    Plan:  -tentative biopsy for tomorrow or outpatient if patient is discharged prior   -a m  Labs pending   -serologic workup so far negative   -continues on diuretics per Cardiology  -patient is fitted for a LifeVest at discharge   -message sent office for outpatient follow-up with repeat    ASSESSMENT/PLAN:  MAXI (POA):  Suspected cardiorenal syndrome vs intrinsic process/nephrotic syndrome   -history of chronic NSAID use for the past 2 years  -status post contrast on 01/11   -baseline creatinine 0 7-1 0   -presented with creatinine of 1 8   -creatinine has plateaued around 1 7   -a m  Labs pending   -initially creatinine improved with diuresis  -continues on furosemide 40 mg in the morning and 20 mg in the evening per Cardiology   -renal ultrasound:  Borderline echogenicity increased cortical medullary accentuation, mild dilatation of both renal collecting systems right greater than left versus prominent renal pyramids  -planning on renal biopsy  Subcu heparin will need to be held day of biopsy  Patient will need to be NPO after midnight   -Workup:              -UA showed 2+ protein, 2-4 RBCs                -UPCR: 9 9                -24 hour protein 6 9 g                -SPEP/UPEP negative for monoclonal banding               -C3, C4, anti-double-stranded DNA, KAYLA, anti-GBM, hepatitis panel, Anca negative               -rheumatoid factor positive                -anti PLA2R pending    -continue to avoid nephrotoxins hypotension, IV contrast   -I/O      Hypertension: blood pressure remains acceptable   -avoid hypotension or high fluctuations in blood pressure   -continues on hydralazine 10 mg t i d , isosorbide 10 mg t i d , Coreg 25 mg 2 times per day, and Lasix 40 mg in the morning and 20 mg in the evening     -holding parameters on antihypertensive for systolic blood pressure less than 130 mmHg       Acute on chronic CHF:  With EF of 25%  Examining fairly euvolemic   -cardiology following   -continues on Lasix 40 mg in the morning and 20 mg in the evening   -weight is decreasing, diuresing well   -planning on LifeVest at discharge      Bilateral pleural effusions:  Status post l right-sided thoracentesis for 630 mL  Remains on room air   -pulmonary following   -repeat chest x-ray shows small to moderate size left pleural effusion, negative pneumothorax      Other:  Tobacco abuse, bilateral pulmonary nodules  Disposition:  Okay to discharge from Renal once medically cleared  SUBJECTIVE:  The patient is resting bed  She reports feeling tired this morning  Otherwise she is doing okay  She denies chest pain or shortness of breath  She denies nausea, vomiting, diarrhea  She is upset because she is hungry and currently NPO      OBJECTIVE:  Current Weight: Weight - Scale: 56 2 kg (123 lb 14 4 oz)  Vitals:    01/18/21 2346 01/19/21 0351 01/19/21 0600 01/19/21 1013   BP: 126/70 130/89  142/95   BP Location: Left arm Right arm  Right arm   Pulse: 80 70  74   Resp: 18 18  16   Temp: 97 5 °F (36 4 °C) (!) 96 5 °F (35 8 °C)  97 8 °F (36 6 °C)   TempSrc: Temporal Temporal  Temporal   SpO2: 90% 91%  95%   Weight:   56 2 kg (123 lb 14 4 oz)    Height:           Intake/Output Summary (Last 24 hours) at 1/19/2021 1102  Last data filed at 1/19/2021 0601  Gross per 24 hour   Intake 240 ml   Output 700 ml   Net -460 ml     General: NAD  Skin: warm, dry, intact, no rash  HEENT: Moist mucous membranes, sclera anicteric, normocephalic, atraumatic  Neck: No apparent JVD appreciated  Chest: lung sounds clear B/L, on RA   CVS:Regular rate and rhythm, no murmer   Abdomen: Soft, round, non-tender, +BS  Extremities: No B/L LE edema present  Neuro: alert and oriented  Psych: appropriate mood and affect     Medications:    Current Facility-Administered Medications:     acetaminophen (TYLENOL) tablet 650 mg, 650 mg, Oral, Q4H PRN, Sugey Soliz PA-C, 650 mg at 01/18/21 1405    atorvastatin (LIPITOR) tablet 40 mg, 40 mg, Oral, Daily With Jeremy Caro DO, 40 mg at 01/18/21 1626    carvedilol (COREG) tablet 25 mg, 25 mg, Oral, BID With Meals, GAGE Mccord, 25 mg at 01/19/21 0915    cyanocobalamin (VITAMIN B-12) tablet 1,000 mcg, 1,000 mcg, Oral, Daily, Luis Angel James DO, 1,000 mcg at 97/85/74 8325    folic acid (FOLVITE) tablet 1 mg, 1 mg, Oral, Daily, Luis Angel James DO, 1 mg at 01/19/21 0914    furosemide (LASIX) tablet 20 mg, 20 mg, Oral, QPM, Jorge Humphrey MD, 20 mg at 01/18/21 1714    furosemide (LASIX) tablet 40 mg, 40 mg, Oral, Daily, Jorge Humphrey MD, 40 mg at 01/19/21 0915    heparin (porcine) subcutaneous injection 5,000 Units, 5,000 Units, Subcutaneous, Q8H [facility], Sugey Soliz PA-C, Stopped at 01/18/21 2108    hydrALAZINE (APRESOLINE) tablet 10 mg, 10 mg, Oral, TID after meals, GAGE Mccord, 10 mg at 01/19/21 0915    isosorbide dinitrate (ISORDIL) tablet 10 mg, 10 mg, Oral, TID after meals, Alex Bazzi PA-C, 10 mg at 01/19/21 0915    Labetalol HCl (NORMODYNE) injection 10 mg, 10 mg, Intravenous, Q6H PRN, Marisabel Bran MD, Stopped at 01/07/21 1214    nicotine (NICODERM CQ) 14 mg/24hr TD 24 hr patch 1 patch, 1 patch, Transdermal, Daily, Sugey Soliz PA-C, 1 patch at 01/19/21 0915    ondansetron (ZOFRAN) injection 4 mg, 4 mg, Intravenous, Q6H PRN, Sugey Soliz PA-C, 4 mg at 01/12/21 1644    oxyCODONE (ROXICODONE) IR tablet 5 mg, 5 mg, Oral, Q4H PRN, Luis Angel James DO, 5 mg at 01/18/21 1921    PARoxetine (PAXIL) tablet 60 mg, 60 mg, Oral, Daily, Sugey Soliz PA-C, 60 mg at 01/19/21 0915    promethazine (PHENERGAN) injection 12 5 mg, 12 5 mg, Intravenous, Q6H PRN, Shelley Hale DO    QUEtiapine (SEROquel) tablet 100 mg, 100 mg, Oral, HS, Sugey Soliz PA-C, 100 mg at 01/18/21 2107    Laboratory Results:  Results from last 7 days   Lab Units 01/18/21  0520 01/17/21  0609 01/16/21  0647 01/15/21  0459 01/14/21  0313 01/13/21  0524   WBC Thousand/uL 7 18 6 69  --  6 88 5 67 9 27   HEMOGLOBIN g/dL 12 6 12 6  --  11 4* 10 7* 11 2*   HEMATOCRIT % 38 1 37 2  --  35 4 32 9* 34 5*   PLATELETS Thousands/uL 332 302  --  213 191 228   SODIUM mmol/L 138 139 136 138 138 138   POTASSIUM mmol/L 4 3 4 1 4 6 4 3 4 4 4 6   CHLORIDE mmol/L 104 104 105 107 107 107   CO2 mmol/L 29 28 26 27 28 27   BUN mg/dL 54* 43* 38* 38* 40* 42*   CREATININE mg/dL 1 79* 1 73* 1 68* 1 65* 1 69* 1 72*   CALCIUM mg/dL 8 9 9 1 8 7 8 6 8 2* 8 3   MAGNESIUM mg/dL  --   --   --  2 1 2 1  --    PHOSPHORUS mg/dL  --   --   --  3 5 4 0  --    ALK PHOS U/L  --   --   --   --   --  76   ALT U/L  --   --   --   --   --  20   AST U/L  --   --   --   --   --  21

## 2021-01-19 NOTE — PLAN OF CARE
Problem: Potential for Falls  Goal: Patient will remain free of falls  Description: INTERVENTIONS:  - Assess patient frequently for physical needs  -  Identify cognitive and physical deficits and behaviors that affect risk of falls  -  [place] fall precautions as indicated by assessment   - Educate patient/family on patient safety including physical limitations  - Instruct patient to call for assistance with activity based on assessment  - Modify environment to reduce risk of injury  - Consider OT/PT consult to assist with strengthening/mobility  Outcome: Progressing     Problem: PAIN - ADULT  Goal: Verbalizes/displays adequate comfort level or baseline comfort level  Description: Interventions:  - Encourage patient to monitor pain and request assistance  - Assess pain using appropriate pain scale  - Administer analgesics based on type and severity of pain and evaluate response  - Implement non-pharmacological measures as appropriate and evaluate response  - Consider cultural and social influences on pain and pain management  - Notify physician/advanced practitioner if interventions unsuccessful or patient reports new pain  Outcome: Progressing     Problem: SAFETY ADULT  Goal: Patient will remain free of falls  Description: INTERVENTIONS:  - Assess patient frequently for physical needs  -  Identify cognitive and physical deficits and behaviors that affect risk of falls    -  [place] fall precautions as indicated by assessment   - Educate patient/family on patient safety including physical limitations  - Instruct patient to call for assistance with activity based on assessment  - Modify environment to reduce risk of injury  - Consider OT/PT consult to assist with strengthening/mobility  Outcome: Progressing  Goal: Maintain or return to baseline ADL function  Description: INTERVENTIONS:  -  Assess patient's ability to carry out ADLs; assess patient's baseline for ADL function and identify physical deficits which impact ability to perform ADLs (bathing, care of mouth/teeth, toileting, grooming, dressing, etc )  - Assess/evaluate cause of self-care deficits   - Assess range of motion  - Assess patient's mobility; develop plan if impaired  - Assess patient's need for assistive devices and provide as appropriate  - Encourage maximum independence but intervene and supervise when necessary  - Involve family in performance of ADLs  - Assess for home care needs following discharge   - Consider OT consult to assist with ADL evaluation and planning for discharge  - Provide patient education as appropriate  Outcome: Progressing  Goal: Maintain or return mobility status to optimal level  Description: INTERVENTIONS:  - Assess patient's baseline mobility status (ambulation, transfers, stairs, etc )    - Identify cognitive and physical deficits and behaviors that affect mobility  - Identify mobility aids required to assist with transfers and/or ambulation (gait belt, sit-to-stand, lift, walker, cane, etc )  - [place] fall precautions as indicated by assessment  - Record patient progress and toleration of activity level on Mobility SBAR; progress patient to next Phase/Stage  - Instruct patient to call for assistance with activity based on assessment  - Consider rehabilitation consult to assist with strengthening/weightbearing, etc   Outcome: Progressing     Problem: DISCHARGE PLANNING  Goal: Discharge to home or other facility with appropriate resources  Description: INTERVENTIONS:  - Identify barriers to discharge w/patient and caregiver  - Arrange for needed discharge resources and transportation as appropriate  - Identify discharge learning needs (meds, wound care, etc )  - Arrange for interpretive services to assist at discharge as needed  - Refer to Case Management Department for coordinating discharge planning if the patient needs post-hospital services based on physician/advanced practitioner order or complex needs related to functional status, cognitive ability, or social support system  Outcome: Progressing     Problem: Knowledge Deficit  Goal: Patient/family/caregiver demonstrates understanding of disease process, treatment plan, medications, and discharge instructions  Description: Complete learning assessment and assess knowledge base    Interventions:  - Provide teaching at level of understanding  - Provide teaching via preferred learning methods  Outcome: Progressing     Problem: CARDIOVASCULAR - ADULT  Goal: Maintains optimal cardiac output and hemodynamic stability  Description: INTERVENTIONS:  - Monitor I/O, vital signs and rhythm  - Monitor for S/S and trends of decreased cardiac output  - Administer and titrate ordered vasoactive medications to optimize hemodynamic stability  - Assess quality of pulses, skin color and temperature  - Assess for signs of decreased coronary artery perfusion  - Instruct patient to report change in severity of symptoms  Outcome: Progressing  Goal: Absence of cardiac dysrhythmias or at baseline rhythm  Description: INTERVENTIONS:  - Continuous cardiac monitoring, vital signs, obtain 12 lead EKG if ordered  - Administer antiarrhythmic and heart rate control medications as ordered  - Monitor electrolytes and administer replacement therapy as ordered  Outcome: Progressing     Problem: Prexisting or High Potential for Compromised Skin Integrity  Goal: Skin integrity is maintained or improved  Description: INTERVENTIONS:  - Identify patients at risk for skin breakdown  - Assess and monitor skin integrity  - Assess and monitor nutrition and hydration status  - Monitor labs   - Assess for incontinence   - Turn and reposition patient  - Assist with mobility/ambulation  - Relieve pressure over bony prominences  - Avoid friction and shearing  - Provide appropriate hygiene as needed including keeping skin clean and dry  - Evaluate need for skin moisturizer/barrier cream  - Collaborate with interdisciplinary team   - Patient/family teaching  - Consider wound care consult   Outcome: Progressing

## 2021-01-20 ENCOUNTER — TELEPHONE (OUTPATIENT)
Dept: CARDIOLOGY CLINIC | Facility: CLINIC | Age: 51
End: 2021-01-20

## 2021-01-20 ENCOUNTER — TELEPHONE (OUTPATIENT)
Dept: OBGYN CLINIC | Facility: HOSPITAL | Age: 51
End: 2021-01-20

## 2021-01-20 ENCOUNTER — PREP FOR PROCEDURE (OUTPATIENT)
Dept: INTERVENTIONAL RADIOLOGY/VASCULAR | Facility: CLINIC | Age: 51
End: 2021-01-20

## 2021-01-20 ENCOUNTER — TELEPHONE (OUTPATIENT)
Dept: NEPHROLOGY | Facility: CLINIC | Age: 51
End: 2021-01-20

## 2021-01-20 DIAGNOSIS — N17.9 AKI (ACUTE KIDNEY INJURY) (HCC): Primary | ICD-10-CM

## 2021-01-20 LAB — MISCELLANEOUS LAB TEST RESULT: NORMAL

## 2021-01-20 NOTE — TELEPHONE ENCOUNTER
Pt was seen in the hospital and when I tried to schedule her for her hospital follow up she said she wasn't interested and hung up on me

## 2021-01-20 NOTE — TELEPHONE ENCOUNTER
----- Message from Ian Austin PA-C sent at 1/19/2021  4:53 PM EST -----  Regarding: Follow up for CHF  Hello all,    For the attached could we get her an appt with Dr Unique Corado? Post-hospital for CHF & new cardiomyopathy  Pt unfortunately left AMA today, but we have been following for the last 2 weeks or so  Max Simon has seen her in the hospital     Thanks!   Keith

## 2021-01-20 NOTE — TELEPHONE ENCOUNTER
----- Message from Siomara Gilliam [facility] sent at 1/19/2021 11:03 AM EST -----  Please schedule patient for acute kidney injury follow-up repeat BMP prior to appointment  Thank you

## 2021-01-20 NOTE — TELEPHONE ENCOUNTER
I placed a call to patient to schedule a follow up tona with Dr Denice Brantley for R ankle fx  Her mailbox is full and cannot receive message

## 2021-01-20 NOTE — UTILIZATION REVIEW
SENDING  FOR 01/17 AND 01/18 W DC OF 01/19      Notification of Discharge  This is a Notification of Discharge from our facility [address]  Please be advised that this patient has been discharge from our facility  Below you will find the admission and discharge date and time including the patients disposition  PRESENTATION DATE: 1/6/2021  4:34 PM  OBS ADMISSION DATE:   IP ADMISSION DATE: 1/6/21 1953   DISCHARGE DATE: 1/19/2021  3:54 PM  DISPOSITION: Home/Self Care Home/Self Care   Admission Orders listed below:  Admission Orders (From admission, onward)     Ordered        01/06/21 1953  Inpatient Admission  Once                   Please contact the UR Department if additional information is required to close this patient's authorization/case  Hollie Force  Network Utilization Review Department  Main: [phone] x carefully listen to the prompts  All voicemails are confidential   [email or web address]  Send all requests for admission clinical reviews, approved or denied determinations and any other requests to dedicated fax number below belonging to the campus where the patient is receiving treatment   List of dedicated fax numbers:  [address] DENIALS (Administrative/Medical Necessity) [phone]   [address] (Maternity/NICU/Pediatrics) [phone]   Ashely Comas [phone]     [address] [phone]   [facility] [phone]   [facility] [address] [place] [address] [phone]   [facility]  [phone]   [address] [facility]  [address]01 [facility] [address] [facility] [phone]

## 2021-01-20 NOTE — UTILIZATION REVIEW
Continued Stay Review    Date:  1/17  & 1/18/21                  Current Patient Class: IP  Current Level of Care: [facility]    HPI:50 y o  female initially admitted on  1/6 for acute resp failure sec to CHF /miguel angel pleural effusions  Assessment/Plan:     1/17: Currently on RA  Per Pulm: Repeat CXR does reveal left pleural effusion is slowly returning  Revealed trace residual pneumothorax  Repeat chest x-ray in a m  Per Cardio: Weight trends shows down 5 lb since yesterday  Continue with oral diuretic  Chest x-ray shows residual left-sided pleural effusion even after thoracentesis  Patient is getting close to being is euvolemic as possible, but extravasation of fluid into the pleural space is likely in part due to hypoalbuminemia with a level of 1 8 Patient has [address]    Per Nephrology: furosemide 20 mg increased to TID per Cardiology team yesterday  - renal u/s pending  BMP in AM    1/18:  Per Cardio:transition to furosemide 40 mg in a m  and 20 mg p m  Will continue carvedilol at 25 mg twice daily, Isordil 10 mg t i d  And hydralazine 10 mg 3 times daily  - patient will be good candidate for adding Ivabradine for sinus rate control as outpatient  - may place life vest and discontinue telemetry  - workup for glomerular disease pending  Per Pulm: Doing well from pulmonary standpoint  She is comfortable on room air  Recommend monitoring residual left-sided effusion   - Needs o/p follow up CXR in 1-2 weeks  Patient will need an ambulatory trial on room air at rest and with ambulation prior to discharge to see oxygen requirements    Per Nephrology:Acute kidney injury, creatinine has plateaued around 1 7  Noted with nephrotic range proteinuria  Patient agreeable with kidney biopsy       Pertinent Labs/Diagnostic Results:   1/18 CXR: Stable findings for small to moderate-sized left pleural effusion  No pneumothorax  1/17 CXR: Persistent moderate left effusion and left base atelectasis    1/17 US Kidney & Bladder: Borderline echogenicity increased cortical medullary accentuation could be related to medical renal disease    Mild dilatation of both renal collecting systems right greater than left versus prominent renal pyramids    Results from last 7 days   Lab Units 01/18/21  0520 01/17/21  0609 01/15/21  0459 01/14/21  0313   WBC Thousand/uL 7 18 6 69 6 88 5 67   HEMOGLOBIN g/dL 12 6 12 6 11 4* 10 7*   HEMATOCRIT % 38 1 37 2 35 4 32 9*   PLATELETS Thousands/uL 332 302 213 191   NEUTROS ABS Thousands/µL 4 13  --   --   --      Results from last 7 days   Lab Units 01/18/21  0520 01/17/21  0609 01/16/21  0647 01/15/21  0459 01/14/21  0313   SODIUM mmol/L 138 139 136 138 138   POTASSIUM mmol/L 4 3 4 1 4 6 4 3 4 4   CHLORIDE mmol/L 104 104 105 107 107   CO2 mmol/L 29 28 26 27 28   ANION GAP mmol/L 5 7 5 4 3*   BUN mg/dL 54* 43* 38* 38* 40*   CREATININE mg/dL 1 79* 1 73* 1 68* 1 65* 1 69*   EGFR ml/min/1 73sq m 32 33 35 35 34   CALCIUM mg/dL 8 9 9 1 8 7 8 6 8 2*   MAGNESIUM mg/dL  --   --   --  2 1 2 1   PHOSPHORUS mg/dL  --   --   --  3 5 4 0     Results from last 7 days   Lab Units 01/18/21  0520 01/17/21  0609 01/16/21  0647 01/15/21  0459 01/14/21  0313   GLUCOSE RANDOM mg/dL 91 82 73 85 94     Results from last 7 days   Lab Units 01/18/21 0520   PROTIME seconds 12 3   INR  0 93     Vital Signs:   01/18/21 2346  97 5 °F (36 4 °C) 80 18 126/70 90 % None (Room air) Lying   01/18/21 1924  97 6 °F (36 4 °C) 71 18 128/87 92 % -- Lying   01/18/21 1920  -- -- -- -- -- None (Room air) --   01/18/21 1500  98 °F (36 7 °C) 73 18 130/80 92 % -- Lying   01/18/21 0730  97 °F (36 1 °C)  76 16 120/72 92 % -- Lying   01/18/21 0520  -- 72 -- 139/66 96 % None (Room air) Lying   01/17/21 2329  97 4 °F (36 3 °C) 83 17 129/87 93 % None (Room air) Lying   01/17/21 1926  -- -- -- -- -- None (Room air) --   01/17/21 1531  98 °F (36 7 °C) 77 18 119/96 96 % None (Room air) Lying   01/17/21 0700  97 6 °F (36 4 °C) 77 -- 152/76 90 % None (Room air) Lying     Medications:   Scheduled Medications:  atorvastatin, 40 mg, Oral, Daily With Dinner  carvedilol, 25 mg, Oral, BID With Meals  cyanocobalamin, 1,000 mcg, Oral, Daily  folic acid, 1 mg, Oral, Daily  furosemide, 20 mg, Oral, TID  -  decreased to qd 1/18  heparin (porcine), 5,000 Units, Subcutaneous, Q8H CATHY  hydrALAZINE, 10 mg, Oral, TID after meals  isosorbide dinitrate, 10 mg, Oral, TID after meals  nicotine, 1 patch, Transdermal, Daily  PARoxetine, 60 mg, Oral, Daily  QUEtiapine, 100 mg, Oral, HS     Continuous IV Infusions:  PRN Meds:  acetaminophen, 650 mg, Oral, Q4H PRN x 1 1/17,  X 1 1/18  Labetalol HCl, 10 mg, Intravenous, Q6H PRN  ondansetron, 4 mg, Intravenous, Q6H PRN  oxyCODONE, 5 mg, Oral, Q4H PRN   X 1 1/17,   X 1 1/18  promethazine, 12 5 mg, Intravenous, Q6H PRN    Discharge Plan: Home once medically cleared    Network Utilization Review Department  ATTENTION: Please call with any questions or concerns to [phone] and carefully listen to the prompts so that you are directed to the right person  All voicemails are confidential   [place] all requests for admission clinical reviews, approved or denied determinations and any other requests to dedicated fax number below belonging to the campus where the patient is receiving treatment   List of dedicated fax numbers for the Facilities:  [address] DENIALS (Administrative/Medical Necessity) [phone]   [address] (Maternity/NICU/Pediatrics) [phone]   [address] [facility] [address] Lidia Dubois 1135 (  Jessie Faustin "[address]" 103) [address] [facility] 28 [phone]     [address] [phone]   [facility] Campuspriscilla donaldMary Ville [address] [phone]

## 2021-01-21 NOTE — UTILIZATION REVIEW
WE KEEP RECEIVING APPROVAL WITH LCD OF 01/16 - HAVE SENT TWICE  FOR 01/17 AND 01/18 W DC SUMMARY OF 01/19    IF ANY QUESTIONS PLEASE CALL ME @ [phone] ARLEEN PRITCHARD

## 2021-01-22 ENCOUNTER — TELEPHONE (OUTPATIENT)
Dept: NEPHROLOGY | Facility: CLINIC | Age: 51
End: 2021-01-22

## 2021-01-22 NOTE — TELEPHONE ENCOUNTER
Patient was recently by the nephrology team during her inpatient stay  She called the office and asked for the provider to give her a call back because she has some questions  She did not specify what she wanted to discuss  Please contact patient at [phone]

## 2021-01-22 NOTE — TELEPHONE ENCOUNTER
Patient should be contact 1st before scheduling biopsy to make sure patient is still interested as well as arrange follow-up with provider      Biopsy was initially schedule while she was in-house, eventually patient left hospital     Thanks,      I cc Dr Gerardo Romberg who saw her initially

## 2021-01-22 NOTE — TELEPHONE ENCOUNTER
Tried calling patient to schedule a follow up, there was no answer and I could not leave a message  Patient previously said she was not interested in an appointment and hung up on our [address]  Her biopsy is scheduled for 2/3 in [facility]  I am not sure if she is keeping the appointment for the biopsy

## 2021-01-22 NOTE — TELEPHONE ENCOUNTER
I spoke with her we will keep renal biopsy planned for feb 3rd and have her see AP in [place] and provider at this location she lives 1 block from the office

## 2021-01-22 NOTE — TELEPHONE ENCOUNTER
I spoke with Dr Tavon Ford  He updated me further on her hospital course  I agree will keep biopsy and follow up appointment with myself  Given her degree of protienuria and her Cardiomyopathy she is at high risk of progression to ESRD and she should be followed very closely    Thank you

## 2021-01-22 NOTE — TELEPHONE ENCOUNTER
Do not cancel biopsy yet, leave appointment as it is and in the meantime please keep reaching out to patient to make sure she is interested as well as arrange hospital follow-up

## 2021-01-25 NOTE — TELEPHONE ENCOUNTER
I tried calling the patient, she stated she does not want to follow with Nephrology and hung up on me

## 2021-01-28 ENCOUNTER — OFFICE VISIT (OUTPATIENT)
Dept: CARDIOLOGY CLINIC | Facility: CLINIC | Age: 51
End: 2021-01-28
Payer: COMMERCIAL

## 2021-01-28 VITALS
TEMPERATURE: 96.7 F | DIASTOLIC BLOOD PRESSURE: 88 MMHG | HEIGHT: 66 IN | BODY MASS INDEX: 19.73 KG/M2 | SYSTOLIC BLOOD PRESSURE: 172 MMHG | WEIGHT: 122.8 LBS

## 2021-01-28 DIAGNOSIS — I42.8 NONISCHEMIC CARDIOMYOPATHY (HCC): ICD-10-CM

## 2021-01-28 DIAGNOSIS — Z72.0 TOBACCO USE: ICD-10-CM

## 2021-01-28 DIAGNOSIS — I16.0 HYPERTENSIVE URGENCY: ICD-10-CM

## 2021-01-28 DIAGNOSIS — I25.10 CORONARY ARTERY DISEASE INVOLVING NATIVE CORONARY ARTERY OF NATIVE HEART WITHOUT ANGINA PECTORIS: ICD-10-CM

## 2021-01-28 DIAGNOSIS — I50.42 CHRONIC COMBINED SYSTOLIC AND DIASTOLIC HEART FAILURE (HCC): Primary | ICD-10-CM

## 2021-01-28 DIAGNOSIS — E78.5 DYSLIPIDEMIA: ICD-10-CM

## 2021-01-28 PROCEDURE — 99214 OFFICE O/P EST MOD 30 MIN: CPT | Performed by: INTERNAL MEDICINE

## 2021-01-28 RX ORDER — SODIUM CHLORIDE 9 MG/ML
75 INJECTION, SOLUTION INTRAVENOUS CONTINUOUS
Status: CANCELLED | OUTPATIENT
Start: 2021-01-28

## 2021-01-28 RX ORDER — HYDRALAZINE HYDROCHLORIDE 25 MG/1
25 TABLET, FILM COATED ORAL 3 TIMES DAILY
Qty: 270 TABLET | Refills: 1 | Status: SHIPPED | OUTPATIENT
Start: 2021-01-28 | End: 2021-07-26

## 2021-01-28 NOTE — PROGRESS NOTES
[facility]  Can you set this patient up with Dr Anuradha Ackerman for after the biopsy  She is aware and I discussed the case with her  Thanks!

## 2021-01-28 NOTE — PROGRESS NOTES
Cardiology Office Note  MD Flavio Hewitt MD Corrine Portland, DO, Fiona MD Matthew Burch DO, Katey Howell DO, [facility] [place]  ----------------------------------------------------------------  [address] [address]    Yudith Patel 44 y o  female MRN: [id number]  Unit/Bed#:  Encounter: [id number]      History of Present Illness: It was a pleasure to see Yudith Patel in the office today for follow up CV evaluation  She is a past medical history of hypertension, tobacco abuse and systolic heart failure with ejection fraction of 25%  She started to experience generalized abdominal bloating and nausea  She also noted that she was having shortness of breath that worsened over 3 days  After having orthopnea with improved breathing sitting up, she became worried and presented to [address] [place] [address] for evaluation in January 2021  Patient was found to have acute respiratory failure requiring nasal cannula  She had elevation of her troponin peaking at 0 14  Echocardiogram was performed and she was found to have an ejection fraction of 25%  Her blood pressure was also markedly elevated  The patient was diagnosed with acute heart failure and treated with intravenous diuretic  During her hospitalization, she had a thoracentesis of the left long with 630 mL removed  She subsequently underwent cardiac catheterization which showed no evidence of obstructive CAD  Her urine was checked and she was found to have nephrotic range proteinuria  She is planned for renal biopsy which was planned for as an outpatient  The patient was converted to oral diuretic  She was fitted with a LifeVest and then discharged to home  Review of Systems:  Review of Systems   Constitution: Negative for decreased appetite, fever, weight gain and weight loss  HENT: Negative for congestion and sore throat      Eyes: Negative for visual disturbance  Cardiovascular: Negative for chest pain, dyspnea on exertion, leg swelling, near-syncope and palpitations  Respiratory: Negative for cough and shortness of breath  Hematologic/Lymphatic: Negative for bleeding problem  Skin: Negative for rash  Musculoskeletal: Negative for myalgias and neck pain  Gastrointestinal: Negative for abdominal pain and nausea  Neurological: Negative for light-headedness and weakness  Psychiatric/Behavioral: Negative for depression  Past Medical History:   Diagnosis Date    Anxiety     Depression     Osteoarthritis        Past Surgical History:   Procedure Laterality Date    ECTOPIC PREGNANCY SURGERY      IR THORACENTESIS  1/12/2021    PA TOTAL HIP ARTHROPLASTY Left 3/5/2018    Procedure: ARTHROPLASTY HIP TOTAL;  Surgeon: Larissa Kaur DO;  Location: [facility];  Service: Orthopedics    WISDOM TOOTH EXTRACTION      x1       Social History     Socioeconomic History    Marital status: Single     Spouse name: Not on file    Number of children: Not on file    Years of education: Not on file    Highest education level: Not on file   Occupational History    Not on file   Social Needs    Financial resource strain: Not on file    Food insecurity     Worry: Not on file     Inability: Not on file    Transportation needs     Medical: Not on file     Non-medical: Not on file   Tobacco Use    Smoking status: Heavy Tobacco Smoker     Packs/day: 0 50     Years: 20 00     Pack years: 10 00     Types: Cigarettes    Smokeless tobacco: Never Used    Tobacco comment: Working on Reducing for surgery      Substance and Sexual Activity    Alcohol use: Yes     Frequency: Monthly or less     Drinks per session: 1 or 2     Comment: social drinker    Drug use: No    Sexual activity: Not Currently   Lifestyle    Physical activity     Days per week: Not on file     Minutes per session: Not on file    Stress: Not on file   Relationships    Social connections Talks on phone: Not on file     Gets together: Not on file     Attends Jehovah's witness service: Not on file     Active member of club or organization: Not on file     Attends meetings of clubs or organizations: Not on file     Relationship status: Not on file    Intimate partner violence     Fear of current or ex partner: Not on file     Emotionally abused: Not on file     Physically abused: Not on file     Forced sexual activity: Not on file   Other Topics Concern    Not on file   Social History Narrative    Not on file       Family History   Problem Relation Age of Onset    Cancer Family     Cancer Father     Atrial fibrillation Father     Hypertension Father        No Known Allergies      Current Outpatient Medications:     atorvastatin (LIPITOR) 40 mg tablet, Take 1 tablet (40 mg total) by mouth daily, Disp: 30 tablet, Rfl: 0    carvedilol (COREG) 25 mg tablet, Take 1 tablet (25 mg total) by mouth 2 (two) times a day with meals, Disp: 60 tablet, Rfl: 0    folic acid (FOLVITE) 1 mg tablet, Take 1 tablet (1 mg total) by mouth daily, Disp: 30 tablet, Rfl: 0    furosemide (LASIX) 40 mg tablet, Take 1 tablet (40 mg total) by mouth every morning, Disp: 30 tablet, Rfl: 0    hydrALAZINE (APRESOLINE) 25 mg tablet, Take 1 tablet (25 mg total) by mouth 3 (three) times a day, Disp: 270 tablet, Rfl: 1    isosorbide dinitrate (ISORDIL) 10 mg tablet, Take 1 tablet (10 mg total) by mouth 3 (three) times daily after meals, Disp: 90 tablet, Rfl: 0    methylphenidate (RITALIN) 20 MG tablet, TK 1 T PO BID, Disp: , Rfl: 0    PARoxetine (PAXIL) 30 mg tablet, Take 60 mg by mouth daily , Disp: , Rfl: 5    QUEtiapine (SEROquel) 100 mg tablet, Take 100 mg by mouth daily at bedtime, Disp: , Rfl:     cyanocobalamin (VITAMIN B-12) 1000 MCG tablet, Take 1 tablet (1,000 mcg total) by mouth daily (Patient not taking: Reported on 1/28/2021), Disp: 30 tablet, Rfl: 0    furosemide (LASIX) 20 mg tablet, Take 1 tablet (20 mg total) by mouth every evening (Patient taking differently: Take 60 mg by mouth ), Disp: 30 tablet, Rfl: 0    Glucos-Chondroit-Hyaluron-MSM (GLUCOSAMINE CHONDROITIN JOINT PO), Take 1 tablet by mouth as needed, Disp: , Rfl:     Vitals:    21 0906   BP: (!) 172/88   Temp: (!) 96 7 °F (35 9 °C)   Weight: 55 7 kg (122 lb 12 8 oz)   Height: 5' 6" (1 676 m)       PHYSICAL EXAMINATION:  Gen: Awake, Alert, NAD    HEENT: AT/NC, Anicteric, mmm  Neck: Supple, No elevated JVP  Resp: CTA bilaterally no w/r/r  CV: RRR +S1, S2, No m/r/g  Abd: Soft, NT/ND + BS  Ext: warm, no LE edema bilaterally  Neuro: Follows commands, moves all extermities  Psych: Appropriate affect    --------------------------------------------------------------------------------  TREADMILL STRESS  No results found for this or any previous visit    --------------------------------------------------------------------------------  NUCLEAR STRESS TEST: No results found for this or any previous visit  No results found for this or any previous visit     --------------------------------------------------------------------------------  CATH:    Results for orders placed during the hospital encounter of 21   Cardiac catheterization    Narrative [address] [place], [place] [address]gabriela Lion[address]ksReg[facility] Hospitaln, [address]  [phone]    [facility]    Invasive Cardiovascular Lab Complete Report    Patient: Yo Howell  MR number: [id number]  Account number: [de-identified]  Study date: 2021  Gender: Female  : 1970  Height: 66 1 in  Weight: 117 9 lb  BSA: 1 6 mï¾²    Allergies: NO KNOWN ALLERGIES    Diagnostic Cardiologist:  Dixie Gomez MD    SUMMARY    CORONARY CIRCULATION:  Left main: Normal   Circumflex: Normal     HEMODYNAMICS:  Hemodynamic assessment demonstrated mildly elevated LVEDP  INDICATIONS:  --  Congestive heart failure with cardiomyopathy      PROCEDURES PERFORMED    --  Left heart catheterization without ventriculogram   --  Left coronary angiography  --  Right coronary angiography  --  Inpatient  --  Mod Sedation Same Physician Initial 15min  --  Mod Sedation Same Physician Add 15min  --  Coronary Catheterization (w/ LHC)  PROCEDURE: The risks and alternatives of the procedures and conscious sedation were explained to the patient and informed consent was obtained  The patient was brought to the cath lab and placed on the table  The planned puncture sites  were prepped and draped in the usual sterile fashion  --  Right radial artery access  After performing an Julián's test to verify adequate ulnar artery supply to the hand, the radial site was prepped  The puncture site was infiltrated with local anesthetic  The vessel was accessed using the  modified Seldinger technique, a wire was advanced into the vessel, and a sheath was advanced over the wire into the vessel  --  Left heart catheterization without ventriculogram  A catheter was advanced over a guidewire into the ascending aorta  After recording ascending aortic pressure, the catheter was advanced across the aortic valve and left ventricular  pressure was recorded  The catheter was pulled back across the aortic valve and into the ascending aorta and pullback pressures were obtained  --  Left coronary artery angiography  A catheter was advanced over a guidewire into the aorta and positioned in the left coronary artery ostium under fluoroscopic guidance  Angiography was performed  --  Right coronary artery angiography  A catheter was advanced over a guidewire into the aorta and positioned in the right coronary artery ostium under fluoroscopic guidance  Angiography was performed  --  Inpatient  --  Mod Sedation Same Physician Initial 15min  --  Mod Sedation Same Physician Add 15min  --  Coronary Catheterization (w/ LHC)  PROCEDURE COMPLETION: The patient tolerated the procedure well and was discharged from the cath lab   TIMING: Test started at 09:55  Test concluded at 10:04  HEMOSTASIS: The sheath was removed  The site was compressed with a Hemoband  device  Hemostasis was obtained  MEDICATIONS GIVEN: Versed (2mg/2ml), 1 mg, IV, at 09:50  Fentanyl (1OOmcg/2 ml), 50 mcg, IV, at 09:50  Baby Aspirin, 324 mg, PO, at 09:53  1% Lidocaine, 1 ml, subcutaneously, at 09:55  Nitroglycerin  (200mcg/ml), 200 mcg, at 09:57  Verapamil (5mg/2ml), 2 5 mg, IV, at 09:58  Heparin 1000 units/ml, 4,000 units, IV, at 09:58  Versed (2mg/2ml), 1 mg, IV, at 10:00  Fentanyl (1OOmcg/2 ml), 50 mcg, IV, at 10:00  CONTRAST GIVEN: 20 ml  Omnipaque (350mg I /ml)  RADIATION EXPOSURE: Fluoroscopy time: 1 03 min  HEMODYNAMICS: Hemodynamic assessment demonstrated mildly elevated LVEDP  CORONARY VESSELS:   --  The coronary circulation is right dominant  --  Left main: Normal   --  LAD: The vessel was large sized  Angiography showed minor luminal irregularities  --  Circumflex: Normal   --  RCA: The vessel was large sized (dominant)  Angiography showed mild atherosclerosis  IMPRESSIONS:  There is no significant coronary artery disease  RECOMMENDATIONS  The patient should continue with the present medications  DISPOSITION:  The patient left the catheterization laboratory in stable condition      Prepared and signed by    Bakari Dozier MD  Signed 2021 10:06:10    Study diagram    Hemodynamic tables    Pressures:  Baseline  Pressures:  - HR: 76  Pressures:  - Rhythm:  Pressures:  -- Aortic Pressure (S/D/M): 121/79/62  Pressures:  -- Left Ventricle (s/edp): 113/20/--    Outputs:  Baseline  Outputs:  -- CALCULATIONS: [age]  Outputs:  -- CALCULATIONS: Body Surface Area: 1 60  Outputs:  -- CALCULATIONS: Height in cm: 168 00  Outputs:  -- CALCULATIONS: Sex: Female  Outputs:  -- CALCULATIONS: Weight in k 60       --------------------------------------------------------------------------------  ECHO:   Results for orders placed during the hospital encounter of 21   Echo complete with contrast if indicated    Narrative [address]  [address]  [place], [address]  [phone]    Transthoracic Echocardiogram  2D, M-mode, Doppler, and Color Doppler    Study date:  2021    Patient: Rosa Graham  MR number: [id number]  Account number: [de-identified]  : 1970  Age: 44 years  Gender: Female  Status: Inpatient  Location: Bedside  Height: 66 in  Weight: 127 lb  BP: 155/ 96 mmHg    Indications: Heart Failure    Diagnoses: I50 9 - Heart failure, unspecified    Sonographer:  Erick Gutierres RDCS  Referring Physician:  Tod Andrews PA-C  Group:  [facility]  Interpreting Physician:  PRESTON Vernon     SUMMARY    LEFT VENTRICLE:  The ventricle was mildly dilated  Systolic function was markedly reduced by visual assessment  Ejection fraction was estimated to be 25 %  There was severe diffuse hypokinesis  Doppler parameters were consistent with a reversible restrictive pattern, indicative of decreased left ventricular diastolic compliance and/or increased left atrial pressure (grade 3 diastolic dysfunction)  LEFT ATRIUM:  The atrium was mildly dilated  MITRAL VALVE:  There was mild regurgitation  AORTIC VALVE:  There was trace regurgitation  TRICUSPID VALVE:  There was mild regurgitation  PULMONIC VALVE:  There was trace regurgitation  PERICARDIUM:  A small pericardial effusion was identified circumferential to the heart  There was no evidence of hemodynamic compromise  There was a large left pleural effusion  SUMMARY MEASUREMENTS  2D measurements:  Unspecified Anatomy:   %FS was 14 6 %  Ao Diam was 3 1 cm  Ao asc was 3 cm  EDV(Teich) was 137 3 ml   EF(Teich) was 30 8 %  ESV(Teich) was 95 ml  IVSd was 0 8 cm  LA Diam was 3 7 cm  LAAs A2C was 20 cm2  LAESV A-L A2C was 65 3 ml  LAESV MOD A2C was 57 8 ml  LALs A2C was 5 2 cm    LVEDV MOD A4C was 116 9 ml   LVEF MOD A4C was 26 8 %  LVESV MOD A4C was 85 6 ml  LVIDd was 5 3 cm  LVIDs was 4 6 cm  LVLd A4C was 7 5 cm  LVLs A4C was 6 7 cm  LVPWd was 1 cm  RAAs  A4C was 13 2 cm2  RAESV A-L was 36 4 ml   RAESV MOD was 35 ml  RALs was 4 1 cm  RVIDd was 3 6 cm  SV MOD A4C was 31 4 ml   SV(Teich) was 42 3 ml   CW measurements:  Unspecified Anatomy:   AV Env  Ti was 239 8 ms   AV VTI was 11 7 cm  AV Vmax was 0 9 m/s  AV Vmean was 0 5 m/s  AV maxPG was 3 1 mmHg  AV meanPG was 1 3 mmHg  TR Vmax was 3 m/s  TR maxPG was 37 mmHg  MM measurements:  Unspecified Anatomy:   TAPSE was 1 5 cm  PW measurements:  Unspecified Anatomy:   DVI was 0 7   E' Sept was 0 m/s  E/E' Sept was 29 1   LVOT Env  Ti was 247 4 ms  LVOT VTI was 8 4 cm  LVOT Vmax was 0 6 m/s  LVOT Vmean was 0 3 m/s  LVOT maxPG was 1 4 mmHg  LVOT meanPG was 0 6 mmHg  MV A Stanton  was 0 4 m/s  MV Dec Vance was 7 3 m/s2  MV DecT was 144 4 ms   MV E Stanton was 1 1 m/s  MV E/A Ratio was 2 8   MV PHT was 41 9 ms  MVA By PHT was 5 3 cm2  HISTORY: PRIOR HISTORY: Smoker, MAXI, Pleural Effusion, CHF, Elevated Troponin, Anemia    PROCEDURE: The procedure was performed at the bedside  This was a routine study  The transthoracic approach was used  The study included complete 2D imaging, M-mode, complete spectral Doppler, and color Doppler  The heart rate was 90 bpm,  at the start of the study  Images were obtained from the parasternal, apical, subcostal, and suprasternal notch acoustic windows  Image quality was adequate  LEFT VENTRICLE: The ventricle was mildly dilated  Systolic function was markedly reduced by visual assessment  Ejection fraction was estimated to be 25 %  There was severe diffuse hypokinesis  DOPPLER: Doppler parameters were consistent  with a reversible restrictive pattern, indicative of decreased left ventricular diastolic compliance and/or increased left atrial pressure (grade 3 diastolic dysfunction)      RIGHT VENTRICLE: The size was normal  Systolic function was normal  Wall thickness was normal     LEFT ATRIUM: The atrium was mildly dilated  RIGHT ATRIUM: Size was normal     MITRAL VALVE: Valve structure was normal  There was mild thickening of the anterior and posterior leaflets  There was normal leaflet separation  DOPPLER: The transmitral velocity was within the normal range  There was no evidence for  stenosis  There was mild regurgitation  AORTIC VALVE: The valve was trileaflet  Leaflets exhibited normal thickness, normal cuspal separation, and sclerosis  DOPPLER: Transaortic velocity was within the normal range  There was no evidence for stenosis  There was trace  regurgitation  TRICUSPID VALVE: The valve structure was normal  There was normal leaflet separation  DOPPLER: The transtricuspid velocity was within the normal range  There was no evidence for stenosis  There was mild regurgitation  Estimated peak PA  pressure was 40 mmHg  PULMONIC VALVE: Leaflets exhibited normal thickness, no calcification, and normal cuspal separation  DOPPLER: The transpulmonic velocity was within the normal range  There was no evidence for stenosis  There was trace regurgitation  PERICARDIUM: A small pericardial effusion was identified circumferential to the heart  There was no evidence of hemodynamic compromise  There was a large left pleural effusion  AORTA: The root exhibited normal size  SYSTEMIC VEINS: IVC: The inferior vena cava was normal in size and course   Respirophasic changes were normal     SYSTEM MEASUREMENT TABLES    2D  %FS: 14 6 %  Ao Diam: 3 1 cm  Ao asc: 3 cm  EDV(Teich): 137 3 ml  EF(Teich): 30 8 %  ESV(Teich): 95 ml  IVSd: 0 8 cm  LA Diam: 3 7 cm  LAAs A2C: 20 cm2  LAESV A-L A2C: 65 3 ml  LAESV MOD A2C: 57 8 ml  LALs A2C: 5 2 cm  LVEDV MOD A4C: 116 9 ml  LVEF MOD A4C: 26 8 %  LVESV MOD A4C: 85 6 ml  LVIDd: 5 3 cm  LVIDs: 4 6 cm  LVLd A4C: 7 5 cm  LVLs A4C: 6 7 cm  LVPWd: 1 cm  RAAs A4C: 13 2 cm2  RAESV A-L: 36 4 ml  RAESV MOD: 35 ml  RALs: 4 1 cm  RVIDd: 3 6 cm  SV MOD A4C: 31 4 ml  SV(Teich): 42 3 ml    CW  AV Env  Ti: 239 8 ms  AV VTI: 11 7 cm  AV Vmax: 0 9 m/s  AV Vmean: 0 5 m/s  AV maxPG: 3 1 mmHg  AV meanP 3 mmHg  TR Vmax: 3 m/s  TR maxP mmHg    MM  TAPSE: 1 5 cm    PW  DVI: 0 7  E' Sept: 0 m/s  E/E' Sept: 29 1  LVOT Env  Ti: 247 4 ms  LVOT VTI: 8 4 cm  LVOT Vmax: 0 6 m/s  LVOT Vmean: 0 3 m/s  LVOT maxP 4 mmHg  LVOT meanP 6 mmHg  MV A Stanton: 0 4 m/s  MV Dec Hernando: 7 3 m/s2  MV DecT: 144 4 ms  MV E Stanton: 1 1 m/s  MV E/A Ratio: 2 8  MV PHT: 41 9 ms  MVA By PHT: 5 3 cm2    [facility]    Prepared and electronically signed by    PRESTON Greer  Signed 2021 13:28:36       No results found for this or any previous visit   --------------------------------------------------------------------------------  HOLTER  No results found for this or any previous visit  No results found for this or any previous visit   --------------------------------------------------------------------------------  CAROTIDS  No results found for this or any previous visit    --------------------------------------------------------------------------------  ECGs:  No results found for this visit on 21       Lab Results   Component Value Date    WBC 7 18 2021    HGB 12 6 2021    HCT 38 1 2021    MCV 99 (H) 2021     2021      Lab Results   Component Value Date    SODIUM 138 2021    K 4 3 2021     2021    CO2 29 2021    BUN 54 (H) 2021    CREATININE 1 79 (H) 2021    GLUC 91 2021    CALCIUM 8 9 2021      Lab Results   Component Value Date    HGBA1C 5 3 2021      No results found for: CHOL  Lab Results   Component Value Date    HDL 53 2021     Lab Results   Component Value Date    LDLCALC 187 (H) 2021     Lab Results   Component Value Date    TRIG 135 2021     No results found for: [place]   Lab Results   Component Value Date    INR 0 93 01/18/2021    INR 0 95 01/06/2021    INR 0 89 02/06/2018    PROTIME 12 3 01/18/2021    PROTIME 12 5 01/06/2021    PROTIME 12 0 (L) 02/06/2018        1  Chronic combined systolic and diastolic heart failure ([address] )    2  Nonischemic cardiomyopathy ([address] )    3  Coronary artery disease involving native coronary artery of native heart without angina pectoris    4  Dyslipidemia    5  Tobacco use    6  Hypertensive urgency  -     hydrALAZINE (APRESOLINE) 25 mg tablet; Take 1 tablet (25 mg total) by mouth 3 (three) times a day        IMPRESSION:   Chronic systolic and diastolic congestive heart failure   Nonischemic cardiomyopathy - wearing LifeVest   Hypertension   CAD s/p cath w/ minimal luminal irregularities LAD and RCA, January 2021   LVEF 25%, global hypokinesis, grade 3 diastolic dysfunction, mild LA dilatation, mild MR, trace AR, mild TR w/ PASP 40 mmHg, small circumferential pericardial effusion, January 2021   Ongoing tobacco use, probable COPD   Acute kidney injury   Dyslipidemia   Nephrotic range proteinuria with > 6 9 g in 24 hours    PLAN:  It was a pleasure to see Reinaldo Epstein in the office for follow-up CV evaluation  She is here today following her recent hospitalization for acute systolic heart failure  Cardiac catheterization demonstrated no significant obstructive disease  Her echocardiogram showed no severe valvular disease with significant diastolic dysfunction  Unfortunately, due to her elevated creatinine, she was not started on ACE-inhibitor ARB  She has not yet followed up with Nephrology for her renal biopsy which is planned on February 3rd  She has no symptoms concerning for angina and no signs or symptoms of heart failure  She examines to be euvolemic in the office today  She has been wearing her life vest appropriately  Based on her clinical presentation, I have the following recommendations:    1   Recommend increasing her hydralazine to 25 mg t i d  Blood pressure was elevated in the office today and would like to better control her pressure with a systolic goal of less than 140 mm Hg and diastolic goal of less than 90 mm Hg  2  Check for findings consistent with nonischemic cardiomyopathy including HIV and CMP  Ferritin on recent blood work was within normal limits  3  Would obtain cardiac MRI with nonischemic cardiomyopathy  4  Recommend obtaining blood pressure cuff  5  We will schedule for Limited repeat 2D echocardiogram at 3 months from her prior study  Should the function be decreased on repeat study, would recommend implantable cardioverter-defibrillator  6  Nephrology follow-up has been encouraged  7  As always, I have recommended a heart healthy diet low in sodium and exercise regimen  8  We will follow up with her after testing  As always, please do not hesitate to call with any questions  Portions of the record may have been created with voice recognition software  Occasional wrong word or "sound a like" substitutions may have occurred due to the inherent limitations of voice recognition software  Read the chart carefully and recognize, using context, where substitutions have occurred          Signed: Shaila Cardenas DO, [facility] [place]

## 2021-01-31 ENCOUNTER — HOSPITAL ENCOUNTER (EMERGENCY)
Facility: HOSPITAL | Age: 51
Discharge: HOME | End: 2021-01-31
Attending: EMERGENCY MEDICINE | Admitting: EMERGENCY MEDICINE
Payer: COMMERCIAL

## 2021-01-31 ENCOUNTER — APPOINTMENT (EMERGENCY)
Dept: RADIOLOGY | Facility: HOSPITAL | Age: 51
End: 2021-01-31
Payer: COMMERCIAL

## 2021-01-31 VITALS
HEART RATE: 75 BPM | TEMPERATURE: 97.8 F | DIASTOLIC BLOOD PRESSURE: 88 MMHG | RESPIRATION RATE: 20 BRPM | OXYGEN SATURATION: 97 % | SYSTOLIC BLOOD PRESSURE: 139 MMHG

## 2021-01-31 DIAGNOSIS — R74.8 ELEVATED LIPASE: ICD-10-CM

## 2021-01-31 DIAGNOSIS — M54.9 ACUTE BILATERAL BACK PAIN, UNSPECIFIED BACK LOCATION: Primary | ICD-10-CM

## 2021-01-31 DIAGNOSIS — M62.830 MUSCLE SPASM OF BACK: ICD-10-CM

## 2021-01-31 LAB
ALBUMIN SERPL BCP-MCNC: 2.2 G/DL (ref 3.5–5)
ALP SERPL-CCNC: 86 U/L (ref 46–116)
ALT SERPL W P-5'-P-CCNC: 33 U/L (ref 12–78)
ANION GAP SERPL CALCULATED.3IONS-SCNC: 7 MMOL/L (ref 4–13)
AST SERPL W P-5'-P-CCNC: 57 U/L (ref 5–45)
ATRIAL RATE: 68 BPM
BASOPHILS # BLD AUTO: 0.09 THOUSANDS/ΜL (ref 0–0.1)
BASOPHILS NFR BLD AUTO: 1 % (ref 0–1)
BILIRUB SERPL-MCNC: 0.19 MG/DL (ref 0.2–1)
BUN SERPL-MCNC: 51 MG/DL (ref 5–25)
CALCIUM ALBUM COR SERPL-MCNC: 10.7 MG/DL (ref 8.3–10.1)
CALCIUM SERPL-MCNC: 9.3 MG/DL (ref 8.3–10.1)
CHLORIDE SERPL-SCNC: 103 MMOL/L (ref 100–108)
CO2 SERPL-SCNC: 30 MMOL/L (ref 21–32)
CREAT SERPL-MCNC: 1.68 MG/DL (ref 0.6–1.3)
EOSINOPHIL # BLD AUTO: 0.11 THOUSAND/ΜL (ref 0–0.61)
EOSINOPHIL NFR BLD AUTO: 1 % (ref 0–6)
ERYTHROCYTE [DISTWIDTH] IN BLOOD BY AUTOMATED COUNT: 11.9 % (ref 11.6–15.1)
GFR SERPL CREATININE-BSD FRML MDRD: 35 ML/MIN/1.73SQ M
GLUCOSE SERPL-MCNC: 98 MG/DL (ref 65–140)
HCT VFR BLD AUTO: 36.6 % (ref 34.8–46.1)
HGB BLD-MCNC: 11.8 G/DL (ref 11.5–15.4)
IMM GRANULOCYTES # BLD AUTO: 0.06 THOUSAND/UL (ref 0–0.2)
IMM GRANULOCYTES NFR BLD AUTO: 1 % (ref 0–2)
LIPASE SERPL-CCNC: 1003 U/L (ref 73–393)
LYMPHOCYTES # BLD AUTO: 1.92 THOUSANDS/ΜL (ref 0.6–4.47)
LYMPHOCYTES NFR BLD AUTO: 17 % (ref 14–44)
MCH RBC QN AUTO: 32.4 PG (ref 26.8–34.3)
MCHC RBC AUTO-ENTMCNC: 32.2 G/DL (ref 31.4–37.4)
MCV RBC AUTO: 101 FL (ref 82–98)
MONOCYTES # BLD AUTO: 0.7 THOUSAND/ΜL (ref 0.17–1.22)
MONOCYTES NFR BLD AUTO: 6 % (ref 4–12)
NEUTROPHILS # BLD AUTO: 8.3 THOUSANDS/ΜL (ref 1.85–7.62)
NEUTS SEG NFR BLD AUTO: 74 % (ref 43–75)
NRBC BLD AUTO-RTO: 0 /100 WBCS
NT-PROBNP SERPL-MCNC: 6161 PG/ML
P AXIS: 64 DEGREES
PLATELET # BLD AUTO: 443 THOUSANDS/UL (ref 149–390)
PMV BLD AUTO: 9.3 FL (ref 8.9–12.7)
POTASSIUM SERPL-SCNC: 4.1 MMOL/L (ref 3.5–5.3)
PR INTERVAL: 160 MS
PROT SERPL-MCNC: 6.9 G/DL (ref 6.4–8.2)
QRS AXIS: 35 DEGREES
QRSD INTERVAL: 90 MS
QT INTERVAL: 386 MS
QTC INTERVAL: 410 MS
RBC # BLD AUTO: 3.64 MILLION/UL (ref 3.81–5.12)
SODIUM SERPL-SCNC: 140 MMOL/L (ref 136–145)
T WAVE AXIS: 85 DEGREES
TROPONIN I SERPL-MCNC: 0.03 NG/ML
VENTRICULAR RATE: 68 BPM
WBC # BLD AUTO: 11.18 THOUSAND/UL (ref 4.31–10.16)

## 2021-01-31 PROCEDURE — 93005 ELECTROCARDIOGRAM TRACING: CPT

## 2021-01-31 PROCEDURE — 36415 COLL VENOUS BLD VENIPUNCTURE: CPT | Performed by: PHYSICIAN ASSISTANT

## 2021-01-31 PROCEDURE — 80053 COMPREHEN METABOLIC PANEL: CPT | Performed by: PHYSICIAN ASSISTANT

## 2021-01-31 PROCEDURE — 99284 EMERGENCY DEPT VISIT MOD MDM: CPT

## 2021-01-31 PROCEDURE — 85025 COMPLETE CBC W/AUTO DIFF WBC: CPT | Performed by: PHYSICIAN ASSISTANT

## 2021-01-31 PROCEDURE — 84484 ASSAY OF TROPONIN QUANT: CPT | Performed by: PHYSICIAN ASSISTANT

## 2021-01-31 PROCEDURE — 93010 ELECTROCARDIOGRAM REPORT: CPT | Performed by: INTERNAL MEDICINE

## 2021-01-31 PROCEDURE — 83690 ASSAY OF LIPASE: CPT | Performed by: PHYSICIAN ASSISTANT

## 2021-01-31 PROCEDURE — 71045 X-RAY EXAM CHEST 1 VIEW: CPT

## 2021-01-31 PROCEDURE — 96374 THER/PROPH/DIAG INJ IV PUSH: CPT

## 2021-01-31 PROCEDURE — 99285 EMERGENCY DEPT VISIT HI MDM: CPT | Performed by: PHYSICIAN ASSISTANT

## 2021-01-31 PROCEDURE — 83880 ASSAY OF NATRIURETIC PEPTIDE: CPT | Performed by: PHYSICIAN ASSISTANT

## 2021-01-31 RX ORDER — METHOCARBAMOL 500 MG/1
500 TABLET, FILM COATED ORAL 2 TIMES DAILY
Qty: 20 TABLET | Refills: 0 | Status: SHIPPED | OUTPATIENT
Start: 2021-01-31 | End: 2021-06-17

## 2021-01-31 RX ORDER — FUROSEMIDE 40 MG/1
40 TABLET ORAL ONCE
Status: COMPLETED | OUTPATIENT
Start: 2021-01-31 | End: 2021-01-31

## 2021-01-31 RX ORDER — DIAZEPAM 5 MG/1
5 TABLET ORAL ONCE
Status: COMPLETED | OUTPATIENT
Start: 2021-01-31 | End: 2021-01-31

## 2021-01-31 RX ORDER — FENTANYL CITRATE 50 UG/ML
25 INJECTION, SOLUTION INTRAMUSCULAR; INTRAVENOUS ONCE
Status: COMPLETED | OUTPATIENT
Start: 2021-01-31 | End: 2021-01-31

## 2021-01-31 RX ORDER — CARVEDILOL 6.25 MG/1
25 TABLET ORAL 2 TIMES DAILY WITH MEALS
Status: DISCONTINUED | OUTPATIENT
Start: 2021-01-31 | End: 2021-01-31 | Stop reason: HOSPADM

## 2021-01-31 RX ORDER — HYDRALAZINE HYDROCHLORIDE 25 MG/1
25 TABLET, FILM COATED ORAL ONCE
Status: COMPLETED | OUTPATIENT
Start: 2021-01-31 | End: 2021-01-31

## 2021-01-31 RX ORDER — FUROSEMIDE 40 MG/1
40 TABLET ORAL ONCE
Status: DISCONTINUED | OUTPATIENT
Start: 2021-01-31 | End: 2021-01-31

## 2021-01-31 RX ORDER — LIDOCAINE 40 MG/G
CREAM TOPICAL AS NEEDED
Qty: 30 G | Refills: 0 | Status: SHIPPED | OUTPATIENT
Start: 2021-01-31 | End: 2021-06-17

## 2021-01-31 RX ADMIN — FENTANYL CITRATE 25 MCG: 50 INJECTION INTRAMUSCULAR; INTRAVENOUS at 07:55

## 2021-01-31 RX ADMIN — HYDRALAZINE HYDROCHLORIDE 25 MG: 25 TABLET ORAL at 09:19

## 2021-01-31 RX ADMIN — CARVEDILOL 25 MG: 6.25 TABLET, FILM COATED ORAL at 09:47

## 2021-01-31 RX ADMIN — DIAZEPAM 5 MG: 5 TABLET ORAL at 09:19

## 2021-01-31 RX ADMIN — FUROSEMIDE 40 MG: 40 TABLET ORAL at 09:19

## 2021-01-31 NOTE — ED NOTES
Patient reports right sided back pain and spasms, reports she was newly diagnosed with heart failure, reports it was found accidentally after she broke her right foot this month, patient reports she was wearing her life pack and she believes with the weight and the way she is carrying the life pack on her body that is has caused her pain, patient reports chest pain and SOB       Nicole Asher RN  01/31/21 [id number]

## 2021-01-31 NOTE — ED PROVIDER NOTES
History  Chief Complaint   Patient presents with    Back Pain     Pt states she was recently in the hospital and discharged with a life vest that has "tweaked her back " c/o generalized back pain with muscle spasms     Patient is a 53-year-old female past medical history of hypertension, tobacco abuse, CHF with systolic EF 05%, CKD who presents with diffuse back pain and shortness of breath since last night  Patient was discharged from the hospital on 01/19 with a LifeVest   She states the best is very big, bulky and has anemia and is difficult for her to wear  Last night she noted onset of diffuse back pain with "muscle spasms over my entire back "  She states she has this aching pain with intermittent episodes of spasms  She denies any radiation of the pain, numbness, tingling, weakness of her extremities, loss of bowel or bladder function, saddle anesthesia  She took Tylenol last night, with little relief  The pain is worse with movement  No alleviating factors  Patient also notes feeling short of breath since last night, unsure if it is secondary to her pain  She denies any chest pain, palpitations, exertional chest pain or PRASAD, leg pain or swelling, known sick contacts  Patient also notes nausea secondary to her pain, but denies any vomiting, abdominal pain, diarrhea, urinary changes  Patient states she has been taking her medications as prescribed, but took her life vest off last night  Patient denies any fevers, chills, diaphoresis, headaches, vision changes, dizziness, lightheadedness, neck pain or stiffness, congestion, cough  Patient states she has given up smoking and denies any alcohol or drug use  Prior to Admission Medications   Prescriptions Last Dose Informant Patient Reported? Taking?    Glucos-Chondroit-Hyaluron-MSM (GLUCOSAMINE CHONDROITIN JOINT PO)  Self Yes No   Sig: Take 1 tablet by mouth as needed   PARoxetine (PAXIL) 30 mg tablet  Self Yes No   Sig: Take 60 mg by mouth daily    QUEtiapine (SEROquel) 100 mg tablet  Self Yes No   Sig: Take 100 mg by mouth daily at bedtime   atorvastatin (LIPITOR) 40 mg tablet  Self No No   Sig: Take 1 tablet (40 mg total) by mouth daily   carvedilol (COREG) 25 mg tablet  Self No No   Sig: Take 1 tablet (25 mg total) by mouth 2 (two) times a day with meals   cyanocobalamin (VITAMIN B-12) 1000 MCG tablet  Self No No   Sig: Take 1 tablet (1,000 mcg total) by mouth daily   Patient not taking: Reported on 4/83/2012   folic acid (FOLVITE) 1 mg tablet  Self No No   Sig: Take 1 tablet (1 mg total) by mouth daily   furosemide (LASIX) 20 mg tablet  Self No No   Sig: Take 1 tablet (20 mg total) by mouth every evening   Patient taking differently: Take 60 mg by mouth    furosemide (LASIX) 40 mg tablet  Self No No   Sig: Take 1 tablet (40 mg total) by mouth every morning   hydrALAZINE (APRESOLINE) 25 mg tablet   No No   Sig: Take 1 tablet (25 mg total) by mouth 3 (three) times a day   isosorbide dinitrate (ISORDIL) 10 mg tablet  Self No No   Sig: Take 1 tablet (10 mg total) by mouth 3 (three) times daily after meals   methylphenidate (RITALIN) 20 MG tablet  Self Yes No   Sig: TK 1 T PO BID      Facility-Administered Medications: None       Past Medical History:   Diagnosis Date    Anxiety     Depression     Osteoarthritis        Past Surgical History:   Procedure Laterality Date    ECTOPIC PREGNANCY SURGERY      IR THORACENTESIS  1/12/2021    [place] TOTAL HIP ARTHROPLASTY Left 3/5/2018    Procedure: ARTHROPLASTY HIP TOTAL;  Surgeon: Shira Peraza DO;  Location: [facility] OR;  Service: Orthopedics    WISDOM TOOTH EXTRACTION      x1       Family History   Problem Relation Age of Onset    Cancer Family     Cancer Father     Atrial fibrillation Father     Hypertension Father      I have reviewed and agree with the history as documented      E-Cigarette/Vaping    E-Cigarette Use Never User      E-Cigarette/Vaping Substances    Nicotine No     THC No     CBD No     Flavoring No     Other No     Unknown No      Social History     Tobacco Use    Smoking status: Former Smoker     Packs/day: 0 50     Years: 20 00     Pack years: 10 00     Types: Cigarettes    Smokeless tobacco: Never Used    Tobacco comment: Working on Reducing for surgery  Substance Use Topics    Alcohol use: Not Currently     Frequency: Monthly or less     Drinks per session: 1 or 2     Comment: social drinker    Drug use: No       Review of Systems   Constitutional: Negative for chills, diaphoresis and fever  HENT: Negative for congestion, ear pain, rhinorrhea and sore throat  Eyes: Negative for visual disturbance  Respiratory: Positive for shortness of breath  Negative for cough, wheezing and stridor  Cardiovascular: Negative for chest pain, palpitations and leg swelling  Gastrointestinal: Positive for nausea  Negative for abdominal pain, diarrhea and vomiting  Genitourinary: Negative for difficulty urinating, dysuria, frequency, hematuria and urgency  Musculoskeletal: Positive for back pain  Negative for myalgias, neck pain and neck stiffness  Skin: Negative for color change, pallor and rash  Neurological: Negative for dizziness, weakness, light-headedness, numbness and headaches  All other systems reviewed and are negative  Physical Exam  Physical Exam  Vitals signs and nursing note reviewed  Constitutional:       General: She is awake  Appearance: She is well-developed  She is not ill-appearing, toxic-appearing or diaphoretic  Comments: Patient is tearful and appears uncomfortable, non-toxic appearing  HENT:      Head: Normocephalic and atraumatic  Right Ear: Hearing and external ear normal       Left Ear: Hearing and external ear normal       Nose: Nose normal    Eyes:      Extraocular Movements: Extraocular movements intact  Conjunctiva/sclera: Conjunctivae normal       Pupils: Pupils are equal, round, and reactive to light     Neck: Musculoskeletal: Normal range of motion and neck supple  Cardiovascular:      Rate and Rhythm: Normal rate and regular rhythm  Pulses: Normal pulses  Radial pulses are 2+ on the right side and 2+ on the left side  Posterior tibial pulses are 2+ on the right side and 2+ on the left side  Heart sounds: Normal heart sounds, S1 normal and S2 normal    Pulmonary:      Effort: Pulmonary effort is normal  No respiratory distress  Breath sounds: Normal breath sounds  No stridor  No decreased breath sounds, wheezing, rhonchi or rales  Abdominal:      General: Bowel sounds are normal  There is no distension  Palpations: Abdomen is soft  Tenderness: There is no abdominal tenderness  Musculoskeletal: Normal range of motion  Right lower leg: No edema  Left lower leg: No edema  Comments: Neurovascularly intact, 5/5 strength in bilateral upper and lower extremities  Patient tender to palpation across entire thoracic and lumbar back  No specific point tenderness, no midline tenderness, step-off deformity, swelling, erythema, skin changes noted  Patient describes worst pain to palpation across upper back with some muscle spasm noted within trapezius muscles  Skin:     General: Skin is warm and dry  Capillary Refill: Capillary refill takes less than 2 seconds  Neurological:      General: No focal deficit present  Mental Status: She is alert and oriented to person, place, and time  GCS: GCS eye subscore is 4  GCS verbal subscore is 5  GCS motor subscore is 6  Psychiatric:         Behavior: Behavior is cooperative           Vital Signs  ED Triage Vitals [01/31/21 0528]   Temperature Pulse Respirations Blood Pressure SpO2   97 8 °F (36 6 °C) 66 20 161/94 99 %      Temp Source Heart Rate Source Patient Position - Orthostatic VS BP Location FiO2 (%)   Oral Monitor Sitting Right arm --      Pain Score       9           Vitals:    01/31/21 0920 01/31/21 0945 01/31/21 0947 01/31/21 1015   BP: (!) 172/117 155/96 155/96 139/88   Pulse:  74 75    Patient Position - Orthostatic VS:  Lying  Lying         Visual Acuity      ED Medications  Medications   carvedilol (COREG) tablet 25 mg (25 mg Oral Given 1/31/21 0947)   fentanyl citrate (PF) 100 MCG/2ML 25 mcg (25 mcg Intravenous Given 1/31/21 0755)   hydrALAZINE (APRESOLINE) tablet 25 mg (25 mg Oral Given 1/31/21 0919)   diazepam (VALIUM) tablet 5 mg (5 mg Oral Given 1/31/21 0919)   furosemide (LASIX) tablet 40 mg (40 mg Oral Given 1/31/21 0919)       Diagnostic Studies  Results Reviewed     Procedure Component Value Units Date/Time    Lipase [[id number]]  (Abnormal) Collected: 01/31/21 0758    Lab Status: Final result Specimen: Blood from Arm, Left Updated: 01/31/21 0835     Lipase 1,003 u/L     NT-BNP PRO [[id number]]  (Abnormal) Collected: 01/31/21 0758    Lab Status: Final result Specimen: Blood from Arm, Left Updated: 01/31/21 0835     NT-proBNP 6,161 pg/mL     Troponin I [[id number]]  (Normal) Collected: 01/31/21 0758    Lab Status: Final result Specimen: Blood from Arm, Left Updated: 01/31/21 0829     Troponin I 0 03 ng/mL     Comprehensive metabolic panel [[id number]]  (Abnormal) Collected: 01/31/21 0758    Lab Status: Final result Specimen: Blood from Arm, Left Updated: 01/31/21 1699     Sodium 140 mmol/L      Potassium 4 1 mmol/L      Chloride 103 mmol/L      CO2 30 mmol/L      ANION GAP 7 mmol/L      BUN 51 mg/dL      Creatinine 1 68 mg/dL      Glucose 98 mg/dL      Calcium 9 3 mg/dL      Corrected Calcium 10 7 mg/dL      AST 57 U/L      ALT 33 U/L      Alkaline Phosphatase 86 U/L      Total Protein 6 9 g/dL      Albumin 2 2 g/dL      Total Bilirubin 0 19 mg/dL      eGFR 35 ml/min/1 73sq m     Narrative:      [place] guidelines for Chronic Kidney Disease (CKD):     Stage 1 with normal or high GFR (GFR > 90 mL/min/1 73 square meters)    Stage 2 Mild CKD (GFR = 60-89 mL/min/1 73 square meters)    Stage 3A Moderate CKD (GFR = 45-59 mL/min/1 73 square meters)    Stage 3B Moderate CKD (GFR = 30-44 mL/min/1 73 square meters)    Stage 4 Severe CKD (GFR = 15-29 mL/min/1 73 square meters)    Stage 5 End Stage CKD (GFR <15 mL/min/1 73 square meters)  Note: GFR calculation is accurate only with a steady state creatinine    CBC and differential [[id number]]  (Abnormal) Collected: 01/31/21 0758    Lab Status: Final result Specimen: Blood from Arm, Left Updated: 01/31/21 0811     WBC 11 18 Thousand/uL      RBC 3 64 Million/uL      Hemoglobin 11 8 g/dL      Hematocrit 36 6 %       fL      MCH 32 4 pg      MCHC 32 2 g/dL      RDW 11 9 %      MPV 9 3 fL      Platelets 402 Thousands/uL      nRBC 0 /100 WBCs      Neutrophils Relative 74 %      Immat GRANS % 1 %      Lymphocytes Relative 17 %      Monocytes Relative 6 %      Eosinophils Relative 1 %      Basophils Relative 1 %      Neutrophils Absolute 8 30 Thousands/µL      Immature Grans Absolute 0 06 Thousand/uL      Lymphocytes Absolute 1 92 Thousands/µL      Monocytes Absolute 0 70 Thousand/µL      Eosinophils Absolute 0 11 Thousand/µL      Basophils Absolute 0 09 Thousands/µL                  XR chest 1 view portable   ED Interpretation by Jojo Navarrete PA-C (01/31 1013)   No acute pathology noted                 Procedures  ECG 12 Lead Documentation Only    Date/Time: 1/31/2021 8:04 AM  Performed by: Jojo Navarrete PA-C  Authorized by: Jojo Navarrete PA-C     Indications / Diagnosis:  SOB  ECG reviewed by me, the ED Provider: yes    Patient location:  ED  Previous ECG:     Previous ECG:  Compared to current    Comparison ECG info:  [id number]  Rate:     ECG rate assessment: normal    Rhythm:     Rhythm: sinus rhythm    Ectopy:     Ectopy: none    QRS:     QRS axis:  Normal  Conduction:     Conduction: normal    ST segments:     ST segments:  Normal  T waves:     T waves: normal               ED Course  ED Course as of Jan 31 1233   Sun Jan 31, 2021   0825 Blood Pressure(!): 183/104   0834 3 weeks ago 0 09   Troponin I: 0 03   0834 Consistent with baseline   Creatinine(!): 1 68   0836 3 weeks ago 96,000   NT-proBNP(!): 6,161   0856 Lipase(!): 1,003   0909 Patient still notes persistent pain, minimal improvement  She did not take her morning medication  Will give morning BP meds and try valium for muscle spasm                HEART Risk Score      Most Recent Value   Heart Score Risk Calculator   History  0 Filed at: 01/31/2021 1023   ECG  0 Filed at: 01/31/2021 1023   Age  1 Filed at: 01/31/2021 1023   Risk Factors  1 Filed at: 01/31/2021 1023   Troponin  0 Filed at: 01/31/2021 1023   HEART Score  2 Filed at: 01/31/2021 1023                                    MDM  Number of Diagnoses or Management Options  Acute bilateral back pain, unspecified back location:   Elevated lipase:   Muscle spasm of back:   Diagnosis management comments: Reviewed results with patient, answered questions  Patient noted improvement of her pain and states she is feeling much better  She denies any shortness of breath or nausea  She has been tolerating p o  Without issue  Stressed importance of wearing life vest, and patient encouraged to replace it as soon as returning home  Recommended follow-up with cardiology for further evaluation and monitoring of symptoms  Recommended follow-up with PCP for monitoring of symptoms and elevated lipse  Reviewed medication education, treatment at home  The management plan was discussed in detail with the patient at bedside and all questions were answered  Provided both verbal and written instructions  Reviewed red flag symptoms and strict return to ED instructions  Patient notes understanding and agrees to plan        Disposition  Final diagnoses:   Acute bilateral back pain, unspecified back location   Muscle spasm of back   Elevated lipase     Time reflects when diagnosis was documented in both MDM as applicable and the Disposition within this note     Time User Action Codes Description Comment    1/31/2021 10:28 AM Janine Perez Add [M54 9] Acute bilateral back pain, unspecified back location     1/31/2021 10:28 AM Taisha Sor Add [I19 674] Muscle spasm of back     1/31/2021 10:30 AM Taisha Sor Add [R74 8] Elevated lipase       ED Disposition     ED Disposition Condition Date/Time Comment    Discharge Stable Sun Jan 31, 2021 10:28 AM Suzanne Cortes discharge to home/self care              Follow-up Information     Follow up With Specialties Details Why [address] [facility] Emergency Department Emergency Medicine  If symptoms worsen [facility] [id number]  [address] [facility] Emergency Department, [address] [facility] [place], [place], [address]    Follow-up with PCP for monitoring of symptoms               Discharge Medication List as of 1/31/2021 10:36 AM      START taking these medications    Details   lidocaine (LMX) 4 % cream Apply topically as needed for mild pain or moderate pain, Starting Sun 1/31/2021, Normal      methocarbamol (ROBAXIN) 500 mg tablet Take 1 tablet (500 mg total) by mouth 2 (two) times a day, Starting Sun 1/31/2021, Normal         CONTINUE these medications which have NOT CHANGED    Details   atorvastatin (LIPITOR) 40 mg tablet Take 1 tablet (40 mg total) by mouth daily, Starting Tue 1/19/2021, Normal      carvedilol (COREG) 25 mg tablet Take 1 tablet (25 mg total) by mouth 2 (two) times a day with meals, Starting Tue 1/19/2021, Normal      cyanocobalamin (VITAMIN B-12) 1000 MCG tablet Take 1 tablet (1,000 mcg total) by mouth daily, Starting Wed 9/04/6557, Normal      folic acid (FOLVITE) 1 mg tablet Take 1 tablet (1 mg total) by mouth daily, Starting Wed 1/20/2021, Normal      !! furosemide (LASIX) 20 mg tablet Take 1 tablet (20 mg total) by mouth every evening, Starting Tue 1/19/2021, Normal      !! furosemide (LASIX) 40 mg tablet Take 1 tablet (40 mg total) by mouth every morning, Starting Tue 1/19/2021, Normal      Glucos-Chondroit-Hyaluron-MSM (GLUCOSAMINE CHONDROITIN JOINT PO) Take 1 tablet by mouth as needed, Historical Med      hydrALAZINE (APRESOLINE) 25 mg tablet Take 1 tablet (25 mg total) by mouth 3 (three) times a day, Starting Thu 1/28/2021, Until Wed 4/28/2021, Normal      isosorbide dinitrate (ISORDIL) 10 mg tablet Take 1 tablet (10 mg total) by mouth 3 (three) times daily after meals, Starting Tue 1/19/2021, Normal      methylphenidate (RITALIN) 20 MG tablet TK 1 T PO BID, Historical Med      PARoxetine (PAXIL) 30 mg tablet Take 60 mg by mouth daily , Starting Mon 1/8/2018, Historical Med      QUEtiapine (SEROquel) 100 mg tablet Take 100 mg by mouth daily at bedtime, Starting Sat 12/5/2020, Historical Med       !! - Potential duplicate medications found  Please discuss with provider  No discharge procedures on file      PDMP Review       Value Time User    PDMP Reviewed  Yes 1/6/2021  9:19 PM Erlinda Turpin PA-C          ED Provider  Electronically Signed by           Dorcas Maravilla PA-C  01/31/21 1999

## 2021-01-31 NOTE — DISCHARGE INSTRUCTIONS
Take Robaxin as prescribed as needed for muscle spasms    Do not drive or operate heavy machinery while taking medication  Use lidocaine cream as prescribed as needed pain  Continue Tylenol at home as needed for pain  Follow-up with PCP for monitoring of symptoms  Follow-up with Cardiology for monitoring of symptoms  Return to ED if symptoms worsen including increasing pain, numbness, tingling, weakness of the legs, loss of bowel or bladder function, numbness in the groin

## 2021-02-03 ENCOUNTER — HOSPITAL ENCOUNTER (OUTPATIENT)
Dept: CT IMAGING | Facility: HOSPITAL | Age: 51
Discharge: HOME | End: 2021-02-03
Attending: STUDENT IN AN ORGANIZED HEALTH CARE EDUCATION/TRAINING PROGRAM

## 2021-02-03 ENCOUNTER — TELEPHONE (OUTPATIENT)
Dept: CARDIOLOGY CLINIC | Facility: CLINIC | Age: 51
End: 2021-02-03

## 2021-02-03 NOTE — PROGRESS NOTES
Biopsy has been canceled by pt due to weather  I have left a message asking pt to contact the office

## 2021-02-03 NOTE — TELEPHONE ENCOUNTER
Pt called saying she was in the er with back muscle spasms  She can't wear the life vest because it is too heavy so she took it off  She wants you to call her   thanks

## 2021-02-04 ENCOUNTER — APPOINTMENT (OUTPATIENT)
Dept: RADIOLOGY | Facility: MEDICAL CENTER | Age: 51
End: 2021-02-04
Payer: COMMERCIAL

## 2021-02-04 ENCOUNTER — OFFICE VISIT (OUTPATIENT)
Dept: OBGYN CLINIC | Facility: MEDICAL CENTER | Age: 51
End: 2021-02-04

## 2021-02-04 VITALS
HEART RATE: 78 BPM | DIASTOLIC BLOOD PRESSURE: 97 MMHG | WEIGHT: 123 LBS | HEIGHT: 66 IN | SYSTOLIC BLOOD PRESSURE: 144 MMHG | BODY MASS INDEX: 19.77 KG/M2

## 2021-02-04 DIAGNOSIS — S82.831D OTHER CLOSED FRACTURE OF DISTAL END OF RIGHT FIBULA WITH ROUTINE HEALING, SUBSEQUENT ENCOUNTER: Primary | ICD-10-CM

## 2021-02-04 DIAGNOSIS — S80.211A ABRASION OF KNEE, BILATERAL: ICD-10-CM

## 2021-02-04 DIAGNOSIS — S93.492A SPRAIN OF ANTERIOR TALOFIBULAR LIGAMENT OF LEFT ANKLE, INITIAL ENCOUNTER: ICD-10-CM

## 2021-02-04 DIAGNOSIS — S80.212A ABRASION OF KNEE, BILATERAL: ICD-10-CM

## 2021-02-04 DIAGNOSIS — S82.831D OTHER CLOSED FRACTURE OF DISTAL END OF RIGHT FIBULA WITH ROUTINE HEALING, SUBSEQUENT ENCOUNTER: ICD-10-CM

## 2021-02-04 PROCEDURE — 99024 POSTOP FOLLOW-UP VISIT: CPT | Performed by: EMERGENCY MEDICINE

## 2021-02-04 PROCEDURE — 73610 X-RAY EXAM OF ANKLE: CPT

## 2021-02-04 NOTE — PROGRESS NOTES
Assessment/Plan:    Diagnoses and all orders for this visit:    Other closed fracture of distal end of right fibula with routine healing, subsequent encounter  -     XR ankle 3+ vw right; Future  -     Ambulatory referral to Physical Therapy; Future    Sprain of anterior talofibular ligament of left ankle, initial encounter  -     Ambulatory referral to Physical Therapy; Future    Abrasion of knee, bilateral    Patient improving no pain or ttp, XRays reveal callus, will start PT and hold out of work     Return in about 3 weeks (around 2/25/2021)  Chief Complaint:     Chief Complaint   Patient presents with    Right Ankle - Pain       Subjective:   Patient ID: Emely Jhaveri is a 46 y o  female  Date of injury 12/07  Patient returns for Fracture Care she is 2 months out she denies pain has been ambulating out of boot  She was recently admitted to the hospital      Previous note:  Patient returns due to an issue with her cast she was seen over the weekend in the emergency department due to irritation and rubbing from her cast   She noted pain and discomfort on the anterior shin at the edge of the cast as well as her distal foot  She was seen in the ER they did perform a clamshell with release of pressure of the cast which did help she is seen here today for cast removal and re-evaluation  Patient states otherwise her pain from the fracture is minimal     Initial note:  New patient presents for bilateral ankle injuries and abrasions of the knees after a fall at home tripping on the last step outside  She was evaluated in the emergency department x-rays were obtained of the knee and ankles she was found to have a fracture of the distal fibula of the right ankle she was placed in a posterior and stirrup Ortho Glass splint and presents here for evaluation  Patient does complain of left lateral ankle pain and discomfort but more so the right ankle  She denies any medial pain of the right ankle    She was provided a tetanus booster in the ER  Pain is worse with movement of the ankles and weight-bearing of the right ankle  Better with immobilization  The pain is sharp and throbbing  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      Review of Systems    The following portions of the patient's chart were reviewed and updated as appropriate: Allergy:  No Known Allergies      Past Medical History:   Diagnosis Date    Anxiety     Depression     Osteoarthritis        Past Surgical History:   Procedure Laterality Date    ECTOPIC PREGNANCY SURGERY      IR THORACENTESIS  1/12/2021    TX TOTAL HIP ARTHROPLASTY Left 3/5/2018    Procedure: ARTHROPLASTY HIP TOTAL;  Surgeon: Ori Gresham DO;  Location: [facility];  Service: Orthopedics    WISDOM TOOTH EXTRACTION      x1       Social History     Socioeconomic History    Marital status: Single     Spouse name: Not on file    Number of children: Not on file    Years of education: Not on file    Highest education level: Not on file   Occupational History    Not on file   Social Needs    Financial resource strain: Not on file    Food insecurity     Worry: Not on file     Inability: Not on file    Transportation needs     Medical: Not on file     Non-medical: Not on file   Tobacco Use    Smoking status: Former Smoker     Packs/day: 0 50     Years: 20 00     Pack years: 10 00     Types: Cigarettes    Smokeless tobacco: Never Used    Tobacco comment: Working on Reducing for surgery      Substance and Sexual Activity    Alcohol use: Not Currently     Frequency: Monthly or less     Drinks per session: 1 or 2     Comment: social drinker    Drug use: No    Sexual activity: Not Currently   Lifestyle    Physical activity     Days per week: Not on file     Minutes per session: Not on file    Stress: Not on file   Relationships    Social connections     Talks on phone: Not on file     Gets together: Not on file     Attends Holiness service: Not on file     Active member of club or organization: Not on file     Attends meetings of clubs or organizations: Not on file     Relationship status: Not on file    Intimate partner violence     Fear of current or ex partner: Not on file     Emotionally abused: Not on file     Physically abused: Not on file     Forced sexual activity: Not on file   Other Topics Concern    Not on file   Social History Narrative    Not on file       Family History   Problem Relation Age of Onset    Cancer Family     Cancer Father     Atrial fibrillation Father     Hypertension Father        Medications:    Current Outpatient Medications:     atorvastatin (LIPITOR) 40 mg tablet, Take 1 tablet (40 mg total) by mouth daily, Disp: 30 tablet, Rfl: 0    carvedilol (COREG) 25 mg tablet, Take 1 tablet (25 mg total) by mouth 2 (two) times a day with meals, Disp: 60 tablet, Rfl: 0    folic acid (FOLVITE) 1 mg tablet, Take 1 tablet (1 mg total) by mouth daily, Disp: 30 tablet, Rfl: 0    furosemide (LASIX) 20 mg tablet, Take 1 tablet (20 mg total) by mouth every evening (Patient taking differently: Take 60 mg by mouth ), Disp: 30 tablet, Rfl: 0    furosemide (LASIX) 40 mg tablet, Take 1 tablet (40 mg total) by mouth every morning, Disp: 30 tablet, Rfl: 0    Glucos-Chondroit-Hyaluron-MSM (GLUCOSAMINE CHONDROITIN JOINT PO), Take 1 tablet by mouth as needed, Disp: , Rfl:     hydrALAZINE (APRESOLINE) 25 mg tablet, Take 1 tablet (25 mg total) by mouth 3 (three) times a day, Disp: 270 tablet, Rfl: 1    isosorbide dinitrate (ISORDIL) 10 mg tablet, Take 1 tablet (10 mg total) by mouth 3 (three) times daily after meals, Disp: 90 tablet, Rfl: 0    lidocaine (LMX) 4 % cream, Apply topically as needed for mild pain or moderate pain, Disp: 30 g, Rfl: 0    methocarbamol (ROBAXIN) 500 mg tablet, Take 1 tablet (500 mg total) by mouth 2 (two) times a day, Disp: 20 tablet, Rfl: 0    methylphenidate (RITALIN) 20 MG tablet, TK 1 T PO BID, Disp: , Rfl: 0    PARoxetine (PAXIL) 30 mg tablet, Take 60 mg by mouth daily , Disp: , Rfl: 5    QUEtiapine (SEROquel) 100 mg tablet, Take 100 mg by mouth daily at bedtime, Disp: , Rfl:     cyanocobalamin (VITAMIN B-12) 1000 MCG tablet, Take 1 tablet (1,000 mcg total) by mouth daily (Patient not taking: Reported on 2/4/2021), Disp: 30 tablet, Rfl: 0    Patient Active Problem List   Diagnosis    Primary osteoarthritis of one hip, left    Pre-operative clearance    Anxiety    Current smoker    Painless hematuria    Mild anemia    Status post total hip replacement, left    Hypertensive urgency    Acute respiratory failure with hypoxia (HCC)    Hypokalemia    MAXI (acute kidney injury) (HCC)    Bilateral pleural effusion    Acute systolic congestive heart failure (HCC)    Elevated troponin    ADHD    Closed fracture of distal end of right fibula    Macrocytic anemia       Objective:  /97   Pulse 78   Ht 5' 6" (1 676 m)   Wt 55 8 kg (123 lb)   BMI 19 85 kg/m²     Right Ankle Exam     Tenderness   The patient is experiencing no tenderness  Swelling: mild    Range of Motion   The patient has normal right ankle ROM  Muscle Strength   The patient has normal right ankle strength  Other   Erythema: absent  Sensation: normal  Pulse: present           Strength/Myotome Testing     Right Ankle/Foot   Normal strength      Physical Exam      Neurologic Exam    Procedures    I have personally reviewed pertinent films in PACS    Repeat Xrays right ankle

## 2021-02-04 NOTE — LETTER
February 4, 2021     Patient: Hero Higgins   YOB: 1970   Date of Visit: 2/4/2021       To Whom it May Concern:    Isabela Spann is under my professional care  She was seen in my office on 2/4/2021  Work excuse until next evaluation in 3 weeks  If you have any questions or concerns, please don't hesitate to call           Sincerely,          Laurel Romero MD        CC: No Recipients

## 2021-02-04 NOTE — TELEPHONE ENCOUNTER
Spoke with patient and Life Vest representative period life vest will reach out to the patient and discuss what can be done regarding the device  All questions answered  Risks and benefits of using life vest have been discussed and patient understands risk of ventricular arrhythmia while not wearing the device  Thank you for notify me

## 2021-02-12 ENCOUNTER — EVALUATION (OUTPATIENT)
Dept: PHYSICAL THERAPY | Facility: MEDICAL CENTER | Age: 51
End: 2021-02-12
Payer: COMMERCIAL

## 2021-02-12 ENCOUNTER — TELEPHONE (OUTPATIENT)
Dept: CARDIOLOGY CLINIC | Facility: CLINIC | Age: 51
End: 2021-02-12

## 2021-02-12 DIAGNOSIS — S93.492A SPRAIN OF ANTERIOR TALOFIBULAR LIGAMENT OF LEFT ANKLE, INITIAL ENCOUNTER: ICD-10-CM

## 2021-02-12 DIAGNOSIS — Z23 ENCOUNTER FOR IMMUNIZATION: ICD-10-CM

## 2021-02-12 DIAGNOSIS — S82.831D OTHER CLOSED FRACTURE OF DISTAL END OF RIGHT FIBULA WITH ROUTINE HEALING, SUBSEQUENT ENCOUNTER: ICD-10-CM

## 2021-02-12 PROCEDURE — 97161 PT EVAL LOW COMPLEX 20 MIN: CPT | Performed by: PHYSICAL THERAPIST

## 2021-02-12 NOTE — PROGRESS NOTES
PT Evaluation     Today's date: 2021  Patient name: Anayeli Zhang  : 1970  MRN: [id number]  Referring provider: Jalyn Woodward MD  Dx:   Encounter Diagnosis     ICD-10-CM    1  Other closed fracture of distal end of right fibula with routine healing, subsequent encounter  [id number] Ambulatory referral to Physical Therapy   2  Sprain of anterior talofibular ligament of left ankle, initial encounter  [id number] Ambulatory referral to Physical Therapy                  Assessment  Assessment details: Pt is a is pleasant 47 yo female presenting to physical therapy s/p R distal fibular fracture  Pt would benefit from skilled PT to address current impairments and return pt to pre-morbid function  Understanding of Dx/Px/POC: good   Prognosis: good    Goals  Impairment Goals  - Pt I with initial HEP in 1-2 visits  - Improve ROM equal to contralateral side in 4-6 weeks  - Increase strength to 5/5 in all affected areas in 4-6 week    Functional Goals  - Increase FOTO to at least 73 in 6-8 weeks  - Patient will be independent with comprehensive HEP in 6-8 weeks  - Ambulation is improved to prior level of function in 6-8 weeks  - Stair climbing is improved to prior level of function in 6-8 weeks  - Squatting is improved to prior level of function in 6-8 weeks    Plan  Patient would benefit from: skilled physical therapy  Other planned modality interventions: Modalities prn   Planned therapy interventions: manual therapy, neuromuscular re-education, patient education, strengthening, stretching, therapeutic activities, therapeutic exercise, balance and home exercise program  Frequency: 2x week  Duration in weeks: 8  Treatment plan discussed with: patient        Subjective Evaluation    History of Present Illness  Date of onset: 2020  Mechanism of injury: Pt was walking down the step heading to work when she fell down her back steps  Pt does not remember exactly what happened during the fall    She injured both ankles and she hit her knees and head  Pt went to work and worked 6 hours, but that night she had to go to the ER because of ankle pain  Pt had xrays and she had a distal fibular fracture on the R and was diagnosed with a sprain on the L  She was initially splinted, then a cast was applied  Due to swelling the cast was removed and a splint was reapplied  After a week she was transitioned [facility] an [facility]       On  she was having SOB and eventually ended up in the hospital for 2 weeks with respiratory and heart failure  Pt is not really having pain at this point, she is limited with ROM and strength  She is not having any trouble with her L ankle at this point  Pain  Current pain ratin  At best pain ratin  At worst pain rating: 3    Social Support    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)  Exercise history: Walking      Diagnostic Tests  Abnormal x-ray: healing fx R distal fibula  Treatments  No previous or current treatments  Patient Goals  Patient goals for therapy: decreased pain, increased motion, increased strength, improved balance and return to sport/leisure activities          Objective     Observations     Additional Observation Details  Gait: Dysfunctional with decreased stance time R and early toe off R  Functional squat: fair depth, no wt shift to note    Balance: poor B, worse on the R    Neurological Testing     Sensation     Ankle/Foot   Left Ankle/Foot   Intact: light touch    Right Ankle/Foot   Intact: light touch     Active Range of Motion   Left Ankle/Foot   Dorsiflexion (ke): 10 degrees   Plantar flexion: WFL  Inversion: 45 degrees   Eversion: WFL    Right Ankle/Foot   Dorsiflexion (ke): 0 degrees   Plantar flexion: WFL  Inversion: 30 degrees   Eversion: [facility]    Joint Play   Left Ankle/Foot  Joints within functional limits are the talocrural joint and subtalar joint       Right Ankle/Foot  Joints within functional limits are the subtalar joint  Hypomobile in the talocrural joint  Strength/Myotome Testing     Left Ankle/Foot   Dorsiflexion: 5  Plantar flexion: 5  Inversion: 5  Eversion: 5    Right Ankle/Foot   Dorsiflexion: 3  Plantar flexion: 3+  Inversion: 2+  Eversion: 3    General Comments:       Ankle/Foot Comments   + R gastroc and soleus       Flowsheet Rows      Most Recent Value   PT/OT G-Codes   Current Score  59   Projected Score  73             Precautions: History of heart and respiratory failure      Manuals 2/12            Post talar mobs HK                                                   Ther Ex 2/12            Bike NV            Gastroc str 3x30"            Ankle AROM all planes IP            Ankle circles IP            Ankle alphabet IP            Ankle t-band X 4 IP                                                                                                                                                                                     Gait Training 2/12            Brief HK

## 2021-02-15 ENCOUNTER — LAB (OUTPATIENT)
Dept: LAB | Facility: HOSPITAL | Age: 51
End: 2021-02-15
Attending: INTERNAL MEDICINE
Payer: COMMERCIAL

## 2021-02-15 DIAGNOSIS — I42.8 NONISCHEMIC CARDIOMYOPATHY (HCC): ICD-10-CM

## 2021-02-15 LAB
ALBUMIN SERPL BCP-MCNC: 2.3 G/DL (ref 3.5–5)
ALP SERPL-CCNC: 95 U/L (ref 46–116)
ALT SERPL W P-5'-P-CCNC: 41 U/L (ref 12–78)
ANION GAP SERPL CALCULATED.3IONS-SCNC: 6 MMOL/L (ref 4–13)
AST SERPL W P-5'-P-CCNC: 26 U/L (ref 5–45)
BASOPHILS # BLD AUTO: 0.08 THOUSANDS/ΜL (ref 0–0.1)
BASOPHILS NFR BLD AUTO: 1 % (ref 0–1)
BILIRUB SERPL-MCNC: 0.16 MG/DL (ref 0.2–1)
BUN SERPL-MCNC: 55 MG/DL (ref 5–25)
CALCIUM ALBUM COR SERPL-MCNC: 10.8 MG/DL (ref 8.3–10.1)
CALCIUM SERPL-MCNC: 9.4 MG/DL (ref 8.3–10.1)
CHLORIDE SERPL-SCNC: 105 MMOL/L (ref 100–108)
CO2 SERPL-SCNC: 29 MMOL/L (ref 21–32)
CREAT SERPL-MCNC: 1.88 MG/DL (ref 0.6–1.3)
EOSINOPHIL # BLD AUTO: 0.22 THOUSAND/ΜL (ref 0–0.61)
EOSINOPHIL NFR BLD AUTO: 3 % (ref 0–6)
ERYTHROCYTE [DISTWIDTH] IN BLOOD BY AUTOMATED COUNT: 11.9 % (ref 11.6–15.1)
GFR SERPL CREATININE-BSD FRML MDRD: 30 ML/MIN/1.73SQ M
GLUCOSE P FAST SERPL-MCNC: 97 MG/DL (ref 65–99)
HCT VFR BLD AUTO: 36.4 % (ref 34.8–46.1)
HGB BLD-MCNC: 11.7 G/DL (ref 11.5–15.4)
IMM GRANULOCYTES # BLD AUTO: 0.06 THOUSAND/UL (ref 0–0.2)
IMM GRANULOCYTES NFR BLD AUTO: 1 % (ref 0–2)
LYMPHOCYTES # BLD AUTO: 2.32 THOUSANDS/ΜL (ref 0.6–4.47)
LYMPHOCYTES NFR BLD AUTO: 27 % (ref 14–44)
MAGNESIUM SERPL-MCNC: 2.6 MG/DL (ref 1.6–2.6)
MCH RBC QN AUTO: 32.1 PG (ref 26.8–34.3)
MCHC RBC AUTO-ENTMCNC: 32.1 G/DL (ref 31.4–37.4)
MCV RBC AUTO: 100 FL (ref 82–98)
MONOCYTES # BLD AUTO: 0.55 THOUSAND/ΜL (ref 0.17–1.22)
MONOCYTES NFR BLD AUTO: 6 % (ref 4–12)
NEUTROPHILS # BLD AUTO: 5.54 THOUSANDS/ΜL (ref 1.85–7.62)
NEUTS SEG NFR BLD AUTO: 62 % (ref 43–75)
NRBC BLD AUTO-RTO: 0 /100 WBCS
PLATELET # BLD AUTO: 389 THOUSANDS/UL (ref 149–390)
PMV BLD AUTO: 9.2 FL (ref 8.9–12.7)
POTASSIUM SERPL-SCNC: 5 MMOL/L (ref 3.5–5.3)
PROT SERPL-MCNC: 6.9 G/DL (ref 6.4–8.2)
RBC # BLD AUTO: 3.65 MILLION/UL (ref 3.81–5.12)
SODIUM SERPL-SCNC: 140 MMOL/L (ref 136–145)
WBC # BLD AUTO: 8.77 THOUSAND/UL (ref 4.31–10.16)

## 2021-02-15 PROCEDURE — 85025 COMPLETE CBC W/AUTO DIFF WBC: CPT | Performed by: INTERNAL MEDICINE

## 2021-02-15 PROCEDURE — 83735 ASSAY OF MAGNESIUM: CPT | Performed by: INTERNAL MEDICINE

## 2021-02-15 PROCEDURE — 80053 COMPREHEN METABOLIC PANEL: CPT | Performed by: INTERNAL MEDICINE

## 2021-02-15 PROCEDURE — 87389 HIV-1 AG W/HIV-1&-2 AB AG IA: CPT

## 2021-02-15 PROCEDURE — 36415 COLL VENOUS BLD VENIPUNCTURE: CPT | Performed by: INTERNAL MEDICINE

## 2021-02-16 LAB — HIV 1+2 AB+HIV1 P24 AG SERPL QL IA: NORMAL

## 2021-02-17 ENCOUNTER — HOSPITAL ENCOUNTER (OUTPATIENT)
Dept: CT IMAGING | Facility: HOSPITAL | Age: 51
Discharge: HOME | End: 2021-02-17
Attending: STUDENT IN AN ORGANIZED HEALTH CARE EDUCATION/TRAINING PROGRAM
Payer: COMMERCIAL

## 2021-02-17 ENCOUNTER — PREP FOR PROCEDURE (OUTPATIENT)
Dept: INTERVENTIONAL RADIOLOGY/VASCULAR | Facility: CLINIC | Age: 51
End: 2021-02-17

## 2021-02-17 VITALS
DIASTOLIC BLOOD PRESSURE: 96 MMHG | SYSTOLIC BLOOD PRESSURE: 164 MMHG | HEIGHT: 66 IN | RESPIRATION RATE: 20 BRPM | TEMPERATURE: 97.5 F | OXYGEN SATURATION: 96 % | HEART RATE: 64 BPM | WEIGHT: 123 LBS | BODY MASS INDEX: 19.77 KG/M2

## 2021-02-17 DIAGNOSIS — N17.9 AKI (ACUTE KIDNEY INJURY) (HCC): Primary | ICD-10-CM

## 2021-02-17 DIAGNOSIS — N17.9 AKI (ACUTE KIDNEY INJURY) (HCC): ICD-10-CM

## 2021-02-17 PROCEDURE — 50200 RENAL BIOPSY PERQ: CPT | Performed by: RADIOLOGY

## 2021-02-17 PROCEDURE — 99152 MOD SED SAME PHYS/QHP 5/>YRS: CPT | Performed by: RADIOLOGY

## 2021-02-17 PROCEDURE — 99153 MOD SED SAME PHYS/QHP EA: CPT

## 2021-02-17 PROCEDURE — 77012 CT SCAN FOR NEEDLE BIOPSY: CPT | Performed by: RADIOLOGY

## 2021-02-17 PROCEDURE — 99152 MOD SED SAME PHYS/QHP 5/>YRS: CPT

## 2021-02-17 PROCEDURE — 77012 CT SCAN FOR NEEDLE BIOPSY: CPT

## 2021-02-17 PROCEDURE — 50200 RENAL BIOPSY PERQ: CPT

## 2021-02-17 RX ORDER — MIDAZOLAM HYDROCHLORIDE 2 MG/2ML
INJECTION, SOLUTION INTRAMUSCULAR; INTRAVENOUS CODE/TRAUMA/SEDATION MEDICATION
Status: DISCONTINUED | OUTPATIENT
Start: 2021-02-17 | End: 2021-02-18 | Stop reason: HOSPADM

## 2021-02-17 RX ORDER — FENTANYL CITRATE 50 UG/ML
INJECTION, SOLUTION INTRAMUSCULAR; INTRAVENOUS CODE/TRAUMA/SEDATION MEDICATION
Status: DISCONTINUED | OUTPATIENT
Start: 2021-02-17 | End: 2021-02-18 | Stop reason: HOSPADM

## 2021-02-17 RX ORDER — ACETAMINOPHEN 325 MG/1
650 TABLET ORAL EVERY 4 HOURS PRN
Status: DISCONTINUED | OUTPATIENT
Start: 2021-02-17 | End: 2021-02-18 | Stop reason: HOSPADM

## 2021-02-17 RX ORDER — SODIUM CHLORIDE 9 MG/ML
75 INJECTION, SOLUTION INTRAVENOUS CONTINUOUS
Status: DISCONTINUED | OUTPATIENT
Start: 2021-02-17 | End: 2021-02-18 | Stop reason: HOSPADM

## 2021-02-17 RX ORDER — SODIUM CHLORIDE 9 MG/ML
75 INJECTION, SOLUTION INTRAVENOUS CONTINUOUS
Status: CANCELLED | OUTPATIENT
Start: 2021-02-17

## 2021-02-17 RX ADMIN — FENTANYL CITRATE 25 MCG: 50 INJECTION INTRAMUSCULAR; INTRAVENOUS at 09:04

## 2021-02-17 RX ADMIN — MIDAZOLAM 0.5 MG: 1 INJECTION INTRAMUSCULAR; INTRAVENOUS at 09:05

## 2021-02-17 RX ADMIN — SODIUM CHLORIDE 75 ML/HR: 0.9 INJECTION, SOLUTION INTRAVENOUS at 07:30

## 2021-02-17 RX ADMIN — FENTANYL CITRATE 25 MCG: 50 INJECTION INTRAMUSCULAR; INTRAVENOUS at 08:57

## 2021-02-17 RX ADMIN — FENTANYL CITRATE 50 MCG: 50 INJECTION INTRAMUSCULAR; INTRAVENOUS at 08:53

## 2021-02-17 RX ADMIN — MIDAZOLAM 1 MG: 1 INJECTION INTRAMUSCULAR; INTRAVENOUS at 08:53

## 2021-02-17 RX ADMIN — MIDAZOLAM 0.5 MG: 1 INJECTION INTRAMUSCULAR; INTRAVENOUS at 08:57

## 2021-02-17 RX ADMIN — MIDAZOLAM 0.5 MG: 1 INJECTION INTRAMUSCULAR; INTRAVENOUS at 09:04

## 2021-02-17 NOTE — BRIEF OP NOTE (RAD/CATH)
INTERVENTIONAL RADIOLOGY PROCEDURE NOTE    Date: 2/17/2021    Procedure: IR BIOPSY KIDNEY COLUMBIA KIT NO LATERALITY    Preoperative diagnosis:   1  MAXI (acute kidney injury) ([facility] [address] )         Postoperative diagnosis: Same  Surgeon: Don Cash MD     Assistant: None  No qualified resident was available  Blood loss:  None    Specimens:  None     Findings:  Aborted biopsy  Patient unable to remain still or follow commands after sedation, attempting to get off CT table after needle placement  Renal capsule was not traversed  Will need to reschedule with anesthesia assistance  Complications: None immediate      Anesthesia: conscious sedation

## 2021-02-17 NOTE — SEDATION DOCUMENTATION
Problem: Patient Care Overview (Adult)  Goal: Plan of Care Review  Outcome: Ongoing (interventions implemented as appropriate)    02/11/17 0042   Coping/Psychosocial Response Interventions   Plan Of Care Reviewed With patient   Patient Care Overview   Progress improving   Outcome Evaluation   Outcome Summary/Follow up Plan PT VSS. PT STILL SLIGHTLY DISORIENTED BUT IN A MORE COOPERATIVE MOOD. NO COMPLAINTS OF PAIN BUT WILL ADDRESS WITH PAIN MEDS PRN. PLAN TO D/C TO REHAB IN THE AM. PT RESTING WITH CALL LIGHT IN REACH          Problem: Chronic Kidney Disease/End Stage Renal Disease (Adult)  Goal: Signs and Symptoms of Listed Potential Problems Will be Absent or Manageable (Chronic Kidney Disease/End Stage Renal Disease)  Outcome: Ongoing (interventions implemented as appropriate)    02/07/17 1938 02/08/17 1057   Chronic Kidney Disease/End Stage Renal Disease   Problems Assessed (Chronic Kidney Disease/ESRD) --  all   Problems Present (Chronic Kidney Disease/ESRD) functional decline/self-care deficit;infection;vascular access complications --          Problem: Confusion, Chronic (Adult)  Goal: Cognitive/Functional Impairments Minimized  Outcome: Ongoing (interventions implemented as appropriate)    02/11/17 0042   Confusion, Chronic (Adult)   Cognitive/Functional Impairments Minimized making progress toward outcome       Goal: Free from Harm/Injuries  Outcome: Ongoing (interventions implemented as appropriate)    02/11/17 0042   Confusion, Chronic (Adult)   Free from Harm/Injuries making progress toward outcome         Problem: Pneumonia (Adult)  Goal: Signs and Symptoms of Listed Potential Problems Will be Absent or Manageable (Pneumonia)  Outcome: Ongoing (interventions implemented as appropriate)    02/07/17 1938 02/08/17 1057   Pneumonia   Problems Assessed (Pneumonia) --  all   Problems Present (Pneumonia) none --          Problem: Perioperative Period (Adult)  Goal: Signs and Symptoms of Listed Potential Problems  Pt  Unable to lay flat on table for procedure  Attempting to climb off procedure table  Multiple attempts to redirect unsuccessful  Procedure aborted at this time  Will be Absent or Manageable (Perioperative Period)  Outcome: Ongoing (interventions implemented as appropriate)    02/07/17 1938 02/09/17 1021   Perioperative Period   Problems Assessed (Perioperative Period) --  all   Problems Present (Perioperative Period) pain --        Goal: Signs and Symptoms of Listed Potential Problems Will be Absent or Manageable (Perioperative Period)  Outcome: Ongoing (interventions implemented as appropriate)    02/07/17 1938 02/09/17 1021   Perioperative Period   Problems Assessed (Perioperative Period) --  all   Problems Present (Perioperative Period) pain --

## 2021-02-17 NOTE — DISCHARGE INSTRUCTIONS
Percutaneous Kidney Biopsy   WHAT YOU NEED TO KNOW:   A percutaneous kidney biopsy is a procedure to remove a small sample of kidney tissue  It may also be done to check for kidney disease or cancer  DISCHARGE INSTRUCTIONS:   Follow up with your healthcare provider as directed:  Write down your questions so you remember to ask them during your visits  Wound care: The Band-Aid may be removed in 24 hours  For more information:   · National Kidney and Urologic Diseases Information Clearinghouse  [place] [address] [address]  Phone: 1- 763 - 711-4310  Web Address: http://kidney  niddk nih gov/   Care after your procedure:    1  Limit your activities for 36 hours after your biopsy  2  No driving day of biopsy  3  Return to your normal diet  Flat sips of flat soda helps with mild nausea  4  Remove band-aid or dressing 24 hours after procedure  Contact Interventional Radiology at [phone]    Josee PATIENTS: Contact Interventional Radiology at [phone]) Dena Rodgersen PATIENTS: Contact Interventional Radiology at [phone]) if:    1  Difficulty breathing, nausea or vomiting  2  You feel weak or dizzy  3  Chills or fever above 101 degrees F  You have persistent nausea or vomiting    4  You have severe pain in your abdomen or where You feel weak or dizzy  your           procedure was done  5  You have blood in your urine  You urinate small amounts or not at all  4  Develop any redness, swelling, heat, unusual drainage, heavy bruising or bleeding     from biopsy site  Procedural Sedation   WHAT YOU NEED TO KNOW:   Procedural sedation is medicine used during procedures to help you feel relaxed and calm  You will remember little to none of the procedure  After sedation you may feel tired, weak, or unsteady on your feet   You may also have trouble concentrating or short-term memory loss  These symptoms should go away in 24 hours or less  DISCHARGE INSTRUCTIONS:     Call 911 or have someone else call for any of the following:   · You have sudden trouble breathing      · You cannot be woken  Contact Interventional Radiology at [phone]   Josee PATIENTS: Contact Interventional Radiology at [phone]) Amee Bernal PATIENTS: Contact Interventional Radiology at [phone]) if any of the following occur:     · You have a severe headache or dizziness      · Your heart is beating faster than usual     · You have a fever or chills      · Your skin is itchy, swollen, or you have a rash      · You have nausea or are vomiting for more than 8 hours after the procedure       · You have questions or concerns about your condition or care  Self-care:   · Have someone stay with you for 24 hours  This person can drive you to errands and help you do things around the house  This person can also watch for problems       · Rest and do quiet activities for 24 hours  Do not exercise, ride a bike, or play sports  Stand up slowly to prevent dizziness and falls  Take short walks around the house with another person  Slowly return to your usual activities the next day       · Do not drive or use dangerous machines or tools for 24 hours  You may injure yourself or others  Examples include a lawnmower, saw, or drill  Do not return to work for 24 hours if you use dangerous machines or tools for work       · Do not make important decisions for 24 hours  For example, do not sign important papers or invest money       · Drink liquids as directed  Liquids help flush the sedation medicine out of your body  Ask how much liquid to drink each day and which liquids are best for you       · Eat small, frequent meals to prevent nausea and vomiting  Start with clear liquids such as juice or broth   If you do not vomit after clear liquids, you can eat your usual foods       · Do not drink alcohol or take medicines that make you drowsy  This includes medicines that help you sleep and anxiety medicines  Ask your healthcare provider if it is safe for you to take pain medicine  Follow up with your healthcare provider as directed: Write down your questions so you remember to ask them during your visits  Procedural Sedation   WHAT YOU NEED TO KNOW:   Procedural sedation is medicine used during procedures to help you feel relaxed and calm  You will remember little to none of the procedure  After sedation you may feel tired, weak, or unsteady on your feet  You may also have trouble concentrating or short-term memory loss  These symptoms should go away in 24 hours or less  DISCHARGE INSTRUCTIONS:     Call 911 or have someone else call for any of the following:   · You have sudden trouble breathing      · You cannot be woken  Contact Interventional Radiology at [phone]   Josee PATIENTS: Contact Interventional Radiology at [phone]) Ariel Guo PATIENTS: Contact Interventional Radiology at [phone]) if any of the following occur:     · You have a severe headache or dizziness      · Your heart is beating faster than usual     · You have a fever or chills      · Your skin is itchy, swollen, or you have a rash      · You have nausea or are vomiting for more than 8 hours after the procedure       · You have questions or concerns about your condition or care  Self-care:   · Have someone stay with you for 24 hours  This person can drive you to errands and help you do things around the house  This person can also watch for problems       · Rest and do quiet activities for 24 hours  Do not exercise, ride a bike, or play sports  Stand up slowly to prevent dizziness and falls  Take short walks around the house with another person  Slowly return to your usual activities the next day       · Do not drive or use dangerous machines or tools for 24 hours  You may injure yourself or others   Examples include a lawnmower, saw, or drill  Do not return to work for 24 hours if you use dangerous machines or tools for work       · Do not make important decisions for 24 hours  For example, do not sign important papers or invest money       · Drink liquids as directed  Liquids help flush the sedation medicine out of your body  Ask how much liquid to drink each day and which liquids are best for you       · Eat small, frequent meals to prevent nausea and vomiting  Start with clear liquids such as juice or broth  If you do not vomit after clear liquids, you can eat your usual foods       · Do not drink alcohol or take medicines that make you drowsy  This includes medicines that help you sleep and anxiety medicines  Ask your healthcare provider if it is safe for you to take pain medicine  Follow up with your healthcare provider as directed: Write down your questions so you remember to ask them during your visits

## 2021-02-19 DIAGNOSIS — I50.21 ACUTE SYSTOLIC CONGESTIVE HEART FAILURE (HCC): ICD-10-CM

## 2021-02-19 DIAGNOSIS — I16.0 HYPERTENSIVE URGENCY: ICD-10-CM

## 2021-02-19 DIAGNOSIS — D53.9 MACROCYTIC ANEMIA: ICD-10-CM

## 2021-02-19 DIAGNOSIS — I50.9 CHF (CONGESTIVE HEART FAILURE) (HCC): ICD-10-CM

## 2021-02-19 RX ORDER — ISOSORBIDE DINITRATE 10 MG/1
10 TABLET ORAL
Qty: 270 TABLET | Refills: 1 | Status: SHIPPED | OUTPATIENT
Start: 2021-02-19 | End: 2021-08-17 | Stop reason: SDUPTHER

## 2021-02-19 RX ORDER — FUROSEMIDE 20 MG/1
20 TABLET ORAL EVERY EVENING
Qty: 90 TABLET | Refills: 1 | Status: SHIPPED | OUTPATIENT
Start: 2021-02-19 | End: 2021-08-17 | Stop reason: SDUPTHER

## 2021-02-19 RX ORDER — CARVEDILOL 25 MG/1
25 TABLET ORAL 2 TIMES DAILY WITH MEALS
Qty: 180 TABLET | Refills: 1 | Status: SHIPPED | OUTPATIENT
Start: 2021-02-19 | End: 2021-08-17

## 2021-02-19 RX ORDER — FOLIC ACID 1 MG/1
1 TABLET ORAL DAILY
Qty: 90 TABLET | Refills: 1 | Status: SHIPPED | OUTPATIENT
Start: 2021-02-19 | End: 2021-08-17

## 2021-02-19 RX ORDER — ATORVASTATIN CALCIUM 40 MG/1
40 TABLET, FILM COATED ORAL DAILY
Qty: 90 TABLET | Refills: 1 | Status: SHIPPED | OUTPATIENT
Start: 2021-02-19 | End: 2021-08-17

## 2021-02-19 RX ORDER — FUROSEMIDE 40 MG/1
40 TABLET ORAL EVERY MORNING
Qty: 90 TABLET | Refills: 1 | Status: SHIPPED | OUTPATIENT
Start: 2021-02-19 | End: 2021-08-17

## 2021-02-22 ENCOUNTER — OFFICE VISIT (OUTPATIENT)
Dept: PHYSICAL THERAPY | Facility: MEDICAL CENTER | Age: 51
End: 2021-02-22
Payer: COMMERCIAL

## 2021-02-22 DIAGNOSIS — S93.492A SPRAIN OF ANTERIOR TALOFIBULAR LIGAMENT OF LEFT ANKLE, INITIAL ENCOUNTER: ICD-10-CM

## 2021-02-22 DIAGNOSIS — S82.831D OTHER CLOSED FRACTURE OF DISTAL END OF RIGHT FIBULA WITH ROUTINE HEALING, SUBSEQUENT ENCOUNTER: Primary | ICD-10-CM

## 2021-02-22 PROCEDURE — 97112 NEUROMUSCULAR REEDUCATION: CPT

## 2021-02-22 PROCEDURE — 97110 THERAPEUTIC EXERCISES: CPT

## 2021-02-22 PROCEDURE — 97140 MANUAL THERAPY 1/> REGIONS: CPT

## 2021-02-22 NOTE — PROGRESS NOTES
Daily Note     Today's date: 2021  Patient name: Mary Blanco  : 1970  MRN: [id number]  Referring provider: Alonso Cadena MD  Dx:   Encounter Diagnosis     ICD-10-CM    1  Other closed fracture of distal end of right fibula with routine healing, subsequent encounter  S81 918J    2  Sprain of anterior talofibular ligament of left ankle, initial encounter  S91 440A                   Subjective: Pt offers no new comments or complaints regarding her ankle  Pt states that she's been trying to performing her hip stretches at home  Objective: See treatment diary below      Assessment: Tolerated treatment well  Patient demonstrated fatigue post treatment, exhibited good technique with therapeutic exercises and would benefit from continued PT  Pt notes improved mobility post manuals  Plan: Continue per plan of care        Precautions: History of heart and respiratory failure      Manuals            Post talar mobs HK                                                   Ther Ex            Bike NV 10 min           Gastroc str 3x30" 3x30"           Ankle AROM all planes IP 3x10           Ankle circles IP x30           Ankle alphabet IP x2           Ankle t-band X 4 IP Red                                                                                                                                                                                    Gait Training             Brief HK

## 2021-02-24 ENCOUNTER — TELEPHONE (OUTPATIENT)
Dept: PULMONOLOGY | Facility: CLINIC | Age: 51
End: 2021-02-24

## 2021-02-24 ENCOUNTER — OFFICE VISIT (OUTPATIENT)
Dept: PHYSICAL THERAPY | Facility: MEDICAL CENTER | Age: 51
End: 2021-02-24
Payer: COMMERCIAL

## 2021-02-24 DIAGNOSIS — S82.831D OTHER CLOSED FRACTURE OF DISTAL END OF RIGHT FIBULA WITH ROUTINE HEALING, SUBSEQUENT ENCOUNTER: Primary | ICD-10-CM

## 2021-02-24 DIAGNOSIS — S93.492A SPRAIN OF ANTERIOR TALOFIBULAR LIGAMENT OF LEFT ANKLE, INITIAL ENCOUNTER: ICD-10-CM

## 2021-02-24 PROCEDURE — 97110 THERAPEUTIC EXERCISES: CPT

## 2021-02-24 PROCEDURE — 97112 NEUROMUSCULAR REEDUCATION: CPT

## 2021-02-24 PROCEDURE — 97140 MANUAL THERAPY 1/> REGIONS: CPT

## 2021-02-24 NOTE — TELEPHONE ENCOUNTER
Mailbox is full, sent letter to pt  Dr Patti Vaughan has a family emergency and won't be in the office on 3/2  Pt is HFU can follow up with any provider

## 2021-02-24 NOTE — PROGRESS NOTES
Daily Note     Today's date: 2021  Patient name: Kiersten Parekh  : 1970  MRN: [id number]  Referring provider: Hung Zaman MD  Dx:   Encounter Diagnosis     ICD-10-CM    1  Other closed fracture of distal end of right fibula with routine healing, subsequent encounter  S89 703R    2  Sprain of anterior talofibular ligament of left ankle, initial encounter  S92 106A                   Subjective: Pt reports that she was a little sore after LV, but "not too bad"  Objective: See treatment diary below      Assessment: Tolerated treatment well  Patient demonstrated fatigue post treatment, exhibited good technique with therapeutic exercises and would benefit from continued PT  Improved mobility post manuals  Plan: Continue per plan of care        Precautions: History of heart and respiratory failure      Manuals           Post talar mobs HK  PROM  KO                                                 Ther Ex           Bike NV 10 min 10 min          Gastroc str 3x30" 3x30" 3x30"          Ankle AROM all planes IP 3x10 3x10          Ankle circles IP x30 x30          Ankle alphabet IP x2 x3          Ankle t-band X 4 IP Red Red  3x10          HR's   3x10          TR's   3x10          Rockerboard   3x10  All dir                                                                                                                                            Gait Training             Brief HK 54

## 2021-02-25 ENCOUNTER — APPOINTMENT (OUTPATIENT)
Dept: RADIOLOGY | Facility: MEDICAL CENTER | Age: 51
End: 2021-02-25
Payer: COMMERCIAL

## 2021-02-25 ENCOUNTER — OFFICE VISIT (OUTPATIENT)
Dept: OBGYN CLINIC | Facility: MEDICAL CENTER | Age: 51
End: 2021-02-25

## 2021-02-25 VITALS
HEART RATE: 64 BPM | BODY MASS INDEX: 19.77 KG/M2 | SYSTOLIC BLOOD PRESSURE: 181 MMHG | WEIGHT: 123 LBS | DIASTOLIC BLOOD PRESSURE: 111 MMHG | HEIGHT: 66 IN

## 2021-02-25 DIAGNOSIS — M16.11 PRIMARY OSTEOARTHRITIS OF ONE HIP, RIGHT: ICD-10-CM

## 2021-02-25 DIAGNOSIS — S93.492A SPRAIN OF ANTERIOR TALOFIBULAR LIGAMENT OF LEFT ANKLE, INITIAL ENCOUNTER: ICD-10-CM

## 2021-02-25 DIAGNOSIS — M25.551 PAIN IN RIGHT HIP: ICD-10-CM

## 2021-02-25 DIAGNOSIS — S80.212A ABRASION OF KNEE, BILATERAL: ICD-10-CM

## 2021-02-25 DIAGNOSIS — S80.211A ABRASION OF KNEE, BILATERAL: ICD-10-CM

## 2021-02-25 DIAGNOSIS — S82.831D OTHER CLOSED FRACTURE OF DISTAL END OF RIGHT FIBULA WITH ROUTINE HEALING, SUBSEQUENT ENCOUNTER: Primary | ICD-10-CM

## 2021-02-25 PROCEDURE — 73502 X-RAY EXAM HIP UNI 2-3 VIEWS: CPT

## 2021-02-25 PROCEDURE — 99024 POSTOP FOLLOW-UP VISIT: CPT | Performed by: EMERGENCY MEDICINE

## 2021-02-25 NOTE — LETTER
February 25, 2021     Patient: Keisha Clark   YOB: 1970   Date of Visit: 2/25/2021       To Whom it May Concern:    Christopher Olguin is under my professional care  She was seen in my office on 2/25/2021  She may return to full duty 3/15  If you have any questions or concerns, please don't hesitate to call           Sincerely,          Evie Wilson MD        CC: No Recipients

## 2021-02-25 NOTE — PROGRESS NOTES
Assessment/Plan:    Diagnoses and all orders for this visit:    Other closed fracture of distal end of right fibula with routine healing, subsequent encounter    Sprain of anterior talofibular ligament of left ankle, initial encounter    Abrasion of knee, bilateral    Pain in right hip  -     Ambulatory referral to Orthopedic Surgery; Future  -     XR hip/pelv 2-3 vws right if performed; Future    Primary osteoarthritis of one hip, right  -     Ambulatory referral to Orthopedic Surgery; Future  -     XR hip/pelv 2-3 vws right if performed; Future    Other orders  -     Cancel: DXA bone density spine hip and pelvis; Future        Return if symptoms worsen or fail to improve, for FRACTURE CARE  Chief Complaint:     Chief Complaint   Patient presents with    Left Ankle - Follow-up       Subjective:   Patient ID: Keisha Clark is a 46 y o  female  Date of injury 12/07  Patient returns with improvement denies pain she has started PT and is happy with improvement, she is hoping to return to work she denies having to perform any heavy lifting/pushing/pulling  Patient c/o right hip and groin pain hx Left MARCIA with Dr Bandar E[place] hoping to f/u with her  Previous note:  Patient returns for Fracture Care she is 2 months out she denies pain has been ambulating out of boot  She was recently admitted to the hospital      Previous note:  Patient returns due to an issue with her cast she was seen over the weekend in the emergency department due to irritation and rubbing from her cast   She noted pain and discomfort on the anterior shin at the edge of the cast as well as her distal foot  She was seen in the ER they did perform a clamshell with release of pressure of the cast which did help she is seen here today for cast removal and re-evaluation    Patient states otherwise her pain from the fracture is minimal     Initial note:  New patient presents for bilateral ankle injuries and abrasions of the knees after a fall at home tripping on the last step outside  She was evaluated in the emergency department x-rays were obtained of the knee and ankles she was found to have a fracture of the distal fibula of the right ankle she was placed in a posterior and stirrup Ortho Glass splint and presents here for evaluation  Patient does complain of left lateral ankle pain and discomfort but more so the right ankle  She denies any medial pain of the right ankle  She was provided a tetanus booster in the ER  Pain is worse with movement of the ankles and weight-bearing of the right ankle  Better with immobilization  The pain is sharp and throbbing  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      Review of Systems    The following portions of the patient's chart were reviewed and updated as appropriate:    Allergy:  No Known Allergies      Past Medical History:   Diagnosis Date    Anxiety     Depression     Osteoarthritis        Past Surgical History:   Procedure Laterality Date    ECTOPIC PREGNANCY SURGERY      IR BIOPSY KIDNEY COLUMBIA KIT NO LATERALITY  2/17/2021    IR THORACENTESIS  1/12/2021    [place] TOTAL HIP ARTHROPLASTY Left 3/5/2018    Procedure: ARTHROPLASTY HIP TOTAL;  Surgeon: Natacha Locke DO;  [facility]: [facility];  Service: Orthopedics    WISDOM TOOTH EXTRACTION      x1       Social History     Socioeconomic History    Marital status: Single     Spouse name: Not on file    Number of children: Not on file    Years of education: Not on file    Highest education level: Not on file   Occupational History    Not on file   Social Needs    Financial resource strain: Not on file    Food insecurity     Worry: Not on file     Inability: Not on file    Transportation needs     Medical: Not on file     Non-medical: Not on file   Tobacco Use    Smoking status: Former Smoker     Packs/day: 0 50     Years: 20 00     Pack years: 10 00     Types: Cigarettes    Smokeless tobacco: Never Used  Tobacco comment: Working on Reducing for surgery      Substance and Sexual Activity    Alcohol use: Not Currently     Frequency: Monthly or less     Drinks per session: 1 or 2     Comment: social drinker    Drug use: No    Sexual activity: Not Currently   Lifestyle    Physical activity     Days per week: Not on file     Minutes per session: Not on file    Stress: Not on file   Relationships    Social connections     Talks on phone: Not on file     Gets together: Not on file     Attends Latter-day service: Not on file     Active member of club or organization: Not on file     Attends meetings of clubs or organizations: Not on file     Relationship status: Not on file    Intimate partner violence     Fear of current or ex partner: Not on file     Emotionally abused: Not on file     Physically abused: Not on file     Forced sexual activity: Not on file   Other Topics Concern    Not on file   Social History Narrative    Not on file       Family History   Problem Relation Age of Onset    Cancer Family     Cancer Father     Atrial fibrillation Father     Hypertension Father        Medications:    Current Outpatient Medications:     atorvastatin (LIPITOR) 40 mg tablet, Take 1 tablet (40 mg total) by mouth daily, Disp: 90 tablet, Rfl: 1    carvedilol (COREG) 25 mg tablet, Take 1 tablet (25 mg total) by mouth 2 (two) times a day with meals, Disp: 180 tablet, Rfl: 1    folic acid (FOLVITE) 1 mg tablet, Take 1 tablet (1 mg total) by mouth daily, Disp: 90 tablet, Rfl: 1    furosemide (LASIX) 20 mg tablet, Take 1 tablet (20 mg total) by mouth every evening, Disp: 90 tablet, Rfl: 1    furosemide (LASIX) 40 mg tablet, Take 1 tablet (40 mg total) by mouth every morning, Disp: 90 tablet, Rfl: 1    Glucos-Chondroit-Hyaluron-MSM (GLUCOSAMINE CHONDROITIN JOINT PO), Take 1 tablet by mouth as needed, Disp: , Rfl:     hydrALAZINE (APRESOLINE) 25 mg tablet, Take 1 tablet (25 mg total) by mouth 3 (three) times a day, Disp: 270 tablet, Rfl: 1    isosorbide dinitrate (ISORDIL) 10 mg tablet, Take 1 tablet (10 mg total) by mouth 3 (three) times daily after meals, Disp: 270 tablet, Rfl: 1    lidocaine (LMX) 4 % cream, Apply topically as needed for mild pain or moderate pain, Disp: 30 g, Rfl: 0    methylphenidate (RITALIN) 20 MG tablet, TK 1 T PO BID, Disp: , Rfl: 0    PARoxetine (PAXIL) 30 mg tablet, Take 60 mg by mouth daily , Disp: , Rfl: 5    QUEtiapine (SEROquel) 100 mg tablet, Take 100 mg by mouth daily at bedtime, Disp: , Rfl:     cyanocobalamin (VITAMIN B-12) 1000 MCG tablet, Take 1 tablet (1,000 mcg total) by mouth daily (Patient not taking: Reported on 2/4/2021), Disp: 30 tablet, Rfl: 0    methocarbamol (ROBAXIN) 500 mg tablet, Take 1 tablet (500 mg total) by mouth 2 (two) times a day (Patient not taking: Reported on 2/25/2021), Disp: 20 tablet, Rfl: 0    Patient Active Problem List   Diagnosis    Primary osteoarthritis of one hip, left    Pre-operative clearance    Anxiety    Current smoker    Painless hematuria    Mild anemia    Status post total hip replacement, left    Hypertensive urgency    Acute respiratory failure with hypoxia (HCC)    Hypokalemia    MAXI (acute kidney injury) (HCC)    Bilateral pleural effusion    Acute systolic congestive heart failure (HCC)    Elevated troponin    ADHD    Closed fracture of distal end of right fibula    Macrocytic anemia       Objective:  BP (!) 181/111   Pulse 64   Ht 5' 6" (1 676 m)   Wt 55 8 kg (123 lb)   BMI 19 85 kg/m²     Right Ankle Exam     Tenderness   The patient is experiencing no tenderness  Swelling: mild    Range of Motion   The patient has normal right ankle ROM  Muscle Strength   The patient has normal right ankle strength      Tests   Varus tilt: negative    Other   Erythema: absent  Sensation: normal  Pulse: present           Strength/Myotome Testing     Right Ankle/Foot   Normal strength      Physical Exam      Neurologic Exam    Procedures    I have personally reviewed the written report of the pertinent studies

## 2021-03-01 ENCOUNTER — HOSPITAL ENCOUNTER (OUTPATIENT)
Dept: RADIOLOGY | Facility: HOSPITAL | Age: 51
Discharge: HOME | End: 2021-03-01
Attending: INTERNAL MEDICINE
Payer: COMMERCIAL

## 2021-03-01 DIAGNOSIS — I42.8 NONISCHEMIC CARDIOMYOPATHY (HCC): ICD-10-CM

## 2021-03-01 PROCEDURE — 75561 CARDIAC MRI FOR MORPH W/DYE: CPT

## 2021-03-01 PROCEDURE — A9585 GADOBUTROL INJECTION: HCPCS | Performed by: INTERNAL MEDICINE

## 2021-03-01 PROCEDURE — G1004 CDSM NDSC: HCPCS

## 2021-03-01 RX ADMIN — GADOBUTROL 12 ML: 604.72 INJECTION INTRAVENOUS at 10:58

## 2021-03-02 ENCOUNTER — ANESTHESIA EVENT (OUTPATIENT)
Dept: RADIOLOGY | Facility: HOSPITAL | Age: 51
End: 2021-03-02
Payer: COMMERCIAL

## 2021-03-02 ENCOUNTER — ANESTHESIA (OUTPATIENT)
Dept: RADIOLOGY | Facility: HOSPITAL | Age: 51
End: 2021-03-02
Payer: COMMERCIAL

## 2021-03-02 ENCOUNTER — HOSPITAL ENCOUNTER (OUTPATIENT)
Dept: RADIOLOGY | Facility: HOSPITAL | Age: 51
Discharge: HOME | End: 2021-03-02
Attending: RADIOLOGY | Admitting: RADIOLOGY
Payer: COMMERCIAL

## 2021-03-02 VITALS
WEIGHT: 123 LBS | SYSTOLIC BLOOD PRESSURE: 174 MMHG | OXYGEN SATURATION: 96 % | HEIGHT: 66 IN | TEMPERATURE: 98 F | DIASTOLIC BLOOD PRESSURE: 93 MMHG | BODY MASS INDEX: 19.77 KG/M2 | RESPIRATION RATE: 17 BRPM | HEART RATE: 68 BPM

## 2021-03-02 DIAGNOSIS — N17.9 AKI (ACUTE KIDNEY INJURY) (HCC): ICD-10-CM

## 2021-03-02 PROBLEM — F32.A DEPRESSION: Status: ACTIVE | Noted: 2021-03-02

## 2021-03-02 PROBLEM — E78.5 HYPERLIPIDEMIA: Status: ACTIVE | Noted: 2021-03-02

## 2021-03-02 PROBLEM — I10 HYPERTENSION: Status: ACTIVE | Noted: 2021-03-02

## 2021-03-02 PROBLEM — I50.9 CHF (CONGESTIVE HEART FAILURE) (HCC): Status: ACTIVE | Noted: 2021-03-02

## 2021-03-02 PROCEDURE — 88350 IMFLUOR EA ADDL 1ANTB STN PX: CPT | Performed by: INTERNAL MEDICINE

## 2021-03-02 PROCEDURE — 50200 RENAL BIOPSY PERQ: CPT

## 2021-03-02 PROCEDURE — 88300 SURGICAL PATH GROSS: CPT | Performed by: PATHOLOGY

## 2021-03-02 PROCEDURE — 77012 CT SCAN FOR NEEDLE BIOPSY: CPT | Performed by: RADIOLOGY

## 2021-03-02 PROCEDURE — 99024 POSTOP FOLLOW-UP VISIT: CPT | Performed by: RADIOLOGY

## 2021-03-02 PROCEDURE — 88346 IMFLUOR 1ST 1ANTB STAIN PX: CPT | Performed by: INTERNAL MEDICINE

## 2021-03-02 PROCEDURE — 88348 ELECTRON MICROSCOPY DX: CPT | Performed by: INTERNAL MEDICINE

## 2021-03-02 PROCEDURE — 88313 SPECIAL STAINS GROUP 2: CPT | Performed by: INTERNAL MEDICINE

## 2021-03-02 PROCEDURE — 88305 TISSUE EXAM BY PATHOLOGIST: CPT | Performed by: INTERNAL MEDICINE

## 2021-03-02 PROCEDURE — 50200 RENAL BIOPSY PERQ: CPT | Performed by: RADIOLOGY

## 2021-03-02 RX ORDER — ONDANSETRON 2 MG/ML
4 INJECTION INTRAMUSCULAR; INTRAVENOUS ONCE AS NEEDED
Status: DISCONTINUED | OUTPATIENT
Start: 2021-03-02 | End: 2021-03-02 | Stop reason: HOSPADM

## 2021-03-02 RX ORDER — SODIUM CHLORIDE, SODIUM LACTATE, POTASSIUM CHLORIDE, CALCIUM CHLORIDE 600; 310; 30; 20 MG/100ML; MG/100ML; MG/100ML; MG/100ML
20 INJECTION, SOLUTION INTRAVENOUS CONTINUOUS
Status: CANCELLED | OUTPATIENT
Start: 2021-03-02

## 2021-03-02 RX ORDER — LIDOCAINE WITH 8.4% SOD BICARB 0.9%(10ML)
SYRINGE (ML) INJECTION CODE/TRAUMA/SEDATION MEDICATION
Status: COMPLETED | OUTPATIENT
Start: 2021-03-02 | End: 2021-03-02

## 2021-03-02 RX ORDER — DEXAMETHASONE SODIUM PHOSPHATE 10 MG/ML
INJECTION, SOLUTION INTRAMUSCULAR; INTRAVENOUS AS NEEDED
Status: DISCONTINUED | OUTPATIENT
Start: 2021-03-02 | End: 2021-03-02

## 2021-03-02 RX ORDER — FENTANYL CITRATE 50 UG/ML
INJECTION, SOLUTION INTRAMUSCULAR; INTRAVENOUS AS NEEDED
Status: DISCONTINUED | OUTPATIENT
Start: 2021-03-02 | End: 2021-03-02

## 2021-03-02 RX ORDER — FENTANYL CITRATE/PF 50 MCG/ML
25 SYRINGE (ML) INJECTION
Status: DISCONTINUED | OUTPATIENT
Start: 2021-03-02 | End: 2021-03-02 | Stop reason: HOSPADM

## 2021-03-02 RX ORDER — ONDANSETRON 2 MG/ML
INJECTION INTRAMUSCULAR; INTRAVENOUS AS NEEDED
Status: DISCONTINUED | OUTPATIENT
Start: 2021-03-02 | End: 2021-03-02

## 2021-03-02 RX ORDER — LIDOCAINE HYDROCHLORIDE 10 MG/ML
INJECTION, SOLUTION EPIDURAL; INFILTRATION; INTRACAUDAL; PERINEURAL AS NEEDED
Status: DISCONTINUED | OUTPATIENT
Start: 2021-03-02 | End: 2021-03-02

## 2021-03-02 RX ORDER — SODIUM CHLORIDE 9 MG/ML
75 INJECTION, SOLUTION INTRAVENOUS CONTINUOUS
Status: DISCONTINUED | OUTPATIENT
Start: 2021-03-02 | End: 2021-03-02 | Stop reason: HOSPADM

## 2021-03-02 RX ORDER — MIDAZOLAM HYDROCHLORIDE 2 MG/2ML
INJECTION, SOLUTION INTRAMUSCULAR; INTRAVENOUS AS NEEDED
Status: DISCONTINUED | OUTPATIENT
Start: 2021-03-02 | End: 2021-03-02

## 2021-03-02 RX ORDER — ROCURONIUM BROMIDE 10 MG/ML
INJECTION, SOLUTION INTRAVENOUS AS NEEDED
Status: DISCONTINUED | OUTPATIENT
Start: 2021-03-02 | End: 2021-03-02

## 2021-03-02 RX ORDER — SODIUM CHLORIDE, SODIUM LACTATE, POTASSIUM CHLORIDE, CALCIUM CHLORIDE 600; 310; 30; 20 MG/100ML; MG/100ML; MG/100ML; MG/100ML
75 INJECTION, SOLUTION INTRAVENOUS CONTINUOUS
Status: CANCELLED | OUTPATIENT
Start: 2021-03-02

## 2021-03-02 RX ORDER — PROPOFOL 10 MG/ML
INJECTION, EMULSION INTRAVENOUS AS NEEDED
Status: DISCONTINUED | OUTPATIENT
Start: 2021-03-02 | End: 2021-03-02

## 2021-03-02 RX ADMIN — SODIUM CHLORIDE 75 ML/HR: 0.9 INJECTION, SOLUTION INTRAVENOUS at 07:59

## 2021-03-02 RX ADMIN — LIDOCAINE HYDROCHLORIDE 50 MG: 10 INJECTION, SOLUTION EPIDURAL; INFILTRATION; INTRACAUDAL; PERINEURAL at 09:26

## 2021-03-02 RX ADMIN — ONDANSETRON 4 MG: 2 INJECTION INTRAMUSCULAR; INTRAVENOUS at 09:46

## 2021-03-02 RX ADMIN — PROPOFOL 40 MG: 10 INJECTION, EMULSION INTRAVENOUS at 09:30

## 2021-03-02 RX ADMIN — ROCURONIUM BROMIDE 20 MG: 50 INJECTION, SOLUTION INTRAVENOUS at 09:26

## 2021-03-02 RX ADMIN — FENTANYL CITRATE 75 MCG: 50 INJECTION INTRAMUSCULAR; INTRAVENOUS at 09:42

## 2021-03-02 RX ADMIN — PROPOFOL 120 MG: 10 INJECTION, EMULSION INTRAVENOUS at 09:26

## 2021-03-02 RX ADMIN — SUGAMMADEX 200 MG: 100 INJECTION, SOLUTION INTRAVENOUS at 10:02

## 2021-03-02 RX ADMIN — DEXAMETHASONE SODIUM PHOSPHATE 10 MG: 10 INJECTION, SOLUTION INTRAMUSCULAR; INTRAVENOUS at 09:46

## 2021-03-02 RX ADMIN — FENTANYL CITRATE 25 MCG: 50 INJECTION INTRAMUSCULAR; INTRAVENOUS at 09:26

## 2021-03-02 RX ADMIN — PROPOFOL 40 MG: 10 INJECTION, EMULSION INTRAVENOUS at 09:57

## 2021-03-02 RX ADMIN — Medication 10 ML: at 09:46

## 2021-03-02 RX ADMIN — MIDAZOLAM 2 MG: 1 INJECTION INTRAMUSCULAR; INTRAVENOUS at 09:20

## 2021-03-02 NOTE — BRIEF OP NOTE (RAD/CATH)
IR BIOPSY KIDNEY COLUMBIA KIT NO LATERALITY  Procedure Note    PATIENT NAME: Romie Suarez  : 1970  MRN: [id number]     Pre-op Diagnosis:   1  MAXI (acute kidney injury) ([facility] [address] )      Post-op Diagnosis:   1  MAXI (acute kidney injury) [facility])        Surgeon:   Belen Penn DO  Assistants:     No qualified resident was available  Estimated Blood Loss: none  Findings: right kidney targeted   D-stat used for hemostasis    Specimens: mult 15T cores    Complications:  none    Anesthesia: general    Belen Penn DO     Date: 3/2/2021  Time: 10:04 AM

## 2021-03-02 NOTE — SEDATION DOCUMENTATION
Right kidney biopsy done by Dr Noa Canela with anesthesia assistance  Patient taken to PACU post procedure and report given to RN at bedside

## 2021-03-02 NOTE — ANESTHESIA POSTPROCEDURE EVALUATION
Post-Op Assessment Note    CV Status:  Stable  Pain Score: 0    Pain management: adequate     Mental Status:  Alert and awake   Hydration Status:  Euvolemic   PONV Controlled:  Controlled   Airway Patency:  Patent      Post Op Vitals Reviewed: Yes      Staff: CRNA         No complications documented      BP   184/105   Temp 97 8 °F (36 6 °C) (03/02/21 1021)    Pulse 72 (03/02/21 1021)   Resp   22   SpO2 99 % (03/02/21 1021)

## 2021-03-02 NOTE — ANESTHESIA PREPROCEDURE EVALUATION
Procedure:  IR BIOPSY KIDNEY COLUMBIA KIT NO LATERALITY    Relevant Problems   CARDIO   (+) CHF (congestive heart failure) (HCC) (EF 25%)   (+) Hyperlipidemia   (+) Hypertension      MUSCULOSKELETAL   (+) Primary osteoarthritis of one hip, left      NEURO/PSYCH   (+) Anxiety   (+) Depression      PULMONARY   (+) Bilateral pleural effusion   (+) Current smoker      Other   (+) ADHD        Physical Exam    Airway    Mallampati score: III  TM Distance: >3 FB  Neck ROM: full     Dental       Cardiovascular      Pulmonary      Other Findings        Anesthesia Plan  ASA Score- 3     Anesthesia Type- general with ASA Monitors  Additional Monitors:   Airway Plan: ETT  Plan Factors-    Chart reviewed  EKG reviewed  Imaging results reviewed  Existing labs reviewed  Patient summary reviewed  Induction- intravenous  Postoperative Plan-     Informed Consent- Anesthetic plan and risks discussed with patient  I personally reviewed this patient with the CRNA  Discussed and agreed on the Anesthesia Plan with the CRNA  William Ballard

## 2021-03-02 NOTE — H&P
Interventional Radiology  History and Physical 3/2/2021     Amanda Rogers   1970   [id number]    Assessment/Plan:  46 y F with proteinuria  Here for renal biopsy  Problem List Items Addressed This Visit        Genitourinary    MAXI (acute kidney injury) ([facility] [address] )    Relevant Orders    IR biopsy kidney [place] kit no laterality             Subjective:     Patient ID: Amanda Rogers is a 46 y o  female  History of Present Illness  46 y F with h/o cardiorenal syndrome, MAXI and  proteinuria  Here for renal biopsy  Review of Systems   Constitutional: Negative  HENT: Negative  Respiratory: Negative  Cardiovascular: Negative  Gastrointestinal: Negative  Endocrine: Negative  Genitourinary: Negative  Musculoskeletal: Negative  Skin: Negative  Neurological: Negative  Hematological: Negative  Psychiatric/Behavioral: Negative  Past Medical History:   Diagnosis Date    Anxiety     Depression     Osteoarthritis         Past Surgical History:   Procedure Laterality Date    ECTOPIC PREGNANCY SURGERY      IR BIOPSY KIDNEY COLUMBIA KIT NO LATERALITY  2/17/2021    IR THORACENTESIS  1/12/2021    [place] TOTAL HIP ARTHROPLASTY Left 3/5/2018    Procedure: ARTHROPLASTY HIP TOTAL;  Surgeon: Judith Klein DO;  Location: [facility];  Service: Orthopedics    WISDOM TOOTH EXTRACTION      x1        Social History     Tobacco Use   Smoking Status Former Smoker    Packs/day: 0 50    Years: 20 00    Pack years: 10 00    Types: Cigarettes   Smokeless Tobacco Never Used   Tobacco Comment    Working on Reducing for surgery           Social History     Substance and Sexual Activity   Alcohol Use Not Currently    Frequency: Monthly or less    Drinks per session: 1 or 2    Comment: social drinker        Social History     Substance and Sexual Activity   Drug Use No        No Known Allergies    Current Facility-Administered Medications   Medication Dose Route Frequency Provider Last Rate Last Admin    sodium chloride 0 9 % infusion  75 mL/hr Intravenous Continuous Asberry Opitz, MD 75 mL/hr at 03/02/21 0759 75 mL/hr at 03/02/21 0759          Objective:    Vitals:    03/02/21 0700   BP: 155/95   BP Location: Right arm   Pulse: 65   Resp: 18   Temp: 98 2 °F (36 8 °C)   TempSrc: Oral   SpO2: 97%   Weight: 55 8 kg (123 lb)   Height: 5' 6" (1 676 m)        Physical Exam  Constitutional:       Appearance: Normal appearance  HENT:      Head: Normocephalic and atraumatic  Eyes:      Extraocular Movements: Extraocular movements intact  Pupils: Pupils are equal, round, and reactive to light  Cardiovascular:      Rate and Rhythm: Normal rate  Pulmonary:      Effort: Pulmonary effort is normal    Abdominal:      General: Bowel sounds are normal    Musculoskeletal: Normal range of motion  Skin:     General: Skin is warm  Neurological:      Mental Status: She is alert and oriented to person, place, and time  Psychiatric:         Mood and Affect: Mood normal            No results found for: BNP   Lab Results   Component Value Date    WBC 8 77 02/15/2021    HGB 11 7 02/15/2021    HCT 36 4 02/15/2021     (H) 02/15/2021     02/15/2021     Lab Results   Component Value Date    INR 0 93 01/18/2021    INR 0 95 01/06/2021    INR 0 89 02/06/2018    PROTIME 12 3 01/18/2021    PROTIME 12 5 01/06/2021    PROTIME 12 0 (L) 02/06/2018     Lab Results   Component Value Date    PTT 32 01/06/2021         I have personally reviewed pertinent imaging and laboratory results  Code Status: Prior  Advance Directive and Living Will:      Power of :    POLST:      This text is generated with voice recognition software  There may be translation, syntax,  or grammatical errors  If you have any questions, please contact the dictating provider

## 2021-03-02 NOTE — DISCHARGE INSTRUCTIONS
Percutaneous Kidney Biopsy   WHAT YOU NEED TO KNOW:   A percutaneous kidney biopsy is a procedure to remove a small sample of kidney tissue  It may also be done to check for kidney disease or cancer  DISCHARGE INSTRUCTIONS:   Follow up with your healthcare provider as directed:  Write down your questions so you remember to ask them during your visits  Wound care: The Band-Aid may be removed in 24 hours  For more information:   · National Kid[employer] and [employer] Information Clearinghouse  [place] [address] [address]  Phone: 2- 492 - [phone]  Web Address: http://kidney  niddk nih gov/     Care after your procedure:    1  Limit your activities for 36 hours after your biopsy  2  No driving day of biopsy  3  Return to your normal diet  Flat sips of flat soda helps with mild nausea  4  Remove band-aid or dressing 24 hours after procedure  Contact Interventional Radiology at [phone]    Josee PATIENTS: Contact Interventional Radiology at [phone]) Ann Marie Caro PATIENTS: Contact Interventional Radiology at [phone]) if:    1  Difficulty breathing, nausea or vomiting  2  You feel weak or dizzy  3  Chills or fever above 101 degrees F  You have persistent nausea or vomiting    4  You have severe pain in your abdomen or where procedure was done  5  You have blood in your urine  You urinate small amounts or not at all  4  Develop any redness, swelling, heat, unusual drainage, heavy bruising or bleeding from biopsy site

## 2021-03-03 ENCOUNTER — OFFICE VISIT (OUTPATIENT)
Dept: PHYSICAL THERAPY | Facility: MEDICAL CENTER | Age: 51
End: 2021-03-03
Payer: COMMERCIAL

## 2021-03-03 DIAGNOSIS — S93.492A SPRAIN OF ANTERIOR TALOFIBULAR LIGAMENT OF LEFT ANKLE, INITIAL ENCOUNTER: ICD-10-CM

## 2021-03-03 DIAGNOSIS — S82.831D OTHER CLOSED FRACTURE OF DISTAL END OF RIGHT FIBULA WITH ROUTINE HEALING, SUBSEQUENT ENCOUNTER: Primary | ICD-10-CM

## 2021-03-03 PROCEDURE — 97140 MANUAL THERAPY 1/> REGIONS: CPT

## 2021-03-03 PROCEDURE — 97110 THERAPEUTIC EXERCISES: CPT

## 2021-03-03 PROCEDURE — 97112 NEUROMUSCULAR REEDUCATION: CPT

## 2021-03-03 NOTE — PROGRESS NOTES
Daily Note     Today's date: 3/3/2021  Patient name: Arleen Tyson  : 1970  MRN: [id number]  Referring provider: Siddhartha Gusman MD  Dx:   Encounter Diagnosis     ICD-10-CM    1  Other closed fracture of distal end of right fibula with routine healing, subsequent encounter  J49 198R    2  Sprain of anterior talofibular ligament of left ankle, initial encounter  C87 144A                   Subjective: Pt reports that her ankle is feeling stronger and is improving  Objective: See treatment diary below      Assessment: Tolerated treatment well  Patient demonstrated fatigue post treatment, exhibited good technique with therapeutic exercises and would benefit from continued PT  Pt challenged with balance exercise initiated today  Plan: Continue per plan of care        Precautions: History of heart and respiratory failure      Manuals 2/12 2/22 2/24 3/3         Post talar mobs HK  PROM  KO PROM  KO                                                Ther Ex 2/12 2/22 2/24 3/3         Bike NV 10 min 10 min 10 min         Gastroc str 3x30" 3x30" 3x30" 3x30"         Ankle AROM all planes IP 3x10 3x10 hep         Ankle circles IP x30 x30 x30         Ankle alphabet IP x2 x3 x3         Ankle t-band X 4 IP Red Red  3x10 Green  3x10         HR's   3x10 3x15         TR's   3x10 3x15         Rockerboard   3x10  All dir 3x10  All dir         SLS    15"  x4                                                                                                                              Gait Training             Brief HK

## 2021-03-08 ENCOUNTER — OFFICE VISIT (OUTPATIENT)
Dept: NEPHROLOGY | Facility: CLINIC | Age: 51
End: 2021-03-08
Payer: COMMERCIAL

## 2021-03-08 VITALS
DIASTOLIC BLOOD PRESSURE: 92 MMHG | HEART RATE: 68 BPM | SYSTOLIC BLOOD PRESSURE: 152 MMHG | WEIGHT: 138 LBS | BODY MASS INDEX: 22.18 KG/M2 | RESPIRATION RATE: 18 BRPM | HEIGHT: 66 IN

## 2021-03-08 DIAGNOSIS — I15.1 HYPERTENSION SECONDARY TO OTHER RENAL DISORDERS: Primary | ICD-10-CM

## 2021-03-08 DIAGNOSIS — N28.89 HYPERTENSION SECONDARY TO OTHER RENAL DISORDERS: Primary | ICD-10-CM

## 2021-03-08 DIAGNOSIS — R80.1 PERSISTENT PROTEINURIA: ICD-10-CM

## 2021-03-08 PROCEDURE — 99214 OFFICE O/P EST MOD 30 MIN: CPT | Performed by: INTERNAL MEDICINE

## 2021-03-08 RX ORDER — LOSARTAN POTASSIUM 25 MG/1
25 TABLET ORAL DAILY
Qty: 30 TABLET | Refills: 3 | Status: SHIPPED | OUTPATIENT
Start: 2021-03-08 | End: 2021-07-02

## 2021-03-08 NOTE — PATIENT INSTRUCTIONS
Chronic Kidney Disease   WHAT YOU NEED TO KNOW:   Chronic kidney disease (CKD) is the gradual and permanent loss of kidney function  It is also called chronic kidney failure, or chronic renal insufficiency  Normally, the kidneys remove fluid, chemicals, and waste from your blood  These wastes are turned into urine by your kidneys  CKD may worsen over time and lead to kidney failure  Your CKD team will help you and your family plan for your care at home  The team will help you create goals and find ways to meet your goals  Your care plan may change over time as your needs change  DISCHARGE INSTRUCTIONS:   Call your local emergency number (911 in the [address]) if:   · You have a seizure  · You have shortness of breath  Call your doctor or nephrologist if:   · You are confused and very drowsy  · You suddenly gain or lose more weight than your healthcare provider has told you is okay  · You have itchy skin or a rash  · You urinate more or less than you normally do  · You have blood in your urine  · You have nausea and are vomiting  · You have fatigue or muscle weakness  · You have hiccups that will not stop  · You have questions or concerns about your condition or care  Medicines:   · Medicines  may be given to decrease blood pressure and get rid of extra fluid  You may also receive medicine to manage health conditions that may occur with CKD, such as anemia, diabetes, and heart disease  · Take your medicine as directed  Contact your healthcare provider if you think your medicine is not helping or if you have side effects  Tell him or her if you are allergic to any medicine  Keep a list of the medicines, vitamins, and herbs you take  Include the amounts, and when and why you take them  Bring the list or the pill bottles to follow-up visits  Carry your medicine list with you in case of an emergency  What you can do to manage CKD: Management may include making some lifestyle changes  Tell your healthcare provider if you have any concerns about being able to make the changes  He or she can help you find solutions, including working with specialists  Ask for help creating a plan to break large goals into smaller steps  Your plan may include any of the following:  · Manage other health conditions  Your healthcare provider will work with you to make a care plan that meets your needs  You will be checked regularly for heart disease or other conditions that can make CKD worse, such as diabetes  Your blood pressure will be closely monitored  You will also get a target blood pressure and help making a plan to reach your target  This may include taking your blood pressure at home  · Maintain a healthy weight  Extra weight can strain your kidneys  Ask what a healthy weight is for you  Your provider can help you create a weight loss plan if you are overweight  · Create an exercise plan  Regular exercise can help you manage CKD, high blood pressure, and diabetes  Exercise also helps control weight  Your provider can help you create exercise goals and a plan to reach those goals  For example, your goal may be to exercise for 30 minutes in a day  Your plan can include breaking exercise into 10 minute sessions, 3 times during the day  · Create a healthy eating plan  Your provider may tell you to eat food low in sodium (salt), potassium, phosphorus, or protein  A dietitian can help you plan meals if needed  Ask how much liquid to drink each day and which liquids are best for you  · Limit alcohol as directed  Alcohol can cause fluid retention and can affect your kidneys  Ask how much alcohol is safe for you  A drink of alcohol is 12 ounces of beer, 5 ounces of wine, or 1½ ounces of liquor  · Do not smoke  Nicotine and other chemicals in cigarettes and cigars can cause kidney damage  Ask your provider for information if you currently smoke and need help to quit   E-cigarettes or smokeless tobacco still contain nicotine  Talk to your provider before you use these products  · Ask about over-the-counter medicines  Medicines such as NSAIDs and laxatives may harm your kidneys  Some cough and cold medicines can raise your blood pressure  Always ask if a medicine is safe before you take it  · Ask about vaccines you may need  Infections such as pneumonia, influenza, and hepatitis can be more harmful or more likely to occur in a person who has CKD  Vaccines lower your risk for infection  Follow up with your doctor as directed: You will need to return for tests to monitor your kidney and nerve function, and your parathyroid hormone level  Your medicines may be changed, based on certain test results  You may also be referred to a nephrologist (kidney specialist)  Write down your questions so you remember to ask them during your visits  © Copyright [address] Information is for End User's use only and may not be sold, redistributed or otherwise used for commercial purposes  All illustrations and images included in CareNotes® are the copyrighted property of A D A M , Inc  or [facility] Erik Kirby   The above information is an  only  It is not intended as medical advice for individual conditions or treatments  Talk to your doctor, nurse or pharmacist before following any medical regimen to see if it is safe and effective for you

## 2021-03-08 NOTE — PROGRESS NOTES
OFFICE FOLLOW UP - Nephrology   Keisha Clark 46 y o  female MRN: [id number]    Encounter: [id number]        ASSESSMENT & PLAN     MAXI Versus progression of chronic kidney disease with proteinuria and hematuria  o  waiting for final biopsy results, does not appear to be related  Immune deposits or amyloidosis  o  blood pressures are now elevated they were low when she was in the hospital  o  will wait for biopsy results  o  continue to treat conservatively  o  her creatinine has remained around 1 7-1 8 will start losartan 25 mg daily  o  HIV negative  o  24 hour urine protein was about 7 g on January 14  o  no monoclonal bands on a SPEP UPEP, kappa but lambda free light chain ratio 1 85  o  negative anti-GBM  o  negative complement levels  o  a negative ANCA serology  o  negative double-stranded DNA antibody  o  negative KAYLA screen     Electrolytes/Acid Base  o  currently acceptable will repeat a BMP in 1 week to ensure stability of serum creatinine     Blood Pressure- hypertension in the setting of cardiomyopathy  o  had a low output with an EF of 25%  o  repeat echocardiogram and cardiac MRI are pending  o  currently on carvedilol 25 mg twice daily  o  furosemide 40 mg in the a m  and 20 mg in the p m   o   Isordil 10 mg daily  o  hydralazine 25 mg 3 times daily  o  added losartan 25 mg daily  o  repeating blood work and urine protein to creatinine ratio next week     Anemia of CKD  o  most recent hemoglobin from February 15th was 11 7 repeat hemoglobin next week     BMD  o  continue to monitor parameters     Risk Reduction/Clinical Course  o  continue cardiovascular risk reduction measures    DISCUSSION/SUMMARY    discussed biopsy results with renal pathologist awaiting final results histiocytes were seen on biopsy- there are rare disorders associated  Histiocytic glomerulopathy- and will discuss this further with the patient once we have final results     otherwise her blood pressure remains elevated we will add losartan to her medication list and make further recommendations on going     will repeat her blood work 1 week after starting losartan    HPI: Buddy Couch is a 46 y o  female who is here for  Follow-up     she was seen in the hospital when she was admitted with acute decompensated congestive heart failure, she had a cardiac catheterization that was negative, she subsequently also had a urinalysis and urine protein to creatinine ratio upwards of 7 g, she had a serologic workup at the time that was negative should pulmonary edema and was treated with diuretics her EF was 25% during the hospitalization her blood pressures have now improved and she has hypertension she also has a life vest in place but took this off because of some back spasm      ROS:   All the systems were reviewed and were negative except as documented on the HPI  Allergies: Patient has no known allergies      Medications:   Current Outpatient Medications:     atorvastatin (LIPITOR) 40 mg tablet, Take 1 tablet (40 mg total) by mouth daily, Disp: 90 tablet, Rfl: 1    carvedilol (COREG) 25 mg tablet, Take 1 tablet (25 mg total) by mouth 2 (two) times a day with meals, Disp: 180 tablet, Rfl: 1    cyanocobalamin (VITAMIN B-12) 1000 MCG tablet, Take 1 tablet (1,000 mcg total) by mouth daily, Disp: 30 tablet, Rfl: 0    folic acid (FOLVITE) 1 mg tablet, Take 1 tablet (1 mg total) by mouth daily, Disp: 90 tablet, Rfl: 1    furosemide (LASIX) 20 mg tablet, Take 1 tablet (20 mg total) by mouth every evening, Disp: 90 tablet, Rfl: 1    furosemide (LASIX) 40 mg tablet, Take 1 tablet (40 mg total) by mouth every morning, Disp: 90 tablet, Rfl: 1    Glucos-Chondroit-Hyaluron-MSM (GLUCOSAMINE CHONDROITIN JOINT PO), Take 1 tablet by mouth as needed, Disp: , Rfl:     hydrALAZINE (APRESOLINE) 25 mg tablet, Take 1 tablet (25 mg total) by mouth 3 (three) times a day, Disp: 270 tablet, Rfl: 1    isosorbide dinitrate (ISORDIL) 10 mg tablet, Take 1 tablet (10 mg total) by mouth 3 (three) times daily after meals, Disp: 270 tablet, Rfl: 1    lidocaine (LMX) 4 % cream, Apply topically as needed for mild pain or moderate pain, Disp: 30 g, Rfl: 0    methylphenidate (RITALIN) 20 MG tablet, TK 1 T PO BID, Disp: , Rfl: 0    PARoxetine (PAXIL) 30 mg tablet, Take 60 mg by mouth daily , Disp: , Rfl: 5    QUEtiapine (SEROquel) 100 mg tablet, Take 100 mg by mouth daily at bedtime, Disp: , Rfl:     losartan (COZAAR) 25 mg tablet, Take 1 tablet (25 mg total) by mouth daily, Disp: 30 tablet, Rfl: 3    methocarbamol (ROBAXIN) 500 mg tablet, Take 1 tablet (500 mg total) by mouth 2 (two) times a day (Patient not taking: Reported on 3/8/2021), Disp: 20 tablet, Rfl: 0    Past Medical History:   Diagnosis Date    Anxiety     Depression     Osteoarthritis      Past Surgical History:   Procedure Laterality Date    CT NEEDLE BIOPSY KIDNEY  3/2/2021    ECTOPIC PREGNANCY SURGERY      IR BIOPSY KIDNEY COLUMBIA KIT NO LATERALITY  2/17/2021    IR THORACENTESIS  1/12/2021    CT TOTAL HIP ARTHROPLASTY Left 3/5/2018    Procedure: ARTHROPLASTY HIP TOTAL;  Surgeon: Rich Mackay DO;  Location: [facility];  Service: Orthopedics    WISDOM TOOTH EXTRACTION      x1     Family History   Problem Relation Age of Onset    Cancer Family     Cancer Father     Atrial fibrillation Father     Hypertension Father       reports that she has quit smoking  Her smoking use included cigarettes  She has a 10 00 pack-year smoking history  She has never used smokeless tobacco  She reports previous alcohol use  She reports that she does not use drugs  Physical Exam:   Vitals:    03/08/21 1515   BP: 152/92   BP Location: Left arm   Patient Position: Sitting   Cuff Size: Standard   Pulse: 68   Resp: 18   Weight: 62 6 kg (138 lb)   Height: 5' 6" (1 676 m)     Body mass index is 22 27 kg/m²      General: conscious, cooperative, in not acute distress  Eyes: conjunctivae pink, anicteric sclerae  ENT: lips and mucous membranes moist  Neck: supple, no JVD  Chest:  Decreased breath sounds on the left side  CVS: distinct S1 & S2, normal rate, regular rhythm  Abdomen: soft, non-tender, non-distended, normoactive bowel sounds  Extremities: no edema of both legs  Skin: no rash  Neuro: awake, alert, oriented      Lab Results:    Results for orders placed or performed during the hospital encounter of 03/02/21   Tissue Exam   Result Value Ref Range    Case Report       Surgical Pathology Report                         Case: [id number]                                   Authorizing Provider:  [facility],            Collected:           03/02/2021 0859              Ordering Location:     [address] [place] [address]      Received:            03/02/2021 [address]                                                   Pathologist:           Nicole Quintero MD                                                               Specimen:    Kidney, Right, [place] kit                                                                Final Diagnosis       A  Right kidney (core needle biopsy):  -  Renal cortical parenchyma, gross evaluation only  -  The entire specimen, including tissue for electron microscopy, light microscopy and immunofluorescence is sent to [facility] for expert nephropathologist evaluation   -  A full report from that institution will follow under separate cover  Interpretation performed at [facility], [address], [address]        Additional Information       All reported additional testing was performed with appropriately reactive controls    These tests were developed and their performance characteristics determined by [facility] or appropriate performing facility, though some tests may be performed on tissues which have not been validated for performance characteristics (such as staining performed on alcohol exposed cell blocks and decalcified tissues)  Results should be interpreted with caution and in the context of the patients clinical condition  These tests may not be cleared or approved by the [place]  Food and Drug Administration, though the FDA has determined that such clearance or approval is not necessary  These tests are used for clinical purposes and they should not be regarded as investigational or for research  This laboratory has been approved by CLIA 88, designated as a high-complexity laboratory and is qualified to perform these tests         Intraoperative Consultation          Gross: Glomeruli identified - rare  Four (4) cores taken for EM - 1, IF- 1, light - 2  Dr Vicky Stone to Dr Sarahi Asencio on 3/2/21 at 9:57 am    Interpretation performed at [facility], [address] [facility], [address] [place] [address]          Gross Description          A  The specimen is received fresh and subsequently placed into solution for EM, IF, light microscopy each labeled with the patient's name and medical record, designated "right kidney"  The tan cores range in length from 10mm to 15 mm with 1 mm average diameter  The specimen is then sent to [facility], Pathology Department, 14th Floor, [address], [address], [address]             Portions of the record may have been created with voice recognition software  Occasional wrong word or "sound a like" substitutions may have occurred due to the inherent limitations of voice recognition software  Read the chart carefully and recognize, using context, where substitutions have occurred  If you have any questions, please contact the dictating provider

## 2021-03-16 LAB — SCAN RESULT: NORMAL

## 2021-03-19 ENCOUNTER — TELEPHONE (OUTPATIENT)
Dept: CARDIOLOGY CLINIC | Facility: CLINIC | Age: 51
End: 2021-03-19

## 2021-03-19 NOTE — TELEPHONE ENCOUNTER
----- Message from Dillan Reagan DO sent at 3/19/2021  2:07 PM EDT -----  Left ventricle appears to have improved in function  Her ejection fraction went from 25% up to 41%  Will need to continue to titrate her medications in the office to help to improve her function  This is very promising  No other significant findings were noted on the cardiac MRI  Please reach out to the patient and let her know this good news    Thank you

## 2021-03-23 DIAGNOSIS — N17.9 ACUTE KIDNEY INJURY (HCC): Primary | ICD-10-CM

## 2021-03-26 ENCOUNTER — APPOINTMENT (OUTPATIENT)
Dept: RADIOLOGY | Facility: MEDICAL CENTER | Age: 51
End: 2021-03-26
Payer: COMMERCIAL

## 2021-03-26 ENCOUNTER — IMMUNIZATIONS (OUTPATIENT)
Dept: FAMILY MEDICINE CLINIC | Facility: HOSPITAL | Age: 51
End: 2021-03-26

## 2021-03-26 ENCOUNTER — OFFICE VISIT (OUTPATIENT)
Dept: OBGYN CLINIC | Facility: MEDICAL CENTER | Age: 51
End: 2021-03-26
Payer: COMMERCIAL

## 2021-03-26 VITALS
DIASTOLIC BLOOD PRESSURE: 92 MMHG | TEMPERATURE: 98.1 F | BODY MASS INDEX: 20.89 KG/M2 | WEIGHT: 130 LBS | SYSTOLIC BLOOD PRESSURE: 150 MMHG | HEART RATE: 75 BPM | HEIGHT: 66 IN

## 2021-03-26 DIAGNOSIS — M25.551 PAIN IN RIGHT HIP: ICD-10-CM

## 2021-03-26 DIAGNOSIS — M47.816 ARTHRITIS, LUMBAR SPINE: ICD-10-CM

## 2021-03-26 DIAGNOSIS — Z23 ENCOUNTER FOR IMMUNIZATION: Primary | ICD-10-CM

## 2021-03-26 DIAGNOSIS — M16.11 PRIMARY OSTEOARTHRITIS OF ONE HIP, RIGHT: Primary | ICD-10-CM

## 2021-03-26 PROCEDURE — 91300 SARS-COV-2 / COVID-19 MRNA VACCINE (PFIZER-BIONTECH) 30 MCG: CPT

## 2021-03-26 PROCEDURE — 0001A SARS-COV-2 / COVID-19 MRNA VACCINE (PFIZER-BIONTECH) 30 MCG: CPT

## 2021-03-26 PROCEDURE — 72110 X-RAY EXAM L-2 SPINE 4/>VWS: CPT

## 2021-03-26 PROCEDURE — 99214 OFFICE O/P EST MOD 30 MIN: CPT | Performed by: ORTHOPAEDIC SURGERY

## 2021-03-26 PROCEDURE — 3008F BODY MASS INDEX DOCD: CPT | Performed by: ORTHOPAEDIC SURGERY

## 2021-03-26 NOTE — PROGRESS NOTES
Assessment/Plan     1  Primary osteoarthritis of one hip, right    2  Pain in right hip    3  Arthritis, lumbar spine      Orders Placed This Encounter   Procedures    XR sacrum and coccyx    Steroid Injection       · X-rays of the hip, lumbar spine, and sacrum reviewed with the patient today demonstrating severe right hip osteoarthritis, diffuse degenerative changes in the lumbar spine with L5-S1 anterolisthesis, and status post left total hip arthroplasty  · Despite diffuse degenerative changes patient is not having any pain in her back at this time  Denies red flag symptoms  Todays imaging was compared to CT scan chest abd pelvis from 2018 and anterolisthesis appears stable  Patient advised to call the office if any back pain, bowel or bladder changes, or numbness  · Order placed for right hip IA steroid injection with Pain Management  · Continue tylenol as needed for pain  Avoid NSAIDs due to CKD  · Declined new PT script today  She would like to continue home exercises for now  Return in about 6 weeks (around 5/7/2021) for right hip follow up  I answered all of the patient's questions during the visit and provided education of the patient's condition during the visit  The patient verbalized understanding of the information given and agrees with the plan  This note was dictated using [employer] software  It may contain errors including improperly dictated words  Please contact physician directly for any questions  History of Present Illness   Chief complaint:   Chief Complaint   Patient presents with    Right Hip - Pain       HPI: Dash Robledo is a 46 y o  female that c/o right hip pain x 4 months  Patient states she had a fall in Dec 2020 when she tripped down her steps at home  She landed on concrete but is unsure exactly how she landed  Patient believed she hit her right hip, right ankle, back, and her head   She did sustain a right distal fibula fracture in that fall that she has been treating with Dr Mitali Rivers  Patient states that since the fall she has had increased right hip pain  She notes prior to the fall she had occasional soreness in the hip, but the pain is becoming more consistent  Pain is located in the groin  Pain does not radiate down the leg  Patient is taking tylenol for pain  She is unable to take NSAIDs due to CKD  Patient has juan doing PT for her right ankle and states they do some exercises and stretches for her hip as well  Patient denies back pain, bowel or bladder incontinence or retention, saddle anesthesia, and distal numbness or tingling  Patient has never seen anyone for her back in the past  Patient states she is interested in trying steroid injection  She was recently hospitalized due to CHF, CKD, and acute respiratory failure so she is not ready to pursue surgery yet  She notes she has not returned to work  Patient underwent left MARCIA in 3/5/2018 and reports no issues with her left hip  She is very happy with her left hip  ROS:    See HPI for musculoskeletal review     All other systems reviewed are negative     Historical Information   Past Medical History:   Diagnosis Date    Anxiety     Depression     Osteoarthritis      Past Surgical History:   Procedure Laterality Date    CT NEEDLE BIOPSY KIDNEY  3/2/2021    ECTOPIC PREGNANCY SURGERY      IR BIOPSY KIDNEY COLUMBIA KIT NO LATERALITY  2/17/2021    IR THORACENTESIS  1/12/2021    IN TOTAL HIP ARTHROPLASTY Left 3/5/2018    Procedure: ARTHROPLASTY HIP TOTAL;  Surgeon: Luis Enrique Garcia DO;  Location[facility];  Service: Orthopedics    WISDOM TOOTH EXTRACTION      x1     Social History   Social History     Substance and Sexual Activity   Alcohol Use Not Currently    Frequency: Monthly or less    Drinks per session: 1 or 2    Comment: social drinker     Social History     Substance and Sexual Activity   Drug Use No     Social History     Tobacco Use   Smoking Status Former Smoker    Packs/day: 0 50  Years: 20 00    Pack years: 10 00    Types: Cigarettes   Smokeless Tobacco Never Used   Tobacco Comment    Working on Reducing for surgery        Family History:   Family History   Problem Relation Age of Onset    Cancer Family     Cancer Father     Atrial fibrillation Father     Hypertension Father        Current Outpatient Medications on File Prior to Visit   Medication Sig Dispense Refill    atorvastatin (LIPITOR) 40 mg tablet Take 1 tablet (40 mg total) by mouth daily 90 tablet 1    carvedilol (COREG) 25 mg tablet Take 1 tablet (25 mg total) by mouth 2 (two) times a day with meals 180 tablet 1    cyanocobalamin (VITAMIN B-12) 1000 MCG tablet Take 1 tablet (1,000 mcg total) by mouth daily 30 tablet 0    folic acid (FOLVITE) 1 mg tablet Take 1 tablet (1 mg total) by mouth daily 90 tablet 1    furosemide (LASIX) 20 mg tablet Take 1 tablet (20 mg total) by mouth every evening 90 tablet 1    furosemide (LASIX) 40 mg tablet Take 1 tablet (40 mg total) by mouth every morning 90 tablet 1    Glucos-Chondroit-Hyaluron-MSM (GLUCOSAMINE CHONDROITIN JOINT PO) Take 1 tablet by mouth as needed      hydrALAZINE (APRESOLINE) 25 mg tablet Take 1 tablet (25 mg total) by mouth 3 (three) times a day 270 tablet 1    isosorbide dinitrate (ISORDIL) 10 mg tablet Take 1 tablet (10 mg total) by mouth 3 (three) times daily after meals 270 tablet 1    lidocaine (LMX) 4 % cream Apply topically as needed for mild pain or moderate pain 30 g 0    losartan (COZAAR) 25 mg tablet Take 1 tablet (25 mg total) by mouth daily 30 tablet 3    methylphenidate (RITALIN) 20 MG tablet TK 1 T PO BID  0    PARoxetine (PAXIL) 30 mg tablet Take 60 mg by mouth daily   5    QUEtiapine (SEROquel) 100 mg tablet Take 100 mg by mouth daily at bedtime      methocarbamol (ROBAXIN) 500 mg tablet Take 1 tablet (500 mg total) by mouth 2 (two) times a day (Patient not taking: Reported on 3/8/2021) 20 tablet 0     No current facility-administered medications on file prior to visit  No Known Allergies    Objective   Vitals: Blood pressure 150/92, pulse 75, temperature 98 1 °F (36 7 °C), height 5' 6" (1 676 m), weight 59 kg (130 lb)  ,Body mass index is 20 98 kg/m²  PE:  AAOx 3  WDWN  Hearing intact, no drainage from eyes  Regular rate  no audible wheezing  no abdominal distension  LE compartments soft, skin intact    right hip:   No dislocation/deformity  positive  [facility]  ROM: 10- 120  Int rot- 15  Ext rot- 30  positive  Nat Test  positive  Impingement test  No TTP over greater trochanter  Abduction: 5/5  Neg  Stevo's test  No TTP over SIJ    rightLE:    Sensation grossly intact L4- S1  Palpable posterior tibial pulse  AT/GS intact    Back:    No TTP over lumbar spinous processes, paraspinal musculature  Palpable step off in lumbar spine, no TTP  No ecchymosis or erythema   SLR: Neg       Imaging Studies: I have personally reviewed pertinent films in PACS  righthip:  Severe osteoarthritis   Lumbar spine and sacrum: diffuse degenerative changes, anterolisthesis L5-S1 appears stable compared to CT scan chest abd pelvis from 2018

## 2021-04-07 ENCOUNTER — LAB (OUTPATIENT)
Dept: LAB | Facility: HOSPITAL | Age: 51
End: 2021-04-07
Attending: INTERNAL MEDICINE
Payer: COMMERCIAL

## 2021-04-07 ENCOUNTER — TELEPHONE (OUTPATIENT)
Dept: NEPHROLOGY | Facility: CLINIC | Age: 51
End: 2021-04-07

## 2021-04-07 DIAGNOSIS — N28.89 HYPERTENSION SECONDARY TO OTHER RENAL DISORDERS: ICD-10-CM

## 2021-04-07 DIAGNOSIS — I15.1 HYPERTENSION SECONDARY TO OTHER RENAL DISORDERS: ICD-10-CM

## 2021-04-07 DIAGNOSIS — N17.9 ACUTE KIDNEY INJURY (HCC): ICD-10-CM

## 2021-04-07 DIAGNOSIS — I10 ESSENTIAL HYPERTENSION: ICD-10-CM

## 2021-04-07 DIAGNOSIS — R80.1 PERSISTENT PROTEINURIA: ICD-10-CM

## 2021-04-07 DIAGNOSIS — E87.6 HYPOKALEMIA: Primary | ICD-10-CM

## 2021-04-07 LAB
ALBUMIN SERPL BCP-MCNC: 2.4 G/DL (ref 3.5–5)
ANION GAP SERPL CALCULATED.3IONS-SCNC: 10 MMOL/L (ref 4–13)
BACTERIA UR QL AUTO: ABNORMAL /HPF
BASOPHILS # BLD AUTO: 0.07 THOUSANDS/ΜL (ref 0–0.1)
BASOPHILS NFR BLD AUTO: 1 % (ref 0–1)
BILIRUB UR QL STRIP: NEGATIVE
BUN SERPL-MCNC: 34 MG/DL (ref 5–25)
CALCIUM ALBUM COR SERPL-MCNC: 10.3 MG/DL (ref 8.3–10.1)
CALCIUM SERPL-MCNC: 9 MG/DL (ref 8.3–10.1)
CHLORIDE SERPL-SCNC: 106 MMOL/L (ref 100–108)
CLARITY UR: CLEAR
CO2 SERPL-SCNC: 25 MMOL/L (ref 21–32)
COLOR UR: YELLOW
CREAT SERPL-MCNC: 2.02 MG/DL (ref 0.6–1.3)
CREAT UR-MCNC: 44.3 MG/DL
EOSINOPHIL # BLD AUTO: 0.13 THOUSAND/ΜL (ref 0–0.61)
EOSINOPHIL NFR BLD AUTO: 2 % (ref 0–6)
ERYTHROCYTE [DISTWIDTH] IN BLOOD BY AUTOMATED COUNT: 12.2 % (ref 11.6–15.1)
GFR SERPL CREATININE-BSD FRML MDRD: 28 ML/MIN/1.73SQ M
GLUCOSE P FAST SERPL-MCNC: 114 MG/DL (ref 65–99)
GLUCOSE UR STRIP-MCNC: NEGATIVE MG/DL
HCT VFR BLD AUTO: 37.6 % (ref 34.8–46.1)
HGB BLD-MCNC: 12.4 G/DL (ref 11.5–15.4)
HGB UR QL STRIP.AUTO: ABNORMAL
IMM GRANULOCYTES # BLD AUTO: 0.03 THOUSAND/UL (ref 0–0.2)
IMM GRANULOCYTES NFR BLD AUTO: 0 % (ref 0–2)
KETONES UR STRIP-MCNC: NEGATIVE MG/DL
LEUKOCYTE ESTERASE UR QL STRIP: NEGATIVE
LYMPHOCYTES # BLD AUTO: 2.51 THOUSANDS/ΜL (ref 0.6–4.47)
LYMPHOCYTES NFR BLD AUTO: 31 % (ref 14–44)
MAGNESIUM SERPL-MCNC: 2.3 MG/DL (ref 1.6–2.6)
MCH RBC QN AUTO: 31.9 PG (ref 26.8–34.3)
MCHC RBC AUTO-ENTMCNC: 33 G/DL (ref 31.4–37.4)
MCV RBC AUTO: 97 FL (ref 82–98)
MONOCYTES # BLD AUTO: 0.55 THOUSAND/ΜL (ref 0.17–1.22)
MONOCYTES NFR BLD AUTO: 7 % (ref 4–12)
NEUTROPHILS # BLD AUTO: 4.87 THOUSANDS/ΜL (ref 1.85–7.62)
NEUTS SEG NFR BLD AUTO: 59 % (ref 43–75)
NITRITE UR QL STRIP: NEGATIVE
NON-SQ EPI CELLS URNS QL MICRO: ABNORMAL /HPF
NRBC BLD AUTO-RTO: 0 /100 WBCS
PH UR STRIP.AUTO: 6.5 [PH]
PHOSPHATE SERPL-MCNC: 5 MG/DL (ref 2.7–4.5)
PLATELET # BLD AUTO: 340 THOUSANDS/UL (ref 149–390)
PMV BLD AUTO: 9.3 FL (ref 8.9–12.7)
POTASSIUM SERPL-SCNC: 4.1 MMOL/L (ref 3.5–5.3)
PROT UR STRIP-MCNC: >=300 MG/DL
PROT UR-MCNC: 356 MG/DL
PROT/CREAT UR: 8.04 MG/G{CREAT} (ref 0–0.1)
PTH-INTACT SERPL-MCNC: 49 PG/ML (ref 18.4–80.1)
RBC # BLD AUTO: 3.89 MILLION/UL (ref 3.81–5.12)
RBC #/AREA URNS AUTO: ABNORMAL /HPF
SODIUM SERPL-SCNC: 141 MMOL/L (ref 136–145)
SP GR UR STRIP.AUTO: 1.02 (ref 1–1.03)
URATE SERPL-MCNC: 5.5 MG/DL (ref 2–6.8)
UROBILINOGEN UR QL STRIP.AUTO: 0.2 E.U./DL
WBC # BLD AUTO: 8.16 THOUSAND/UL (ref 4.31–10.16)
WBC #/AREA URNS AUTO: ABNORMAL /HPF

## 2021-04-07 PROCEDURE — 81001 URINALYSIS AUTO W/SCOPE: CPT | Performed by: INTERNAL MEDICINE

## 2021-04-07 PROCEDURE — 85025 COMPLETE CBC W/AUTO DIFF WBC: CPT

## 2021-04-07 PROCEDURE — 84550 ASSAY OF BLOOD/URIC ACID: CPT

## 2021-04-07 PROCEDURE — 80069 RENAL FUNCTION PANEL: CPT

## 2021-04-07 PROCEDURE — 82570 ASSAY OF URINE CREATININE: CPT | Performed by: INTERNAL MEDICINE

## 2021-04-07 PROCEDURE — 36415 COLL VENOUS BLD VENIPUNCTURE: CPT

## 2021-04-07 PROCEDURE — 83735 ASSAY OF MAGNESIUM: CPT

## 2021-04-07 PROCEDURE — 83970 ASSAY OF PARATHORMONE: CPT

## 2021-04-07 PROCEDURE — 82595 ASSAY OF CRYOGLOBULIN: CPT

## 2021-04-07 PROCEDURE — 84156 ASSAY OF PROTEIN URINE: CPT | Performed by: INTERNAL MEDICINE

## 2021-04-07 NOTE — TELEPHONE ENCOUNTER
----- Message from Edis Hernández sent at 4/7/2021  2:18 PM EDT -----  Hello! Can we update patient that her creatinine bumped to 2    noted that her losartan was recently started  Lets check BMP in 2 weeks to ensure stability if patient feels well  Thank you!   Yomi Erwin

## 2021-04-07 NOTE — TELEPHONE ENCOUNTER
Tried calling patient to go over lab results, there was no answer and the mailbox is full  Will try again

## 2021-04-09 RX ORDER — LOSARTAN POTASSIUM 25 MG/1
25 TABLET ORAL DAILY
Qty: 30 TABLET | Refills: 0 | OUTPATIENT
Start: 2021-04-09

## 2021-04-13 LAB — CRYOGLOB SER QL 1D COLD INC: NORMAL

## 2021-04-14 ENCOUNTER — HOSPITAL ENCOUNTER (OUTPATIENT)
Dept: NON INVASIVE DIAGNOSTICS | Facility: HOSPITAL | Age: 51
Discharge: HOME | End: 2021-04-14
Attending: INTERNAL MEDICINE
Payer: COMMERCIAL

## 2021-04-14 DIAGNOSIS — I42.8 NONISCHEMIC CARDIOMYOPATHY (HCC): ICD-10-CM

## 2021-04-14 PROCEDURE — 93308 TTE F-UP OR LMTD: CPT

## 2021-04-14 PROCEDURE — 93356 MYOCRD STRAIN IMG SPCKL TRCK: CPT

## 2021-04-14 PROCEDURE — 93306 TTE W/DOPPLER COMPLETE: CPT | Performed by: INTERNAL MEDICINE

## 2021-04-16 ENCOUNTER — IMMUNIZATIONS (OUTPATIENT)
Dept: FAMILY MEDICINE CLINIC | Facility: HOSPITAL | Age: 51
End: 2021-04-16

## 2021-04-16 DIAGNOSIS — Z23 ENCOUNTER FOR IMMUNIZATION: Primary | ICD-10-CM

## 2021-04-16 PROCEDURE — 0002A SARS-COV-2 / COVID-19 MRNA VACCINE (PFIZER-BIONTECH) 30 MCG: CPT

## 2021-04-16 PROCEDURE — 91300 SARS-COV-2 / COVID-19 MRNA VACCINE (PFIZER-BIONTECH) 30 MCG: CPT

## 2021-04-21 ENCOUNTER — TELEPHONE (OUTPATIENT)
Dept: NEPHROLOGY | Facility: CLINIC | Age: 51
End: 2021-04-21

## 2021-04-21 NOTE — TELEPHONE ENCOUNTER
Tried calling patient, mailbox is full and I could not leave a message  Sending a letter to the patient and including an order for a repeat BMP

## 2021-04-21 NOTE — TELEPHONE ENCOUNTER
I tried reaching out to schedule the pt's appointment with Dr Sunita Poon but the mailbox was full and I was unable to leave a v/m I will try again later

## 2021-04-23 ENCOUNTER — OFFICE VISIT (OUTPATIENT)
Dept: CARDIOLOGY CLINIC | Facility: CLINIC | Age: 51
End: 2021-04-23
Payer: COMMERCIAL

## 2021-04-23 VITALS
WEIGHT: 134.4 LBS | HEART RATE: 71 BPM | BODY MASS INDEX: 21.69 KG/M2 | DIASTOLIC BLOOD PRESSURE: 92 MMHG | SYSTOLIC BLOOD PRESSURE: 152 MMHG

## 2021-04-23 DIAGNOSIS — E78.5 DYSLIPIDEMIA: ICD-10-CM

## 2021-04-23 DIAGNOSIS — I50.42 CHRONIC COMBINED SYSTOLIC AND DIASTOLIC HEART FAILURE (HCC): Primary | ICD-10-CM

## 2021-04-23 DIAGNOSIS — I25.10 CORONARY ARTERY DISEASE INVOLVING NATIVE CORONARY ARTERY OF NATIVE HEART WITHOUT ANGINA PECTORIS: ICD-10-CM

## 2021-04-23 DIAGNOSIS — I16.0 HYPERTENSIVE URGENCY: ICD-10-CM

## 2021-04-23 DIAGNOSIS — I42.8 NONISCHEMIC CARDIOMYOPATHY (HCC): ICD-10-CM

## 2021-04-23 DIAGNOSIS — Z72.0 TOBACCO USE: ICD-10-CM

## 2021-04-23 PROCEDURE — 99214 OFFICE O/P EST MOD 30 MIN: CPT | Performed by: INTERNAL MEDICINE

## 2021-04-23 RX ORDER — AMLODIPINE BESYLATE 5 MG/1
5 TABLET ORAL DAILY
Qty: 90 TABLET | Refills: 1 | Status: SHIPPED | OUTPATIENT
Start: 2021-04-23 | End: 2021-06-17

## 2021-04-23 NOTE — PROGRESS NOTES
Cardiology Office Note  MD Mallika Hill MD Leandro Combs, DO, MD Spencer Irwin DO, Dia George DO, [facility] - [place]  ----------------------------------------------------------------  [address] [address]    Malissa Duarte 46 y o  female MRN: [id number]  Unit/Bed#:  Encounter: [id number]      History of Present Illness: It was a pleasure to see Malissa Duarte in the office today for follow up CV evaluation  She is a past medical history of hypertension, tobacco abuse and systolic heart failure with ejection fraction of 25%  She started to experience generalized abdominal bloating and nausea  She also noted that she was having shortness of breath that worsened over 3 days  After having orthopnea with improved breathing sitting up, she became worried and presented to [address] [place] [address] for evaluation in January 2021  Patient was found to have acute respiratory failure requiring nasal cannula  She had elevation of her troponin peaking at 0 14  Echocardiogram was performed and she was found to have an ejection fraction of 25%  Her blood pressure was also markedly elevated  The patient was diagnosed with acute heart failure and treated with intravenous diuretic  During her hospitalization, she had a thoracentesis of the left long with 630 mL removed  She subsequently underwent cardiac catheterization which showed no evidence of obstructive CAD  Her urine was checked and she was found to have nephrotic range proteinuria  Once euvolemic, patient was transitioned to oral diuretic and discharged home  Since our last encounter, patient underwent renal biopsy results showed 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 and severe interstitial fibrosis    She was sent for cardiac MRI and echocardiogram to reassess her function as well as to look for evidence of late gadolinium enhancement on the MRI  She is here today to discuss the results  She denies any chest pain, pressure, tightness or squeezing  Denies lightheadedness, dizziness or palpitations  Denies lower extremity swelling, orthopnea or paroxysmal nocturnal dyspnea  Recently started on losartan by Nephrology as an outpatient  Had mild bump in her creatinine is being closely monitored by Nephrology  Review of Systems:  Review of Systems   Constitution: Negative for decreased appetite, fever, weight gain and weight loss  HENT: Negative for congestion and sore throat  Eyes: Negative for visual disturbance  Cardiovascular: Negative for chest pain, dyspnea on exertion, leg swelling, near-syncope and palpitations  Respiratory: Negative for cough and shortness of breath  Hematologic/Lymphatic: Negative for bleeding problem  Skin: Negative for rash  Musculoskeletal: Negative for myalgias and neck pain  Gastrointestinal: Negative for abdominal pain and nausea  Neurological: Negative for light-headedness and weakness  Psychiatric/Behavioral: Negative for depression         Past Medical History:   Diagnosis Date    Anxiety     Depression     Osteoarthritis        Past Surgical History:   Procedure Laterality Date    CT NEEDLE BIOPSY KIDNEY  3/2/2021    ECTOPIC PREGNANCY SURGERY      IR BIOPSY KIDNEY COLUMBIA KIT NO LATERALITY  2/17/2021    IR THORACENTESIS  1/12/2021    NH TOTAL HIP ARTHROPLASTY Left 3/5/2018    Procedure: ARTHROPLASTY HIP TOTAL;  Surgeon: Janell Abbasi DO;  Location: [facility];  Service: Orthopedics    WISDOM TOOTH EXTRACTION      x1       Social History     Socioeconomic History    Marital status: Single     Spouse name: None    Number of children: None    Years of education: None    Highest education level: None   Occupational History    None   Social Needs    Financial resource strain: None    Food insecurity     Worry: None     Inability: None    Transportation needs     Medical: None     Non-medical: None   Tobacco Use    Smoking status: Former Smoker     Packs/day: 0 50     Years: 20 00     Pack years: 10 00     Types: Cigarettes    Smokeless tobacco: Never Used    Tobacco comment: Working on Reducing for surgery      Substance and Sexual Activity    Alcohol use: Not Currently     Frequency: Monthly or less     Drinks per session: 1 or 2     Comment: social drinker    Drug use: No    Sexual activity: Not Currently   Lifestyle    Physical activity     Days per week: None     Minutes per session: None    Stress: None   Relationships    Social connections     Talks on phone: None     Gets together: None     Attends Caodaism service: None     Active member of club or organization: None     Attends meetings of clubs or organizations: None     Relationship status: None    Intimate partner violence     Fear of current or ex partner: None     Emotionally abused: None     Physically abused: None     Forced sexual activity: None   Other Topics Concern    None   Social History Narrative    None       Family History   Problem Relation Age of Onset    Cancer Family     Cancer Father     Atrial fibrillation Father     Hypertension Father        No Known Allergies      Current Outpatient Medications:     atorvastatin (LIPITOR) 40 mg tablet, Take 1 tablet (40 mg total) by mouth daily, Disp: 90 tablet, Rfl: 1    carvedilol (COREG) 25 mg tablet, Take 1 tablet (25 mg total) by mouth 2 (two) times a day with meals, Disp: 180 tablet, Rfl: 1    cyanocobalamin (VITAMIN B-12) 1000 MCG tablet, Take 1 tablet (1,000 mcg total) by mouth daily, Disp: 30 tablet, Rfl: 0    folic acid (FOLVITE) 1 mg tablet, Take 1 tablet (1 mg total) by mouth daily, Disp: 90 tablet, Rfl: 1    furosemide (LASIX) 20 mg tablet, Take 1 tablet (20 mg total) by mouth every evening, Disp: 90 tablet, Rfl: 1    furosemide (LASIX) 40 mg tablet, Take 1 tablet (40 mg total) by mouth every morning, Disp: 90 tablet, Rfl: 1    hydrALAZINE (APRESOLINE) 25 mg tablet, Take 1 tablet (25 mg total) by mouth 3 (three) times a day, Disp: 270 tablet, Rfl: 1    isosorbide dinitrate (ISORDIL) 10 mg tablet, Take 1 tablet (10 mg total) by mouth 3 (three) times daily after meals, Disp: 270 tablet, Rfl: 1    losartan (COZAAR) 25 mg tablet, Take 1 tablet (25 mg total) by mouth daily, Disp: 30 tablet, Rfl: 3    methylphenidate (RITALIN) 20 MG tablet, TK 1 T PO BID, Disp: , Rfl: 0    PARoxetine (PAXIL) 30 mg tablet, Take 60 mg by mouth daily , Disp: , Rfl: 5    QUEtiapine (SEROquel) 100 mg tablet, Take 100 mg by mouth daily at bedtime, Disp: , Rfl:     amLODIPine (NORVASC) 5 mg tablet, Take 1 tablet (5 mg total) by mouth daily, Disp: 90 tablet, Rfl: 1    Glucos-Chondroit-Hyaluron-MSM (GLUCOSAMINE CHONDROITIN JOINT PO), Take 1 tablet by mouth as needed, Disp: , Rfl:     lidocaine (LMX) 4 % cream, Apply topically as needed for mild pain or moderate pain (Patient not taking: Reported on 4/23/2021), Disp: 30 g, Rfl: 0    methocarbamol (ROBAXIN) 500 mg tablet, Take 1 tablet (500 mg total) by mouth 2 (two) times a day (Patient not taking: Reported on 3/8/2021), Disp: 20 tablet, Rfl: 0    Vitals:    04/23/21 1023   BP: 152/92   BP Location: Left arm   Patient Position: Sitting   Cuff Size: Standard   Pulse: 71   Weight: 61 kg (134 lb 6 4 oz)       PHYSICAL EXAMINATION:  Gen: Awake, Alert, NAD    HEENT: AT/NC, Anicteric, mmm  Neck: Supple, No elevated JVP  Resp: CTA bilaterally no w/r/r  CV: RRR +S1, S2, No m/r/g  Abd: Soft, NT/ND + BS  Ext: warm, no LE edema bilaterally  Neuro:  Follows commands, moves all extermities  Psych: Appropriate affect    --------------------------------------------------------------------------------  TREADMILL STRESS  No results found for this or any previous visit    --------------------------------------------------------------------------------  NUCLEAR STRESS TEST: No results found for this or any previous visit  No results found for this or any previous visit     --------------------------------------------------------------------------------  CATH:    Results for orders placed during the hospital encounter of 21   Cardiac catheterization    Narrative [address] [place] [address], [address]  [phone]    [facility]    Invasive Cardiovascular Lab Complete Report    Patient: Ashok Hutchison  MR number: [id number]  Account number: [de-identified]  Study date: 2021  Gender: Female  : 1970  Height: 66 1 in  Weight: 117 9 lb  BSA: 1 6 mï¾²    Allergies: NO KNOWN ALLERGIES    Diagnostic Cardiologist:  Milton Russell MD    SUMMARY    CORONARY CIRCULATION:  Left main: Normal   Circumflex: Normal     HEMODYNAMICS:  Hemodynamic assessment demonstrated mildly elevated LVEDP  INDICATIONS:  --  Congestive heart failure with cardiomyopathy  PROCEDURES PERFORMED    --  Left heart catheterization without ventriculogram   --  Left coronary angiography  --  Right coronary angiography  --  Inpatient  --  Mod Sedation Same Physician Initial 15min  --  Mod Sedation Same Physician Add 15min  --  Coronary Catheterization (w/ LHC)  PROCEDURE: The risks and alternatives of the procedures and conscious sedation were explained to the patient and informed consent was obtained  The patient was brought to the cath lab and placed on the table  The planned puncture sites  were prepped and draped in the usual sterile fashion  --  Right radial artery access  After performing an Julián's test to verify adequate ulnar artery supply to the hand, the radial site was prepped  The puncture site was infiltrated with local anesthetic  The vessel was accessed using the  modified Seldinger technique, a wire was advanced into the vessel, and a sheath was advanced over the wire into the vessel      --  Left heart catheterization without ventriculogram  A catheter was advanced over a guidewire into the ascending aorta  After recording ascending aortic pressure, the catheter was advanced across the aortic valve and left ventricular  pressure was recorded  The catheter was pulled back across the aortic valve and into the ascending aorta and pullback pressures were obtained  --  Left coronary artery angiography  A catheter was advanced over a guidewire into the aorta and positioned in the left coronary artery ostium under fluoroscopic guidance  Angiography was performed  --  Right coronary artery angiography  A catheter was advanced over a guidewire into the aorta and positioned in the right coronary artery ostium under fluoroscopic guidance  Angiography was performed  --  Inpatient  --  Mod Sedation Same Physician Initial 15min  --  Mod Sedation Same Physician Add 15min  --  Coronary Catheterization (w/ LHC)  PROCEDURE COMPLETION: The patient tolerated the procedure well and was discharged from the cath lab  TIMING: Test started at 09:55  Test concluded at 10:04  HEMOSTASIS: The sheath was removed  The site was compressed with a Hemoband  device  Hemostasis was obtained  MEDICATIONS GIVEN: Versed (2mg/2ml), 1 mg, IV, at 09:50  Fentanyl (1OOmcg/2 ml), 50 mcg, IV, at 09:50  Baby Aspirin, 324 mg, PO, at 09:53  1% Lidocaine, 1 ml, subcutaneously, at 09:55  Nitroglycerin  (200mcg/ml), 200 mcg, at 09:57  Verapamil (5mg/2ml), 2 5 mg, IV, at 09:58  Heparin 1000 units/ml, 4,000 units, IV, at 09:58  Versed (2mg/2ml), 1 mg, IV, at 10:00  Fentanyl (1OOmcg/2 ml), 50 mcg, IV, at 10:00  CONTRAST GIVEN: 20 ml  Omnipaque (350mg I /ml)  RADIATION EXPOSURE: Fluoroscopy time: 1 03 min  HEMODYNAMICS: Hemodynamic assessment demonstrated mildly elevated LVEDP  CORONARY VESSELS:   --  The coronary circulation is right dominant  --  Left main: Normal   --  LAD: The vessel was large sized  Angiography showed minor luminal irregularities    --  Circumflex: Normal   --  RCA: The vessel was large sized (dominant)  Angiography showed mild atherosclerosis  IMPRESSIONS:  There is no significant coronary artery disease  RECOMMENDATIONS  The patient should continue with the present medications  DISPOSITION:  The patient left the catheterization laboratory in stable condition  Prepared and signed by    Jenean Osler, MD  Signed 2021 10:06:10    Study diagram    Hemodynamic tables    Pressures:  Baseline  Pressures:  - HR: 76  Pressures:  - Rhythm:  Pressures:  -- Aortic Pressure (S/D/M): 121/79/62  Pressures:  -- Left Ventricle (s/edp): 113/20/--    Outputs:  Baseline  Outputs:  -- CALCULATIONS: [age]  Outputs:  -- CALCULATIONS: Body Surface Area: 1 60  Outputs:  -- CALCULATIONS: Height in cm: 168 00  Outputs:  -- CALCULATIONS: Sex: Female  Outputs:  -- CALCULATIONS: Weight in k 60       --------------------------------------------------------------------------------  ECHO:   Results for orders placed during the hospital encounter of 21   Echo complete with contrast if indicated    Narrative [address] [place] [address], [address] [place] [address]  [phone]    Transthoracic Echocardiogram  2D, M-mode, Doppler, and Color Doppler    Study date:  2021    Patient: Jane Pitts  MR number: [id number]  Account number: [de-identified]  : 1970  Age: 44 years  Gender: Female  Status: Inpatient  Location: Bedside  Height: 66 in  Weight: 127 lb  BP: 155/ 96 mmHg    Indications: Heart Failure    Diagnoses: I50 9 - Heart failure, unspecified    Sonographer:  Sherwin Zee RDCS  Referring Physician:  Lino Shaffer PA-C  Group:  [facility]  Interpreting Physician:  PRESTON Campbell     SUMMARY    LEFT VENTRICLE:  The ventricle was mildly dilated  Systolic function was markedly reduced by visual assessment  Ejection fraction was estimated to be 25 %  There was severe diffuse hypokinesis    Doppler parameters were consistent with a reversible restrictive pattern, indicative of decreased left ventricular diastolic compliance and/or increased left atrial pressure (grade 3 diastolic dysfunction)  LEFT ATRIUM:  The atrium was mildly dilated  MITRAL VALVE:  There was mild regurgitation  AORTIC VALVE:  There was trace regurgitation  TRICUSPID VALVE:  There was mild regurgitation  PULMONIC VALVE:  There was trace regurgitation  PERICARDIUM:  A small pericardial effusion was identified circumferential to the heart  There was no evidence of hemodynamic compromise  There was a large left pleural effusion  SUMMARY MEASUREMENTS  2D measurements:  Unspecified Anatomy:   %FS was 14 6 %  Ao Diam was 3 1 cm  Ao asc was 3 cm  EDV(Teich) was 137 3 ml   EF(Teich) was 30 8 %  ESV(Teich) was 95 ml  IVSd was 0 8 cm  LA Diam was 3 7 cm  LAAs A2C was 20 cm2  LAESV A-L A2C was 65 3 ml  LAESV MOD A2C was 57 8 ml  LALs A2C was 5 2 cm  LVEDV MOD A4C was 116 9 ml  LVEF MOD A4C was 26 8 %  LVESV MOD A4C was 85 6 ml  LVIDd was 5 3 cm  LVIDs was 4 6 cm  LVLd A4C was 7 5 cm  LVLs A4C was 6 7 cm  LVPWd was 1 cm  RAAs  A4C was 13 2 cm2  RAESV A-L was 36 4 ml   RAESV MOD was 35 ml  RALs was 4 1 cm  RVIDd was 3 6 cm  SV MOD A4C was 31 4 ml   SV(Teich) was 42 3 ml   CW measurements:  Unspecified Anatomy:   AV Env  Ti was 239 8 ms   AV VTI was 11 7 cm  AV Vmax was 0 9 m/s  AV Vmean was 0 5 m/s  AV maxPG was 3 1 mmHg  AV meanPG was 1 3 mmHg  TR Vmax was 3 m/s  TR maxPG was 37 mmHg  MM measurements:  Unspecified Anatomy:   TAPSE was 1 5 cm  PW measurements:  Unspecified Anatomy:   DVI was 0 7   E' Sept was 0 m/s  E/E' Sept was 29 1   LVOT Env  Ti was 247 4 ms  LVOT VTI was 8 4 cm  LVOT Vmax was 0 6 m/s  LVOT Vmean was 0 3 m/s  LVOT maxPG was 1 4 mmHg  LVOT meanPG was 0 6 mmHg  MV A Stanton  was 0 4 m/s  MV Dec Cowley was 7 3 m/s2  MV DecT was 144 4 ms   MV E Stanton was 1 1 m/s    MV E/A Ratio was 2  8    MV PHT was 41 9 ms  MVA By PHT was 5 3 cm2  HISTORY: PRIOR HISTORY: Smoker, MAXI, Pleural Effusion, CHF, Elevated Troponin, Anemia    PROCEDURE: The procedure was performed at the bedside  This was a routine study  The transthoracic approach was used  The study included complete 2D imaging, M-mode, complete spectral Doppler, and color Doppler  The heart rate was 90 bpm,  at the start of the study  Images were obtained from the parasternal, apical, subcostal, and suprasternal notch acoustic windows  Image quality was adequate  LEFT VENTRICLE: The ventricle was mildly dilated  Systolic function was markedly reduced by visual assessment  Ejection fraction was estimated to be 25 %  There was severe diffuse hypokinesis  DOPPLER: Doppler parameters were consistent  with a reversible restrictive pattern, indicative of decreased left ventricular diastolic compliance and/or increased left atrial pressure (grade 3 diastolic dysfunction)  RIGHT VENTRICLE: The size was normal  Systolic function was normal  Wall thickness was normal     LEFT ATRIUM: The atrium was mildly dilated  RIGHT ATRIUM: Size was normal     MITRAL VALVE: Valve structure was normal  There was mild thickening of the anterior and posterior leaflets  There was normal leaflet separation  DOPPLER: The transmitral velocity was within the normal range  There was no evidence for  stenosis  There was mild regurgitation  AORTIC VALVE: The valve was trileaflet  Leaflets exhibited normal thickness, normal cuspal separation, and sclerosis  DOPPLER: Transaortic velocity was within the normal range  There was no evidence for stenosis  There was trace  regurgitation  TRICUSPID VALVE: The valve structure was normal  There was normal leaflet separation  DOPPLER: The transtricuspid velocity was within the normal range  There was no evidence for stenosis  There was mild regurgitation  Estimated peak PA  pressure was 40 mmHg      PULMONIC VALVE: Leaflets exhibited normal thickness, no calcification, and normal cuspal separation  DOPPLER: The transpulmonic velocity was within the normal range  There was no evidence for stenosis  There was trace regurgitation  PERICARDIUM: A small pericardial effusion was identified circumferential to the heart  There was no evidence of hemodynamic compromise  There was a large left pleural effusion  AORTA: The root exhibited normal size  SYSTEMIC VEINS: IVC: The inferior vena cava was normal in size and course  Respirophasic changes were normal     SYSTEM MEASUREMENT TABLES    2D  %FS: 14 6 %  Ao Diam: 3 1 cm  Ao asc: 3 cm  EDV(Teich): 137 3 ml  EF(Teich): 30 8 %  ESV(Teich): 95 ml  IVSd: 0 8 cm  LA Diam: 3 7 cm  LAAs A2C: 20 cm2  LAESV A-L A2C: 65 3 ml  LAESV MOD A2C: 57 8 ml  LALs A2C: 5 2 cm  LVEDV MOD A4C: 116 9 ml  LVEF MOD A4C: 26 8 %  LVESV MOD A4C: 85 6 ml  LVIDd: 5 3 cm  LVIDs: 4 6 cm  LVLd A4C: 7 5 cm  LVLs A4C: 6 7 cm  LVPWd: 1 cm  RAAs A4C: 13 2 cm2  RAESV A-L: 36 4 ml  RAESV MOD: 35 ml  RALs: 4 1 cm  RVIDd: 3 6 cm  SV MOD A4C: 31 4 ml  SV(Teich): 42 3 ml    CW  AV Env  Ti: 239 8 ms  AV VTI: 11 7 cm  AV Vmax: 0 9 m/s  AV Vmean: 0 5 m/s  AV maxPG: 3 1 mmHg  AV meanP 3 mmHg  TR Vmax: 3 m/s  TR maxP mmHg    MM  TAPSE: 1 5 cm    PW  DVI: 0 7  E' Sept: 0 m/s  E/E' Sept: 29 1  LVOT Env  Ti: 247 4 ms  LVOT VTI: 8 4 cm  LVOT Vmax: 0 6 m/s  LVOT Vmean: 0 3 m/s  LVOT maxP 4 mmHg  LVOT meanP 6 mmHg  MV A Stanton: 0 4 m/s  MV Dec Boundary: 7 3 m/s2  MV DecT: 144 4 ms  MV E Stanton: 1 1 m/s  MV E/A Ratio: 2 8  MV PHT: 41 9 ms  MVA By PHT: 5 3 cm2    [facility]    Prepared and electronically signed by    PRESTON Nava  Signed 2021 13:28:36       No results found for this or any previous visit   --------------------------------------------------------------------------------  HOLTER  No results found for this or any previous visit    No results found for this or any previous visit   --------------------------------------------------------------------------------  CAROTIDS  No results found for this or any previous visit    --------------------------------------------------------------------------------  ECGs:  No results found for this visit on 04/23/21  Lab Results   Component Value Date    WBC 8 16 04/07/2021    HGB 12 4 04/07/2021    HCT 37 6 04/07/2021    MCV 97 04/07/2021     04/07/2021      Lab Results   Component Value Date    SODIUM 141 04/07/2021    K 4 1 04/07/2021     04/07/2021    CO2 25 04/07/2021    BUN 34 (H) 04/07/2021    CREATININE 2 02 (H) 04/07/2021    GLUC 98 01/31/2021    CALCIUM 9 0 04/07/2021      Lab Results   Component Value Date    HGBA1C 5 3 01/07/2021      No results found for: CHOL  Lab Results   Component Value Date    HDL 53 01/07/2021     Lab Results   Component Value Date    LDLCALC 187 (H) 01/07/2021     Lab Results   Component Value Date    TRIG 135 01/07/2021     No results found for: [place], [place]   Lab Results   Component Value Date    INR 0 93 01/18/2021    INR 0 95 01/06/2021    INR 0 89 02/06/2018    PROTIME 12 3 01/18/2021    PROTIME 12 5 01/06/2021    PROTIME 12 0 (L) 02/06/2018        1  Chronic combined systolic and diastolic heart failure ([address] )    2  Hypertensive urgency  -     amLODIPine (NORVASC) 5 mg tablet; Take 1 tablet (5 mg total) by mouth daily    3  Coronary artery disease involving native coronary artery of native heart without angina pectoris    4  Dyslipidemia    5  Nonischemic cardiomyopathy ([address] )    6   Tobacco use        IMPRESSION:  · Chronic systolic and diastolic congestive heart failure  · Nonischemic cardiomyopathy with recovering LV function  · LVEF 50%, abnormal GLS -96%, grade 1 diastolic dysfunction, trace MR, April 2021  · LVEF 40%, mild LV dilatation, no LGE with probable nonischemic cardiomyopathy, March 2021  · Mild MR by 2D echocardiogram, January 2021  · Hypertension  · CAD s/p cath w/ minimal luminal irregularities LAD and RCA, January 2021  · Ongoing tobacco use, probable COPD  · CKD  · Dyslipidemia  · Nephrotic range proteinuria with > 6 9 g in 24 hours  · Diffuse proliferative/sclerosing glomerulonephritis with tubular atrophy and severe interstitial fibrosis of the kidney by biopsy    PLAN:  It was a pleasure to see Tarsha Valentine in the office for follow-up CV evaluation  She is here today to review the results of her echocardiogram and MRI  The MRI demonstrated an EF 40% in March 2021 without evidence of late gadolinium enhancement  Patient's findings are most consistent with nonischemic cardiomyopathy, possible viral etiology  The echocardiogram showed even further improvement of left ventricular function with EF of 50%  Global longitudinal strain is mildly decreased  She has no symptoms concerning for angina and no signs or symptoms of heart failure  She examines to be euvolemic in the office today  Blood pressure is mildly elevated at 228 mm Hg systolic  Based on her clinical presentation, I have the following recommendations:    1  Recommend initiation of amlodipine 5 mg daily  This may help to some small extent with proteinuria  Should the patient require any further antihypertensive control, would consider either increasing her amlodipine for her hydralazine  Patient prefers to check her blood pressure at home than 2 week nursing evaluation  Will call the office if blood pressure remains elevated or has any adverse effects  2  Continue carvedilol 25 mg b i d , losartan 25 mg daily and isosorbide  3  Continue statin therapy  4  Follow-up with Nephrology has been encouraged  5  As always, I have recommend a heart healthy diet low in sodium and exercise regimen  6  We will follow up in 3 months  As always, please do not hesitate to call with any questions  Portions of the record may have been created with voice recognition software   Occasional wrong word or "sound a like" substitutions may have occurred due to the inherent limitations of voice recognition software  Read the chart carefully and recognize, using context, where substitutions have occurred          Signed: Kota Hanson DO, [facility]

## 2021-05-04 ENCOUNTER — HOSPITAL ENCOUNTER (OUTPATIENT)
Dept: RADIOLOGY | Facility: MEDICAL CENTER | Age: 51
Discharge: HOME | End: 2021-05-04
Attending: PHYSICAL MEDICINE & REHABILITATION
Payer: COMMERCIAL

## 2021-05-04 VITALS
TEMPERATURE: 97.7 F | DIASTOLIC BLOOD PRESSURE: 90 MMHG | SYSTOLIC BLOOD PRESSURE: 143 MMHG | OXYGEN SATURATION: 96 % | HEART RATE: 61 BPM | RESPIRATION RATE: 20 BRPM

## 2021-05-04 DIAGNOSIS — M16.11 PRIMARY OSTEOARTHRITIS OF ONE HIP, RIGHT: ICD-10-CM

## 2021-05-04 DIAGNOSIS — M25.551 PAIN IN RIGHT HIP: ICD-10-CM

## 2021-05-04 PROCEDURE — 20610 DRAIN/INJ JOINT/BURSA W/O US: CPT | Performed by: PHYSICAL MEDICINE & REHABILITATION

## 2021-05-04 PROCEDURE — 77002 NEEDLE LOCALIZATION BY XRAY: CPT

## 2021-05-04 PROCEDURE — 77002 NEEDLE LOCALIZATION BY XRAY: CPT | Performed by: PHYSICAL MEDICINE & REHABILITATION

## 2021-05-04 RX ORDER — BUPIVACAINE HCL/PF 2.5 MG/ML
10 VIAL (ML) INJECTION ONCE
Status: COMPLETED | OUTPATIENT
Start: 2021-05-04 | End: 2021-05-04

## 2021-05-04 RX ORDER — METHYLPREDNISOLONE ACETATE 40 MG/ML
40 INJECTION, SUSPENSION INTRA-ARTICULAR; INTRALESIONAL; INTRAMUSCULAR; PARENTERAL; SOFT TISSUE ONCE
Status: COMPLETED | OUTPATIENT
Start: 2021-05-04 | End: 2021-05-04

## 2021-05-04 RX ADMIN — Medication 3 ML: at 09:04

## 2021-05-04 RX ADMIN — METHYLPREDNISOLONE ACETATE 40 MG: 40 INJECTION, SUSPENSION INTRA-ARTICULAR; INTRALESIONAL; INTRAMUSCULAR; PARENTERAL; SOFT TISSUE at 09:04

## 2021-05-04 RX ADMIN — IOHEXOL 2 ML: 300 INJECTION, SOLUTION INTRAVENOUS at 09:04

## 2021-05-04 NOTE — DISCHARGE INSTRUCTIONS

## 2021-05-04 NOTE — H&P
History of Present Illness:  The patient is a 46 y o  female who presents with complaints of right hip pain    Patient Active Problem List   Diagnosis    Primary osteoarthritis of one hip, left    Pre-operative clearance    Anxiety    Current smoker    Painless hematuria    Mild anemia    Status post total hip replacement, left    Hypertensive urgency    Acute respiratory failure with hypoxia (HCC)    Hypokalemia    MAXI (acute kidney injury) (HCC)    Bilateral pleural effusion    Acute systolic congestive heart failure (HCC)    Elevated troponin    ADHD    Closed fracture of distal end of right fibula    Macrocytic anemia    Hypertension    Depression    Hyperlipidemia    CHF (congestive heart failure) (HCC)       Past Medical History:   Diagnosis Date    Anxiety     Depression     Osteoarthritis        Past Surgical History:   Procedure Laterality Date    CT NEEDLE BIOPSY KIDNEY  3/2/2021    ECTOPIC PREGNANCY SURGERY      IR BIOPSY KIDNEY COLUMBIA KIT NO LATERALITY  2/17/2021    IR THORACENTESIS  1/12/2021    [place] TOTAL HIP ARTHROPLASTY Left 3/5/2018    Procedure: ARTHROPLASTY HIP TOTAL;  Surgeon: Carla Rhodes DO;  Location: [facility] OR;  Service: Orthopedics    WISDOM TOOTH EXTRACTION      x1         Current Outpatient Medications:     amLODIPine (NORVASC) 5 mg tablet, Take 1 tablet (5 mg total) by mouth daily, Disp: 90 tablet, Rfl: 1    atorvastatin (LIPITOR) 40 mg tablet, Take 1 tablet (40 mg total) by mouth daily, Disp: 90 tablet, Rfl: 1    carvedilol (COREG) 25 mg tablet, Take 1 tablet (25 mg total) by mouth 2 (two) times a day with meals, Disp: 180 tablet, Rfl: 1    cyanocobalamin (VITAMIN B-12) 1000 MCG tablet, Take 1 tablet (1,000 mcg total) by mouth daily, Disp: 30 tablet, Rfl: 0    folic acid (FOLVITE) 1 mg tablet, Take 1 tablet (1 mg total) by mouth daily, Disp: 90 tablet, Rfl: 1    furosemide (LASIX) 20 mg tablet, Take 1 tablet (20 mg total) by mouth every evening, Disp: 90 tablet, Rfl: 1    furosemide (LASIX) 40 mg tablet, Take 1 tablet (40 mg total) by mouth every morning, Disp: 90 tablet, Rfl: 1    Glucos-Chondroit-Hyaluron-MSM (GLUCOSAMINE CHONDROITIN JOINT PO), Take 1 tablet by mouth as needed, Disp: , Rfl:     hydrALAZINE (APRESOLINE) 25 mg tablet, Take 1 tablet (25 mg total) by mouth 3 (three) times a day, Disp: 270 tablet, Rfl: 1    isosorbide dinitrate (ISORDIL) 10 mg tablet, Take 1 tablet (10 mg total) by mouth 3 (three) times daily after meals, Disp: 270 tablet, Rfl: 1    lidocaine (LMX) 4 % cream, Apply topically as needed for mild pain or moderate pain (Patient not taking: Reported on 4/23/2021), Disp: 30 g, Rfl: 0    losartan (COZAAR) 25 mg tablet, Take 1 tablet (25 mg total) by mouth daily, Disp: 30 tablet, Rfl: 3    methocarbamol (ROBAXIN) 500 mg tablet, Take 1 tablet (500 mg total) by mouth 2 (two) times a day (Patient not taking: Reported on 3/8/2021), Disp: 20 tablet, Rfl: 0    methylphenidate (RITALIN) 20 MG tablet, TK 1 T PO BID, Disp: , Rfl: 0    PARoxetine (PAXIL) 30 mg tablet, Take 60 mg by mouth daily , Disp: , Rfl: 5    QUEtiapine (SEROquel) 100 mg tablet, Take 100 mg by mouth daily at bedtime, Disp: , Rfl:     Current Facility-Administered Medications:     bupivacaine (PF) (MARCAINE) 0 25 % injection 10 mL, 10 mL, Intra-articular, Once, Leetta Jackson, DO    iohexol (OMNIPAQUE) 300 mg/mL injection 50 mL, 50 mL, Injection, Once, Liborioa Jackson, DO    methylPREDNISolone acetate (DEPO-MEDROL) injection 40 mg, 40 mg, Intra-articular, Once, Leetta Jackson, DO    No Known Allergies    Physical Exam:   Vitals:    05/04/21 0851   BP: 126/86   Pulse: 63   Resp: 18   Temp: 97 7 °F (36 5 °C)   SpO2: 97%     General: Awake, Alert, Oriented x 3, Mood and affect appropriate  Respiratory: Respirations even and unlabored  Cardiovascular: Peripheral pulses intact; no edema  Musculoskeletal Exam: right hip pain    ASA Score: 2    Patient/Chart Verification  Patient ID Verified: Verbal  Consents Confirmed: Procedural, To be obtained in the Pre-Procedure area  H&P( within 30 days) Verified: To be obtained in the Pre-Procedure area  Allergies Reviewed: Yes  Anticoag/NSAID held?: NA  Currently on antibiotics?: No  Pregnancy denied?: Yes    Assessment:   1  Primary osteoarthritis of one hip, right    2   Pain in right hip        Plan: Right Hip IA Steroid Injection Under Guidance ([facility])

## 2021-05-11 ENCOUNTER — TELEPHONE (OUTPATIENT)
Dept: PAIN MEDICINE | Facility: CLINIC | Age: 51
End: 2021-05-11

## 2021-05-11 NOTE — TELEPHONE ENCOUNTER
1st attempt  Lm for patient to call back with % improvement and pain level  Unable to leave a message

## 2021-06-11 ENCOUNTER — TELEPHONE (OUTPATIENT)
Dept: NEPHROLOGY | Facility: CLINIC | Age: 51
End: 2021-06-11

## 2021-06-11 NOTE — TELEPHONE ENCOUNTER
Left message for patient to remind her of her appointment with Dr Carissa Varela, and there is a BMP to be done

## 2021-06-14 ENCOUNTER — OFFICE VISIT (OUTPATIENT)
Dept: OBGYN CLINIC | Facility: MEDICAL CENTER | Age: 51
End: 2021-06-14
Payer: COMMERCIAL

## 2021-06-14 VITALS
TEMPERATURE: 97.5 F | WEIGHT: 133 LBS | HEART RATE: 81 BPM | BODY MASS INDEX: 21.47 KG/M2 | SYSTOLIC BLOOD PRESSURE: 115 MMHG | DIASTOLIC BLOOD PRESSURE: 78 MMHG

## 2021-06-14 DIAGNOSIS — F17.200 SMOKER: ICD-10-CM

## 2021-06-14 DIAGNOSIS — M16.11 PRIMARY OSTEOARTHRITIS OF ONE HIP, RIGHT: Primary | ICD-10-CM

## 2021-06-14 PROCEDURE — 99213 OFFICE O/P EST LOW 20 MIN: CPT | Performed by: ORTHOPAEDIC SURGERY

## 2021-06-14 NOTE — PROGRESS NOTES
Assessment/Plan     1  Primary osteoarthritis of one hip, right    2  Smoker      Orders Placed This Encounter   Procedures    Nicotine, Blood       · Patient has severe right hip OA  · Patient would like to proceed with R MARCIA  She is still smoking at this time  Discussed smoking cessation  Order placed for nicotine testing  Patient aware she must wait 2 weeks after quitting to obtain the blood work  Advised patient to reach out to PCP if having issues quitting  · Continue tylenol as needed for pain up to 3000 mg per day  Avoids NSAIDs due to kidney issues  · Continue home exercises  · Patient will discuss MARCIA with cardiologist and nephrologist at upcoming appointments  · Will get updated x-rays with mag marker when patient returns for surgery scheduling  Patient had IA CSI on 5/4/21 and is aware she must wait until 8/4 or after for MARCIA  Return in about 6 weeks (around 7/26/2021) for discuss TKA  I answered all of the patient's questions during the visit and provided education of the patient's condition during the visit  The patient verbalized understanding of the information given and agrees with the plan  This note was dictated using Connected software  It may contain errors including improperly dictated words  Please contact physician directly for any questions  Subjective   Chief Complaint:   Chief Complaint   Patient presents with    Right Hip - Follow-up       HPI:  Anne Jimenez is a 46 y o  female who presents for follow up for right hip pain due to severe osteoarthritis  Patient had a right hip steroid injection on 05/04/2021 and states she had some relief for the first few hours  Patient states she did not have complete relief from the injection  She is having constant achy pain over the lateral aspect of the right hip radiating to the groin region  She has sharp pain with activity  She feels her right leg is shorter  She denies any radiating leg pain    Pain is worse with walking, weight-bearing, and going up steps  She recently started using a cane for assistance when walking  She is taking Tylenol as needed for pain  Patient cannot take NSAIDs due to chronic kidney disease  She had a left MARCIA on 3/5/2018 and is happy with this side  She is a smoker and smokes five cigarettes a day  She does see cardiologist for congestive heart failure  She is not a diabetic, no history of MRSA, no history of blood clots or family history of blood clots, no history of hep C or HIV  Her last dental appointment was two years ago  Review of Systems  See HPI for musculoskeletal review  All other systems reviewed are negative     History:  Past Medical History:   Diagnosis Date    Anxiety     Depression     Osteoarthritis      Past Surgical History:   Procedure Laterality Date    CT NEEDLE BIOPSY KIDNEY  3/2/2021    ECTOPIC PREGNANCY SURGERY      IR BIOPSY KIDNEY COLUMBIA KIT NO LATERALITY  2/17/2021    IR THORACENTESIS  1/12/2021    [place] TOTAL HIP ARTHROPLASTY Left 3/5/2018    Procedure: ARTHROPLASTY HIP TOTAL;  Surgeon: Carol Perales DO;  Location: [facility];  Service: Orthopedics    WISDOM TOOTH EXTRACTION      x1     Social History   Social History     Substance and Sexual Activity   Alcohol Use Not Currently    Comment: social drinker     Social History     Substance and Sexual Activity   Drug Use No     Social History     Tobacco Use   Smoking Status Former Smoker    Packs/day: 0 50    Years: 20 00    Pack years: 10 00    Types: Cigarettes   Smokeless Tobacco Never Used   Tobacco Comment    Working on Reducing for surgery        Family History:   Family History   Problem Relation Age of Onset    Cancer Family     Cancer Father     Atrial fibrillation Father     Hypertension Father        Current Outpatient Medications on File Prior to Visit   Medication Sig Dispense Refill    amLODIPine (NORVASC) 5 mg tablet Take 1 tablet (5 mg total) by mouth daily 90 tablet 1    atorvastatin (LIPITOR) 40 mg tablet Take 1 tablet (40 mg total) by mouth daily 90 tablet 1    carvedilol (COREG) 25 mg tablet Take 1 tablet (25 mg total) by mouth 2 (two) times a day with meals 180 tablet 1    cyanocobalamin (VITAMIN B-12) 1000 MCG tablet Take 1 tablet (1,000 mcg total) by mouth daily 30 tablet 0    folic acid (FOLVITE) 1 mg tablet Take 1 tablet (1 mg total) by mouth daily 90 tablet 1    furosemide (LASIX) 20 mg tablet Take 1 tablet (20 mg total) by mouth every evening 90 tablet 1    furosemide (LASIX) 40 mg tablet Take 1 tablet (40 mg total) by mouth every morning 90 tablet 1    Glucos-Chondroit-Hyaluron-MSM (GLUCOSAMINE CHONDROITIN JOINT PO) Take 1 tablet by mouth as needed      hydrALAZINE (APRESOLINE) 25 mg tablet Take 1 tablet (25 mg total) by mouth 3 (three) times a day 270 tablet 1    isosorbide dinitrate (ISORDIL) 10 mg tablet Take 1 tablet (10 mg total) by mouth 3 (three) times daily after meals 270 tablet 1    lidocaine (LMX) 4 % cream Apply topically as needed for mild pain or moderate pain (Patient not taking: Reported on 4/23/2021) 30 g 0    losartan (COZAAR) 25 mg tablet Take 1 tablet (25 mg total) by mouth daily 30 tablet 3    methocarbamol (ROBAXIN) 500 mg tablet Take 1 tablet (500 mg total) by mouth 2 (two) times a day (Patient not taking: Reported on 3/8/2021) 20 tablet 0    methylphenidate (RITALIN) 20 MG tablet TK 1 T PO BID  0    PARoxetine (PAXIL) 30 mg tablet Take 60 mg by mouth daily   5    QUEtiapine (SEROquel) 100 mg tablet Take 100 mg by mouth daily at bedtime       No current facility-administered medications on file prior to visit  No Known Allergies     Objective     /78   Pulse 81   Temp 97 5 °F (36 4 °C)   Wt 60 3 kg (133 lb)   BMI 21 47 kg/m²      PE:  AAOx 3  WDWN  Hearing intact, no drainage from eyes  no audible wheezing  no abdominal distension  LE compartments soft, skin intact    right hip:   No dislocation/deformity  Positive  [facility]  ROM: 10- 130  Int rot- 25  Ext rot- 25  Neg   Impingement test  No TTP over greater trochanter  No TTP over SIJ    AT/GS intact

## 2021-06-16 ENCOUNTER — LAB (OUTPATIENT)
Dept: LAB | Facility: HOSPITAL | Age: 51
End: 2021-06-16
Payer: COMMERCIAL

## 2021-06-16 DIAGNOSIS — E87.6 HYPOKALEMIA: ICD-10-CM

## 2021-06-16 DIAGNOSIS — I10 ESSENTIAL HYPERTENSION: ICD-10-CM

## 2021-06-16 LAB
ANION GAP SERPL CALCULATED.3IONS-SCNC: 6 MMOL/L (ref 4–13)
BUN SERPL-MCNC: 34 MG/DL (ref 5–25)
CALCIUM SERPL-MCNC: 9 MG/DL (ref 8.3–10.1)
CHLORIDE SERPL-SCNC: 107 MMOL/L (ref 100–108)
CO2 SERPL-SCNC: 31 MMOL/L (ref 21–32)
CREAT SERPL-MCNC: 2.06 MG/DL (ref 0.6–1.3)
GFR SERPL CREATININE-BSD FRML MDRD: 27 ML/MIN/1.73SQ M
GLUCOSE SERPL-MCNC: 93 MG/DL (ref 65–140)
POTASSIUM SERPL-SCNC: 4.6 MMOL/L (ref 3.5–5.3)
SODIUM SERPL-SCNC: 144 MMOL/L (ref 136–145)

## 2021-06-16 PROCEDURE — 80048 BASIC METABOLIC PNL TOTAL CA: CPT

## 2021-06-16 PROCEDURE — 36415 COLL VENOUS BLD VENIPUNCTURE: CPT

## 2021-06-17 ENCOUNTER — OFFICE VISIT (OUTPATIENT)
Dept: NEPHROLOGY | Facility: CLINIC | Age: 51
End: 2021-06-17
Payer: COMMERCIAL

## 2021-06-17 VITALS
BODY MASS INDEX: 22.76 KG/M2 | SYSTOLIC BLOOD PRESSURE: 94 MMHG | HEART RATE: 63 BPM | WEIGHT: 141.6 LBS | DIASTOLIC BLOOD PRESSURE: 60 MMHG | HEIGHT: 66 IN

## 2021-06-17 DIAGNOSIS — N18.32 STAGE 3B CHRONIC KIDNEY DISEASE (HCC): Primary | ICD-10-CM

## 2021-06-17 DIAGNOSIS — E87.6 HYPOKALEMIA: ICD-10-CM

## 2021-06-17 DIAGNOSIS — J90 BILATERAL PLEURAL EFFUSION: ICD-10-CM

## 2021-06-17 DIAGNOSIS — I16.0 HYPERTENSIVE URGENCY: ICD-10-CM

## 2021-06-17 DIAGNOSIS — N17.9 AKI (ACUTE KIDNEY INJURY) (HCC): ICD-10-CM

## 2021-06-17 DIAGNOSIS — I50.9 CONGESTIVE HEART FAILURE, UNSPECIFIED HF CHRONICITY, UNSPECIFIED HEART FAILURE TYPE (HCC): ICD-10-CM

## 2021-06-17 PROBLEM — N04.9 NEPHROTIC SYNDROME: Status: ACTIVE | Noted: 2021-06-17

## 2021-06-17 PROBLEM — E88.09 HYPOALBUMINEMIA: Status: ACTIVE | Noted: 2021-06-17

## 2021-06-17 PROCEDURE — 3008F BODY MASS INDEX DOCD: CPT | Performed by: INTERNAL MEDICINE

## 2021-06-17 PROCEDURE — 99214 OFFICE O/P EST MOD 30 MIN: CPT | Performed by: INTERNAL MEDICINE

## 2021-06-17 PROCEDURE — 1036F TOBACCO NON-USER: CPT | Performed by: INTERNAL MEDICINE

## 2021-06-17 RX ORDER — AMLODIPINE BESYLATE 2.5 MG/1
2.5 TABLET ORAL DAILY
Qty: 90 TABLET | Refills: 1 | Status: SHIPPED | OUTPATIENT
Start: 2021-06-17 | End: 2021-12-13

## 2021-06-17 NOTE — PROGRESS NOTES
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 and sclerosing glomerular nephritis with abundant intra capillary histiocytes seen on renal biopsy done March 18th, severe tubular atrophy and interstitial fibrosis, moderate chronic inflammation, severe arterial sclerosis on arteriolosclerosis with hyaline nose  o Clear-cut diagnostic reason for her renal failure still unclear, cryoglobulins were negative which can be seen when histiocytes are noted on renal biopsy, this could be infectious related GN  Which also may have contributed to patient's acute decrease  Ejection fraction which is now improved  o she also seems to have elements of hypertensive kidney disease  o at this point we are treating  The proteinuria with losartan and will monitor her volume status  o  blood pressures are much better controlled, now having low blood pressures in the 09Y to 614 systolic range will decrease the amlodipine to 2 5 mg daily from 5 mg  o her creatinine increased to about 2 with the addition of losartan which is appropriate  o her proteinuria from April remains at about 8 g, her albumin was 2 4     Electrolytes/Acid Base  o  electrolytes and acid-base status are acceptable     Blood Pressure- hypertension in the setting of cardiomyopathy  o  had a low output with an EF of 25%, this has improved to 50%  o  following with Cardiology  o  currently on carvedilol 25 mg twice daily  o  furosemide 40 mg in the a m  and 20 mg in the p m   o  Isordil 10 mg 3 times daily  o  hydralazine 25 mg 3 times daily  o  added losartan 25 mg daily  o  amlodipine 5 mg daily- decreased to 2 5 mg daily and monitor blood pressure     Anemia of CKD  o   Monitor H&H     BMD  o  continue to monitor parameters     Risk Reduction/Clinical Course  o  continue cardiovascular risk reduction measures    DISCUSSION/SUMMARY    -it was nice seeing Mamta Mcgovern again today  -given advanced proteinuria she is at high risk for progression of this CKD we discuss this today  -she is seeing orthopedics and  May need total hip operation will provide adjustments pre and postoperatively to her medications  For acute kidney injury risk reduction  - decreased her amlodipine to 2 5 mg daily from 5 mg daily because of low blood pressure  - Follow-up every 3 months with repeat blood work and urine studies  - she was in agreement with plan and had no further questions      HPI: Lisa Stafford is a 46 y o  female who is here for  Follow-up     she was seen in the hospital when she was admitted with acute decompensated congestive heart failure, she had a cardiac catheterization that was negative, she subsequently also had a urinalysis and urine protein to creatinine ratio upwards of 7 g, she had a serologic workup at the time that was negative should pulmonary edema and was treated with diuretics her EF was 25% during the hospitalization her blood pressures have now improved and she has hypertension she also has a life vest in place but took this off because of some back spasm     her most recent echocardiogram shows her EF is better, she is now having constant pain in her hip, and is being evaluated for total hip surgery, from her blood pressure standpoint her blood pressures remained elevated and she was started on amlodipine, now blood pressures are on the lower side in the 10X to 269 systolic range, she denies any acute chest pain or shortness of breath no fevers or chills no nausea vomiting no diarrhea constipation S she has some lower extremity edema at the end of the day has some headaches off and on but otherwise is stable now walking with a cane      ROS:   All the systems were reviewed and were negative except as documented on the HPI      Allergies: Patient has no known allergies  Medications:   Current Outpatient Medications:     amLODIPine (NORVASC) 2 5 mg tablet, Take 1 tablet (2 5 mg total) by mouth daily, Disp: 90 tablet, Rfl: 1    atorvastatin (LIPITOR) 40 mg tablet, Take 1 tablet (40 mg total) by mouth daily, Disp: 90 tablet, Rfl: 1    carvedilol (COREG) 25 mg tablet, Take 1 tablet (25 mg total) by mouth 2 (two) times a day with meals, Disp: 180 tablet, Rfl: 1    cyanocobalamin (VITAMIN B-12) 1000 MCG tablet, Take 1 tablet (1,000 mcg total) by mouth daily, Disp: 30 tablet, Rfl: 0    folic acid (FOLVITE) 1 mg tablet, Take 1 tablet (1 mg total) by mouth daily, Disp: 90 tablet, Rfl: 1    furosemide (LASIX) 20 mg tablet, Take 1 tablet (20 mg total) by mouth every evening, Disp: 90 tablet, Rfl: 1    furosemide (LASIX) 40 mg tablet, Take 1 tablet (40 mg total) by mouth every morning, Disp: 90 tablet, Rfl: 1    Glucos-Chondroit-Hyaluron-MSM (GLUCOSAMINE CHONDROITIN JOINT PO), Take 1 tablet by mouth as needed, Disp: , Rfl:     hydrALAZINE (APRESOLINE) 25 mg tablet, Take 1 tablet (25 mg total) by mouth 3 (three) times a day, Disp: 270 tablet, Rfl: 1    isosorbide dinitrate (ISORDIL) 10 mg tablet, Take 1 tablet (10 mg total) by mouth 3 (three) times daily after meals, Disp: 270 tablet, Rfl: 1    losartan (COZAAR) 25 mg tablet, Take 1 tablet (25 mg total) by mouth daily, Disp: 30 tablet, Rfl: 3    methylphenidate (RITALIN) 20 MG tablet, TK 1 T PO BID, Disp: , Rfl: 0    PARoxetine (PAXIL) 30 mg tablet, Take by mouth daily Take 1 5 tabs by mouth once daily  , Disp: , Rfl: 5    QUEtiapine (SEROquel) 100 mg tablet, Take 100 mg by mouth daily at bedtime, Disp: , Rfl:     Past Medical History:   Diagnosis Date    Anemia     Anxiety     Arthritis 2017    CHF (congestive heart failure) (HCC)     Chronic kidney disease 01/0/2021    Coronary artery disease 1/6/2021    Depression     Hyperlipidemia 1/7/2021    Hypertension 01/06/2021    Osteoarthritis      Past Surgical History:   Procedure Laterality Date    CT NEEDLE BIOPSY KIDNEY  3/2/2021    ECTOPIC PREGNANCY SURGERY      IR BIOPSY KIDNEY COLUMBIA KIT NO LATERALITY  2/17/2021    IR THORACENTESIS  1/12/2021    [place] TOTAL HIP ARTHROPLASTY Left 3/5/2018    Procedure: ARTHROPLASTY HIP TOTAL;  Surgeon: Faisal Carrasco DO;  Location: [facility];  Service: Orthopedics    RENAL BIOPSY  03/02/2021    WISDOM TOOTH EXTRACTION      x1     Family History   Problem Relation Age of Onset    Hypertension Mother     Cancer Family     Cancer Father         PROSTATE    Atrial fibrillation Father     Hypertension Father     Diabetes Brother     Hypertension Sister       reports that she quit smoking about 5 months ago  Her smoking use included cigarettes  She has a 10 00 pack-year smoking history  She has quit using smokeless tobacco  She reports previous alcohol use  She reports that she does not use drugs  Physical Exam:   Vitals:    06/17/21 0846   BP: 94/60   BP Location: Left arm   Patient Position: Sitting   Cuff Size: Standard   Pulse: 63   Weight: 64 2 kg (141 lb 9 6 oz)   Height: 5' 6" (1 676 m)     Body mass index is 22 85 kg/m²      General: conscious, cooperative, in not acute distress  Eyes: conjunctivae pink, anicteric sclerae  ENT: lips and mucous membranes moist  Neck: supple, no JVD  Chest:  Decreased breath sounds on the left side  CVS: distinct S1 & S2, normal rate, regular rhythm  Abdomen: soft, non-tender, non-distended, normoactive bowel sounds  Extremities: no edema of both legs  Skin: no rash  Neuro: awake, alert, oriented      Lab Results:    Results for orders placed or performed in visit on 75/37/39   Basic metabolic panel   Result Value Ref Range    Sodium 144 136 - 145 mmol/L    Potassium 4 6 3 5 - 5 3 mmol/L    Chloride 107 100 - 108 mmol/L    CO2 31 21 - 32 mmol/L    ANION GAP 6 4 - 13 mmol/L    BUN 34 (H) 5 - 25 mg/dL    Creatinine 2 06 (H) 0 60 - 1 30 mg/dL    Glucose 93 65 - 140 mg/dL    Calcium 9 0 8 3 - 10 1 mg/dL    eGFR 27 ml/min/1 73sq m       Portions of the record may have been created with voice recognition software  Occasional wrong word or "sound a like" substitutions may have occurred due to the inherent limitations of voice recognition software  Read the chart carefully and recognize, using context, where substitutions have occurred  If you have any questions, please contact the dictating provider

## 2021-06-17 NOTE — PATIENT INSTRUCTIONS
Chronic Kidney Disease   WHAT YOU NEED TO KNOW:   Chronic kidney disease (CKD) is the gradual and permanent loss of kidney function  It is also called chronic kidney failure, or chronic renal insufficiency  Normally, the kidneys remove fluid, chemicals, and waste from your blood  These wastes are turned into urine by your kidneys  CKD may worsen over time and lead to kidney failure  Your CKD team will help you and your family plan for your care at home  The team will help you create goals and find ways to meet your goals  Your care plan may change over time as your needs change  DISCHARGE INSTRUCTIONS:   Call your local emergency number (911 in the [address]) if:   · You have a seizure  · You have shortness of breath  Call your doctor or nephrologist if:   · You are confused and very drowsy  · You suddenly gain or lose more weight than your healthcare provider has told you is okay  · You have itchy skin or a rash  · You urinate more or less than you normally do  · You have blood in your urine  · You have nausea and are vomiting  · You have fatigue or muscle weakness  · You have hiccups that will not stop  · You have questions or concerns about your condition or care  Medicines:   · Medicines  may be given to decrease blood pressure and get rid of extra fluid  You may also receive medicine to manage health conditions that may occur with CKD, such as anemia, diabetes, and heart disease  · Take your medicine as directed  Contact your healthcare provider if you think your medicine is not helping or if you have side effects  Tell him or her if you are allergic to any medicine  Keep a list of the medicines, vitamins, and herbs you take  Include the amounts, and when and why you take them  Bring the list or the pill bottles to follow-up visits  Carry your medicine list with you in case of an emergency  What you can do to manage CKD: Management may include making some lifestyle changes  Tell your healthcare provider if you have any concerns about being able to make the changes  He or she can help you find solutions, including working with specialists  Ask for help creating a plan to break large goals into smaller steps  Your plan may include any of the following:  · Manage other health conditions  Your healthcare provider will work with you to make a care plan that meets your needs  You will be checked regularly for heart disease or other conditions that can make CKD worse, such as diabetes  Your blood pressure will be closely monitored  You will also get a target blood pressure and help making a plan to reach your target  This may include taking your blood pressure at home  · Maintain a healthy weight  Extra weight can strain your kidneys  Ask what a healthy weight is for you  Your provider can help you create a weight loss plan if you are overweight  · Create an exercise plan  Regular exercise can help you manage CKD, high blood pressure, and diabetes  Exercise also helps control weight  Your provider can help you create exercise goals and a plan to reach those goals  For example, your goal may be to exercise for 30 minutes in a day  Your plan can include breaking exercise into 10 minute sessions, 3 times during the day  · Create a healthy eating plan  Your provider may tell you to eat food low in sodium (salt), potassium, phosphorus, or protein  A dietitian can help you plan meals if needed  Ask how much liquid to drink each day and which liquids are best for you  · Limit alcohol as directed  Alcohol can cause fluid retention and can affect your kidneys  Ask how much alcohol is safe for you  A drink of alcohol is 12 ounces of beer, 5 ounces of wine, or 1½ ounces of liquor  · Do not smoke  Nicotine and other chemicals in cigarettes and cigars can cause kidney damage  Ask your provider for information if you currently smoke and need help to quit   E-cigarettes or smokeless tobacco still contain nicotine  Talk to your provider before you use these products  · Ask about over-the-counter medicines  Medicines such as NSAIDs and laxatives may harm your kidneys  Some cough and cold medicines can raise your blood pressure  Always ask if a medicine is safe before you take it  · Ask about vaccines you may need  Infections such as pneumonia, influenza, and hepatitis can be more harmful or more likely to occur in a person who has CKD  Vaccines lower your risk for infection  Follow up with your doctor as directed: You will need to return for tests to monitor your kidney and nerve function, and your parathyroid hormone level  Your medicines may be changed, based on certain test results  You may also be referred to a nephrologist (kidney specialist)  Write down your questions so you remember to ask them during your visits  © Copyright [address] Information is for End User's use only and may not be sold, redistributed or otherwise used for commercial purposes  All illustrations and images included in CareNotes® are the copyrighted property of [employer] D [employer] M [employer]  or [facility] Erik Kirby   The above information is an  only  It is not intended as medical advice for individual conditions or treatments  Talk to your doctor, nurse or pharmacist before following any medical regimen to see if it is safe and effective for you

## 2021-06-29 NOTE — PROGRESS NOTES
Pulmonary Follow Up Note   Miguel Marques 46 y o  female MRN: [id number]  6/30/2021      Assessment:    Acute respiratory failure with hypoxia (HCC)   Secondary to a volume overload with underlying heart failure and renal disease  Has resolved  Bilateral pleural effusion    Patient developed bilateral pleural effusions in January  Underwent thoracentesis that showed transudative effusions without evidence of infection or malignancy  Effusions were likely related to underlying acute a cardiomyopathy and renal failure  Unfortunately does not have a unifying diagnosis  Renal biopsy was not consistent with amyloid or sarcoid or specific disease  Cardiac MRI without evidence of infiltrative disease  Autoimmune workup unrevealing at this point  Plan  Will repeat chest x-ray to ensure the effusions have resolved  If the effusions recur her may benefit from CT of the chest to assess for any lymphadenopathy  Lymphadenopathy present could consider biopsy  CHF (congestive heart failure) (HCC)  Wt Readings from Last 3 Encounters:   06/30/21 63 kg (139 lb)   06/17/21 64 2 kg (141 lb 9 6 oz)   06/14/21 60 3 kg (133 lb)           Unclear etiology  Developed an acute cardiomyopathy  Fortunately ejection fraction did improve  No evidence of decompensation during today's assessment  Continue current cardiac medications  Follow-up with Cardiology  Primary osteoarthritis of one hip, left    Patient due for right hip surgery in August   At this time she is asymptomatic from pulmonary perspective  And I did not see any pulmonary restrictions for her going forward with the operation  Plan  obtain chest x-ray   Pulmonary function test   Strongly recommend smoking cessation  Plan:    Diagnoses and all orders for this visit:    Bilateral pleural effusion  -     XR chest pa & lateral; Future  -     Complete PFT; Future    Tobacco use disorder  -     Complete PFT;  Future    Acute respiratory failure with hypoxia (HCC)    Combined systolic and diastolic congestive heart failure, unspecified HF chronicity ([address] )        Return in about 1 month (around 7/30/2021)  History of Present Illness   HPI:  Nani Steward is a 46 y o  female with a history of CHF, CKD stage 3, nephrotic syndrome who presents as a hospital follow-up  She was admitted to [facility] in Jan, 2021 due to sob (had broke her ankle previous to that time)  In ED she was found to have hypoxic respiratory failure  CT showed no evidence of PE  Echo showed an EF of 25%  She underwent cardiac catheterization that showed   Only minor luminal irregularities in the LAD and mild atherosclerosis of the RCA  Follow-up echocardiogram in April showed improvement in ejection fraction to 50% along with grade 1 diastolic dysfunction  She underwent thoracentesis that showed a transudative pleural effusion  She underwent kidney biopsy in March that was not consistent with specific disease  States that she has been feeling well  She is not experiencing sob, cough, or wheezing  She does have right hip pain and will undergo hip replacement in August     She has been smoking for past 20 years--previously over 1 pack per day and now 1/4 per day  She states that she is interested in quitting  She is a pediatric LPN, 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  She does not have any acute complaints today  Review of Systems   Constitutional: Negative for chills, fatigue and fever  HENT: Negative for congestion, nosebleeds, postnasal drip, rhinorrhea, sinus pressure and sore throat  Eyes: Negative for discharge, redness and itching  Respiratory: Negative for cough, choking, chest tightness, shortness of breath, wheezing and stridor  Cardiovascular: Positive for leg swelling  Negative for chest pain and palpitations  Gastrointestinal: Negative for blood in stool     Genitourinary: Negative for difficulty urinating and dysuria  Musculoskeletal: Negative for arthralgias, joint swelling and myalgias  Hip pain    Skin: Negative for color change and rash  Neurological: Negative for light-headedness and headaches  Hematological: Negative for adenopathy         Historical Information   Past Medical History:   Diagnosis Date    Anemia     Anxiety     Arthritis 2017    CHF (congestive heart failure) (HCC)     Chronic kidney disease 01/0/2021    Coronary artery disease 1/6/2021    Depression     Hyperlipidemia 1/7/2021    Hypertension 01/06/2021    Osteoarthritis      Past Surgical History:   Procedure Laterality Date    CT NEEDLE BIOPSY KIDNEY  3/2/2021    ECTOPIC PREGNANCY SURGERY      IR BIOPSY KIDNEY COLUMBIA KIT NO LATERALITY  2/17/2021    IR THORACENTESIS  1/12/2021    OH TOTAL HIP ARTHROPLASTY Left 3/5/2018    Procedure: ARTHROPLASTY HIP TOTAL;  Surgeon: Tomás Vanessa DO;  Location: [facility];  Service: Orthopedics    RENAL BIOPSY  03/02/2021    WISDOM TOOTH EXTRACTION      x1     Family History   Problem Relation Age of Onset    Hypertension Mother     Cancer Family     Cancer Father         PROSTATE    Atrial fibrillation Father     Hypertension Father     Diabetes Brother     Hypertension Sister          Meds/Allergies     Current Outpatient Medications:     amLODIPine (NORVASC) 2 5 mg tablet, Take 1 tablet (2 5 mg total) by mouth daily, Disp: 90 tablet, Rfl: 1    atorvastatin (LIPITOR) 40 mg tablet, Take 1 tablet (40 mg total) by mouth daily, Disp: 90 tablet, Rfl: 1    carvedilol (COREG) 25 mg tablet, Take 1 tablet (25 mg total) by mouth 2 (two) times a day with meals, Disp: 180 tablet, Rfl: 1    cyanocobalamin (VITAMIN B-12) 1000 MCG tablet, Take 1 tablet (1,000 mcg total) by mouth daily, Disp: 30 tablet, Rfl: 0    folic acid (FOLVITE) 1 mg tablet, Take 1 tablet (1 mg total) by mouth daily, Disp: 90 tablet, Rfl: 1    furosemide (LASIX) 20 mg tablet, Take 1 tablet (20 mg total) by mouth every evening, Disp: 90 tablet, Rfl: 1    furosemide (LASIX) 40 mg tablet, Take 1 tablet (40 mg total) by mouth every morning, Disp: 90 tablet, Rfl: 1    Glucos-Chondroit-Hyaluron-MSM (GLUCOSAMINE CHONDROITIN JOINT PO), Take 1 tablet by mouth as needed, Disp: , Rfl:     hydrALAZINE (APRESOLINE) 25 mg tablet, Take 1 tablet (25 mg total) by mouth 3 (three) times a day, Disp: 270 tablet, Rfl: 1    isosorbide dinitrate (ISORDIL) 10 mg tablet, Take 1 tablet (10 mg total) by mouth 3 (three) times daily after meals, Disp: 270 tablet, Rfl: 1    losartan (COZAAR) 25 mg tablet, Take 1 tablet (25 mg total) by mouth daily, Disp: 30 tablet, Rfl: 3    methylphenidate (RITALIN) 20 MG tablet, TK 1 T PO BID, Disp: , Rfl: 0    PARoxetine (PAXIL) 30 mg tablet, Take by mouth daily Take 1 5 tabs by mouth once daily  , Disp: , Rfl: 5    QUEtiapine (SEROquel) 100 mg tablet, Take 100 mg by mouth daily at bedtime, Disp: , Rfl:   No Known Allergies    Vitals: Blood pressure 112/66, pulse 83, temperature 98 7 °F (37 1 °C), resp  rate 18, height 5' 6" (1 676 m), weight 63 kg (139 lb), SpO2 98 %  Body mass index is 22 44 kg/m²  Oxygen Therapy  SpO2: 98 %      Physical Exam  Physical Exam    Labs: I have personally reviewed pertinent lab results  Lab Results   Component Value Date    WBC 8 16 04/07/2021    HGB 12 4 04/07/2021    HCT 37 6 04/07/2021    MCV 97 04/07/2021     04/07/2021     Lab Results   Component Value Date    CALCIUM 9 0 06/16/2021    K 4 6 06/16/2021    CO2 31 06/16/2021     06/16/2021    BUN 34 (H) 06/16/2021    CREATININE 2 06 (H) 06/16/2021     No results found for: IGE  Lab Results   Component Value Date    ALT 41 02/15/2021    AST 26 02/15/2021    ALKPHOS 95 02/15/2021       Imaging and other studies: I have personally reviewed pertinent reports  and I have personally reviewed pertinent films in PACS      Cardiac MRI 3/2021  IMPRESSION:  Impression:  1   Mildly dilated left ventricle with mildly reduced left ventricular systolic function  2  Normal right ventricular size and systolic function  3  No significant valvular abnormalities  4  No evidence of myocardial scarring, inflammation, or infiltrative disease  This 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         CTA 1/2021 1/6/2021    IMPRESSION:     No pulmonary embolus      Bilateral moderate pleural effusions      Posterior bibasilar atelectasis/infiltrates  Thoracentesis 1/2021  A & B  Pleural fluid, left (ThinPrep and cell block preparations):  Negative for malignancy  Benign mesothelial cells, histiocytes and lymphocytes  Pulmonary function testing:    ordered today    EKG, Pathology, and Other Studies: I have personally reviewed pertinent reports  and I have personally reviewed pertinent films in PACS      1/2021  CORONARY VESSELS:   --  The coronary circulation is right dominant  --  Left main: Normal   --  LAD: The vessel was large sized  Angiography showed minor luminal irregularities  --  Circumflex: Normal   --  RCA: The vessel was large sized (dominant)  Angiography showed mild atherosclerosis      IMPRESSIONS:  There is no significant coronary artery disease      RECOMMENDATIONS  The patient should continue with the present medications      DISPOSITION:  The patient left the catheterization laboratory in stable condition  [facility]

## 2021-06-30 ENCOUNTER — OFFICE VISIT (OUTPATIENT)
Dept: PULMONOLOGY | Facility: CLINIC | Age: 51
End: 2021-06-30
Payer: COMMERCIAL

## 2021-06-30 VITALS
SYSTOLIC BLOOD PRESSURE: 112 MMHG | DIASTOLIC BLOOD PRESSURE: 66 MMHG | TEMPERATURE: 98.7 F | HEIGHT: 66 IN | WEIGHT: 139 LBS | OXYGEN SATURATION: 98 % | RESPIRATION RATE: 18 BRPM | BODY MASS INDEX: 22.34 KG/M2 | HEART RATE: 83 BPM

## 2021-06-30 DIAGNOSIS — J90 BILATERAL PLEURAL EFFUSION: Primary | ICD-10-CM

## 2021-06-30 DIAGNOSIS — F17.200 TOBACCO USE DISORDER: ICD-10-CM

## 2021-06-30 DIAGNOSIS — J96.01 ACUTE RESPIRATORY FAILURE WITH HYPOXIA (HCC): ICD-10-CM

## 2021-06-30 DIAGNOSIS — I50.40 COMBINED SYSTOLIC AND DIASTOLIC CONGESTIVE HEART FAILURE, UNSPECIFIED HF CHRONICITY (HCC): ICD-10-CM

## 2021-06-30 PROCEDURE — 3008F BODY MASS INDEX DOCD: CPT | Performed by: INTERNAL MEDICINE

## 2021-06-30 PROCEDURE — 99215 OFFICE O/P EST HI 40 MIN: CPT | Performed by: INTERNAL MEDICINE

## 2021-06-30 NOTE — LETTER
June 30, 2021     [facility], [address] [address]    Patient: Vibha Aragon   YOB: 1970   Date of Visit: 6/30/2021       Dear Dr Fernando Danielle: Thank you for referring Daniel Denver to me for evaluation  Below are my notes for this consultation  If you have questions, please do not hesitate to call me  I look forward to following your patient along with you  Sincerely,        Felicitas Kim MD        CC: No Recipients  Felicitas Kim MD  6/30/2021  9:01 AM  Sign when Signing Visit  Pulmonary Follow Up Note   Vibha Aragon 46 y o  female MRN: [id number]  6/30/2021      Assessment:    Acute respiratory failure with hypoxia ([address] )   Secondary to a volume overload with underlying heart failure and renal disease  Has resolved  Bilateral pleural effusion    Patient developed bilateral pleural effusions in January  Underwent thoracentesis that showed transudative effusions without evidence of infection or malignancy  Effusions were likely related to underlying acute a cardiomyopathy and renal failure  Unfortunately does not have a unifying diagnosis  Renal biopsy was not consistent with amyloid or sarcoid or specific disease  Cardiac MRI without evidence of infiltrative disease  Autoimmune workup unrevealing at this point  Plan  Will repeat chest x-ray to ensure the effusions have resolved  If the effusions recur her may benefit from CT of the chest to assess for any lymphadenopathy  Lymphadenopathy present could consider biopsy  CHF (congestive heart failure) (HCC)  Wt Readings from Last 3 Encounters:   06/30/21 63 kg (139 lb)   06/17/21 64 2 kg (141 lb 9 6 oz)   06/14/21 60 3 kg (133 lb)           Unclear etiology  Developed an acute cardiomyopathy  Fortunately ejection fraction did improve  No evidence of decompensation during today's assessment  Continue current cardiac medications  Follow-up with Cardiology      Primary osteoarthritis of one hip, left    Patient due for right hip surgery in August   At this time she is asymptomatic from pulmonary perspective  And I did not see any pulmonary restrictions for her going forward with the operation  Plan  obtain chest x-ray   Pulmonary function test   Strongly recommend smoking cessation  Plan:    Diagnoses and all orders for this visit:    Bilateral pleural effusion  -     XR chest pa & lateral; Future  -     Complete PFT; Future    Tobacco use disorder  -     Complete PFT; Future    Acute respiratory failure with hypoxia (HCC)    Combined systolic and diastolic congestive heart failure, unspecified HF chronicity ([facility] [address] )        Return in about 1 month (around 7/30/2021)  History of Present Illness   HPI:  Mey Sheridan is a 46 y o  female with a history of CHF, CKD stage 3, nephrotic syndrome who presents as a hospital follow-up  She was admitted to [facility] in Jan, 2021 due to sob (had broke her ankle previous to that time)  In ED she was found to have hypoxic respiratory failure  CT showed no evidence of PE  Echo showed an EF of 25%  She underwent cardiac catheterization that showed   Only minor luminal irregularities in the LAD and mild atherosclerosis of the RCA  Follow-up echocardiogram in April showed improvement in ejection fraction to 50% along with grade 1 diastolic dysfunction  She underwent thoracentesis that showed a transudative pleural effusion  She underwent kidney biopsy in March that was not consistent with specific disease  States that she has been feeling well  She is not experiencing sob, cough, or wheezing  She does have right hip pain and will undergo hip replacement in August     She has been smoking for past 20 years--previously over 1 pack per day and now 1/4 per day  She states that she is interested in quitting  She is a pediatric LPN, 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    She does not have any acute complaints today  Review of Systems   Constitutional: Negative for chills, fatigue and fever  HENT: Negative for congestion, nosebleeds, postnasal drip, rhinorrhea, sinus pressure and sore throat  Eyes: Negative for discharge, redness and itching  Respiratory: Negative for cough, choking, chest tightness, shortness of breath, wheezing and stridor  Cardiovascular: Positive for leg swelling  Negative for chest pain and palpitations  Gastrointestinal: Negative for blood in stool  Genitourinary: Negative for difficulty urinating and dysuria  Musculoskeletal: Negative for arthralgias, joint swelling and myalgias  Hip pain    Skin: Negative for color change and rash  Neurological: Negative for light-headedness and headaches  Hematological: Negative for adenopathy         Historical Information   Past Medical History:   Diagnosis Date    Anemia     Anxiety     Arthritis 2017    [facility]cong[facility])     Chronic kidney disease 01/0/2021    Coronary artery disease 1/6/2021    Depression     Hyperlipidemia 1/7/2021    Hypertension 01/06/2021    Osteoarthritis      Past Surgical History:   Procedure Laterality Date    CT NEEDLE BIOPSY KIDNEY  3/2/2021    ECTOPIC PREGNANCY SURGERY      IR BIOPSY KIDNEY COLUMBIA KIT NO LATERALITY  2/17/2021    IR THORACENTESIS  1/12/2021    CO TOTAL HIP ARTHROPLASTY Left 3/5/2018    Procedure: ARTHROPLASTY HIP TOTAL;  Surgeon: Vargas Benavides DO;  Location: [facility];  Service: Orthopedics    RENAL BIOPSY  03/02/2021    WISDOM TOOTH EXTRACTION      x1     Family History   Problem Relation Age of Onset    Hypertension Mother     Cancer Family     Cancer Father         PROSTATE    Atrial fibrillation Father     Hypertension Father     Diabetes Brother     Hypertension Sister          Meds/Allergies     Current Outpatient Medications:     amLODIPine (NORVASC) 2 5 mg tablet, Take 1 tablet (2 5 mg total) by mouth daily, Disp: 90 tablet, Rfl: 1    atorvastatin (LIPITOR) 40 mg tablet, Take 1 tablet (40 mg total) by mouth daily, Disp: 90 tablet, Rfl: 1    carvedilol (COREG) 25 mg tablet, Take 1 tablet (25 mg total) by mouth 2 (two) times a day with meals, Disp: 180 tablet, Rfl: 1    cyanocobalamin (VITAMIN B-12) 1000 MCG tablet, Take 1 tablet (1,000 mcg total) by mouth daily, Disp: 30 tablet, Rfl: 0    folic acid (FOLVITE) 1 mg tablet, Take 1 tablet (1 mg total) by mouth daily, Disp: 90 tablet, Rfl: 1    furosemide (LASIX) 20 mg tablet, Take 1 tablet (20 mg total) by mouth every evening, Disp: 90 tablet, Rfl: 1    furosemide (LASIX) 40 mg tablet, Take 1 tablet (40 mg total) by mouth every morning, Disp: 90 tablet, Rfl: 1    Glucos-Chondroit-Hyaluron-MSM (GLUCOSAMINE CHONDROITIN JOINT PO), Take 1 tablet by mouth as needed, Disp: , Rfl:     hydrALAZINE (APRESOLINE) 25 mg tablet, Take 1 tablet (25 mg total) by mouth 3 (three) times a day, Disp: 270 tablet, Rfl: 1    isosorbide dinitrate (ISORDIL) 10 mg tablet, Take 1 tablet (10 mg total) by mouth 3 (three) times daily after meals, Disp: 270 tablet, Rfl: 1    losartan (COZAAR) 25 mg tablet, Take 1 tablet (25 mg total) by mouth daily, Disp: 30 tablet, Rfl: 3    methylphenidate (RITALIN) 20 MG tablet, TK 1 T PO BID, Disp: , Rfl: 0    PARoxetine (PAXIL) 30 mg tablet, Take by mouth daily Take 1 5 tabs by mouth once daily  , Disp: , Rfl: 5    QUEtiapine (SEROquel) 100 mg tablet, Take 100 mg by mouth daily at bedtime, Disp: , Rfl:   No Known Allergies    Vitals: Blood pressure 112/66, pulse 83, temperature 98 7 °F (37 1 °C), resp  rate 18, height 5' 6" (1 676 m), weight 63 kg (139 lb), SpO2 98 %  Body mass index is 22 44 kg/m²  Oxygen Therapy  SpO2: 98 %      Physical Exam  Physical Exam    Labs: I have personally reviewed pertinent lab results    Lab Results   Component Value Date    WBC 8 16 04/07/2021    HGB 12 4 04/07/2021    HCT 37 6 04/07/2021    MCV 97 04/07/2021     04/07/2021 Lab Results   Component Value Date    CALCIUM 9 0 06/16/2021    K 4 6 06/16/2021    CO2 31 06/16/2021     06/16/2021    BUN 34 (H) 06/16/2021    CREATININE 2 06 (H) 06/16/2021     No results found for: IGE  Lab Results   Component Value Date    ALT 41 02/15/2021    AST 26 02/15/2021    ALKPHOS 95 02/15/2021       Imaging and other studies: I have personally reviewed pertinent reports  and I have personally reviewed pertinent films in PACS      Cardiac MRI 3/2021  IMPRESSION:  Impression:  1  Mildly dilated left ventricle with mildly reduced left ventricular systolic function  2  Normal right ventricular size and systolic function  3  No significant valvular abnormalities  4  No evidence of myocardial scarring, inflammation, or infiltrative disease  This 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         CTA 1/2021 1/6/2021    IMPRESSION:     No pulmonary embolus      Bilateral moderate pleural effusions      Posterior bibasilar atelectasis/infiltrates  Thoracentesis 1/2021  A & B  Pleural fluid, left (ThinPrep and cell block preparations):  Negative for malignancy  Benign mesothelial cells, histiocytes and lymphocytes  Pulmonary function testing:    ordered today    EKG, Pathology, and Other Studies: I have personally reviewed pertinent reports  and I have personally reviewed pertinent films in PACS      1/2021  CORONARY VESSELS:   --  The coronary circulation is right dominant  --  Left main: Normal   --  LAD: The vessel was large sized  Angiography showed minor luminal irregularities  --  Circumflex: Normal   --  RCA: The vessel was large sized (dominant)  Angiography showed mild atherosclerosis      IMPRESSIONS:  There is no significant coronary artery disease      RECOMMENDATIONS  The patient should continue with the present medications      DISPOSITION:  The patient left the catheterization laboratory in stable condition        Cesar Edmonds JAMES Elizalde

## 2021-06-30 NOTE — ASSESSMENT & PLAN NOTE
Wt Readings from Last 3 Encounters:   06/30/21 63 kg (139 lb)   06/17/21 64 2 kg (141 lb 9 6 oz)   06/14/21 60 3 kg (133 lb)           Unclear etiology  Developed an acute cardiomyopathy  Fortunately ejection fraction did improve  No evidence of decompensation during today's assessment  Continue current cardiac medications  Follow-up with Cardiology

## 2021-06-30 NOTE — ASSESSMENT & PLAN NOTE
Patient developed bilateral pleural effusions in January  Underwent thoracentesis that showed transudative effusions without evidence of infection or malignancy  Effusions were likely related to underlying acute a cardiomyopathy and renal failure  Unfortunately does not have a unifying diagnosis  Renal biopsy was not consistent with amyloid or sarcoid or specific disease  Cardiac MRI without evidence of infiltrative disease  Autoimmune workup unrevealing at this point  Plan  Will repeat chest x-ray to ensure the effusions have resolved  If the effusions recur her may benefit from CT of the chest to assess for any lymphadenopathy  Lymphadenopathy present could consider biopsy

## 2021-06-30 NOTE — ASSESSMENT & PLAN NOTE
Patient due for right hip surgery in August   At this time she is asymptomatic from pulmonary perspective  And I did not see any pulmonary restrictions for her going forward with the operation  Plan  obtain chest x-ray   Pulmonary function test   Strongly recommend smoking cessation

## 2021-07-02 DIAGNOSIS — N28.89 HYPERTENSION SECONDARY TO OTHER RENAL DISORDERS: ICD-10-CM

## 2021-07-02 DIAGNOSIS — I15.1 HYPERTENSION SECONDARY TO OTHER RENAL DISORDERS: ICD-10-CM

## 2021-07-02 RX ORDER — LOSARTAN POTASSIUM 25 MG/1
TABLET ORAL
Qty: 30 TABLET | Refills: 3 | Status: SHIPPED | OUTPATIENT
Start: 2021-07-02 | End: 2021-09-09 | Stop reason: SDUPTHER

## 2021-07-26 ENCOUNTER — OFFICE VISIT (OUTPATIENT)
Dept: CARDIOLOGY CLINIC | Facility: CLINIC | Age: 51
End: 2021-07-26
Payer: COMMERCIAL

## 2021-07-26 VITALS
DIASTOLIC BLOOD PRESSURE: 78 MMHG | WEIGHT: 146 LBS | HEART RATE: 80 BPM | SYSTOLIC BLOOD PRESSURE: 124 MMHG | BODY MASS INDEX: 23.57 KG/M2

## 2021-07-26 DIAGNOSIS — I50.42 CHRONIC COMBINED SYSTOLIC AND DIASTOLIC HEART FAILURE (HCC): ICD-10-CM

## 2021-07-26 DIAGNOSIS — I25.10 CORONARY ARTERY DISEASE INVOLVING NATIVE CORONARY ARTERY OF NATIVE HEART WITHOUT ANGINA PECTORIS: ICD-10-CM

## 2021-07-26 DIAGNOSIS — I42.8 NONISCHEMIC CARDIOMYOPATHY (HCC): ICD-10-CM

## 2021-07-26 DIAGNOSIS — Z01.810 PREOPERATIVE CARDIOVASCULAR EXAMINATION: Primary | ICD-10-CM

## 2021-07-26 DIAGNOSIS — E78.5 DYSLIPIDEMIA: ICD-10-CM

## 2021-07-26 DIAGNOSIS — I10 BENIGN ESSENTIAL HYPERTENSION: ICD-10-CM

## 2021-07-26 DIAGNOSIS — I16.0 HYPERTENSIVE URGENCY: ICD-10-CM

## 2021-07-26 PROCEDURE — 93000 ELECTROCARDIOGRAM COMPLETE: CPT | Performed by: INTERNAL MEDICINE

## 2021-07-26 PROCEDURE — 99214 OFFICE O/P EST MOD 30 MIN: CPT | Performed by: INTERNAL MEDICINE

## 2021-07-26 RX ORDER — HYDRALAZINE HYDROCHLORIDE 25 MG/1
TABLET, FILM COATED ORAL
Qty: 270 TABLET | Refills: 1 | Status: SHIPPED | OUTPATIENT
Start: 2021-07-26 | End: 2022-01-20

## 2021-07-26 NOTE — PROGRESS NOTES
Cardiology Office Note  MD Chloe Engel MD Maryann Record, DO, MD César Ervin DO, Mireille Callejas DO, [facility] - [place]  ----------------------------------------------------------------  [address] [address]    Gabi Parra 46 y o  female MRN: [id number]  Unit/Bed#:  Encounter: [id number]      History of Present Illness: It was a pleasure to see Gabi Parra in the office today for follow up CV evaluation  She is a past medical history of hypertension, tobacco abuse and systolic heart failure with ejection fraction of 25%  She started to experience generalized abdominal bloating and nausea  She also noted that she was having shortness of breath that worsened over 3 days  After having orthopnea with improved breathing sitting up, she became worried and presented to [address] [place] [address] for evaluation in January 2021  Patient was found to have acute respiratory failure requiring nasal cannula  She had elevation of her troponin peaking at 0 14  Echocardiogram was performed and she was found to have an ejection fraction of 25%  Her blood pressure was also markedly elevated  The patient was diagnosed with acute heart failure and treated with intravenous diuretic  During her hospitalization, she had a thoracentesis of the left long with 630 mL removed  She subsequently underwent cardiac catheterization which showed no evidence of obstructive CAD  Her urine was checked and she was found to have nephrotic range proteinuria  Once euvolemic, patient was transitioned to oral diuretic and discharged home  Since our last encounter, patient underwent renal biopsy results showed 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 and severe interstitial fibrosis    She was sent for cardiac MRI and echocardiogram to reassess her function as well as to look for evidence of late gadolinium enhancement on the MRI  MRI demonstrated evidence nonischemic cardiomyopathy  Echocardiogram was performed in April 2021 which showed improved left ventricular function to an ejection fraction of 50%  She is here today for routine follow-up  She denies any chest pain, pressure, tightness or squeezing  Denies lightheadedness, dizziness or palpitations  Denies lower extremity swelling, orthopnea or paroxysmal nocturnal dyspnea  Baseline creatinine has been around the 1 8 to 2 0 range  She continues to follow-up with Nephrology regularly regarding her known glomerulonephritis    Review of Systems:  Review of Systems   Constitutional: Negative for decreased appetite, fever, weight gain and weight loss  HENT: Negative for congestion and sore throat  Eyes: Negative for visual disturbance  Cardiovascular: Negative for chest pain, dyspnea on exertion, leg swelling, near-syncope and palpitations  Respiratory: Negative for cough and shortness of breath  Hematologic/Lymphatic: Negative for bleeding problem  Skin: Negative for rash  Musculoskeletal: Negative for myalgias and neck pain  Gastrointestinal: Negative for abdominal pain and nausea  Neurological: Negative for light-headedness and weakness  Psychiatric/Behavioral: Negative for depression         Past Medical History:   Diagnosis Date    Anemia     Anxiety     Arthritis 2017    CHF (congestive heart failure) (HCC)     Chronic kidney disease 01/0/2021    Coronary artery disease 1/6/2021    Depression     Hyperlipidemia 1/7/2021    Hypertension 01/06/2021    Osteoarthritis        Past Surgical History:   Procedure Laterality Date    CT NEEDLE BIOPSY KIDNEY  3/2/2021    ECTOPIC PREGNANCY SURGERY      IR BIOPSY KIDNEY COLUMBIA KIT NO LATERALITY  2/17/2021    IR THORACENTESIS  1/12/2021    NC TOTAL HIP ARTHROPLASTY Left 3/5/2018    Procedure: ARTHROPLASTY HIP TOTAL;  Surgeon: Beata Crawford DO;  Location: [facility]; Service: Orthopedics    RENAL BIOPSY  03/02/2021    WISDOM TOOTH EXTRACTION      x1       Social History     Socioeconomic History    Marital status: Single     Spouse name: Not on file    Number of children: Not on file    Years of education: Not on file    Highest education level: Not on file   Occupational History    Not on file   Tobacco Use    Smoking status: Current Every Day Smoker     Packs/day: 1 50     Years: 33 00     Pack years: 49 50     Types: Cigarettes     Start date: 1988    Smokeless tobacco: Never Used    Tobacco comment: 0 25 pack daily as of 6/2021   Vaping Use    Vaping Use: Never used   Substance and Sexual Activity    Alcohol use: Yes     Alcohol/week: 0 0 standard drinks     Comment: rarely    Drug use: No    Sexual activity: Not Currently   Other Topics Concern    Not on file   Social History Narrative    Not on file     Social Determinants of Health     Financial Resource Strain:     Difficulty of Paying Living Expenses:    Food Insecurity:     Worried About Running Out of Food in the Last Year:     [address] in the Last Year:    Transportation Needs:     Lack of Transportation (Medical):      Lack of Transportation (Non-Medical):    Physical Activity:     Days of Exercise per Week:     Minutes of Exercise per Session:    Stress:     Feeling of Stress :    Social Connections:     Frequency of Communication with Friends and Family:     Frequency of Social Gatherings with Friends and Family:     Attends Jew Services:     Active Member of Clubs or Organizations:     Attends Club or Organization Meetings:     Marital Status:    Intimate Partner Violence:     Fear of Current or Ex-Partner:     Emotionally Abused:     Physically Abused:     Sexually Abused:        Family History   Problem Relation Age of Onset    Hypertension Mother     Cancer Family     Cancer Father         PROSTATE    Atrial fibrillation Father     Hypertension Father    Sonal Day Diabetes Brother     Hypertension Sister        No Known Allergies      Current Outpatient Medications:     amLODIPine (NORVASC) 2 5 mg tablet, Take 1 tablet (2 5 mg total) by mouth daily, Disp: 90 tablet, Rfl: 1    atorvastatin (LIPITOR) 40 mg tablet, Take 1 tablet (40 mg total) by mouth daily, Disp: 90 tablet, Rfl: 1    carvedilol (COREG) 25 mg tablet, Take 1 tablet (25 mg total) by mouth 2 (two) times a day with meals, Disp: 180 tablet, Rfl: 1    cyanocobalamin (VITAMIN B-12) 1000 MCG tablet, Take 1 tablet (1,000 mcg total) by mouth daily, Disp: 30 tablet, Rfl: 0    furosemide (LASIX) 20 mg tablet, Take 1 tablet (20 mg total) by mouth every evening, Disp: 90 tablet, Rfl: 1    furosemide (LASIX) 40 mg tablet, Take 1 tablet (40 mg total) by mouth every morning, Disp: 90 tablet, Rfl: 1    Glucos-Chondroit-Hyaluron-MSM (GLUCOSAMINE CHONDROITIN JOINT PO), Take 1 tablet by mouth as needed, Disp: , Rfl:     hydrALAZINE (APRESOLINE) 25 mg tablet, TAKE 1 TABLET(25 MG) BY MOUTH THREE TIMES DAILY, Disp: 270 tablet, Rfl: 1    isosorbide dinitrate (ISORDIL) 10 mg tablet, Take 1 tablet (10 mg total) by mouth 3 (three) times daily after meals, Disp: 270 tablet, Rfl: 1    losartan (COZAAR) 25 mg tablet, TAKE 1 TABLET(25 MG) BY MOUTH DAILY, Disp: 30 tablet, Rfl: 3    methylphenidate (RITALIN) 20 MG tablet, TK 1 T PO BID, Disp: , Rfl: 0    PARoxetine (PAXIL) 30 mg tablet, Take by mouth daily Take 1 5 tabs by mouth once daily  , Disp: , Rfl: 5    QUEtiapine (SEROquel) 100 mg tablet, Take 100 mg by mouth daily at bedtime, Disp: , Rfl:     folic acid (FOLVITE) 1 mg tablet, Take 1 tablet (1 mg total) by mouth daily, Disp: 90 tablet, Rfl: 1    Vitals:    07/26/21 0758   BP: 124/78   BP Location: Right arm   Patient Position: Sitting   Cuff Size: Adult   Pulse: 80   Weight: 66 2 kg (146 lb)       PHYSICAL EXAMINATION:  Gen: Awake, Alert, NAD  Head/eyes: AT/NC, pupils equal and round, Anicteric  ENT: mmm  Neck: Supple, No elevated JVP, trachea midline  Resp: CTA bilaterally no w/r/r  CV: RRR +S1, S2, No m/r/g  Abd: Soft, NT/ND + BS  Ext: no LE edema bilaterally  Neuro: Follows commands, moves all extermities  Psych: Appropriate affect, normal mood, pleasant attitude, non-combative  Skin: warm; no rash, erythema or venous stasis changes on exposed skin    --------------------------------------------------------------------------------  TREADMILL STRESS  No results found for this or any previous visit    --------------------------------------------------------------------------------  NUCLEAR STRESS TEST: No results found for this or any previous visit  No results found for this or any previous visit     --------------------------------------------------------------------------------  CATH:    Results for orders placed during the hospital encounter of 21   Cardiac catheterization    Narrative [facility] [place] [address]  [facility] [address] [facility]  [phone]    [facility]    Invasive Cardiovascular Lab Complete Report    Patient: Xavier Ventura  MR number: [id number]  Account number: [de-identified]  Study date: 2021  Gender: Female  : 1970  Height: 66 1 in  Weight: 117 9 lb  BSA: 1 6 mï¾²    Allergies: NO KNOWN ALLERGIES    Diagnostic Cardiologist:  Taylor Mercado MD    SUMMARY    CORONARY CIRCULATION:  Left main: Normal   Circumflex: Normal     HEMODYNAMICS:  Hemodynamic assessment demonstrated mildly elevated LVEDP  INDICATIONS:  --  Congestive heart failure with cardiomyopathy  PROCEDURES PERFORMED    --  Left heart catheterization without ventriculogram   --  Left coronary angiography  --  Right coronary angiography  --  Inpatient  --  Mod Sedation Same Physician Initial 15min  --  Mod Sedation Same Physician Add 15min  --  Coronary Catheterization (w/ LHC)      PROCEDURE: The risks and alternatives of the procedures and conscious sedation were explained to the patient and informed consent was obtained  The patient was brought to the cath lab and placed on the table  The planned puncture sites  were prepped and draped in the usual sterile fashion  --  Right radial artery access  After performing an Julián's test to verify adequate ulnar artery supply to the hand, the radial site was prepped  The puncture site was infiltrated with local anesthetic  The vessel was accessed using the  modified Seldinger technique, a wire was advanced into the vessel, and a sheath was advanced over the wire into the vessel  --  Left heart catheterization without ventriculogram  A catheter was advanced over a guidewire into the ascending aorta  After recording ascending aortic pressure, the catheter was advanced across the aortic valve and left ventricular  pressure was recorded  The catheter was pulled back across the aortic valve and into the ascending aorta and pullback pressures were obtained  --  Left coronary artery angiography  A catheter was advanced over a guidewire into the aorta and positioned in the left coronary artery ostium under fluoroscopic guidance  Angiography was performed  --  Right coronary artery angiography  A catheter was advanced over a guidewire into the aorta and positioned in the right coronary artery ostium under fluoroscopic guidance  Angiography was performed  --  Inpatient  --  Mod Sedation Same Physician Initial 15min  --  Mod Sedation Same Physician Add 15min  --  Coronary Catheterization (w/ LHC)  PROCEDURE COMPLETION: The patient tolerated the procedure well and was discharged from the cath lab  TIMING: Test started at 09:55  Test concluded at 10:04  HEMOSTASIS: The sheath was removed  The site was compressed with a Hemoband  device  Hemostasis was obtained  MEDICATIONS GIVEN: Versed (2mg/2ml), 1 mg, IV, at 09:50  Fentanyl (1OOmcg/2 ml), 50 mcg, IV, at 09:50  Baby Aspirin, 324 mg, PO, at 09:53  1% Lidocaine, 1 ml, subcutaneously, at 09:55  Nitroglycerin  (200mcg/ml), 200 mcg, at 09:57  Verapamil (5mg/2ml), 2 5 mg, IV, at 09:58  Heparin 1000 units/ml, 4,000 units, IV, at 09:58  Versed (2mg/2ml), 1 mg, IV, at 10:00  Fentanyl (1OOmcg/2 ml), 50 mcg, IV, at 10:00  CONTRAST GIVEN: 20 ml  Omnipaque (350mg I /ml)  RADIATION EXPOSURE: Fluoroscopy time: 1 03 min  HEMODYNAMICS: Hemodynamic assessment demonstrated mildly elevated LVEDP  CORONARY VESSELS:   --  The coronary circulation is right dominant  --  Left main: Normal   --  LAD: The vessel was large sized  Angiography showed minor luminal irregularities  --  Circumflex: Normal   --  RCA: The vessel was large sized (dominant)  Angiography showed mild atherosclerosis  IMPRESSIONS:  There is no significant coronary artery disease  RECOMMENDATIONS  The patient should continue with the present medications  DISPOSITION:  The patient left the catheterization laboratory in stable condition  Prepared and signed by    Tom Umana MD  Signed 2021 10:06:10    Study diagram    Hemodynamic tables    Pressures:  Baseline  Pressures:  - HR: 76  Pressures:  - Rhythm:  Pressures:  -- Aortic Pressure (S/D/M): 121/79/62  Pressures:  -- Left Ventricle (s/edp): 113/20/--    Outputs:  Baseline  Outputs:  -- CALCULATIONS: [age]  Outputs:  -- CALCULATIONS: Body Surface Area: 1 60  Outputs:  -- CALCULATIONS: Height in cm: 168 00  Outputs:  -- CALCULATIONS: Sex: Female  Outputs:  -- CALCULATIONS: Weight in k 60       --------------------------------------------------------------------------------  ECHO:   Results for orders placed during the hospital encounter of 21   Echo complete with contrast if indicated    Narrative [facility]  [address]    [place], [address]  [phone]    Transthoracic Echocardiogram  2D, M-mode, Doppler, and Color Doppler    Study date:  2021    Patient: Tim Arreaga  MR number: [id number]  Account number: [id number]  : 1970  Age: 44 years  Gender: Female  Status: Inpatient  Location: Bedside  Height: 66 in  Weight: 127 lb  BP: 155/ 96 mmHg    Indications: Heart Failure    Diagnoses: I50 9 - Heart failure, unspecified    Sonographer:  Jocy Butler RDCS  Referring Physician:  Bhavin Avilez PA-C  Group:  [id number]  Interpreting Physician:  PRESTON Villegas Ace     SUMMARY    LEFT VENTRICLE:  The ventricle was mildly dilated  Systolic function was markedly reduced by visual assessment  Ejection fraction was estimated to be 25 %  There was severe diffuse hypokinesis  Doppler parameters were consistent with a reversible restrictive pattern, indicative of decreased left ventricular diastolic compliance and/or increased left atrial pressure (grade 3 diastolic dysfunction)  LEFT ATRIUM:  The atrium was mildly dilated  MITRAL VALVE:  There was mild regurgitation  AORTIC VALVE:  There was trace regurgitation  TRICUSPID VALVE:  There was mild regurgitation  PULMONIC VALVE:  There was trace regurgitation  PERICARDIUM:  A small pericardial effusion was identified circumferential to the heart  There was no evidence of hemodynamic compromise  There was a large left pleural effusion  SUMMARY MEASUREMENTS  2D measurements:  Unspecified Anatomy:   %FS was 14 6 %  Ao Diam was 3 1 cm  Ao asc was 3 cm  EDV(Teich) was 137 3 ml   EF(Teich) was 30 8 %  ESV(Teich) was 95 ml  IVSd was 0 8 cm  LA Diam was 3 7 cm  LAAs A2C was 20 cm2  LAESV A-L A2C was 65 3 ml  LAESV MOD A2C was 57 8 ml  LALs A2C was 5 2 cm  LVEDV MOD A4C was 116 9 ml  LVEF MOD A4C was 26 8 %  LVESV MOD A4C was 85 6 ml  LVIDd was 5 3 cm  LVIDs was 4 6 cm  LVLd A4C was 7 5 cm  LVLs A4C was 6 7 cm  LVPWd was 1 cm  RAAs  A4C was 13 2 cm2  RAESV A-L was 36 4 ml   RAESV MOD was 35 ml  RALs was 4 1 cm  RVIDd was 3 6 cm    SV MOD A4C was 31 4 ml   SV(Teich) was 42 3 ml   CW measurements:  Unspecified Anatomy:   AV Env  Ti was 239 8 ms   AV VTI was 11 7 cm  AV Vmax was 0 9 m/s  AV Vmean was 0 5 m/s  AV maxPG was 3 1 mmHg  AV meanPG was 1 3 mmHg  TR Vmax was 3 m/s  TR maxPG was 37 mmHg  MM measurements:  Unspecified Anatomy:   TAPSE was 1 5 cm  PW measurements:  Unspecified Anatomy:   DVI was 0 7   E' Sept was 0 m/s  E/E' Sept was 29 1   LVOT Env  Ti was 247 4 ms  LVOT VTI was 8 4 cm  LVOT Vmax was 0 6 m/s  LVOT Vmean was 0 3 m/s  LVOT maxPG was 1 4 mmHg  LVOT meanPG was 0 6 mmHg  MV A Stanton  was 0 4 m/s  MV Dec [place] was 7 3 m/s2  MV DecT was 144 4 ms   MV E [place] was 1 1 m/s  MV E/A Ratio was 2 8   MV PHT was 41 9 ms  MVA By PHT was 5 3 cm2  HISTORY: PRIOR HISTORY: Smoker, MAXI, Pleural Effusion, CHF, Elevated Troponin, Anemia    PROCEDURE: The procedure was performed at the bedside  This was a routine study  The transthoracic approach was used  The study included complete 2D imaging, M-mode, complete spectral Doppler, and color Doppler  The heart rate was 90 bpm,  at the start of the study  Images were obtained from the parasternal, apical, subcostal, and suprasternal notch acoustic windows  Image quality was adequate  LEFT VENTRICLE: The ventricle was mildly dilated  Systolic function was markedly reduced by visual assessment  Ejection fraction was estimated to be 25 %  There was severe diffuse hypokinesis  DOPPLER: Doppler parameters were consistent  with a reversible restrictive pattern, indicative of decreased left ventricular diastolic compliance and/or increased left atrial pressure (grade 3 diastolic dysfunction)  RIGHT VENTRICLE: The size was normal  Systolic function was normal  Wall thickness was normal     LEFT ATRIUM: The atrium was mildly dilated  RIGHT ATRIUM: Size was normal     MITRAL VALVE: Valve structure was normal  There was mild thickening of the anterior and posterior leaflets  There was normal leaflet separation   DOPPLER: The transmitral velocity was within the normal range  There was no evidence for  stenosis  There was mild regurgitation  AORTIC VALVE: The valve was trileaflet  Leaflets exhibited normal thickness, normal cuspal separation, and sclerosis  DOPPLER: Transaortic velocity was within the normal range  There was no evidence for stenosis  There was trace  regurgitation  TRICUSPID VALVE: The valve structure was normal  There was normal leaflet separation  DOPPLER: The transtricuspid velocity was within the normal range  There was no evidence for stenosis  There was mild regurgitation  Estimated peak PA  pressure was 40 mmHg  PULMONIC VALVE: Leaflets exhibited normal thickness, no calcification, and normal cuspal separation  DOPPLER: The transpulmonic velocity was within the normal range  There was no evidence for stenosis  There was trace regurgitation  PERICARDIUM: A small pericardial effusion was identified circumferential to the heart  There was no evidence of hemodynamic compromise  There was a large left pleural effusion  AORTA: The root exhibited normal size  SYSTEMIC VEINS: IVC: The inferior vena cava was normal in size and course  Respirophasic changes were normal     SYSTEM MEASUREMENT TABLES    2D  %FS: 14 6 %  Ao Diam: 3 1 cm  Ao asc: 3 cm  EDV(Teich): 137 3 ml  EF(Teich): 30 8 %  ESV(Teich): 95 ml  IVSd: 0 8 cm  LA Diam: 3 7 cm  LAAs A2C: 20 cm2  LAESV A-L A2C: 65 3 ml  LAESV MOD A2C: 57 8 ml  LALs A2C: 5 2 cm  LVEDV MOD A4C: 116 9 ml  LVEF MOD A4C: 26 8 %  LVESV MOD A4C: 85 6 ml  LVIDd: 5 3 cm  LVIDs: 4 6 cm  LVLd A4C: 7 5 cm  LVLs A4C: 6 7 cm  LVPWd: 1 cm  RAAs A4C: 13 2 cm2  RAESV A-L: 36 4 ml  RAESV MOD: 35 ml  RALs: 4 1 cm  RVIDd: 3 6 cm  SV MOD A4C: 31 4 ml  SV(Teich): 42 3 ml    CW  AV Env  Ti: 239 8 ms  AV VTI: 11 7 cm  AV Vmax: 0 9 m/s  AV Vmean: 0 5 m/s  AV maxPG: 3 1 mmHg  AV meanP 3 mmHg  TR Vmax: 3 m/s  TR maxP mmHg    MM  TAPSE: 1 5 cm    PW  DVI: 0 7  E' Sept: 0 m/s  E/E' Sept: 29 1  LVOT Env  Ti: 247 4 ms  LVOT VTI: 8 4 cm  LVOT Vmax: 0 6 m/s  LVOT Vmean: 0 3 m/s  LVOT maxP 4 mmHg  LVOT meanP 6 mmHg  MV A [place]: 0 4 m/s  MV Dec [place]: 7 3 m/s2  MV DecT: 144 4 ms  MV E [place]: 1 1 m/s  MV E/A Ratio: 2 8  MV PHT: 41 9 ms  MVA By PHT: 5 3 cm2    [facility]    Prepared and electronically signed by    PRESTON Serrano  Signed 2021 13:28:36       No results found for this or any previous visit   --------------------------------------------------------------------------------  HOLTER  No results found for this or any previous visit  No results found for this or any previous visit   --------------------------------------------------------------------------------  CAROTIDS  No results found for this or any previous visit    --------------------------------------------------------------------------------  ECGs:  No results found for this visit on 21  Lab Results   Component Value Date    WBC 8 16 2021    HGB 12 4 2021    HCT 37 6 2021    MCV 97 2021     2021      Lab Results   Component Value Date    SODIUM 144 2021    K 4 6 2021     2021    CO2 31 2021    BUN 34 (H) 2021    CREATININE 2 06 (H) 2021    GLUC 93 2021    CALCIUM 9 0 2021      Lab Results   Component Value Date    HGBA1C 5 3 2021      No results found for: CHOL  Lab Results   Component Value Date    HDL 53 2021     Lab Results   Component Value Date    LDLCALC 187 (H) 2021     Lab Results   Component Value Date    TRIG 135 2021     No results found for: [place], [place]   Lab Results   Component Value Date    INR 0 93 2021    INR 0 95 2021    INR 0 89 2018    PROTIME 12 3 2021    PROTIME 12 5 2021    PROTIME 12 0 (L) 2018        1  Preoperative cardiovascular examination  -     POCT ECG    2   Chronic combined systolic and diastolic heart failure ([address] )    3  Coronary artery disease involving native coronary artery of native heart without angina pectoris    4  Dyslipidemia    5  Nonischemic cardiomyopathy ([address] )    6  Benign essential hypertension        IMPRESSION:  · Preoperative CV risk assessment for hip surgery  · Chronic systolic and diastolic congestive heart failure  · Nonischemic cardiomyopathy with recovering LV function  · LVEF 50%, abnormal GLS -49%, grade 1 diastolic dysfunction, trace MR, April 2021  · LVEF 40%, mild LV dilatation, no LGE with probable nonischemic cardiomyopathy by cMRI, March 2021  · Mild MR by 2D echocardiogram, January 2021  · Hypertension  · CAD s/p cath w/ minimal luminal irregularities LAD and RCA, January 2021  · Ongoing tobacco use, probable COPD  · CKD  · Dyslipidemia  · Nephrotic range proteinuria with > 6 9 g in 24 hours  · Diffuse proliferative/sclerosing glomerulonephritis with tubular atrophy and severe interstitial fibrosis of the kidney by biopsy    PLAN:  It was a pleasure to see Jason Brian in the office for follow-up CV evaluation  She is here today for preoperative CV risk assessment prior to her upcoming right hip surgery  She has no symptoms concerning for angina and no signs or symptoms of heart failure  She examines to be euvolemic in the office today  She is able to perform greater than 4 METs on a daily basis without any exertional symptoms  ECG is nonischemic unchanged from prior  Her only limiting factor is her hip pain  Prior cardiac catheterization showed only minimal luminal irregularities in January 2021  Her LV function has recovered on recent echocardiogram and she has been feeling much improved from a cardiovascular perspective  Blood pressure and heart rate are currently stable  Based on her clinical presentation, I have the following recommendations:    1   Given the patient's nonobstructive coronary disease, improved left ventricular function, lack of cardiovascular symptoms and reasonable functional capacity, she is at a low to at most intermediate risk from CV perspective for her upcoming hip surgery  No further CV testing prior to the OR as it would not change our management  2  Continue carvedilol, losartan, isosorbide and hydralazine  Agree with decreased dose of amlodipine  Patient's blood pressure is well controlled  3  Continue statin therapy  Goal LDL is less than 70 mg/dL  4  Encouraged nephrology follow-up regarding her chronic nephrologic condition  5  As always, I recommend a heart healthy diet low in sodium and exercise regimen  6  We will follow up with her in 6 months  As always, please do not hesitate to call with any questions  Portions of the record may have been created with voice recognition software  Occasional wrong word or "sound a like" substitutions may have occurred due to the inherent limitations of voice recognition software  Read the chart carefully and recognize, using context, where substitutions have occurred        Signed: Alee Bartlett DO, Judyann Novak

## 2021-07-29 ENCOUNTER — OFFICE VISIT (OUTPATIENT)
Dept: OBGYN CLINIC | Facility: MEDICAL CENTER | Age: 51
End: 2021-07-29
Payer: COMMERCIAL

## 2021-07-29 ENCOUNTER — APPOINTMENT (OUTPATIENT)
Dept: RADIOLOGY | Facility: MEDICAL CENTER | Age: 51
End: 2021-07-29
Payer: COMMERCIAL

## 2021-07-29 VITALS
DIASTOLIC BLOOD PRESSURE: 80 MMHG | BODY MASS INDEX: 23.69 KG/M2 | SYSTOLIC BLOOD PRESSURE: 137 MMHG | HEART RATE: 75 BPM | WEIGHT: 147.4 LBS | HEIGHT: 66 IN

## 2021-07-29 DIAGNOSIS — M16.11 PRIMARY OSTEOARTHRITIS OF ONE HIP, RIGHT: ICD-10-CM

## 2021-07-29 DIAGNOSIS — M16.11 PRIMARY OSTEOARTHRITIS OF ONE HIP, RIGHT: Primary | ICD-10-CM

## 2021-07-29 PROCEDURE — 3008F BODY MASS INDEX DOCD: CPT | Performed by: ORTHOPAEDIC SURGERY

## 2021-07-29 PROCEDURE — 73502 X-RAY EXAM HIP UNI 2-3 VIEWS: CPT

## 2021-07-29 PROCEDURE — 99213 OFFICE O/P EST LOW 20 MIN: CPT | Performed by: ORTHOPAEDIC SURGERY

## 2021-07-29 NOTE — PROGRESS NOTES
Assessment/Plan     1  Primary osteoarthritis of one hip, right      Orders Placed This Encounter   Procedures    XR hip/pelv 2-3 vws right if performed     · Patient has severe right hip OA  · She is currently not a surgical candidate for MARCIA due to continuing to smoke  · She will go for nicotine blood work two weeks after quitting smoking and to make follow appointment to schedule surgery 4 weeks after quitting smoking   ·  recommended patient to follow up with her PCP to help with smoking cessation and finding new ways to cope with stress  Return if symptoms worsen or fail to improve  I answered all of the patient's questions during the visit and provided education of the patient's condition during the visit  The patient verbalized understanding of the information given and agrees with the plan  This note was dictated using [employer] software  It may contain errors including improperly dictated words  Please contact physician directly for any questions  Subjective   Chief Complaint:   Chief Complaint   Patient presents with    Right Hip - Follow-up       HPI:  Anson Jo is a 46 y o  female who presents follow up  for right pain due to severe osteoarthritis  Patient states she still continues to smoke  She states the pain is getting worse  She states she is having pain over the lateral aspect of the right hip and radiating into the groin region  She states sharp pain with activity  She feels her right leg is shorter  Pain is worse with walking, weight-bearing and going up steps  She is taking Tylenol as needed for pain  Patient can not take NSAIDs due to chronic kidney disease  Review of Systems  See HPI for musculoskeletal review     All other systems reviewed are negative     History:  Past Medical History:   Diagnosis Date    Anemia     Anxiety     Arthritis 2017    [facility] failure[facility])     Chronic kidney disease 01/0/2021    Coronary artery disease 1/6/2021  Depression     Hyperlipidemia 1/7/2021    Hypertension 01/06/2021    Osteoarthritis      Past Surgical History:   Procedure Laterality Date    CT NEEDLE BIOPSY KIDNEY  3/2/2021    ECTOPIC PREGNANCY SURGERY      IR BIOPSY KIDNEY COLUMBIA KIT NO LATERALITY  2/17/2021    IR THORACENTESIS  1/12/2021    MT TOTAL HIP ARTHROPLASTY Left 3/5/2018    Procedure: ARTHROPLASTY HIP TOTAL;  Surgeon: Cherelle Alvarez DO;  Location: [facility];  Service: Orthopedics    RENAL BIOPSY  03/02/2021    WISDOM TOOTH EXTRACTION      x1     Social History   Social History     Substance and Sexual Activity   Alcohol Use Yes    Alcohol/week: 0 0 standard drinks    Comment: rarely     Social History     Substance and Sexual Activity   Drug Use No     Social History     Tobacco Use   Smoking Status Current Every Day Smoker    Packs/day: 1 50    Years: 33 00    Pack years: 49 50    Types: Cigarettes    Start date: 1988   Smokeless Tobacco Never Used   Tobacco Comment    0 25 pack daily as of 6/2021     Family History:   Family History   Problem Relation Age of Onset    Hypertension Mother     Cancer Family     Cancer Father         PROSTATE    Atrial fibrillation Father     Hypertension Father     Diabetes Brother     Hypertension Sister        Current Outpatient Medications on File Prior to Visit   Medication Sig Dispense Refill    amLODIPine (NORVASC) 2 5 mg tablet Take 1 tablet (2 5 mg total) by mouth daily 90 tablet 1    atorvastatin (LIPITOR) 40 mg tablet Take 1 tablet (40 mg total) by mouth daily 90 tablet 1    carvedilol (COREG) 25 mg tablet Take 1 tablet (25 mg total) by mouth 2 (two) times a day with meals 180 tablet 1    cyanocobalamin (VITAMIN B-12) 1000 MCG tablet Take 1 tablet (1,000 mcg total) by mouth daily 30 tablet 0    folic acid (FOLVITE) 1 mg tablet Take 1 tablet (1 mg total) by mouth daily 90 tablet 1    furosemide (LASIX) 20 mg tablet Take 1 tablet (20 mg total) by mouth every evening 90 tablet 1    furosemide (LASIX) 40 mg tablet Take 1 tablet (40 mg total) by mouth every morning 90 tablet 1    Glucos-Chondroit-Hyaluron-MSM (GLUCOSAMINE CHONDROITIN JOINT PO) Take 1 tablet by mouth as needed      hydrALAZINE (APRESOLINE) 25 mg tablet TAKE 1 TABLET(25 MG) BY MOUTH THREE TIMES DAILY 270 tablet 1    isosorbide dinitrate (ISORDIL) 10 mg tablet Take 1 tablet (10 mg total) by mouth 3 (three) times daily after meals 270 tablet 1    losartan (COZAAR) 25 mg tablet TAKE 1 TABLET(25 MG) BY MOUTH DAILY 30 tablet 3    methylphenidate (RITALIN) 20 MG tablet TK 1 T PO BID  0    PARoxetine (PAXIL) 30 mg tablet Take by mouth daily Take 1 5 tabs by mouth once daily  5    QUEtiapine (SEROquel) 100 mg tablet Take 100 mg by mouth daily at bedtime       No current facility-administered medications on file prior to visit  No Known Allergies     Objective     /80   Pulse 75   Ht 5' 6" (1 676 m)   Wt 66 9 kg (147 lb 6 4 oz)   BMI 23 79 kg/m²      PE:  AAOx 3  WDWN  Hearing intact, no drainage from eyes  no audible wheezing  no abdominal distension  LE compartments soft, skin intact    right hip:   No dislocation/deformity  Positive  [facility]  ROM: 10- 130  Int rot- 25  Ext rot- 25  Neg   Impingement test  No TTP over greater trochanter  No TTP over SIJ     AT/GS intact      Scribe Attestation    I,:  Marc Hassan am acting as a scribe while in the presence of the attending physician :       I,:  Jolie Callejas DO personally performed the services described in this documentation    as scribed in my presence :

## 2021-08-15 DIAGNOSIS — D53.9 MACROCYTIC ANEMIA: ICD-10-CM

## 2021-08-15 DIAGNOSIS — I50.21 ACUTE SYSTOLIC CONGESTIVE HEART FAILURE (HCC): ICD-10-CM

## 2021-08-15 DIAGNOSIS — I16.0 HYPERTENSIVE URGENCY: ICD-10-CM

## 2021-08-15 DIAGNOSIS — I50.9 CHF (CONGESTIVE HEART FAILURE) (HCC): ICD-10-CM

## 2021-08-17 RX ORDER — CARVEDILOL 25 MG/1
TABLET ORAL
Qty: 180 TABLET | Refills: 1 | Status: SHIPPED | OUTPATIENT
Start: 2021-08-17 | End: 2022-02-11

## 2021-08-17 RX ORDER — FOLIC ACID 1 MG/1
TABLET ORAL
Qty: 90 TABLET | Refills: 1 | Status: SHIPPED | OUTPATIENT
Start: 2021-08-17 | End: 2021-12-02 | Stop reason: ALTCHOICE

## 2021-08-17 RX ORDER — FUROSEMIDE 20 MG/1
20 TABLET ORAL EVERY EVENING
Qty: 90 TABLET | Refills: 1 | Status: SHIPPED | OUTPATIENT
Start: 2021-08-17 | End: 2022-02-11

## 2021-08-17 RX ORDER — ISOSORBIDE DINITRATE 10 MG/1
10 TABLET ORAL
Qty: 270 TABLET | Refills: 1 | Status: SHIPPED | OUTPATIENT
Start: 2021-08-17 | End: 2021-08-27

## 2021-08-17 RX ORDER — FUROSEMIDE 40 MG/1
TABLET ORAL
Qty: 90 TABLET | Refills: 1 | Status: SHIPPED | OUTPATIENT
Start: 2021-08-17 | End: 2022-02-11

## 2021-08-17 RX ORDER — ATORVASTATIN CALCIUM 40 MG/1
TABLET, FILM COATED ORAL
Qty: 90 TABLET | Refills: 1 | Status: SHIPPED | OUTPATIENT
Start: 2021-08-17 | End: 2022-02-11

## 2021-08-25 ENCOUNTER — TELEPHONE (OUTPATIENT)
Dept: CARDIOLOGY CLINIC | Facility: CLINIC | Age: 51
End: 2021-08-25

## 2021-08-25 NOTE — TELEPHONE ENCOUNTER
Fax received Cover My Meds from [facility] request for  Isosorbide Dinitreate 10mg    Prior Auth initiated   [employer]

## 2021-08-27 ENCOUNTER — TELEPHONE (OUTPATIENT)
Dept: CARDIOLOGY CLINIC | Facility: CLINIC | Age: 51
End: 2021-08-27

## 2021-08-27 NOTE — TELEPHONE ENCOUNTER
Phone call to patient  Dr Liza Belcher to switch Isosorbide Dinitrate to covered medication  Isosorbide mononitrate 30mg    Patient understands and will start new medciation

## 2021-08-27 NOTE — TELEPHONE ENCOUNTER
Received fax denial from Stefanie Quigley for Isosorbide Dinitrate  Insurance changed since initial start in January  To meet rules for approval pt must try two of the following preferred drugs on the preferred drug list    Isosorbide Mononitrate tablets  Isosorbide Mononitrate ER tablets  Nitro bid ointment  Nitroglycerin patch  Nitroglycerin SL tablets  Ranolazine ER tablets  (prior auth required)    She has not taken med since Monday  Not having any chest pain  She is willing to try another med  Pending follow up visit January 2022    Please advise

## 2021-09-07 ENCOUNTER — APPOINTMENT (OUTPATIENT)
Dept: LAB | Facility: HOSPITAL | Age: 51
End: 2021-09-07
Attending: INTERNAL MEDICINE
Payer: COMMERCIAL

## 2021-09-07 DIAGNOSIS — J90 BILATERAL PLEURAL EFFUSION: ICD-10-CM

## 2021-09-07 DIAGNOSIS — I50.9 CONGESTIVE HEART FAILURE, UNSPECIFIED HF CHRONICITY, UNSPECIFIED HEART FAILURE TYPE (HCC): ICD-10-CM

## 2021-09-07 DIAGNOSIS — E87.6 HYPOKALEMIA: ICD-10-CM

## 2021-09-07 DIAGNOSIS — N17.9 AKI (ACUTE KIDNEY INJURY) (HCC): ICD-10-CM

## 2021-09-07 DIAGNOSIS — I16.0 HYPERTENSIVE URGENCY: ICD-10-CM

## 2021-09-07 DIAGNOSIS — N18.32 STAGE 3B CHRONIC KIDNEY DISEASE (HCC): ICD-10-CM

## 2021-09-07 LAB
ALBUMIN SERPL BCP-MCNC: 2.8 G/DL (ref 3.5–5)
ALP SERPL-CCNC: 99 U/L (ref 46–116)
ALT SERPL W P-5'-P-CCNC: 33 U/L (ref 12–78)
ANION GAP SERPL CALCULATED.3IONS-SCNC: 10 MMOL/L (ref 4–13)
AST SERPL W P-5'-P-CCNC: 27 U/L (ref 5–45)
BACTERIA UR QL AUTO: ABNORMAL /HPF
BILIRUB SERPL-MCNC: 0.21 MG/DL (ref 0.2–1)
BILIRUB UR QL STRIP: NEGATIVE
BUN SERPL-MCNC: 39 MG/DL (ref 5–25)
CALCIUM ALBUM COR SERPL-MCNC: 10.2 MG/DL (ref 8.3–10.1)
CALCIUM SERPL-MCNC: 9.2 MG/DL (ref 8.3–10.1)
CHLORIDE SERPL-SCNC: 106 MMOL/L (ref 100–108)
CLARITY UR: CLEAR
CO2 SERPL-SCNC: 27 MMOL/L (ref 21–32)
COLOR UR: COLORLESS
CREAT SERPL-MCNC: 1.89 MG/DL (ref 0.6–1.3)
CREAT UR-MCNC: 69.3 MG/DL
ERYTHROCYTE [DISTWIDTH] IN BLOOD BY AUTOMATED COUNT: 12.5 % (ref 11.6–15.1)
GFR SERPL CREATININE-BSD FRML MDRD: 30 ML/MIN/1.73SQ M
GLUCOSE P FAST SERPL-MCNC: 97 MG/DL (ref 65–99)
GLUCOSE UR STRIP-MCNC: NEGATIVE MG/DL
HCT VFR BLD AUTO: 39.2 % (ref 34.8–46.1)
HGB BLD-MCNC: 12.9 G/DL (ref 11.5–15.4)
HGB UR QL STRIP.AUTO: ABNORMAL
KETONES UR STRIP-MCNC: NEGATIVE MG/DL
LEUKOCYTE ESTERASE UR QL STRIP: NEGATIVE
MAGNESIUM SERPL-MCNC: 2.6 MG/DL (ref 1.6–2.6)
MCH RBC QN AUTO: 33.2 PG (ref 26.8–34.3)
MCHC RBC AUTO-ENTMCNC: 32.9 G/DL (ref 31.4–37.4)
MCV RBC AUTO: 101 FL (ref 82–98)
NITRITE UR QL STRIP: NEGATIVE
NON-SQ EPI CELLS URNS QL MICRO: ABNORMAL /HPF
PH UR STRIP.AUTO: 7 [PH]
PHOSPHATE SERPL-MCNC: 4.7 MG/DL (ref 2.7–4.5)
PLATELET # BLD AUTO: 338 THOUSANDS/UL (ref 149–390)
PMV BLD AUTO: 9.7 FL (ref 8.9–12.7)
POTASSIUM SERPL-SCNC: 4.3 MMOL/L (ref 3.5–5.3)
PROT SERPL-MCNC: 7 G/DL (ref 6.4–8.2)
PROT UR STRIP-MCNC: >=300 MG/DL
PROT UR-MCNC: 487 MG/DL
PROT/CREAT UR: 7.03 MG/G{CREAT} (ref 0–0.1)
PTH-INTACT SERPL-MCNC: 45.6 PG/ML (ref 18.4–80.1)
RBC # BLD AUTO: 3.89 MILLION/UL (ref 3.81–5.12)
RBC #/AREA URNS AUTO: ABNORMAL /HPF
SODIUM SERPL-SCNC: 143 MMOL/L (ref 136–145)
SP GR UR STRIP.AUTO: 1.02 (ref 1–1.03)
UROBILINOGEN UR QL STRIP.AUTO: 0.2 E.U./DL
WBC # BLD AUTO: 7.9 THOUSAND/UL (ref 4.31–10.16)
WBC #/AREA URNS AUTO: ABNORMAL /HPF

## 2021-09-07 PROCEDURE — 83735 ASSAY OF MAGNESIUM: CPT

## 2021-09-07 PROCEDURE — 83970 ASSAY OF PARATHORMONE: CPT

## 2021-09-07 PROCEDURE — 82570 ASSAY OF URINE CREATININE: CPT

## 2021-09-07 PROCEDURE — 84156 ASSAY OF PROTEIN URINE: CPT

## 2021-09-07 PROCEDURE — 84100 ASSAY OF PHOSPHORUS: CPT

## 2021-09-07 PROCEDURE — 85027 COMPLETE CBC AUTOMATED: CPT

## 2021-09-07 PROCEDURE — 36415 COLL VENOUS BLD VENIPUNCTURE: CPT

## 2021-09-07 PROCEDURE — 81001 URINALYSIS AUTO W/SCOPE: CPT

## 2021-09-07 PROCEDURE — 80053 COMPREHEN METABOLIC PANEL: CPT

## 2021-09-09 ENCOUNTER — OFFICE VISIT (OUTPATIENT)
Dept: NEPHROLOGY | Facility: CLINIC | Age: 51
End: 2021-09-09
Payer: COMMERCIAL

## 2021-09-09 VITALS
HEIGHT: 66 IN | SYSTOLIC BLOOD PRESSURE: 118 MMHG | WEIGHT: 149 LBS | HEART RATE: 70 BPM | DIASTOLIC BLOOD PRESSURE: 75 MMHG | BODY MASS INDEX: 23.95 KG/M2

## 2021-09-09 DIAGNOSIS — N28.89 HYPERTENSION SECONDARY TO OTHER RENAL DISORDERS: ICD-10-CM

## 2021-09-09 DIAGNOSIS — N04.9 NEPHROTIC SYNDROME: Primary | ICD-10-CM

## 2021-09-09 DIAGNOSIS — I15.1 HYPERTENSION SECONDARY TO OTHER RENAL DISORDERS: ICD-10-CM

## 2021-09-09 PROCEDURE — 99214 OFFICE O/P EST MOD 30 MIN: CPT | Performed by: INTERNAL MEDICINE

## 2021-09-09 RX ORDER — LOSARTAN POTASSIUM 25 MG/1
25 TABLET ORAL DAILY
Qty: 90 TABLET | Refills: 3 | Status: SHIPPED | OUTPATIENT
Start: 2021-09-09

## 2021-09-09 NOTE — PROGRESS NOTES
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 and sclerosing glomerular nephritis with abundant intra capillary histiocytes seen on renal biopsy done March 18th, severe tubular atrophy and interstitial fibrosis, moderate chronic inflammation, severe arterial sclerosis on arteriolosclerosis with hyaline nose  o Clear-cut diagnostic reason for her renal failure still unclear, cryoglobulins were negative which can be seen when histiocytes are noted on renal biopsy, this could be infectious related GN  Which also may have contributed to patient's acute decrease  Ejection fraction which is now improved  o she also seems to have elements of hypertensive kidney disease  o at this point we are treating  The proteinuria with losartan and will monitor her volume status-she is not taking losartan because having insurance issues, recent to [employer] we can use an alternative RAASi if not covered by insurance  o Blood pressures are stable  o Her creatinine has remained around 1 8-2  o Protein urea remains elevated around 7 g     Electrolytes/Acid Base  o  electrolytes and acid-base status are acceptable     Blood Pressure- hypertension in the setting of cardiomyopathy  o  had a low output with an EF of 25%, this has improved to 50%  o  following with Cardiology  o  currently on carvedilol 25 mg twice daily  o  furosemide 40 mg in the a m  and 20 mg in the p m   o  Isordil 10 mg 3 times daily  o  hydralazine 25 mg 3 times daily  o  added losartan 25 mg daily  o Amlodipine 2 5 mg daily this can be discontinued if blood pressures are low     Anemia of CKD  o   Monitor H&H     BMD  o  continue to monitor parameters     Risk Reduction/Clinical Course  o  continue cardiovascular risk reduction measures    DISCUSSION/SUMMARY    -it was nice seeing Camilo Edwards again today  -given advanced proteinuria she is at high risk for progression of this CKD we discuss this again today  -struggling with quitting smoking which she needs to do for her hip surgery  -continue semi annual surveillance      HPI: Geo Jiang is a 46 y o  female who is here for  Follow-up     she was seen in the hospital when she was admitted with acute decompensated congestive heart failure, she had a cardiac catheterization that was negative, she subsequently also had a urinalysis and urine protein to creatinine ratio upwards of 7 g, she had a serologic workup at the time that was negative should pulmonary edema and was treated with diuretics her EF was 25% during the hospitalization her blood pressures have now improved and she has hypertension she also has a life vest in place but took this off because of some back spasm    Overall she continues to have a significant amount of pain in her hip, using a walker/cane, she denies any acute chest pain or shortness of breath fevers chills nausea vomiting diarrhea no constipation      ROS:   All the systems were reviewed and were negative except as documented on the HPI  Allergies: Patient has no known allergies      Medications:   Current Outpatient Medications:     amLODIPine (NORVASC) 2 5 mg tablet, Take 1 tablet (2 5 mg total) by mouth daily, Disp: 90 tablet, Rfl: 1    atorvastatin (LIPITOR) 40 mg tablet, TAKE 1 TABLET(40 MG) BY MOUTH DAILY, Disp: 90 tablet, Rfl: 1    carvedilol (COREG) 25 mg tablet, TAKE 1 TABLET(25 MG) BY MOUTH TWICE DAILY WITH MEALS, Disp: 180 tablet, Rfl: 1    cyanocobalamin (VITAMIN B-12) 1000 MCG tablet, Take 1 tablet (1,000 mcg total) by mouth daily, Disp: 30 tablet, Rfl: 0    folic acid (FOLVITE) 1 mg tablet, TAKE 1 TABLET(1 MG) BY MOUTH DAILY, Disp: 90 tablet, Rfl: 1    furosemide (LASIX) 20 mg tablet, Take 1 tablet (20 mg total) by mouth every evening, Disp: 90 tablet, Rfl: 1    furosemide (LASIX) 40 mg tablet, TAKE 1 TABLET(40 MG) BY MOUTH EVERY MORNING, Disp: 90 tablet, Rfl: 1    Glucos-Chondroit-Hyaluron-MSM (GLUCOSAMINE CHONDROITIN JOINT PO), Take 1 tablet by mouth as needed, Disp: , Rfl:     hydrALAZINE (APRESOLINE) 25 mg tablet, TAKE 1 TABLET(25 MG) BY MOUTH THREE TIMES DAILY, Disp: 270 tablet, Rfl: 1    isosorbide mononitrate (IMDUR) 30 mg 24 hr tablet, Take 1 tablet (30 mg total) by mouth daily, Disp: 90 tablet, Rfl: 1    losartan (COZAAR) 25 mg tablet, Take 1 tablet (25 mg total) by mouth daily, Disp: 90 tablet, Rfl: 3    methylphenidate (RITALIN) 20 MG tablet, TK 1 T PO BID, Disp: , Rfl: 0    PARoxetine (PAXIL) 30 mg tablet, Take by mouth daily Take 1 5 tabs by mouth once daily  , Disp: , Rfl: 5    QUEtiapine (SEROquel) 100 mg tablet, Take 100 mg by mouth daily at bedtime, Disp: , Rfl:     Past Medical History:   Diagnosis Date    Anemia     Anxiety     Arthritis 2017    CHF (congestive heart failure) (HCC)     Chronic kidney disease 01/0/2021    Coronary artery disease 1/6/2021    Depression     Hyperlipidemia 1/7/2021    Hypertension 01/06/2021    Osteoarthritis      Past Surgical History:   Procedure Laterality Date    CT NEEDLE BIOPSY KIDNEY  3/2/2021    ECTOPIC PREGNANCY SURGERY      IR BIOPSY KIDNEY COLUMBIA KIT NO LATERALITY  2/17/2021    IR THORACENTESIS  1/12/2021    MS TOTAL HIP ARTHROPLASTY Left 3/5/2018    Procedure: ARTHROPLASTY HIP TOTAL;  Surgeon: Javier Haney DO;  Location[facility];  Service: Orthopedics    RENAL BIOPSY  03/02/2021    WISDOM TOOTH EXTRACTION      x1     Family History   Problem Relation Age of Onset    Hypertension Mother     Cancer Family     Cancer Father         PROSTATE    Atrial fibrillation Father     Hypertension Father     Diabetes Brother     Hypertension Sister       reports that she has been smoking cigarettes  She started smoking about 33 years ago   She has a 49 50 pack-year smoking history  She has never used smokeless tobacco  She reports current alcohol use  She reports that she does not use drugs  Physical Exam:   Vitals:    09/09/21 0845   BP: 118/75   BP Location: Left arm   Patient Position: Sitting   Cuff Size: Standard   Pulse: 70   Weight: 67 6 kg (149 lb)   Height: 5' 6" (1 676 m)     Body mass index is 24 05 kg/m²      General: conscious, cooperative, in not acute distress  Eyes: conjunctivae pink, anicteric sclerae  ENT: lips and mucous membranes moist  Neck: supple, no JVD  Chest:  Decreased breath sounds on the left side  CVS: distinct S1 & S2, normal rate, regular rhythm  Abdomen: soft, non-tender, non-distended, normoactive bowel sounds  Extremities: no edema of both legs  Skin: no rash  Neuro: awake, alert, oriented      Lab Results:    Results for orders placed or performed in visit on 09/07/21   Comprehensive metabolic panel   Result Value Ref Range    Sodium 143 136 - 145 mmol/L    Potassium 4 3 3 5 - 5 3 mmol/L    Chloride 106 100 - 108 mmol/L    CO2 27 21 - 32 mmol/L    ANION GAP 10 4 - 13 mmol/L    BUN 39 (H) 5 - 25 mg/dL    Creatinine 1 89 (H) 0 60 - 1 30 mg/dL    Glucose, Fasting 97 65 - 99 mg/dL    Calcium 9 2 8 3 - 10 1 mg/dL    Corrected Calcium 10 2 (H) 8 3 - 10 1 mg/dL    AST 27 5 - 45 U/L    ALT 33 12 - 78 U/L    Alkaline Phosphatase 99 46 - 116 U/L    Total Protein 7 0 6 4 - 8 2 g/dL    Albumin 2 8 (L) 3 5 - 5 0 g/dL    Total Bilirubin 0 21 0 20 - 1 00 mg/dL    eGFR 30 ml/min/1 73sq m   Magnesium   Result Value Ref Range    Magnesium 2 6 1 6 - 2 6 mg/dL   Phosphorus   Result Value Ref Range    Phosphorus 4 7 (H) 2 7 - 4 5 mg/dL   Protein / creatinine ratio, urine   Result Value Ref Range    Creatinine, Ur 69 3 mg/dL    Protein Urine Random 487 mg/dL    Prot/Creat Ratio, Ur 7 03 (H) 0 00 - 0 10   PTH, intact   Result Value Ref Range    PTH 45 6 18 4 - 80 1 pg/mL   Urinalysis with microscopic   Result Value Ref Range    Clarity, UA Clear Color, UA Colorless     Specific Gravity, UA 1 020 1 003 - 1 030    pH, UA 7 0 4 5, 5 0, 5 5, 6 0, 6 5, 7 0, 7 5, 8 0    Glucose, UA Negative Negative mg/dl    Ketones, UA Negative Negative mg/dl    Blood, UA Large (A) Negative    Protein, UA >=300 (A) Negative mg/dl    Nitrite, UA Negative Negative    Bilirubin, UA Negative Negative    Urobilinogen, UA 0 2 0 2, 1 0 E U /dl E U /dl    Leukocytes, UA Negative Negative    WBC, UA 0-1 (A) None Seen, 2-4, 5-60 /hpf    RBC, UA 10-20 (A) None Seen, 2-4 /hpf    Bacteria, UA Moderate (A) None Seen, Occasional /hpf    Epithelial Cells Occasional None Seen, Occasional /hpf   CBC   Result Value Ref Range    WBC 7 90 4 31 - 10 16 Thousand/uL    RBC 3 89 3 81 - 5 12 Million/uL    Hemoglobin 12 9 11 5 - 15 4 g/dL    Hematocrit 39 2 34 8 - 46 1 %     (H) 82 - 98 fL    MCH 33 2 26 8 - 34 3 pg    MCHC 32 9 31 4 - 37 4 g/dL    RDW 12 5 11 6 - 15 1 %    Platelets 312 808 - 884 Thousands/uL    MPV 9 7 8 9 - 12 7 fL       Portions of the record may have been created with voice recognition software  Occasional wrong word or "sound a like" substitutions may have occurred due to the inherent limitations of voice recognition software  Read the chart carefully and recognize, using context, where substitutions have occurred  If you have any questions, please contact the dictating provider

## 2021-09-09 NOTE — PATIENT INSTRUCTIONS
Chronic Kidney Disease   WHAT YOU NEED TO KNOW:   Chronic kidney disease (CKD) is the gradual and permanent loss of kidney function  It is also called chronic kidney failure, or chronic renal insufficiency  Normally, the kidneys remove fluid, chemicals, and waste from your blood  These wastes are turned into urine by your kidneys  CKD may worsen over time and lead to kidney failure  Your CKD team will help you and your family plan for your care at home  The team will help you create goals and find ways to meet your goals  Your care plan may change over time as your needs change  DISCHARGE INSTRUCTIONS:   Call your local emergency number (911 in the [address]) if:   · You have a seizure  · You have shortness of breath  Return to the emergency department if:   · You are confused and very drowsy  Call your doctor or nephrologist if:   · You suddenly gain or lose more weight than your healthcare provider has told you is okay  · You have itchy skin or a rash  · You urinate more or less than you normally do  · You have blood in your urine  · You have nausea and are vomiting  · You have fatigue or muscle weakness  · You have hiccups that will not stop  · You have questions or concerns about your condition or care  Medicines:   · Medicines  may be given to decrease blood pressure and get rid of extra fluid  You may also receive medicine to manage health conditions that may occur with CKD, such as anemia, diabetes, and heart disease  · Take your medicine as directed  Contact your healthcare provider if you think your medicine is not helping or if you have side effects  Tell him or her if you are allergic to any medicine  Keep a list of the medicines, vitamins, and herbs you take  Include the amounts, and when and why you take them  Bring the list or the pill bottles to follow-up visits  Carry your medicine list with you in case of an emergency      What you can do to manage CKD: Management may include making some lifestyle changes  Tell your healthcare provider if you have any concerns about being able to make changes  He or she can help you find solutions, including working with specialists  Ask for help creating a plan to break large goals into smaller steps  Your plan may include any of the following:  · Manage other health conditions  Your healthcare provider will work with you to make a care plan that meets your needs  You will be checked regularly for heart disease or other conditions that can make CKD worse, such as diabetes  Your blood pressure will be closely monitored  You will also get a target blood pressure and help making a plan to reach your target  This may include taking your blood pressure at home  · Maintain a healthy weight  Your weight and body mass index (BMI) will be checked regularly  BMI helps find if your weight is healthy for your height  Your healthcare provider will use other tests to check your muscle and protein levels  Extra weight can strain your kidneys  A low weight or low muscle mass can make you feel more tired  You may have trouble doing your daily activities  Ask your provider what a healthy weight is for you  He or she can help you create a plan to lose or gain weight safely, if needed  The plan may include keeping a food diary  This is a list of foods and liquids you have each day  Your provider will use the diary to help you make changes, if needed  Changes are based on your health and any other conditions you have, such as diabetes  · Create an exercise plan  Regular exercise can help you manage CKD, high blood pressure, and diabetes  Exercise also helps control weight  Your provider can help you create exercise goals and a plan to reach those goals  For example, your goal may be to exercise for 30 minutes in a day  Your plan can include breaking exercise into 10 minute sessions, 3 times during the day           · Create a healthy eating plan  Your provider may tell you to eat food low in potassium, phosphorus, or protein  Your provider may also recommend vitamin or mineral supplements  Do not take any supplements without talking to your provider  A dietitian can help you plan meals if needed  Ask how much liquid to drink each day and which liquids are best for you  · Limit sodium (salt) as directed  You may need to limit sodium to less than 2,300 milligrams (mg) each day  Ask your dietitian or healthcare provider how much sodium you can have each day  The amount depends on your stage of kidney disease  Table salt, canned foods, soups, salted snacks, and processed meats, like deli meats and sausage, are high in sodium  Your provider or a dietitian can show you how to read food labels for sodium  · Limit alcohol as directed  Alcohol can cause fluid retention and can affect your kidneys  Ask how much alcohol is safe for you  A drink of alcohol is 12 ounces of beer, 5 ounces of wine, or 1½ ounces of liquor  · Do not smoke  Nicotine and other chemicals in cigarettes and cigars can cause kidney damage  Ask your provider for information if you currently smoke and need help to quit  E-cigarettes or smokeless tobacco still contain nicotine  Talk to your provider before you use these products  · Ask about over-the-counter medicines  Medicines such as NSAIDs and laxatives may harm your kidneys  Some cough and cold medicines can raise your blood pressure  Always ask if a medicine is safe before you take it  · Ask about vaccines you may need  CKD can increase your risk for infections such as pneumonia, influenza, and hepatitis  Vaccines lower your risk for infection  Your healthcare provider will tell you which vaccines you need and when to get them  Follow up with your doctor or nephrologist as directed: You will need to return for tests to monitor your kidney and nerve function, and your parathyroid hormone level   Your medicines may be changed, based on certain test results  Write down your questions so you remember to ask them during your visits  © Copyright [employer] 2021 Information is for End User's use only and may not be sold, redistributed or otherwise used for commercial purposes  All illustrations and images included in CareNotes® are the copyrighted property of [employer]  or [employer]  The above information is an  only  It is not intended as medical advice for individual conditions or treatments  Talk to your doctor, nurse or pharmacist before following any medical regimen to see if it is safe and effective for you

## 2021-11-02 ENCOUNTER — OFFICE VISIT (OUTPATIENT)
Dept: FAMILY MEDICINE CLINIC | Facility: CLINIC | Age: 51
End: 2021-11-02
Payer: COMMERCIAL

## 2021-11-02 VITALS
RESPIRATION RATE: 18 BRPM | TEMPERATURE: 96.8 F | HEIGHT: 66 IN | WEIGHT: 155 LBS | SYSTOLIC BLOOD PRESSURE: 116 MMHG | HEART RATE: 68 BPM | DIASTOLIC BLOOD PRESSURE: 82 MMHG | BODY MASS INDEX: 24.91 KG/M2 | OXYGEN SATURATION: 98 %

## 2021-11-02 DIAGNOSIS — Z72.0 NICOTINE ABUSE: ICD-10-CM

## 2021-11-02 DIAGNOSIS — I50.21 ACUTE SYSTOLIC CONGESTIVE HEART FAILURE (HCC): ICD-10-CM

## 2021-11-02 DIAGNOSIS — M16.11 PRIMARY OSTEOARTHRITIS OF ONE HIP, RIGHT: ICD-10-CM

## 2021-11-02 DIAGNOSIS — Z23 NEED FOR IMMUNIZATION AGAINST INFLUENZA: ICD-10-CM

## 2021-11-02 DIAGNOSIS — E78.2 MIXED HYPERLIPIDEMIA: ICD-10-CM

## 2021-11-02 DIAGNOSIS — F33.1 MODERATE EPISODE OF RECURRENT MAJOR DEPRESSIVE DISORDER (HCC): Primary | ICD-10-CM

## 2021-11-02 PROCEDURE — 90686 IIV4 VACC NO PRSV 0.5 ML IM: CPT

## 2021-11-02 PROCEDURE — 99204 OFFICE O/P NEW MOD 45 MIN: CPT | Performed by: FAMILY MEDICINE

## 2021-11-02 PROCEDURE — 90471 IMMUNIZATION ADMIN: CPT

## 2021-11-02 RX ORDER — PAROXETINE 30 MG/1
30 TABLET, FILM COATED ORAL DAILY
Qty: 30 TABLET | Refills: 2 | Status: SHIPPED | OUTPATIENT
Start: 2021-11-02 | End: 2022-01-02

## 2021-11-02 RX ORDER — QUETIAPINE FUMARATE 100 MG/1
100 TABLET, FILM COATED ORAL
Qty: 30 TABLET | Refills: 2 | Status: SHIPPED | OUTPATIENT
Start: 2021-11-02 | End: 2022-01-31

## 2021-11-02 RX ORDER — NICOTINE 21 MG/24HR
1 PATCH, TRANSDERMAL 24 HOURS TRANSDERMAL EVERY 24 HOURS
Qty: 28 PATCH | Refills: 2 | Status: SHIPPED | OUTPATIENT
Start: 2021-11-02 | End: 2021-12-02 | Stop reason: ALTCHOICE

## 2021-11-02 RX ORDER — METHYLPHENIDATE HYDROCHLORIDE 20 MG/1
20 TABLET ORAL 2 TIMES DAILY
Qty: 60 TABLET | Refills: 0 | Status: SHIPPED | OUTPATIENT
Start: 2021-11-02 | End: 2021-12-06 | Stop reason: SDUPTHER

## 2021-11-10 ENCOUNTER — TELEPHONE (OUTPATIENT)
Dept: FAMILY MEDICINE CLINIC | Facility: CLINIC | Age: 51
End: 2021-11-10

## 2021-11-10 DIAGNOSIS — M16.11 PRIMARY OSTEOARTHRITIS OF ONE HIP, RIGHT: ICD-10-CM

## 2021-11-10 DIAGNOSIS — Z96.642 STATUS POST TOTAL HIP REPLACEMENT, LEFT: Primary | ICD-10-CM

## 2021-11-10 PROBLEM — M16.12 PRIMARY OSTEOARTHRITIS OF ONE HIP, LEFT: Status: RESOLVED | Noted: 2018-01-31 | Resolved: 2021-11-10

## 2021-12-02 ENCOUNTER — OFFICE VISIT (OUTPATIENT)
Dept: FAMILY MEDICINE CLINIC | Facility: CLINIC | Age: 51
End: 2021-12-02
Payer: COMMERCIAL

## 2021-12-02 VITALS
TEMPERATURE: 96.8 F | SYSTOLIC BLOOD PRESSURE: 120 MMHG | BODY MASS INDEX: 24.91 KG/M2 | HEIGHT: 66 IN | HEART RATE: 75 BPM | OXYGEN SATURATION: 99 % | WEIGHT: 155 LBS | DIASTOLIC BLOOD PRESSURE: 80 MMHG

## 2021-12-02 DIAGNOSIS — Z12.12 SCREENING FOR COLORECTAL CANCER: ICD-10-CM

## 2021-12-02 DIAGNOSIS — Z87.891 PERSONAL HISTORY OF NICOTINE DEPENDENCE: ICD-10-CM

## 2021-12-02 DIAGNOSIS — M16.11 PRIMARY OSTEOARTHRITIS OF ONE HIP, RIGHT: ICD-10-CM

## 2021-12-02 DIAGNOSIS — F43.21 GRIEF: ICD-10-CM

## 2021-12-02 DIAGNOSIS — Z12.31 ENCOUNTER FOR SCREENING MAMMOGRAM FOR BREAST CANCER: ICD-10-CM

## 2021-12-02 DIAGNOSIS — Z23 ENCOUNTER FOR IMMUNIZATION: Primary | ICD-10-CM

## 2021-12-02 DIAGNOSIS — I50.21 ACUTE SYSTOLIC CONGESTIVE HEART FAILURE (HCC): ICD-10-CM

## 2021-12-02 DIAGNOSIS — Z00.00 ANNUAL PHYSICAL EXAM: ICD-10-CM

## 2021-12-02 DIAGNOSIS — Z12.2 ENCOUNTER FOR SCREENING FOR LUNG CANCER: ICD-10-CM

## 2021-12-02 DIAGNOSIS — Z12.11 SCREENING FOR COLORECTAL CANCER: ICD-10-CM

## 2021-12-02 DIAGNOSIS — F32.9 REACTIVE DEPRESSION: ICD-10-CM

## 2021-12-02 PROBLEM — J96.01 ACUTE RESPIRATORY FAILURE WITH HYPOXIA (HCC): Status: RESOLVED | Noted: 2021-01-06 | Resolved: 2021-12-02

## 2021-12-02 PROCEDURE — 90471 IMMUNIZATION ADMIN: CPT | Performed by: FAMILY MEDICINE

## 2021-12-02 PROCEDURE — 99396 PREV VISIT EST AGE 40-64: CPT | Performed by: FAMILY MEDICINE

## 2021-12-02 PROCEDURE — 90732 PPSV23 VACC 2 YRS+ SUBQ/IM: CPT | Performed by: FAMILY MEDICINE

## 2021-12-06 ENCOUNTER — TELEPHONE (OUTPATIENT)
Dept: PSYCHIATRY | Facility: CLINIC | Age: 51
End: 2021-12-06

## 2021-12-06 DIAGNOSIS — F33.1 MODERATE EPISODE OF RECURRENT MAJOR DEPRESSIVE DISORDER (HCC): ICD-10-CM

## 2021-12-06 RX ORDER — METHYLPHENIDATE HYDROCHLORIDE 20 MG/1
20 TABLET ORAL 2 TIMES DAILY
Qty: 60 TABLET | Refills: 0 | Status: SHIPPED | OUTPATIENT
Start: 2021-12-06 | End: 2022-01-07 | Stop reason: SDUPTHER

## 2021-12-13 DIAGNOSIS — I16.0 HYPERTENSIVE URGENCY: ICD-10-CM

## 2021-12-13 RX ORDER — AMLODIPINE BESYLATE 2.5 MG/1
TABLET ORAL
Qty: 90 TABLET | Refills: 1 | Status: SHIPPED | OUTPATIENT
Start: 2021-12-13 | End: 2022-06-09

## 2021-12-22 DIAGNOSIS — I42.8 NONISCHEMIC CARDIOMYOPATHY (HCC): ICD-10-CM

## 2021-12-22 RX ORDER — ISOSORBIDE MONONITRATE 30 MG/1
30 TABLET, EXTENDED RELEASE ORAL DAILY
Qty: 90 TABLET | Refills: 0 | Status: SHIPPED | OUTPATIENT
Start: 2021-12-22 | End: 2022-03-21

## 2022-01-07 DIAGNOSIS — F32.9 REACTIVE DEPRESSION: Primary | ICD-10-CM

## 2022-01-07 DIAGNOSIS — F33.1 MODERATE EPISODE OF RECURRENT MAJOR DEPRESSIVE DISORDER (HCC): ICD-10-CM

## 2022-01-07 RX ORDER — METHYLPHENIDATE HYDROCHLORIDE 20 MG/1
20 TABLET ORAL 2 TIMES DAILY
Qty: 6 TABLET | Refills: 0 | Status: SHIPPED | OUTPATIENT
Start: 2022-01-07 | End: 2022-01-10 | Stop reason: SDUPTHER

## 2022-01-10 DIAGNOSIS — F33.1 MODERATE EPISODE OF RECURRENT MAJOR DEPRESSIVE DISORDER (HCC): ICD-10-CM

## 2022-01-10 RX ORDER — METHYLPHENIDATE HYDROCHLORIDE 20 MG/1
20 TABLET ORAL 2 TIMES DAILY
Qty: 60 TABLET | Refills: 0 | Status: SHIPPED | OUTPATIENT
Start: 2022-01-10 | End: 2022-02-08 | Stop reason: SDUPTHER

## 2022-01-20 ENCOUNTER — TELEPHONE (OUTPATIENT)
Dept: PSYCHIATRY | Facility: CLINIC | Age: 52
End: 2022-01-20

## 2022-01-20 DIAGNOSIS — I16.0 HYPERTENSIVE URGENCY: ICD-10-CM

## 2022-01-20 RX ORDER — HYDRALAZINE HYDROCHLORIDE 25 MG/1
TABLET, FILM COATED ORAL
Qty: 270 TABLET | Refills: 1 | Status: SHIPPED | OUTPATIENT
Start: 2022-01-20 | End: 2022-07-18

## 2022-01-28 ENCOUNTER — TELEPHONE (OUTPATIENT)
Dept: OTHER | Facility: OTHER | Age: 52
End: 2022-01-28

## 2022-01-30 DIAGNOSIS — F33.1 MODERATE EPISODE OF RECURRENT MAJOR DEPRESSIVE DISORDER (HCC): ICD-10-CM

## 2022-01-31 RX ORDER — QUETIAPINE FUMARATE 100 MG/1
TABLET, FILM COATED ORAL
Qty: 30 TABLET | Refills: 2 | Status: SHIPPED | OUTPATIENT
Start: 2022-01-31 | End: 2022-04-29

## 2022-02-08 DIAGNOSIS — F33.1 MODERATE EPISODE OF RECURRENT MAJOR DEPRESSIVE DISORDER (HCC): ICD-10-CM

## 2022-02-08 RX ORDER — METHYLPHENIDATE HYDROCHLORIDE 20 MG/1
20 TABLET ORAL 2 TIMES DAILY
Qty: 60 TABLET | Refills: 0 | Status: SHIPPED | OUTPATIENT
Start: 2022-02-08 | End: 2022-03-11 | Stop reason: SDUPTHER

## 2022-02-10 ENCOUNTER — CONSULT (OUTPATIENT)
Dept: PAIN MEDICINE | Facility: MEDICAL CENTER | Age: 52
End: 2022-02-10
Payer: COMMERCIAL

## 2022-02-10 VITALS
HEIGHT: 66 IN | HEART RATE: 84 BPM | SYSTOLIC BLOOD PRESSURE: 122 MMHG | OXYGEN SATURATION: 95 % | TEMPERATURE: 97.8 F | DIASTOLIC BLOOD PRESSURE: 76 MMHG | BODY MASS INDEX: 24.91 KG/M2 | WEIGHT: 155 LBS

## 2022-02-10 DIAGNOSIS — G89.29 CHRONIC BILATERAL LOW BACK PAIN WITHOUT SCIATICA: Primary | ICD-10-CM

## 2022-02-10 DIAGNOSIS — M16.11 PRIMARY OSTEOARTHRITIS OF ONE HIP, RIGHT: ICD-10-CM

## 2022-02-10 DIAGNOSIS — M43.17 SPONDYLOLISTHESIS AT L5-S1 LEVEL: ICD-10-CM

## 2022-02-10 DIAGNOSIS — G89.4 CHRONIC PAIN SYNDROME: ICD-10-CM

## 2022-02-10 DIAGNOSIS — M54.50 CHRONIC BILATERAL LOW BACK PAIN WITHOUT SCIATICA: Primary | ICD-10-CM

## 2022-02-10 PROCEDURE — 99244 OFF/OP CNSLTJ NEW/EST MOD 40: CPT | Performed by: PHYSICAL MEDICINE & REHABILITATION

## 2022-02-10 RX ORDER — BUPRENORPHINE 5 UG/H
1 PATCH TRANSDERMAL WEEKLY
Qty: 4 PATCH | Refills: 0 | Status: SHIPPED | OUTPATIENT
Start: 2022-02-10 | End: 2022-02-23 | Stop reason: SDUPTHER

## 2022-02-10 NOTE — PATIENT INSTRUCTIONS
Arthritis   WHAT YOU NEED TO KNOW:   Arthritis is pain or disease in one or more joints  There are many types of arthritis  Types such as rheumatoid arthritis cause inflammation in the joints  Other types wear away the cartilage between joints, such as osteoarthritis  This makes the bones of the joint rub together when you move the joint  An infection from bacteria, a virus, or a fungus can also cause arthritis  Your symptoms may be constant, or symptoms may come and go  Arthritis often gets worse over time and can cause permanent joint damage  DISCHARGE INSTRUCTIONS:   Call your doctor or rheumatologist if:   · You have a fever and severe joint pain or swelling  · You cannot move the affected joint  · You have severe joint pain you cannot tolerate  · You have a new or worsening rash  · Your pain or swelling does not get better with treatment  · You have questions or concerns about your condition or care  Medicines:   · Acetaminophen  decreases pain and fever  It is available without a doctor's order  Ask how much to take and how often to take it  Follow directions  Read the labels of all other medicines you are using to see if they also contain acetaminophen, or ask your doctor or pharmacist  Acetaminophen can cause liver damage if not taken correctly  Do not use more than 4 grams (4,000 milligrams) total of acetaminophen in one day  · NSAIDs , such as ibuprofen, help decrease swelling, pain, and fever  This medicine is available with or without a doctor's order  NSAIDs can cause stomach bleeding or kidney problems in certain people  If you take blood thinner medicine, always ask your healthcare provider if NSAIDs are safe for you  Always read the medicine label and follow directions  · Steroids  reduce swelling and pain  · Prescription pain medicine  may be given  Ask your healthcare provider how to take this medicine safely  Some prescription pain medicines contain acetaminophen   Do not take other medicines that contain acetaminophen without talking to your healthcare provider  Too much acetaminophen may cause liver damage  Prescription pain medicine may cause constipation  Ask your healthcare provider how to prevent or treat constipation  · Take your medicine as directed  Contact your healthcare provider if you think your medicine is not helping or if you have side effects  Tell him of her if you are allergic to any medicine  Keep a list of the medicines, vitamins, and herbs you take  Include the amounts, and when and why you take them  Bring the list or the pill bottles to follow-up visits  Carry your medicine list with you in case of an emergency  Manage your symptoms:   · Rest your painful joint so it can heal   Your healthcare provider may recommend crutches or a walker if the affected joint is in a leg  · Apply ice or heat to the joint  Both can help decrease swelling and pain  Ice may also help prevent tissue damage  Use an ice pack, or put crushed ice in a plastic bag  Cover it with a towel and place it on your joint for 15 to 20 minutes every hour or as directed  You can apply heat for 20 minutes every 2 hours  Heat treatment includes hot packs or heat lamps  · Elevate your joint  Elevation helps reduce swelling and pain  Raise your joint above the level of your heart as often as you can  Prop your painful joint on pillows to keep it above your heart comfortably  Manage arthritis:   · Talk to your healthcare providers about your arthritis medicines  Some medicines may only be needed when you have arthritis pain  You may need to take other medicines every day to prevent arthritis from getting worse  Your healthcare providers will help you understand all your medicines and when to take them  It is important to take the medicines as directed, even if you start to feel better  You can continue to have joint damage and inflammation even if you do not feel it      · Eat a variety of healthy foods  Healthy foods include fruits, vegetables, whole-grain breads, low-fat dairy products, beans, lean meats, and fish  Ask if you need to be on a special diet  A diet rich in calcium and vitamin D may decrease your risk of osteoporosis  Foods high in calcium include milk, cheese, broccoli, and tofu  Vitamin D may be found in meat, fish, fortified milk, cereal and bread  Ask if you need calcium or vitamin D supplements  · Go to physical or occupational therapy as directed  A physical therapist can teach you exercises to improve flexibility and range of motion  You may also be shown non-weight-bearing exercises that are safe for your joints, such as swimming  Exercise can help keep your joints flexible and reduce pain  An occupational therapist can help you learn to do your daily activities when your joints are stiff or sore  · Maintain a healthy weight  Extra weight puts increased pressure on your joints  Ask your healthcare provider what you should weigh  If you need to lose weight, he or she can help you create a weight loss program  Weight loss can help reduce pain and increase your ability to do your activities  · Wear flat or low-heeled shoes  This will help decrease pain and reduce pressure on your ankle, knee, and hip joints  · Do not smoke  Nicotine and other chemicals in cigarettes and cigars can damage your bones and joints  Ask your healthcare provider for information if you currently smoke and need help to quit  E-cigarettes or smokeless tobacco still contain nicotine  Talk to your healthcare provider before you use these products  Support devices:   · Orthotic shoes or insoles  help support your feet when you walk  · Crutches, a cane, or a walker  may help decrease your risk for falling  They also decrease stress on affected joints  · Devices to prevent falls  include raised toilet seats and bathtub bars to help you get up from sitting   Handrails can be placed in areas where you need balance and support  · Devices to help with support and rest  include splints to wear on your hands and a firm pillow while you sleep  Use a pillow that is firm enough to support your neck and head  Follow up with your healthcare provider or rheumatologist as directed:  Write down your questions so you remember to ask them during your visits  © Copyright [employer] 2021 Information is for End User's use only and may not be sold, redistributed or otherwise used for commercial purposes  All illustrations and images included in CareNotes® are the copyrighted property of [employer]  or [address] [place], [place] [address]jessie   The above information is an  only  It is not intended as medical advice for individual conditions or treatments  Talk to your doctor, nurse or pharmacist before following any medical regimen to see if it is safe and effective for you

## 2022-02-10 NOTE — PROGRESS NOTES
Assessment  1  Chronic bilateral low back pain without sciatica    2  Primary osteoarthritis of one hip, right    3  Spondylolisthesis at L5-S1 level    4  Chronic pain syndrome        Plan  1  At this time for the patient's chronic around the clock pain we will initiate Butrans patch  She will place 1 patch per week and remove the old patch prior to placing a new patch  --would recommend titrating this medication to effect before considering other options  --other possible options include Tramadol, Tramadol ER, Nucynta, Nucynta ER    2  We did discuss the option of repeating her hip injection  She only got about few weeks of relief from that and prefers to avoid repeating at this time  She certainly needs to pursue total hip arthroplasty not be the best way to manage this pain  She is developing some hip flexion contracture as well  She should work on activities to help stretch this area  3  We also discussed the option of lumbar medial branch nerve blocks  She would like to review our literature and pamphlets related to this prior to considering scheduling  If she would like to set this up we can schedule bilateral L3-5 medial branch nerve blocks  4  Strongly encouraged smoking cessation       I did review the [place] Prescription Drug Monitoring Program website today which was appropriate for the medications being prescribed  I did review with the patient that she would not be allowed to continue using marijuana if we start prescribing medications such as Butrans  She verbalized understanding and agreement with our policies  My impressions and treatment recommendations were discussed in detail with the patient who verbalized understanding and had no further questions  Discharge instructions were provided  I personally saw and examined the patient and I agree with the above discussed plan of care  No orders of the defined types were placed in this encounter      No orders of the defined types were placed in this encounter  History of Present Illness    Vibha Aragon is a 45 y o  female seen in consultation at the request of Dr Fernando Danielle to assist with pain management related to end-stage arthritis in the right hip as well as pain in her lower back  She has been experiencing severe pain rated as an 8/10 which is nearly constant without any typical pattern characterized as burning, cramping, shooting, numbness, sharp, cutting, pins and needles, pressure-like, throbbing, dull, aching  She does describe lower extremity weakness as well and utilizes a cane or walker for ambulation  She is localizing most of her pain to the right groin region consistent with intra-articular hip pathology  She also has pain in the axial lumbar spine without radiation down the legs  Aggravating factors include lying down, standing, bending, sitting, walking, exercise, relaxation  Diagnostic studies include x-rays of the lumbar spine pelvis and hips  She has severe hip arthritis and grade 2 anterolisthesis of L5 on S1 in the lumbar spine as well as some degenerative changes  We did perform a hip joint injection on her about a year ago which provided only a few weeks of relief  Social history positive for tobacco use and occasional marijuana use  Negative alcohol use  She does try acetaminophen as well without any significant relief  I have personally reviewed and/or updated the patient's past medical history, past surgical history, family history, social history, current medications, allergies, and vital signs today  Review of Systems   Constitutional: Positive for unexpected weight change  Negative for fever  HENT: Negative for trouble swallowing  Eyes: Negative for visual disturbance  Respiratory: Negative for shortness of breath and wheezing  Cardiovascular: Negative for chest pain and palpitations     Gastrointestinal: Negative for constipation, diarrhea, nausea and vomiting  Endocrine: Negative for cold intolerance, heat intolerance and polydipsia  Genitourinary: Negative for difficulty urinating and frequency  Musculoskeletal: Positive for joint swelling and myalgias  Negative for arthralgias and gait problem  Skin: Negative for rash  Neurological: Negative for dizziness, seizures, syncope, weakness and headaches  Hematological: Does not bruise/bleed easily  Psychiatric/Behavioral: Positive for decreased concentration and dysphoric mood  The patient is nervous/anxious  All other systems reviewed and are negative        Patient Active Problem List   Diagnosis    Pre-operative clearance    Anxiety    Current smoker    Painless hematuria    Mild anemia    Status post total hip replacement, left    Hypertensive urgency    Hypokalemia    Bilateral pleural effusion    Acute systolic congestive heart failure (HCC)    Elevated troponin    ADHD    Closed fracture of distal end of right fibula    Macrocytic anemia    Hypertension    Depression    Hyperlipidemia    Primary osteoarthritis of one hip, right    Pain in right hip    Nephrotic syndrome    Hypoalbuminemia       Past Medical History:   Diagnosis Date    Allergic     Anemia     Anxiety     Arthritis 2017    Bipolar disorder (HCC)     CHF (congestive heart failure) (HCC)     Chronic kidney disease 01/0/2021    Coronary artery disease 1/6/2021    Depression     Hyperlipidemia 1/7/2021    Hypertension 01/06/2021    Osteoarthritis        Past Surgical History:   Procedure Laterality Date    CT NEEDLE BIOPSY KIDNEY  3/2/2021    ECTOPIC PREGNANCY SURGERY      IR BIOPSY KIDNEY [place] KIT NO LATERALITY  2/17/2021    IR THORACENTESIS  1/12/2021    JOINT REPLACEMENT      ORTHOPEDIC SURGERY      DE TOTAL HIP ARTHROPLASTY Left 3/5/2018    Procedure: ARTHROPLASTY HIP TOTAL;  Surgeon: Raul Gregg DO;  Location: [facility];  Service: Orthopedics    RENAL BIOPSY  03/02/2021    WISDOM TOOTH EXTRACTION      x1       Family History   Problem Relation Age of Onset    Hypertension Mother     Cancer Family     Cancer Father         PROSTATE    Atrial fibrillation Father    Kendra Perez Hypertension Father     Diabetes Brother     Hypertension Sister        Social History     Occupational History    Occupation: disabled   Tobacco Use    Smoking status: Current Every Day Smoker     Packs/day: 1 50     Years: 33 00     Pack years: 49 50     Types: Cigarettes     Start date: 1988    Smokeless tobacco: Never Used    Tobacco comment: 0 25 pack daily as of 6/2021   Vaping Use    Vaping Use: Never used   Substance and Sexual Activity    Alcohol use:  Yes     Alcohol/week: 0 0 standard drinks     Comment: rarely    Drug use: No    Sexual activity: Not Currently       Current Outpatient Medications on File Prior to Visit   Medication Sig    amLODIPine (NORVASC) 2 5 mg tablet TAKE 1 TABLET(2 5 MG) BY MOUTH DAILY    atorvastatin (LIPITOR) 40 mg tablet TAKE 1 TABLET(40 MG) BY MOUTH DAILY    carvedilol (COREG) 25 mg tablet TAKE 1 TABLET(25 MG) BY MOUTH TWICE DAILY WITH MEALS    cyanocobalamin (VITAMIN B-12) 1000 MCG tablet Take 1 tablet (1,000 mcg total) by mouth daily    furosemide (LASIX) 20 mg tablet Take 1 tablet (20 mg total) by mouth every evening    furosemide (LASIX) 40 mg tablet TAKE 1 TABLET(40 MG) BY MOUTH EVERY MORNING    hydrALAZINE (APRESOLINE) 25 mg tablet TAKE 1 TABLET(25 MG) BY MOUTH THREE TIMES DAILY    isosorbide mononitrate (IMDUR) 30 mg 24 hr tablet Take 1 tablet (30 mg total) by mouth daily    losartan (COZAAR) 25 mg tablet Take 1 tablet (25 mg total) by mouth daily    methylphenidate (RITALIN) 20 MG tablet Take 1 tablet (20 mg total) by mouth 2 (two) times a day Max Daily Amount: 40 mg    PARoxetine (PAXIL) 30 mg tablet TAKE 1 AND 1/2 TABLET BY MOUTH DAILY    QUEtiapine (SEROquel) 100 mg tablet TAKE 1 TABLET(100 MG) BY MOUTH DAILY AT BEDTIME     No current facility-administered medications on file prior to visit  No Known Allergies    Physical Exam    /76   Pulse 84   Temp 97 8 °F (36 6 °C)   Ht 5' 6" (1 676 m)   Wt 70 3 kg (155 lb)   SpO2 95%   BMI 25 02 kg/m²     LUMBAR  General: Well-developed, well-nourished individual in moderate acute distress  Mental: Appropriate mood and affect  Grossly oriented with coherent speech and thought processing   Neuro:  Cranial nerves: Cranial nerve function is grossly intact bilaterally   Strength: Bilateral lower extremity strength is normal and symmetric   No atrophy or tone abnormalities noted   Reflexes: Bilateral lower extremity muscle stretch reflexes are physiologic and symmetric   No ankle clonus is noted   Sensation: No loss of sensation is noted   SLR/Foraminal Compression Maneuvers: Straight leg raising in the sitting position is negative for radicular pain   Gait:  Gait/gross motor: Gait is antalgic on the right and she does have some degree of hip flexion contracture on that right side  Station is slightly forward flexed  Toe walking, heel walking  are not tested secondary to balance  Musculoskeletal:  Palpation: Inspection and palpation of the spine and extremities are unremarkable except for tenderness to palpation over the axial lumbar spine  Spine: Normal pain-free range of motion except for lumbar extension which is limited in reproduces back pain as well as right hip pain  No gross axial skeletal deformities   Skin: Skin inspection grossly negative for erythema, breakdown, or concerning lesions in affected area   Lymph: No lymphadenopathy is appreciated in the involved extremity   Vessels: No lower extremity edema   Lungs: Breathing is comfortable and regular  No dyspnea noted during examination   Eyes: Visual field grossly intact to confrontation  No redness appreciated  ENT: No craniofacial deformities or asymmetry  No neck masses appreciated  Imaging    Study Result    Narrative & Impression   LUMBAR SPINE     INDICATION:   M47 [id number]: Spondylosis without myelopathy or radiculopathy, lumbar region   "fall x Dec 2020 resulting in right hip pain  denies back pain "     COMPARISON:  CT abdomen pelvis 3/20/2018      VIEWS:  XR SPINE LUMBAR MINIMUM 4 VIEWS NON INJURY        FINDINGS:     There are 5 non rib bearing lumbar vertebral bodies       There is no evidence of acute fracture or destructive osseous lesion      Mild dextro lumbar scoliosis  Grade 2 anterolisthesis L5 on S1      Disc space height loss and endplate osteophytes at L1-L2      The pedicles appear intact      Soft tissues are unremarkable      Partially imaged left hip prosthesis      Partially imaged severe right hip degenerative changes      IMPRESSION:     Mild dextro lumbar scoliosis       Grade 2 anterolisthesis of L5 on S1 unchanged from the CT from March 2018        Disc space height loss and endplate osteophytes at L1-L2               Workstation performed: [id number]        Study Result    Narrative & Impression   RIGHT HIP     INDICATION:   M16 11: Unilateral primary osteoarthritis, right hip      COMPARISON:  Right hip x-ray dated February 25, 2021      VIEWS:  XR HIP/PELV 2-3 VWS RIGHT W PELVIS IF PERFORMED         FINDINGS:     There is no acute fracture or dislocation      Severe right hip osteoarthritis is seen    Postoperative changes of left total hip arthroplasty are stable      No lytic or blastic osseous lesion      Soft tissues are unremarkable      Degenerative changes visualized lower lumbar spine      IMPRESSION:     No acute osseous abnormality      Severe osteoarthritic degenerative changes of the right hip joint      Workstation performed: [id number]

## 2022-02-11 DIAGNOSIS — I16.0 HYPERTENSIVE URGENCY: ICD-10-CM

## 2022-02-11 DIAGNOSIS — I50.21 ACUTE SYSTOLIC CONGESTIVE HEART FAILURE (HCC): ICD-10-CM

## 2022-02-11 DIAGNOSIS — I50.9 CHF (CONGESTIVE HEART FAILURE) (HCC): ICD-10-CM

## 2022-02-11 RX ORDER — ATORVASTATIN CALCIUM 40 MG/1
TABLET, FILM COATED ORAL
Qty: 90 TABLET | Refills: 1 | Status: SHIPPED | OUTPATIENT
Start: 2022-02-11 | End: 2022-08-10

## 2022-02-11 RX ORDER — FUROSEMIDE 20 MG/1
TABLET ORAL
Qty: 90 TABLET | Refills: 1 | Status: SHIPPED | OUTPATIENT
Start: 2022-02-11 | End: 2022-08-10

## 2022-02-11 RX ORDER — FUROSEMIDE 40 MG/1
TABLET ORAL
Qty: 90 TABLET | Refills: 1 | Status: SHIPPED | OUTPATIENT
Start: 2022-02-11 | End: 2022-08-10

## 2022-02-11 RX ORDER — CARVEDILOL 25 MG/1
TABLET ORAL
Qty: 180 TABLET | Refills: 1 | Status: SHIPPED | OUTPATIENT
Start: 2022-02-11 | End: 2022-08-10

## 2022-02-14 ENCOUNTER — TELEPHONE (OUTPATIENT)
Dept: PAIN MEDICINE | Facility: MEDICAL CENTER | Age: 52
End: 2022-02-14

## 2022-02-14 NOTE — TELEPHONE ENCOUNTER
Pt called in to check on the status of her Prior Authorization transdermal buprenorphine (Butrans) 5 mcg/hr TD patch            Insurance [address]   Member [id number]  Insurance company phone: [phone]      Please be advised thank you    Pt can be reached @ [phone]

## 2022-02-16 ENCOUNTER — TELEPHONE (OUTPATIENT)
Dept: NEPHROLOGY | Facility: CLINIC | Age: 52
End: 2022-02-16

## 2022-02-16 ENCOUNTER — TELEPHONE (OUTPATIENT)
Dept: PSYCHIATRY | Facility: CLINIC | Age: 52
End: 2022-02-16

## 2022-02-16 NOTE — TELEPHONE ENCOUNTER
PT called looking to sche np appoint  PT has been added to wait list and suggested calling the number on the back of the insurance card

## 2022-02-16 NOTE — TELEPHONE ENCOUNTER
Received denial for Butran's patches ordered, plan is stating pt needs to try and fail a short acting pain medication and needs a documented UDS prior to approval

## 2022-02-17 NOTE — TELEPHONE ENCOUNTER
S/w pt, informed her that we need an OV for a UDS and documented tried and failed pain meds prior to approval of G[facility]'s  Moved up pt's appt to 2/23

## 2022-02-23 ENCOUNTER — OFFICE VISIT (OUTPATIENT)
Dept: PAIN MEDICINE | Facility: MEDICAL CENTER | Age: 52
End: 2022-02-23
Payer: COMMERCIAL

## 2022-02-23 VITALS
OXYGEN SATURATION: 97 % | BODY MASS INDEX: 24.91 KG/M2 | HEIGHT: 66 IN | TEMPERATURE: 97.8 F | DIASTOLIC BLOOD PRESSURE: 82 MMHG | SYSTOLIC BLOOD PRESSURE: 136 MMHG | HEART RATE: 72 BPM | WEIGHT: 155 LBS

## 2022-02-23 DIAGNOSIS — G89.29 CHRONIC BILATERAL LOW BACK PAIN WITHOUT SCIATICA: ICD-10-CM

## 2022-02-23 DIAGNOSIS — M54.50 CHRONIC BILATERAL LOW BACK PAIN WITHOUT SCIATICA: ICD-10-CM

## 2022-02-23 DIAGNOSIS — Z79.891 LONG-TERM CURRENT USE OF OPIATE ANALGESIC: ICD-10-CM

## 2022-02-23 DIAGNOSIS — G89.4 CHRONIC PAIN SYNDROME: ICD-10-CM

## 2022-02-23 DIAGNOSIS — M16.11 PRIMARY OSTEOARTHRITIS OF ONE HIP, RIGHT: ICD-10-CM

## 2022-02-23 DIAGNOSIS — M43.17 SPONDYLOLISTHESIS AT L5-S1 LEVEL: ICD-10-CM

## 2022-02-23 DIAGNOSIS — F11.20 UNCOMPLICATED OPIOID DEPENDENCE (HCC): Primary | ICD-10-CM

## 2022-02-23 PROCEDURE — 99214 OFFICE O/P EST MOD 30 MIN: CPT | Performed by: NURSE PRACTITIONER

## 2022-02-23 PROCEDURE — 80305 DRUG TEST PRSMV DIR OPT OBS: CPT | Performed by: NURSE PRACTITIONER

## 2022-02-23 RX ORDER — BUPRENORPHINE 5 UG/H
1 PATCH TRANSDERMAL WEEKLY
Qty: 4 PATCH | Refills: 0 | Status: SHIPPED | OUTPATIENT
Start: 2022-02-23 | End: 2022-03-02

## 2022-02-23 NOTE — PROGRESS NOTES
Assessment:  1  Uncomplicated opioid dependence ([address] )    2  Chronic pain syndrome    3  Long-term current use of opiate analgesic    4  Primary osteoarthritis of one hip, right    5  Chronic bilateral low back pain without sciatica    6  Spondylolisthesis at L5-S1 level        Plan:  Move forward with prescribing Butrans patch 5 micro g for her chronic pain symptoms  She has previously trialed and failed short-acting pain medications  While the patient was in the office today an opioid contract was thoroughly reviewed and signed by the patient  The patient was given adequate time to ask questions in regards to the contract and a signed copy was sent home for his/her records  [place] Prescription Drug Monitoring Program report was reviewed and was appropriate     A urine drug screen was collected at today's office visit as part of our medication management protocol  The point of care testing results were appropriate for what was being prescribed  The specimen will be sent for confirmatory testing  The drug screen is medically necessary because the patient is either dependent on opioid medication or is being considered for opioid medication therapy and the results could impact ongoing or future treatment  The drug screen is to evaluate for the presences or absence of prescribed, non-prescribed, and/or illicit drugs/substances  There are risks associated with opioid medications, including dependence, addiction and tolerance  The patient understands and agrees to use these medications only as prescribed  Potential side effects of the medications include, but are not limited to, constipation, drowsiness, addiction, impaired judgment and risk of fatal overdose if not taken as prescribed  The patient was warned against driving while taking sedation medications  Sharing medications is a felony   At this point in time, the patient is showing no signs of addiction, abuse, diversion or suicidal ideation  The patient will follow-up in 4 weeks for medication prescription refill and reevaluation  The patient was advised to contact the office should their symptoms worsen in the interim  The patient was agreeable and verbalized an understanding  History of Present Illness: The patient is a 45 y o  female last seen on February 10, 2022 who presents for a follow up office visit in regards to chronic pain secondary to chronic bilateral low back pain, primary osteoarthritis of the right hip, spondylolisthesis at L5-S1  The patient currently reports worsened pain symptoms rated 8/10 this is constant bothersome throughout the entirety of the day, and described as burning, dull, aching, sharp, throbbing, cramping, and shooting  Patient was prescribed Butrans patch 5 micro g at her last office visit however she was unable to get the medication due to insurance issues  Patient tells me that she did review the information regarding the bilateral L3-5 medial branch blocks and she is not quite sure yet about moving forward  Pain Contract Signed: 2/23/22  Last Urine Drug Screen: 2/23/22    I have personally reviewed and/or updated the patient's past medical history, past surgical history, family history, social history, current medications, allergies, and vital signs today  Review of Systems:    Review of Systems   Respiratory: Negative for shortness of breath  Cardiovascular: Negative for chest pain  Gastrointestinal: Positive for nausea and vomiting  Negative for constipation and diarrhea  Musculoskeletal: Positive for back pain, gait problem, joint swelling and myalgias  Negative for arthralgias  Skin: Negative for rash  Neurological: Positive for weakness  Negative for dizziness and seizures  All other systems reviewed and are negative          Past Medical History:   Diagnosis Date    Allergic     Anemia     Anxiety     Arthritis 2017    [facility]     CHF (congestive heart failure) (HCC)     Chronic kidney disease 01/0/2021    Coronary artery disease 1/6/2021    Depression     Hyperlipidemia 1/7/2021    Hypertension 01/06/2021    Osteoarthritis        Past Surgical History:   Procedure Laterality Date    CT NEEDLE BIOPSY KIDNEY  3/2/2021    ECTOPIC PREGNANCY SURGERY      IR BIOPSY KIDNEY COLUM[place] KIT NO LATERALITY  2/17/2021    IR THORACENTESIS  1/12/2021    JOINT REPLACEMENT      ORTHOPEDIC SURGERY      MN TOTAL HIP ARTHROPLASTY Left 3/5/2018    Procedure: ARTHROPLASTY HIP TOTAL;  Surgeon: Osiris Kovacs DO;  Location: [facility];  Service: Orthopedics    RENAL BIOPSY  03/02/2021    WISDOM TOOTH EXTRACTION      x1       Family History   Problem Relation Age of Onset    Hypertension Mother     Cancer Family     Cancer Father         PROSTATE    Atrial fibrillation Father    Shilpi Courser Hypertension Father     Diabetes Brother     Hypertension Sister        Social History     Occupational History    Occupation: disabled   Tobacco Use    Smoking status: Current Every Day Smoker     Packs/day: 1 50     Years: 33 00     Pack years: 49 50     Types: Cigarettes     Start date: 1988    Smokeless tobacco: Never Used    Tobacco comment: 0 25 pack daily as of 6/2021   Vaping Use    Vaping Use: Never used   Substance and Sexual Activity    Alcohol use:  Yes     Alcohol/week: 0 0 standard drinks     Comment: rarely    Drug use: No    Sexual activity: Not Currently         Current Outpatient Medications:     amLODIPine (NORVASC) 2 5 mg tablet, TAKE 1 TABLET(2 5 MG) BY MOUTH DAILY, Disp: 90 tablet, Rfl: 1    atorvastatin (LIPITOR) 40 mg tablet, TAKE 1 TABLET(40 MG) BY MOUTH DAILY, Disp: 90 tablet, Rfl: 1    carvedilol (COREG) 25 mg tablet, TAKE 1 TABLET(25 MG) BY MOUTH TWICE DAILY WITH MEALS, Disp: 180 tablet, Rfl: 1    cyanocobalamin (VITAMIN B-12) 1000 MCG tablet, Take 1 tablet (1,000 mcg total) by mouth daily, Disp: 30 tablet, Rfl: 0    furosemide (LASIX) 20 mg tablet, TAKE 1 TABLET(20 MG) BY MOUTH EVERY EVENING, Disp: 90 tablet, Rfl: 1    furosemide (LASIX) 40 mg tablet, TAKE 1 TABLET(40 MG) BY MOUTH EVERY MORNING, Disp: 90 tablet, Rfl: 1    hydrALAZINE (APRESOLINE) 25 mg tablet, TAKE 1 TABLET(25 MG) BY MOUTH THREE TIMES DAILY, Disp: 270 tablet, Rfl: 1    isosorbide mononitrate (IMDUR) 30 mg 24 hr tablet, Take 1 tablet (30 mg total) by mouth daily, Disp: 90 tablet, Rfl: 0    losartan (COZAAR) 25 mg tablet, Take 1 tablet (25 mg total) by mouth daily, Disp: 90 tablet, Rfl: 3    methylphenidate (RITALIN) 20 MG tablet, Take 1 tablet (20 mg total) by mouth 2 (two) times a day Max Daily Amount: 40 mg, Disp: 60 tablet, Rfl: 0    PARoxetine (PAXIL) 30 mg tablet, TAKE 1 AND 1/2 TABLET BY MOUTH DAILY, Disp: 30 tablet, Rfl: 2    QUEtiapine (SEROquel) 100 mg tablet, TAKE 1 TABLET(100 MG) BY MOUTH DAILY AT BEDTIME, Disp: 30 tablet, Rfl: 2    transdermal buprenorphine (Butrans) 5 mcg/hr TD patch, Place 1 patch on the skin once a week, Disp: 4 patch, Rfl: 0    No Known Allergies    Physical Exam:    /82   Pulse 72   Temp 97 8 °F (36 6 °C)   Ht 5' 6" (1 676 m)   Wt 70 3 kg (155 lb)   SpO2 97%   BMI 25 02 kg/m²     Constitutional:normal, well developed, well nourished, alert, in no distress and non-toxic and no overt pain behavior    Eyes:anicteric  HEENT:grossly intact  Neck:supple, symmetric, trachea midline and no masses   Pulmonary:even and unlabored  Cardiovascular:No edema or pitting edema present  Skin:Normal without rashes or lesions and well hydrated  Psychiatric:Mood and affect appropriate  Neurologic:Cranial Nerves II-XII grossly intact  Musculoskeletal:ambulates with cane      Imaging  No orders to display         Orders Placed This Encounter   Procedures    MM ALL_Prescribed Meds and Special Instructions    MM DT_Alprazolam Definitive Test    MM DT_Amphetamine Definitive Test    MM DT_Aripiprazole Definitive Test    MM DT_Bath Salts Definitive Test    MM DT_Buprenorphine Definitive Test    MM DT_Butalbital Definitive Test    MM DT_Clonazepam Definitive Test    MM DT_Clozapine Definitive Test    MM DT_Cocaine Definitive Test    MM DT_Codeine Definitive Test    MM DT_Desipramine Definitive Test    MM DT_Dextromethorphan Definitive Test    MM Diazepam Definitive Test    MM DT_Ethyl Glucuronide/Ethyl Sulfate Definitive Test    MM DT_Fentanyl Definitive Test    MM DT_Heroin Definitive Test    MM DT_Hydrocodone Definitive Test    MM DT_Hydromorphone Definitive Test    MM DT_Kratom Definitive Test    MM DT_Levorphanol Definitive Test    MM Lorazepam Definitive Test    MM DT_MDMA Definitive Test    MM DT_Meperidine Definitive Test    MM DT_Methadone Definitive Test    MM DT_Methamphetamine Definitive Test    MM DT_Methylphenidate Definitive Test    MM DT_Morphine Definitive Test    MM DT_Olanzapine Definitive Test    MM DT_Oxazepam Definitive Test    MM DT_Oxycodone Definitive Test    MM DT_Oxymorphone Definitive Test    MM DT_Phenobarbital Definitive Test    MM DT_Phentermine Definitive Test    MM DT_Secobarbital Definitive Test    MM DT_Spice Definitive Test    MM DT_Tapentadol Definitive Test    MM DT_Temazapam Definitive Test    MM DT_THC Definitive Test    MM DT_Tramadol Definitive Test    MM DT_Validity Specific    MM DT_Validity Creatinine    MM DT_Validity Oxidant    MM DT_Validity pH

## 2022-02-23 NOTE — PATIENT INSTRUCTIONS
Opioid Safety   AMBULATORY CARE:   Opioid safety  includes the correct use, storage, and disposal of opioids  Examples of opioid medicines to treat pain include oxycodone, morphine, fentanyl, and codeine  Call your local emergency number (911 in the [address]), or have someone else call if:   · You have a seizure  · You cannot be woken  · You have trouble staying awake and your breathing is slow or shallow  · Your speech is slurred, or you are confused  · You are dizzy or stumble when you walk  Call your doctor, or have someone close to you call if:   · You are extremely drowsy, or you have trouble staying awake or speaking  · You have pale or clammy skin  · You have blue fingernails or lips  · Your heartbeat is slower than normal     · You cannot stop vomiting  · You have questions or concerns about your condition or care  Use opioids safely:   · Take prescribed opioids exactly as directed  Opioids come with directions based on the kind of opioid and how it is given  Do not take more than the recommended amount, or for longer than needed  · Do not give opioids to others or take opioids that belong to someone else  Misuse of opioids can lead to an addiction or overdose  · Do not mix opioids with other medicines or alcohol  The combination can cause an overdose, or lead to a coma  · Do not drive or operate heavy machinery after you take the opioid  Your provider or pharmacist can tell you how long to wait after a dose before you do these activities  · Talk to your healthcare provider if you have any side effects  He or she can help you prevent or relieve side effects  Side effects include nausea, sleepiness, itching, and trouble thinking clearly  Manage constipation:  Constipation is the most common side effect of opioid medicine  Constipation is when you have hard, dry bowel movements, or you go longer than usual between bowel movements   Tell your healthcare provider about all changes in your bowel movements while you are taking opioids  He or she may recommend laxative medicine to help you have a bowel movement  He or she may also change the kind of opioid you are taking, or change when you take it  The following are more ways you can prevent or relieve constipation:  · Drink liquids as directed  You may need to drink extra liquids to help soften and move your bowels  Ask how much liquid to drink each day and which liquids are best for you  · Eat high-fiber foods  This may help decrease constipation by adding bulk to your bowel movements  High-fiber foods include fruits, vegetables, whole-grain breads and cereals, and beans  Your healthcare provider or dietitian can help you create a high-fiber meal plan  Your provider may also recommend a fiber supplement if you cannot get enough fiber from food  · Exercise regularly  Regular physical activity can help stimulate your intestines  Walking is a good exercise to prevent or relieve constipation  Ask which exercises are best for you  · Schedule a time each day to have a bowel movement  This may help train your body to have regular bowel movements  Bend forward while you are on the toilet to help move the bowel movement out  Sit on the toilet for at least 10 minutes, even if you do not have a bowel movement  Store opioids safely:   · Store opioids where others cannot easily get them  Keep them in a locked cabinet or secure area  Do not  keep them in a purse or other bag you carry with you  A person may be looking for something else and find the opioids  · Make sure opioids are stored out of the reach of children  A child can easily overdose on opioids  Opioids may look like candy to a small child  The best way to dispose of opioids: The laws vary by country and area   In the [place], the best way is to return the opioids through a take-back program  This program is offered by the [employer] ([address] [facility])  The following are options for using the program:  · Take the opioids to a MICHELE collection site  The site is often a law enforcement center  Call your local law enforcement center for scheduled take-back days in your area  You will be given information on where to go if the collection site is in a different location  · Take the opioids to an approved pharmacy or hospital   A pharmacy or hospital may be set up as a collection site  You will need to ask if it is a MICHELE collection site if you were not directed there  A pharmacy or doctor's office may not be able to take back opioids unless it is a MICHELE site  · Use a mail-back system  This means you are given containers to put the opioids into  You will then mail them in the containers  · Use a take-back drop box  This is a place to leave the opioids at any time  People and animals will not be able to get into the box  Your local law enforcement agency can tell you where to find a drop box in your area  Other safe ways to dispose of opioids: The medicine may come with disposal instructions  The instructions may vary depending on the brand of medicine you are using  Instructions may come in a Medication Guide, but not every medicine has one  You may instead get instructions from your pharmacy or doctor  Follow instructions carefully  The following are general guidelines to follow:  · Find out if you can flush the opioid  Some opioids can be flushed down the toilet or poured into the sink  You will need to contact authorities in your area to see if this is an option for you  The FDA also offers a list of medicines that are safe to flush down the toilet  You can check the list if you cannot get the information for your local area  · Ask your waste management company about rules for putting opioids in the trash  The company will be able to give you specific directions   Scratch out personal information on the original medicine label so it cannot be read  Then put it in the trash  Do not label the trash or put any information on it about the opioids  It should look like regular household trash so no one is tempted to look for the opioids  Keep the trash out of the reach of children and animals  Always make sure trash is secure  · Talk to officials if you live in a facility  If you live in a nursing home or assisted living center, talk to an official  The person will know the rules for your area  Other ways to manage pain:   · Ask your healthcare provider about non-opioid medicines to control pain  Nonprescription medicines include NSAIDs (such as ibuprofen) and acetaminophen  Prescription medicines include muscle relaxers, antidepressants, and steroids  · Pain may be managed without any medicines  Some ways to relieve pain include massage, aromatherapy, or meditation  Physical or occupational therapy may also help  For more information:   · Drug Enforcement Administration  [facility] [address]  Phone: 8- 040 - [phone]  Web Address: [facility]/drug_disposal/    · [address]  and Drug Administration  [facility] , [address] [place] [address]  Phone: 5- 806 - [phone]  Web Address: http://[employer]/    Follow up with your doctor or pain specialist as directed: You may need to have your dose adjusted  Your doctor or pain specialist can also help you find ways to manage pain without opioids  Write down your questions so you remember to ask them during your visits  © Copyright [employer] 2021 Information is for End User's use only and may not be sold, redistributed or otherwise used for commercial purposes  All illustrations and images included in CareNotes® are the copyrighted property of A D A M , Inc  or [employer]  The above information is an  only  It is not intended as medical advice for individual conditions or treatments   Talk to your doctor, nurse or pharmacist before following any medical regimen to see if it is safe and effective for you

## 2022-02-25 LAB
6MAM UR QL CFM: NEGATIVE NG/ML
7AMINOCLONAZEPAM UR QL CFM: NEGATIVE NG/ML
A-OH ALPRAZ UR QL CFM: NEGATIVE NG/ML
ACCEPTABLE CREAT UR QL: ABNORMAL MG/DL
ACCEPTIBLE SP GR UR QL: NORMAL
AMPHET UR QL CFM: NEGATIVE NG/ML
AMPHET UR QL CFM: NEGATIVE NG/ML
BUPRENORPHINE UR QL CFM: ABNORMAL NG/ML
BUTALBITAL UR QL CFM: NEGATIVE NG/ML
BZE UR QL CFM: NEGATIVE NG/ML
CODEINE UR QL CFM: NEGATIVE NG/ML
DESIPRAMINE UR QL CFM: NEGATIVE NG/ML
EDDP UR QL CFM: NEGATIVE NG/ML
ETHYL GLUCURONIDE UR QL CFM: NEGATIVE NG/ML
ETHYL SULFATE UR QL SCN: NEGATIVE NG/ML
EUTYLONE UR QL: NEGATIVE NG/ML
FENTANYL UR QL CFM: NEGATIVE NG/ML
GLIADIN IGG SER IA-ACNC: NEGATIVE NG/ML
GLUCOSE 30M P 50 G LAC PO SERPL-MCNC: NEGATIVE NG/ML
HYDROCODONE UR QL CFM: NEGATIVE NG/ML
HYDROCODONE UR QL CFM: NEGATIVE NG/ML
HYDROMORPHONE UR QL CFM: NEGATIVE NG/ML
LORAZEPAM UR QL CFM: NEGATIVE NG/ML
MDMA UR QL CFM: NEGATIVE NG/ML
ME-PHENIDATE UR QL CFM: NEGATIVE NG/ML
MEPERIDINE UR QL CFM: NEGATIVE NG/ML
METHADONE UR QL CFM: NEGATIVE NG/ML
METHAMPHET UR QL CFM: NEGATIVE NG/ML
MORPHINE UR QL CFM: NEGATIVE NG/ML
MORPHINE UR QL CFM: NEGATIVE NG/ML
NITRITE UR QL: NORMAL UG/ML
NORBUPRENORPHINE UR QL CFM: ABNORMAL NG/ML
NORDIAZEPAM UR QL CFM: NEGATIVE NG/ML
NORFENTANYL UR QL CFM: NEGATIVE NG/ML
NORHYDROCODONE UR QL CFM: NEGATIVE NG/ML
NORHYDROCODONE UR QL CFM: NEGATIVE NG/ML
NORMEPERIDINE UR QL CFM: NEGATIVE NG/ML
NOROXYCODONE UR QL CFM: NEGATIVE NG/ML
OLANZAPINE QUANTIFICATION: NEGATIVE NG/ML
OPC-3373 QUANTIFICATION: NEGATIVE
OXAZEPAM UR QL CFM: NEGATIVE NG/ML
OXYCODONE UR QL CFM: NEGATIVE NG/ML
OXYMORPHONE UR QL CFM: NEGATIVE NG/ML
OXYMORPHONE UR QL CFM: NEGATIVE NG/ML
PHENOBARB UR QL CFM: NEGATIVE NG/ML
RESULT ALL_PRESCRIBED MEDS AND SPECIAL INSTRUCTIONS: NORMAL
SECOBARBITAL UR QL CFM: NEGATIVE NG/ML
SL AMB 3-METHYL-FENTANYL QUANTIFICATION: NORMAL NG/ML
SL AMB 4-ANPP QUANTIFICATION: NORMAL NG/ML
SL AMB 4-FIBF QUANTIFICATION: NORMAL NG/ML
SL AMB 5F-ADB-M7 METABOLITE QUANTIFICATION: NEGATIVE NG/ML
SL AMB 7-OH-MITRAGYNINE (KRATOM ALKALOID) QUANTIFICATION: NEGATIVE NG/ML
SL AMB AB-FUBINACA-M3 METABOLITE QUANTIFICATION: NEGATIVE NG/ML
SL AMB ACETYL FENTANYL QUANTIFICATION: NORMAL NG/ML
SL AMB ACETYL NORFENTANYL QUANTIFICATION: NORMAL NG/ML
SL AMB ACRYL FENTANYL QUANTIFICATION: NORMAL NG/ML
SL AMB BUTRYL FENTANYL QUANTIFICATION: NORMAL NG/ML
SL AMB CARFENTANIL QUANTIFICATION: NORMAL NG/ML
SL AMB CLOZAPINE QUANTIFICATION: NEGATIVE NG/ML
SL AMB CTHC (MARIJUANA METABOLITE) QUANTIFICATION: ABNORMAL NG/ML
SL AMB CYCLOPROPYL FENTANYL QUANTIFICATION: NORMAL NG/ML
SL AMB DEXTROMETHORPHAN QUANTIFICATION: NEGATIVE NG/ML
SL AMB DEXTRORPHAN (DEXTROMETHORPHAN METABOLITE) QUANT: NEGATIVE NG/ML
SL AMB DEXTRORPHAN (DEXTROMETHORPHAN METABOLITE) QUANT: NEGATIVE NG/ML
SL AMB FURANYL FENTANYL QUANTIFICATION: NORMAL NG/ML
SL AMB JWH018 METABOLITE QUANTIFICATION: NEGATIVE NG/ML
SL AMB JWH073 METABOLITE QUANTIFICATION: NEGATIVE NG/ML
SL AMB MDMB-FUBINACA-M1 METABOLITE QUANTIFICATION: NEGATIVE NG/ML
SL AMB METHOXYACETYL FENTANYL QUANTIFICATION: NORMAL NG/ML
SL AMB METHYLONE QUANTIFICATION: NEGATIVE NG/ML
SL AMB N-DESMETHYL U-47700 QUANTIFICATION: NORMAL NG/ML
SL AMB N-DESMETHYL-TRAMADOL QUANTIFICATION: NEGATIVE NG/ML
SL AMB N-DESMETHYLCLOZAPINE QUANTIFICATION: NEGATIVE NG/ML
SL AMB PHENTERMINE QUANTIFICATION: NEGATIVE NG/ML
SL AMB RCS4 METABOLITE QUANTIFICATION: NEGATIVE NG/ML
SL AMB RITALINIC ACID QUANTIFICATION: NORMAL NG/ML
SL AMB U-47700 QUANTIFICATION: NORMAL NG/ML
SPECIMEN DRAWN SERPL: NEGATIVE NG/ML
SPECIMEN PH ACCEPTABLE UR: NORMAL
TAPENTADOL UR QL CFM: NEGATIVE NG/ML
TEMAZEPAM UR QL CFM: NEGATIVE NG/ML
TEMAZEPAM UR QL CFM: NEGATIVE NG/ML
TRAMADOL UR QL CFM: NEGATIVE NG/ML
URATE/CREAT 24H UR: NEGATIVE NG/ML

## 2022-02-27 DIAGNOSIS — F33.1 MODERATE EPISODE OF RECURRENT MAJOR DEPRESSIVE DISORDER (HCC): ICD-10-CM

## 2022-02-28 ENCOUNTER — TELEPHONE (OUTPATIENT)
Dept: PAIN MEDICINE | Facility: MEDICAL CENTER | Age: 52
End: 2022-02-28

## 2022-02-28 RX ORDER — PAROXETINE 30 MG/1
TABLET, FILM COATED ORAL
Qty: 30 TABLET | Refills: 2 | Status: SHIPPED | OUTPATIENT
Start: 2022-02-28 | End: 2022-04-11 | Stop reason: SDUPTHER

## 2022-02-28 NOTE — TELEPHONE ENCOUNTER
----- Message from [address] sent at 2/28/2022  2:31 PM EST -----  Please let patient know that her UDS was positive for marijuana   We will not provide any further refills of the butrans patch until she has a clean UDS

## 2022-03-02 NOTE — TELEPHONE ENCOUNTER
Yes Rx cancelled   She can f/u at her previously scheduled visit if she wants to try for the medication again

## 2022-03-08 NOTE — TELEPHONE ENCOUNTER
Attempted to reach pt  Detailed VMMLOM as per release of information document advising pt of same  CB# and OH provided

## 2022-03-11 DIAGNOSIS — F33.1 MODERATE EPISODE OF RECURRENT MAJOR DEPRESSIVE DISORDER (HCC): ICD-10-CM

## 2022-03-11 RX ORDER — METHYLPHENIDATE HYDROCHLORIDE 20 MG/1
20 TABLET ORAL 2 TIMES DAILY
Qty: 60 TABLET | Refills: 0 | Status: SHIPPED | OUTPATIENT
Start: 2022-03-11 | End: 2022-04-11 | Stop reason: SDUPTHER

## 2022-03-19 DIAGNOSIS — I42.8 NONISCHEMIC CARDIOMYOPATHY (HCC): ICD-10-CM

## 2022-03-21 RX ORDER — ISOSORBIDE MONONITRATE 30 MG/1
TABLET, EXTENDED RELEASE ORAL
Qty: 90 TABLET | Refills: 0 | Status: SHIPPED | OUTPATIENT
Start: 2022-03-21 | End: 2022-07-13 | Stop reason: SDUPTHER

## 2022-03-24 ENCOUNTER — TELEPHONE (OUTPATIENT)
Dept: NEPHROLOGY | Facility: CLINIC | Age: 52
End: 2022-03-24

## 2022-03-24 NOTE — TELEPHONE ENCOUNTER
Called patient and left a voicemail asking to please have labs drawn before her follow up appointment

## 2022-03-29 ENCOUNTER — TELEPHONE (OUTPATIENT)
Dept: NEPHROLOGY | Facility: CLINIC | Age: 52
End: 2022-03-29

## 2022-03-29 PROBLEM — N18.32 STAGE 3B CHRONIC KIDNEY DISEASE (HCC): Status: ACTIVE | Noted: 2022-03-29

## 2022-03-29 NOTE — TELEPHONE ENCOUNTER
Appointment Confirmation   Person confirmed appointment with  If not patient, name of the person Voice message    Date and time of appointment 3/31 10:30   Patient acknowledged and will be at appointment? no    Did you advise the patient that they will need a urine sample if they are a new patient?  N/A    Did you advise the patient to bring their current medications for verification? (including any OTC) Yes    Additional Information

## 2022-04-06 ENCOUNTER — TELEPHONE (OUTPATIENT)
Dept: NEPHROLOGY | Facility: CLINIC | Age: 52
End: 2022-04-06

## 2022-04-06 NOTE — TELEPHONE ENCOUNTER
PAT with patient to see if she would be able to move her appointment on 5/10 to 830 or 5/11 to either 730 or 830

## 2022-04-11 DIAGNOSIS — F33.1 MODERATE EPISODE OF RECURRENT MAJOR DEPRESSIVE DISORDER (HCC): ICD-10-CM

## 2022-04-11 RX ORDER — METHYLPHENIDATE HYDROCHLORIDE 20 MG/1
20 TABLET ORAL 2 TIMES DAILY
Qty: 60 TABLET | Refills: 0 | Status: SHIPPED | OUTPATIENT
Start: 2022-04-11 | End: 2022-05-09 | Stop reason: SDUPTHER

## 2022-04-11 RX ORDER — PAROXETINE 30 MG/1
45 TABLET, FILM COATED ORAL DAILY
Qty: 30 TABLET | Refills: 2 | Status: SHIPPED | OUTPATIENT
Start: 2022-04-11 | End: 2022-05-09 | Stop reason: SDUPTHER

## 2022-04-29 DIAGNOSIS — F33.1 MODERATE EPISODE OF RECURRENT MAJOR DEPRESSIVE DISORDER (HCC): ICD-10-CM

## 2022-04-29 RX ORDER — QUETIAPINE FUMARATE 100 MG/1
TABLET, FILM COATED ORAL
Qty: 30 TABLET | Refills: 2 | Status: SHIPPED | OUTPATIENT
Start: 2022-04-29 | End: 2022-06-21 | Stop reason: SDUPTHER

## 2022-05-04 ENCOUNTER — TELEPHONE (OUTPATIENT)
Dept: ENDOCRINOLOGY | Facility: CLINIC | Age: 52
End: 2022-05-04

## 2022-05-09 ENCOUNTER — TELEPHONE (OUTPATIENT)
Dept: NEPHROLOGY | Facility: CLINIC | Age: 52
End: 2022-05-09

## 2022-05-09 DIAGNOSIS — F33.1 MODERATE EPISODE OF RECURRENT MAJOR DEPRESSIVE DISORDER (HCC): ICD-10-CM

## 2022-05-09 RX ORDER — PAROXETINE 30 MG/1
45 TABLET, FILM COATED ORAL DAILY
Qty: 30 TABLET | Refills: 2 | Status: SHIPPED | OUTPATIENT
Start: 2022-05-09 | End: 2022-06-21 | Stop reason: SDUPTHER

## 2022-05-09 RX ORDER — METHYLPHENIDATE HYDROCHLORIDE 20 MG/1
20 TABLET ORAL 2 TIMES DAILY
Qty: 60 TABLET | Refills: 0 | Status: SHIPPED | OUTPATIENT
Start: 2022-05-09 | End: 2022-06-21 | Stop reason: SDUPTHER

## 2022-05-09 NOTE — TELEPHONE ENCOUNTER
Appointment Confirmation   Person confirmed appointment with  If not patient, name of the person Patient    Date and time of appointment 5/10    Patient acknowledged and will be at appointment?  Yes   Did you advise the patient that they will need a urine sample if they are a new patient? n/a   Did you advise the patient to bring their current medications for verification? (including any OTC) Yes   Additional Information

## 2022-05-10 ENCOUNTER — TELEPHONE (OUTPATIENT)
Dept: NEPHROLOGY | Facility: CLINIC | Age: 52
End: 2022-05-10

## 2022-05-10 NOTE — TELEPHONE ENCOUNTER
Patient called to r/s her appointment with Dr Idalia Taylor for today since she did not obtain her lab work  Scheduled for 07/14/22 at 2:30 pm, pt confirmed

## 2022-06-09 DIAGNOSIS — I16.0 HYPERTENSIVE URGENCY: ICD-10-CM

## 2022-06-09 RX ORDER — AMLODIPINE BESYLATE 2.5 MG/1
TABLET ORAL
Qty: 90 TABLET | Refills: 1 | Status: SHIPPED | OUTPATIENT
Start: 2022-06-09

## 2022-06-10 DIAGNOSIS — F33.1 MODERATE EPISODE OF RECURRENT MAJOR DEPRESSIVE DISORDER (HCC): ICD-10-CM

## 2022-06-11 RX ORDER — METHYLPHENIDATE HYDROCHLORIDE 20 MG/1
20 TABLET ORAL 2 TIMES DAILY
Qty: 60 TABLET | Refills: 0 | OUTPATIENT
Start: 2022-06-11

## 2022-06-14 ENCOUNTER — TELEPHONE (OUTPATIENT)
Dept: PSYCHIATRY | Facility: CLINIC | Age: 52
End: 2022-06-14

## 2022-06-14 NOTE — TELEPHONE ENCOUNTER
Pt called to see if she was still on the wait list and how long before she was going to get an appointment    I confirmed she was still on the wait list but I couldn't confirm when she was going to get an appointment

## 2022-06-15 DIAGNOSIS — F33.1 MODERATE EPISODE OF RECURRENT MAJOR DEPRESSIVE DISORDER (HCC): ICD-10-CM

## 2022-06-16 RX ORDER — METHYLPHENIDATE HYDROCHLORIDE 20 MG/1
20 TABLET ORAL 2 TIMES DAILY
Qty: 60 TABLET | Refills: 0 | OUTPATIENT
Start: 2022-06-16

## 2022-06-21 ENCOUNTER — OFFICE VISIT (OUTPATIENT)
Dept: FAMILY MEDICINE CLINIC | Facility: CLINIC | Age: 52
End: 2022-06-21
Payer: COMMERCIAL

## 2022-06-21 VITALS
TEMPERATURE: 97.7 F | OXYGEN SATURATION: 95 % | WEIGHT: 148 LBS | BODY MASS INDEX: 23.78 KG/M2 | HEART RATE: 75 BPM | SYSTOLIC BLOOD PRESSURE: 106 MMHG | DIASTOLIC BLOOD PRESSURE: 80 MMHG | HEIGHT: 66 IN

## 2022-06-21 DIAGNOSIS — F31.77 BIPOLAR DISORDER, IN PARTIAL REMISSION, MOST RECENT EPISODE MIXED (HCC): ICD-10-CM

## 2022-06-21 DIAGNOSIS — F33.1 MODERATE EPISODE OF RECURRENT MAJOR DEPRESSIVE DISORDER (HCC): ICD-10-CM

## 2022-06-21 DIAGNOSIS — I50.21 ACUTE SYSTOLIC CONGESTIVE HEART FAILURE (HCC): ICD-10-CM

## 2022-06-21 DIAGNOSIS — I15.8 OTHER SECONDARY HYPERTENSION: ICD-10-CM

## 2022-06-21 DIAGNOSIS — N18.32 STAGE 3B CHRONIC KIDNEY DISEASE (HCC): Primary | ICD-10-CM

## 2022-06-21 DIAGNOSIS — M16.11 PRIMARY OSTEOARTHRITIS OF ONE HIP, RIGHT: ICD-10-CM

## 2022-06-21 PROCEDURE — 99215 OFFICE O/P EST HI 40 MIN: CPT | Performed by: FAMILY MEDICINE

## 2022-06-21 RX ORDER — QUETIAPINE FUMARATE 100 MG/1
100 TABLET, FILM COATED ORAL
Qty: 90 TABLET | Refills: 1 | Status: SHIPPED | OUTPATIENT
Start: 2022-06-21 | End: 2022-12-18

## 2022-06-21 RX ORDER — METHYLPHENIDATE HYDROCHLORIDE 20 MG/1
20 TABLET ORAL 2 TIMES DAILY
Qty: 60 TABLET | Refills: 0 | Status: SHIPPED | OUTPATIENT
Start: 2022-06-21 | End: 2022-07-19 | Stop reason: SDUPTHER

## 2022-06-21 RX ORDER — PAROXETINE 30 MG/1
45 TABLET, FILM COATED ORAL DAILY
Qty: 135 TABLET | Refills: 1 | Status: SHIPPED | OUTPATIENT
Start: 2022-06-21 | End: 2022-12-18

## 2022-06-21 NOTE — PROGRESS NOTES
Assessment/Plan:  We did go over medications  I refilled her meds  We talked about smoking cessation  She is considering that  She should have a DEXA scan at some point as well as a lung cancer screening but it is difficult for her to get around right now so will hold off on ordering that  Follow-up here in 6 months  Diagnoses and all orders for this visit:    Stage 3b chronic kidney disease (HCC)    Moderate episode of recurrent major depressive disorder (HCC)  -     methylphenidate (RITALIN) 20 MG tablet; Take 1 tablet (20 mg total) by mouth 2 (two) times a day Max Daily Amount: 40 mg  -     PARoxetine (PAXIL) 30 mg tablet; Take 1 5 tablets (45 mg total) by mouth daily  -     QUEtiapine (SEROquel) 100 mg tablet; Take 1 tablet (100 mg total) by mouth daily at bedtime    Acute systolic congestive heart failure ([address] )    Other secondary hypertension    Primary osteoarthritis of one hip, right    Bipolar disorder, in partial remission, most recent episode mixed (HCC)          Subjective:        Patient ID: Fidel Orozco is a 45 y o  female  He is in today for follow-up and to refill some of her medication  She is still on the waiting list to follow-up with [facility]  She also saw pain management for her back pain  They prescribed a patch but her insurance would not cover it  Therefore she is still suffering from her chronic pain  She is also on a waiting list for oral surgery for a cracked tooth          The following portions of the patient's history were reviewed and updated as appropriate: allergies, current medications, past family history, past medical history, past social history, past surgical history and problem list       Review of Systems        Objective:             /80 (BP Location: Left arm, Patient Position: Sitting, Cuff Size: Standard)   Pulse 75   Temp 97 7 °F (36 5 °C) (Tympanic)   Ht 5' 6" (1 676 m)   Wt 67 1 kg (148 lb)   SpO2 95%   BMI 23 89 kg/m²          Physical Exam

## 2022-06-22 ENCOUNTER — VBI (OUTPATIENT)
Dept: ADMINISTRATIVE | Facility: OTHER | Age: 52
End: 2022-06-22

## 2022-07-01 ENCOUNTER — TELEPHONE (OUTPATIENT)
Dept: NEPHROLOGY | Facility: CLINIC | Age: 52
End: 2022-07-01

## 2022-07-01 NOTE — TELEPHONE ENCOUNTER
Called and spoke with [employer] to complete their bloodwork prior to their appointment on 07/14/22 with Dr Buddy Godlberg at the [facility]

## 2022-07-12 ENCOUNTER — APPOINTMENT (OUTPATIENT)
Dept: LAB | Facility: HOSPITAL | Age: 52
End: 2022-07-12
Payer: COMMERCIAL

## 2022-07-12 DIAGNOSIS — I15.1 HYPERTENSION SECONDARY TO OTHER RENAL DISORDERS: ICD-10-CM

## 2022-07-12 DIAGNOSIS — N28.89 HYPERTENSION SECONDARY TO OTHER RENAL DISORDERS: ICD-10-CM

## 2022-07-12 DIAGNOSIS — N04.9 NEPHROTIC SYNDROME: ICD-10-CM

## 2022-07-12 LAB
ALBUMIN SERPL BCP-MCNC: 3.2 G/DL (ref 3.5–5)
ALP SERPL-CCNC: 96 U/L (ref 46–116)
ALT SERPL W P-5'-P-CCNC: 32 U/L (ref 12–78)
ANION GAP SERPL CALCULATED.3IONS-SCNC: 8 MMOL/L (ref 4–13)
AST SERPL W P-5'-P-CCNC: 20 U/L (ref 5–45)
BACTERIA UR QL AUTO: ABNORMAL /HPF
BASOPHILS # BLD AUTO: 0.08 THOUSANDS/ΜL (ref 0–0.1)
BASOPHILS NFR BLD AUTO: 1 % (ref 0–1)
BILIRUB SERPL-MCNC: 0.23 MG/DL (ref 0.2–1)
BILIRUB UR QL STRIP: NEGATIVE
BUN SERPL-MCNC: 22 MG/DL (ref 5–25)
CALCIUM ALBUM COR SERPL-MCNC: 9.8 MG/DL (ref 8.3–10.1)
CALCIUM SERPL-MCNC: 9.2 MG/DL (ref 8.3–10.1)
CHLORIDE SERPL-SCNC: 106 MMOL/L (ref 100–108)
CHOLEST SERPL-MCNC: 170 MG/DL
CLARITY UR: ABNORMAL
CO2 SERPL-SCNC: 30 MMOL/L (ref 21–32)
COLOR UR: YELLOW
CREAT SERPL-MCNC: 1.81 MG/DL (ref 0.6–1.3)
CREAT UR-MCNC: 150.1 MG/DL
CREAT UR-MCNC: 152 MG/DL
EOSINOPHIL # BLD AUTO: 0.18 THOUSAND/ΜL (ref 0–0.61)
EOSINOPHIL NFR BLD AUTO: 2 % (ref 0–6)
ERYTHROCYTE [DISTWIDTH] IN BLOOD BY AUTOMATED COUNT: 12 % (ref 11.6–15.1)
GFR SERPL CREATININE-BSD FRML MDRD: 31 ML/MIN/1.73SQ M
GLUCOSE P FAST SERPL-MCNC: 106 MG/DL (ref 65–99)
GLUCOSE UR STRIP-MCNC: NEGATIVE MG/DL
HCT VFR BLD AUTO: 43.3 % (ref 34.8–46.1)
HDLC SERPL-MCNC: 50 MG/DL
HGB BLD-MCNC: 14.6 G/DL (ref 11.5–15.4)
HGB UR QL STRIP.AUTO: ABNORMAL
IMM GRANULOCYTES # BLD AUTO: 0.02 THOUSAND/UL (ref 0–0.2)
IMM GRANULOCYTES NFR BLD AUTO: 0 % (ref 0–2)
KETONES UR STRIP-MCNC: NEGATIVE MG/DL
LDLC SERPL CALC-MCNC: 80 MG/DL (ref 0–100)
LEUKOCYTE ESTERASE UR QL STRIP: NEGATIVE
LYMPHOCYTES # BLD AUTO: 2.33 THOUSANDS/ΜL (ref 0.6–4.47)
LYMPHOCYTES NFR BLD AUTO: 32 % (ref 14–44)
MAGNESIUM SERPL-MCNC: 2.2 MG/DL (ref 1.6–2.6)
MCH RBC QN AUTO: 33.3 PG (ref 26.8–34.3)
MCHC RBC AUTO-ENTMCNC: 33.7 G/DL (ref 31.4–37.4)
MCV RBC AUTO: 99 FL (ref 82–98)
MICROALBUMIN UR-MCNC: 3290 MG/L (ref 0–20)
MICROALBUMIN/CREAT 24H UR: 2164 MG/G CREATININE (ref 0–30)
MONOCYTES # BLD AUTO: 0.38 THOUSAND/ΜL (ref 0.17–1.22)
MONOCYTES NFR BLD AUTO: 5 % (ref 4–12)
NEUTROPHILS # BLD AUTO: 4.39 THOUSANDS/ΜL (ref 1.85–7.62)
NEUTS SEG NFR BLD AUTO: 60 % (ref 43–75)
NITRITE UR QL STRIP: NEGATIVE
NON-SQ EPI CELLS URNS QL MICRO: ABNORMAL /HPF
NONHDLC SERPL-MCNC: 120 MG/DL
NRBC BLD AUTO-RTO: 0 /100 WBCS
PH UR STRIP.AUTO: 7.5 [PH]
PHOSPHATE SERPL-MCNC: 3.6 MG/DL (ref 2.7–4.5)
PLATELET # BLD AUTO: 334 THOUSANDS/UL (ref 149–390)
PMV BLD AUTO: 9.5 FL (ref 8.9–12.7)
POTASSIUM SERPL-SCNC: 3.9 MMOL/L (ref 3.5–5.3)
PROT SERPL-MCNC: 7.3 G/DL (ref 6.4–8.2)
PROT UR STRIP-MCNC: >=300 MG/DL
PROT UR-MCNC: 405 MG/DL
PROT/CREAT UR: 2.7 MG/G{CREAT} (ref 0–0.1)
PTH-INTACT SERPL-MCNC: 62.5 PG/ML (ref 18.4–80.1)
RBC # BLD AUTO: 4.39 MILLION/UL (ref 3.81–5.12)
RBC #/AREA URNS AUTO: ABNORMAL /HPF
SODIUM SERPL-SCNC: 144 MMOL/L (ref 136–145)
SP GR UR STRIP.AUTO: 1.02 (ref 1–1.03)
TRIGL SERPL-MCNC: 200 MG/DL
URATE SERPL-MCNC: 5.2 MG/DL (ref 2–6.8)
UROBILINOGEN UR QL STRIP.AUTO: 0.2 E.U./DL
WBC # BLD AUTO: 7.38 THOUSAND/UL (ref 4.31–10.16)
WBC #/AREA URNS AUTO: ABNORMAL /HPF

## 2022-07-12 PROCEDURE — 85025 COMPLETE CBC W/AUTO DIFF WBC: CPT | Performed by: FAMILY MEDICINE

## 2022-07-12 PROCEDURE — 84550 ASSAY OF BLOOD/URIC ACID: CPT

## 2022-07-12 PROCEDURE — 80053 COMPREHEN METABOLIC PANEL: CPT | Performed by: FAMILY MEDICINE

## 2022-07-12 PROCEDURE — 82043 UR ALBUMIN QUANTITATIVE: CPT | Performed by: FAMILY MEDICINE

## 2022-07-12 PROCEDURE — 84156 ASSAY OF PROTEIN URINE: CPT

## 2022-07-12 PROCEDURE — 84100 ASSAY OF PHOSPHORUS: CPT

## 2022-07-12 PROCEDURE — 82570 ASSAY OF URINE CREATININE: CPT

## 2022-07-12 PROCEDURE — 83970 ASSAY OF PARATHORMONE: CPT

## 2022-07-12 PROCEDURE — 81001 URINALYSIS AUTO W/SCOPE: CPT | Performed by: FAMILY MEDICINE

## 2022-07-12 PROCEDURE — 83735 ASSAY OF MAGNESIUM: CPT

## 2022-07-12 PROCEDURE — 80061 LIPID PANEL: CPT | Performed by: FAMILY MEDICINE

## 2022-07-12 PROCEDURE — 82570 ASSAY OF URINE CREATININE: CPT | Performed by: FAMILY MEDICINE

## 2022-07-12 PROCEDURE — 36415 COLL VENOUS BLD VENIPUNCTURE: CPT | Performed by: FAMILY MEDICINE

## 2022-07-13 ENCOUNTER — TELEPHONE (OUTPATIENT)
Dept: NEPHROLOGY | Facility: CLINIC | Age: 52
End: 2022-07-13

## 2022-07-13 DIAGNOSIS — I42.8 NONISCHEMIC CARDIOMYOPATHY (HCC): ICD-10-CM

## 2022-07-13 NOTE — TELEPHONE ENCOUNTER
Appointment Confirmation   Person confirmed appointment with  If not patient, name of the person Patient    Date and time of appointment 7/14 2:30    Patient acknowledged and will be at appointment? yes    Did you advise the patient that they will need a urine sample if they are a new patient?  N/A    Did you advise the patient to bring their current medications for verification? (including any OTC) Yes    Additional Information

## 2022-07-14 ENCOUNTER — OFFICE VISIT (OUTPATIENT)
Dept: NEPHROLOGY | Facility: CLINIC | Age: 52
End: 2022-07-14
Payer: COMMERCIAL

## 2022-07-14 VITALS
OXYGEN SATURATION: 98 % | HEART RATE: 65 BPM | HEIGHT: 66 IN | DIASTOLIC BLOOD PRESSURE: 88 MMHG | BODY MASS INDEX: 23.56 KG/M2 | WEIGHT: 146.6 LBS | SYSTOLIC BLOOD PRESSURE: 130 MMHG

## 2022-07-14 DIAGNOSIS — N18.32 STAGE 3B CHRONIC KIDNEY DISEASE (HCC): Primary | ICD-10-CM

## 2022-07-14 PROCEDURE — 99213 OFFICE O/P EST LOW 20 MIN: CPT | Performed by: INTERNAL MEDICINE

## 2022-07-14 RX ORDER — ISOSORBIDE MONONITRATE 30 MG/1
30 TABLET, EXTENDED RELEASE ORAL DAILY
Qty: 90 TABLET | Refills: 2 | Status: SHIPPED | OUTPATIENT
Start: 2022-07-14 | End: 2022-07-25

## 2022-07-14 NOTE — PROGRESS NOTES
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 with abundant intra capillary histiocytes seen on renal biopsy done March 18th, severe tubular atrophy and interstitial fibrosis, moderate chronic inflammation, severe arterial sclerosis on arteriolosclerosis with hyalinosis  o Clear-cut diagnostic reason for her renal failure still unclear, cryoglobulins were negative which can be seen when histiocytes are noted on renal biopsy, this could be infectious related GN  Which also may have contributed to patient's acute decrease  Ejection fraction which is now improved  o she also seems to have elements of hypertensive kidney disease  o at this point we are treating  The proteinuria with losartan this has improved from 7 g down to 2 g  o Blood pressures are stable  o Her creatinine has remained around 1 8-2  o Protein urea remains elevated around 7 g but now improved to 2 g     Electrolytes/Acid Base  o  electrolytes and acid-base status are acceptable     Blood Pressure- hypertension in the setting of cardiomyopathy  o  had a low output with an EF of 25%, this has improved to 50%  o  following with Cardiology  o  currently on carvedilol 25 mg twice daily  o  furosemide 40 mg in the a m  and 20 mg in the p m   o  Isordil 10 mg 3 times daily  o  hydralazine 25 mg 3 times daily  o  added losartan 25 mg daily  o Amlodipine 2 5 mg daily this can be discontinued if blood pressures are low     Anemia of CKD  o   Monitor H&H     BMD  o  continue to monitor parameters     Risk Reduction/Clinical Course  o  continue cardiovascular risk reduction measures    DISCUSSION/SUMMARY    -it was nice seeing Tylor Sim again today  -given advanced proteinuria she is at high risk for progression of this CKD we discuss this again today  -struggling with quitting smoking which she needs to do for her hip surgery is going to try hypnosis  -Will continue semi annual surveillance      HPI: Jen Branham is a 45 y o  female who is here for  Follow-up     she was seen in the hospital when she was admitted with acute decompensated congestive heart failure, she had a cardiac catheterization that was negative, she subsequently also had a urinalysis and urine protein to creatinine ratio upwards of 7 g, she had a serologic workup at the time that was negative should pulmonary edema and was treated with diuretics her EF was 25% during the hospitalization her blood pressures have now improved and she has hypertension she also has a life vest in place but took this off because of some back spasm    Overall she is now improved her ejection fraction is improved, her blood pressures are stable, she denies any acute chest pain or shortness of breath no fevers or chills nausea vomiting diarrhea constipation no foamy or bloody urine tolerating her medications without difficulty      ROS:   All the systems were reviewed and were negative except as documented on the HPI  Allergies: Patient has no known allergies      Medications:   Current Outpatient Medications:     amLODIPine (NORVASC) 2 5 mg tablet, TAKE 1 TABLET(2 5 MG) BY MOUTH DAILY, Disp: 90 tablet, Rfl: 1    atorvastatin (LIPITOR) 40 mg tablet, TAKE 1 TABLET(40 MG) BY MOUTH DAILY, Disp: 90 tablet, Rfl: 1    carvedilol (COREG) 25 mg tablet, TAKE 1 TABLET(25 MG) BY MOUTH TWICE DAILY WITH MEALS, Disp: 180 tablet, Rfl: 1    cyanocobalamin (VITAMIN B-12) 1000 MCG tablet, Take 1 tablet (1,000 mcg total) by mouth daily, Disp: 30 tablet, Rfl: 0    furosemide (LASIX) 20 mg tablet, TAKE 1 TABLET(20 MG) BY MOUTH EVERY EVENING, Disp: 90 tablet, Rfl: 1    furosemide (LASIX) 40 mg tablet, TAKE 1 TABLET(40 MG) BY MOUTH EVERY MORNING, Disp: 90 tablet, Rfl: 1    hydrALAZINE (APRESOLINE) 25 mg tablet, TAKE 1 TABLET(25 MG) BY MOUTH THREE TIMES DAILY, Disp: 270 tablet, Rfl: 1    isosorbide mononitrate (IMDUR) 30 mg 24 hr tablet, TAKE 1 TABLET(30 MG) BY MOUTH DAILY, Disp: 90 tablet, Rfl: 0    losartan (COZAAR) 25 mg tablet, Take 1 tablet (25 mg total) by mouth daily, Disp: 90 tablet, Rfl: 3    methylphenidate (RITALIN) 20 MG tablet, Take 1 tablet (20 mg total) by mouth 2 (two) times a day Max Daily Amount: 40 mg, Disp: 60 tablet, Rfl: 0    PARoxetine (PAXIL) 30 mg tablet, Take 1 5 tablets (45 mg total) by mouth daily, Disp: 135 tablet, Rfl: 1    QUEtiapine (SEROquel) 100 mg tablet, Take 1 tablet (100 mg total) by mouth daily at bedtime, Disp: 90 tablet, Rfl: 1    Past Medical History:   Diagnosis Date    Allergic     Anemia     Anxiety     Arthritis 2017    Bipolar disorder ([facility] [address] )     CHF (congestive heart failure) ([facility])     Chronic kidney disease 01/0/2021    Coronary artery disease 1/6/2021    Depression     Hyperlipidemia 1/7/2021    Hypertension 01/06/2021    Osteoarthritis      Past Surgical History:   Procedure Laterality Date    CT NEEDLE BIOPSY KIDNEY  3/2/2021    ECTOPIC PREGNANCY SURGERY      IR BIOPSY KIDNEY RANDOM  2/17/2021    IR THORACENTESIS  1/12/2021    JOINT REPLACEMENT      ORTHOPEDIC SURGERY      MA TOTAL HIP ARTHROPLASTY Left 3/5/2018    Procedure: ARTHROPLASTY HIP TOTAL;  Surgeon: Carol Romero DO;  Location: [facility];  Service: Orthopedics    RENAL BIOPSY  03/02/2021    WISDOM TOOTH EXTRACTION      x1     Family History   Problem Relation Age of Onset    Hypertension Mother     Cancer Family     Cancer Father         PROSTATE    Atrial fibrillation Father     Hypertension Father     Diabetes Brother     Hypertension Sister       reports that she has been smoking cigarettes  She started smoking about 34 years ago  She has a 49 50 pack-year smoking history  She has never used smokeless tobacco  She reports current alcohol use   She reports that she does not use drugs  Physical Exam:   Vitals:    07/14/22 1411   BP: 130/88   BP Location: Left arm   Patient Position: Sitting   Cuff Size: Adult   Pulse: 65   SpO2: 98%   Weight: 66 5 kg (146 lb 9 6 oz)   Height: 5' 6" (1 676 m)     Body mass index is 23 66 kg/m²  General: conscious, cooperative, in not acute distress  Eyes: conjunctivae pink, anicteric sclerae  ENT: lips and mucous membranes moist  Neck: supple, no JVD  Chest:  Decreased breath sounds on the left side  CVS: distinct S1 & S2, normal rate, regular rhythm  Abdomen: soft, non-tender, non-distended, normoactive bowel sounds  Extremities: no edema of both legs  Skin: no rash  Neuro: awake, alert, oriented      Lab Results:    Results for orders placed or performed in visit on 07/12/22   Magnesium   Result Value Ref Range    Magnesium 2 2 1 6 - 2 6 mg/dL   Phosphorus   Result Value Ref Range    Phosphorus 3 6 2 7 - 4 5 mg/dL   PTH, intact   Result Value Ref Range    PTH 62 5 18 4 - 80 1 pg/mL   Uric acid   Result Value Ref Range    Uric Acid 5 2 2 0 - 6 8 mg/dL   Protein / creatinine ratio, urine   Result Value Ref Range    Creatinine, Ur 150 1 mg/dL    Protein Urine Random 405 mg/dL    Prot/Creat Ratio, Ur 2 70 (H) 0 00 - 0 10       Portions of the record may have been created with voice recognition software  Occasional wrong word or "sound a like" substitutions may have occurred due to the inherent limitations of voice recognition software  Read the chart carefully and recognize, using context, where substitutions have occurred  If you have any questions, please contact the dictating provider

## 2022-07-14 NOTE — PATIENT INSTRUCTIONS
Low-Sodium Diet   WHAT YOU NEED TO KNOW:   What is a low-sodium diet? A low-sodium diet limits foods that are high in sodium (salt)  You will need to follow a low-sodium diet if you have high blood pressure, kidney disease, or heart failure  You may also need to follow this diet if you have a condition that is causing your body to retain (hold) extra fluid  You may need to limit the amount of sodium you eat in a day to 1,500 to 2,000 mg  Ask your healthcare provider how much sodium you can have each day  How can I use food labels to choose foods that are low in sodium? Read food labels to find the amount of sodium they contain  The amount of sodium is listed in milligrams (mg)  The % Daily Value (DV) column tells you how much of your daily needs are met by 1 serving of the food for each nutrient listed  Choose foods that have less than 5% of the DV of sodium  These foods are considered low in sodium  Foods that have 20% or more of the DV of sodium are considered high in sodium  Some food labels may also list any of the following terms that tell you about the sodium content in the food:  Sodium-free:  Less than 5 mg in each serving    Very low sodium:  35 mg of sodium or less in each serving    Low sodium:  140 mg of sodium or less in each serving    Reduced sodium: At least 25% less sodium in each serving than the regular type    Light in sodium:  50% less sodium in each serving    Unsalted or no added salt:  No extra salt is added during processing (the food may still contain sodium)       Which foods should I avoid? Salty foods are high in sodium   You should avoid the following:  Processed foods:      Mixes for cornbread, biscuits, cake, and pudding     Instant foods, such as potatoes, cereals, noodles, and rice     Packaged foods, such as bread stuffing, rice and pasta mixes, snack dip mixes, and macaroni and cheese     Canned foods, such as canned vegetables, soups, broths, sauces, and vegetable or tomato juice    Snack foods, such as salted chips, popcorn, pretzels, pork rinds, salted crackers, and salted nuts    Frozen foods, such as dinners, entrees, vegetables with sauces, and breaded meats    Sauerkraut, pickled vegetables, and other foods prepared in brine    Meats and cheeses:      Smoked or cured meat, such as corned beef, ng, ham, hot dogs, and sausage    Canned meats or spreads, such as potted meats, sardines, anchovies, and imitation seafood    Deli or lunch meats, such as bologna, ham, turkey, and roast beef    Processed cheese, such as American cheese and cheese spreads    Condiments, sauces, and seasonings:      Salt (¼ teaspoon of salt contains 575 mg of sodium)    Seasonings made with salt, such as garlic salt, celery salt, onion salt, and seasoned salt    Regular soy sauce, barbecue sauce, teriyaki sauce, steak sauce, Worcestershire sauce, and most flavored vinegars    Canned gravy and mixes     Regular condiments, such as mustard, ketchup, and salad dressings    Pickles and olives    Meat tenderizers and monosodium glutamate (MSG)    Which foods can I include? Read the food label to find the amount of sodium in each serving  Bread and cereal:  Try to choose breads with less than 80 mg of sodium per serving  Bread, roll, suzan, tortilla, or unsalted crackers  Ready-to-eat cereals with less than 5% DV of sodium (examples include shredded wheat and puffed rice)    Pasta    Vegetables and fruits:      Unsalted fresh, frozen, or canned vegetables    Fresh, frozen, or canned fruits    Fruit juice    Dairy:  One serving has about 150 mg of sodium  Milk, all types    Yogurt    Hard cheese, such as cheddar, Swiss, [employer], or [employer] and other protein foods:  Some raw meats may have added sodium       Plain meats, fish, and poultry     Egg    Other foods:      Homemade pudding    Unsalted nuts, popcorn, or pretzels    Unsalted butter or margarine    What are some ways that I can decrease sodium? Add spices and herbs to foods instead of salt during cooking  Use salt-free seasonings to add flavor to foods  Examples include onion powder, garlic powder, basil, fraser powder, paprika, and parsley  Try lemon or lime juice or vinegar to give foods a tart flavor  Use hot peppers, pepper, or cayenne pepper to add a spicy flavor  Do not keep a salt shaker at your kitchen table  This may help keep you from adding salt to food at the table  A teaspoon of salt has 2,300 mg of sodium  It may take time to get used to enjoying the natural flavor of food instead of adding salt  Talk to your healthcare provider before you use salt substitutes  Some salt substitutes have a high amount of potassium and need to be avoided if you have kidney disease  Choose low-sodium foods at restaurants  Meals from restaurants are often high in sodium  Some restaurants have nutrition information on the menu that tells you the amount of sodium in their foods  If possible, ask for your food to be prepared with less, or no salt  Shop for unsalted or low-sodium foods and snacks at the grocery store  Examples include unsalted or low-sodium broths, soups, and canned vegetables  Choose fresh or frozen vegetables instead  Choose unsalted nuts or seeds or fresh fruits or vegetables as snacks  Read food labels and choose salt-free, very low-sodium, or low-sodium foods  CARE AGREEMENT:   You have the right to help plan your care  Discuss treatment options with your healthcare provider to decide what care you want to receive  You always have the right to refuse treatment  The above information is an  only  It is not intended as medical advice for individual conditions or treatments  Talk to your doctor, nurse or pharmacist before following any medical regimen to see if it is safe and effective for you    © Copyright [employer] 2022 Information is for End User's use only and may not be sold, redistributed or otherwise used for commercial purposes   All illustrations and images included in CareNotes® are the copyrighted property of [employer]  or [facility] Erik Gonzalez

## 2022-07-17 DIAGNOSIS — I16.0 HYPERTENSIVE URGENCY: ICD-10-CM

## 2022-07-18 RX ORDER — HYDRALAZINE HYDROCHLORIDE 25 MG/1
TABLET, FILM COATED ORAL
Qty: 270 TABLET | Refills: 1 | Status: SHIPPED | OUTPATIENT
Start: 2022-07-18

## 2022-07-18 NOTE — TELEPHONE ENCOUNTER
Requested medication(s) are due for refill today: Yes  Patient has already received a courtesy refill: No  Other reason request has been forwarded to provider: failed protocol, has appt 7/25/22

## 2022-07-19 DIAGNOSIS — F33.1 MODERATE EPISODE OF RECURRENT MAJOR DEPRESSIVE DISORDER (HCC): ICD-10-CM

## 2022-07-19 RX ORDER — METHYLPHENIDATE HYDROCHLORIDE 20 MG/1
20 TABLET ORAL 2 TIMES DAILY
Qty: 60 TABLET | Refills: 0 | Status: SHIPPED | OUTPATIENT
Start: 2022-07-19 | End: 2022-08-18 | Stop reason: SDUPTHER

## 2022-07-25 ENCOUNTER — OFFICE VISIT (OUTPATIENT)
Dept: CARDIOLOGY CLINIC | Facility: CLINIC | Age: 52
End: 2022-07-25
Payer: COMMERCIAL

## 2022-07-25 VITALS
HEIGHT: 66 IN | SYSTOLIC BLOOD PRESSURE: 120 MMHG | BODY MASS INDEX: 23.14 KG/M2 | HEART RATE: 50 BPM | DIASTOLIC BLOOD PRESSURE: 90 MMHG | WEIGHT: 144 LBS

## 2022-07-25 DIAGNOSIS — Z72.0 TOBACCO USE: ICD-10-CM

## 2022-07-25 DIAGNOSIS — I50.42 CHRONIC COMBINED SYSTOLIC AND DIASTOLIC HEART FAILURE (HCC): Primary | ICD-10-CM

## 2022-07-25 DIAGNOSIS — I25.10 CORONARY ARTERY DISEASE INVOLVING NATIVE CORONARY ARTERY OF NATIVE HEART WITHOUT ANGINA PECTORIS: ICD-10-CM

## 2022-07-25 DIAGNOSIS — E78.5 DYSLIPIDEMIA: ICD-10-CM

## 2022-07-25 DIAGNOSIS — I10 BENIGN ESSENTIAL HYPERTENSION: ICD-10-CM

## 2022-07-25 DIAGNOSIS — I42.8 NONISCHEMIC CARDIOMYOPATHY (HCC): ICD-10-CM

## 2022-07-25 PROCEDURE — 93000 ELECTROCARDIOGRAM COMPLETE: CPT | Performed by: INTERNAL MEDICINE

## 2022-07-25 PROCEDURE — 99214 OFFICE O/P EST MOD 30 MIN: CPT | Performed by: INTERNAL MEDICINE

## 2022-07-25 NOTE — PROGRESS NOTES
Cardiology Office Note  MD Loren Strong MD Mahala Salmon, DO, MD Aramis Gil DO, Vitaliy Pedroza DO, [facility] [place]  ----------------------------------------------------------------  [address] [place] [address]    Sharee Johnson 45 y o  female MRN: [id number]  Unit/Bed#:  Encounter: [id number]      History of Present Illness: It was a pleasure to see Sharee Johnson in the office today for follow up CV evaluation  She is a past medical history of hypertension, tobacco abuse and systolic heart failure with ejection fraction of 25%  She started to experience generalized abdominal bloating and nausea  She also noted that she was having shortness of breath that worsened over 3 days  After having orthopnea with improved breathing sitting up, she became worried and presented to [address] [place] [address] for evaluation in January 2021  Patient was found to have acute respiratory failure requiring nasal cannula  She had elevation of her troponin peaking at 0 14  Echocardiogram was performed and she was found to have an ejection fraction of 25%  Her blood pressure was also markedly elevated  The patient was diagnosed with acute heart failure and treated with intravenous diuretic  During her hospitalization, she had a thoracentesis of the left long with 630 mL removed  She subsequently underwent cardiac catheterization which showed no evidence of obstructive CAD  Her urine was checked and she was found to have nephrotic range proteinuria  Once euvolemic, patient was transitioned to oral diuretic and discharged home  Since our last encounter, patient underwent renal biopsy results showed 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 and severe interstitial fibrosis    She was sent for cardiac MRI and echocardiogram to reassess her function as well as to look for evidence of late gadolinium enhancement on the MRI  MRI demonstrated evidence nonischemic cardiomyopathy  Echocardiogram was performed in April 2021 which showed improved left ventricular function to an ejection fraction of 50%  She is here today for routine CV follow-up  Her creatinine has remained stable  Overall, she has been fairly functional   Denies any significant shortness of breath, dyspnea on exertion, lower extremity swelling, orthopnea or paroxysmal nocturnal dyspnea  Denies chest pain, exertional chest discomfort, tightness or squeezing  Denies dizziness or palpitations  Admitted to some lightheadedness on isosorbide  She follows up with Nephrology regularly  Review of Systems:  Review of Systems   Constitutional: Negative for decreased appetite, fever, weight gain and weight loss  HENT: Negative for congestion and sore throat  Eyes: Negative for visual disturbance  Cardiovascular: Negative for chest pain, dyspnea on exertion, leg swelling, near-syncope and palpitations  Respiratory: Negative for cough and shortness of breath  Hematologic/Lymphatic: Negative for bleeding problem  Skin: Negative for rash  Musculoskeletal: Negative for myalgias and neck pain  Gastrointestinal: Negative for abdominal pain and nausea  Neurological: Negative for light-headedness and weakness  Psychiatric/Behavioral: Negative for depression         Past Medical History:   Diagnosis Date    Allergic     Anemia     Anxiety     Arthritis 2017    Bipolar disorder ([facility] [address] )     [facility])     Chronic kidney disease 01/0/2021    Coronary artery disease 1/6/2021    Depression     Hyperlipidemia 1/7/2021    Hypertension 01/06/2021    Osteoarthritis        Past Surgical History:   Procedure Laterality Date    CT NEEDLE BIOPSY KIDNEY  3/2/2021    ECTOPIC PREGNANCY SURGERY      IR BIOPSY KIDNEY RANDOM  2/17/2021    IR THORACENTESIS  1/12/2021    JOINT REPLACEMENT      ORTHOPEDIC SURGERY      VT TOTAL HIP ARTHROPLASTY Left 3/5/2018    Procedure: ARTHROPLASTY HIP TOTAL;  Surgeon: Kelli Tatum DO;  Location: [facility];  Service: Orthopedics    RENAL BIOPSY  03/02/2021    WISDOM TOOTH EXTRACTION      x1       Social History     Socioeconomic History    Marital status: Single     Spouse name: None    Number of children: None    Years of education: None    Highest education level: None   Occupational History    Occupation: disabled   Tobacco Use    Smoking status: Current Every Day Smoker     Packs/day: 1 50     Years: 33 00     Pack years: 49 50     Types: Cigarettes     Start date: 1988    Smokeless tobacco: Never Used    Tobacco comment: 0 25 pack daily as of 6/2021   Vaping Use    Vaping Use: Never used   Substance and Sexual Activity    Alcohol use:  Yes     Alcohol/week: 0 0 standard drinks     Comment: rarely    Drug use: No    Sexual activity: Not Currently   Other Topics Concern    None   Social History Narrative    None     Social Determinants of Health     Financial Resource Strain: Not on file   Food Insecurity: Not on file   Transportation Needs: Not on file   Physical Activity: Not on file   Stress: Not on file   Social Connections: Not on file   Intimate Partner Violence: Not on file   Housing Stability: Not on file       Family History   Problem Relation Age of Onset    Hypertension Mother     Cancer Family     Cancer Father         PROSTATE    Atrial fibrillation Father     Hypertension Father     Diabetes Brother     Hypertension Sister        No Known Allergies      Current Outpatient Medications:     amLODIPine (NORVASC) 2 5 mg tablet, TAKE 1 TABLET(2 5 MG) BY MOUTH DAILY, Disp: 90 tablet, Rfl: 1    atorvastatin (LIPITOR) 40 mg tablet, TAKE 1 TABLET(40 MG) BY MOUTH DAILY, Disp: 90 tablet, Rfl: 1    carvedilol (COREG) 25 mg tablet, TAKE 1 TABLET(25 MG) BY MOUTH TWICE DAILY WITH MEALS, Disp: 180 tablet, Rfl: 1    cyanocobalamin (VITAMIN B-12) 1000 MCG tablet, Take 1 tablet (1,000 mcg total) by mouth daily, Disp: 30 tablet, Rfl: 0    furosemide (LASIX) 20 mg tablet, TAKE 1 TABLET(20 MG) BY MOUTH EVERY EVENING, Disp: 90 tablet, Rfl: 1    furosemide (LASIX) 40 mg tablet, TAKE 1 TABLET(40 MG) BY MOUTH EVERY MORNING, Disp: 90 tablet, Rfl: 1    hydrALAZINE (APRESOLINE) 25 mg tablet, TAKE 1 TABLET(25 MG) BY MOUTH THREE TIMES DAILY, Disp: 270 tablet, Rfl: 1    losartan (COZAAR) 25 mg tablet, Take 1 tablet (25 mg total) by mouth daily, Disp: 90 tablet, Rfl: 3    methylphenidate (RITALIN) 20 MG tablet, Take 1 tablet (20 mg total) by mouth 2 (two) times a day Max Daily Amount: 40 mg, Disp: 60 tablet, Rfl: 0    PARoxetine (PAXIL) 30 mg tablet, Take 1 5 tablets (45 mg total) by mouth daily, Disp: 135 tablet, Rfl: 1    QUEtiapine (SEROquel) 100 mg tablet, Take 1 tablet (100 mg total) by mouth daily at bedtime, Disp: 90 tablet, Rfl: 1    Vitals:    07/25/22 0905   BP: 120/90   BP Location: Left arm   Patient Position: Sitting   Cuff Size: Adult   Pulse: (!) 50   Weight: 65 3 kg (144 lb)   Height: 5' 6" (1 676 m)       PHYSICAL EXAMINATION:  Gen: Awake, Alert, NAD  Head/eyes: AT/NC, pupils equal and round, Anicteric  ENT: mmm  Neck: Supple, No elevated JVP, trachea midline  Resp: CTA bilaterally no w/r/r  CV: RRR +S1, S2, No m/r/g  Abd: Soft, NT/ND + BS  Ext: no LE edema bilaterally  Neuro: Follows commands, moves all extermities  Psych: Appropriate affect, happy mood, pleasant attitude, non-combative  Skin: warm; no rash, erythema or venous stasis changes on exposed skin    --------------------------------------------------------------------------------  TREADMILL STRESS  No results found for this or any previous visit    --------------------------------------------------------------------------------  NUCLEAR STRESS TEST: No results found for this or any previous visit    No results found for this or any previous visit     --------------------------------------------------------------------------------  CATH:    Results for orders placed during the hospital encounter of 21   Cardiac catheterization    Narrative [address] [place] [address], [address]  [phone]    [facility]    Invasive Cardiovascular Lab Complete Report    Patient: Kelsey Cabrera  MR number: [id number]  Account number: [de-identified]  Study date: 2021  Gender: Female  : 1970  Height: 66 1 in  Weight: 117 9 lb  BSA: 1 6 mï¾²    Allergies: NO KNOWN ALLERGIES    Diagnostic Cardiologist:  Gavino Payne MD    SUMMARY    CORONARY CIRCULATION:  Left main: Normal   Circumflex: Normal     HEMODYNAMICS:  Hemodynamic assessment demonstrated mildly elevated LVEDP  INDICATIONS:  --  Congestive heart failure with cardiomyopathy  PROCEDURES PERFORMED    --  Left heart catheterization without ventriculogram   --  Left coronary angiography  --  Right coronary angiography  --  Inpatient  --  Mod Sedation Same Physician Initial 15min  --  Mod Sedation Same Physician Add 15min  --  Coronary Catheterization (w/ LHC)  PROCEDURE: The risks and alternatives of the procedures and conscious sedation were explained to the patient and informed consent was obtained  The patient was brought to the cath lab and placed on the table  The planned puncture sites  were prepped and draped in the usual sterile fashion  --  Right radial artery access  After performing an Julián's test to verify adequate ulnar artery supply to the hand, the radial site was prepped  The puncture site was infiltrated with local anesthetic  The vessel was accessed using the  modified Seldinger technique, a wire was advanced into the vessel, and a sheath was advanced over the wire into the vessel  --  Left heart catheterization without ventriculogram  A catheter was advanced over a guidewire into the ascending aorta   After recording ascending aortic pressure, the catheter was advanced across the aortic valve and left ventricular  pressure was recorded  The catheter was pulled back across the aortic valve and into the ascending aorta and pullback pressures were obtained  --  Left coronary artery angiography  A catheter was advanced over a guidewire into the aorta and positioned in the left coronary artery ostium under fluoroscopic guidance  Angiography was performed  --  Right coronary artery angiography  A catheter was advanced over a guidewire into the aorta and positioned in the right coronary artery ostium under fluoroscopic guidance  Angiography was performed  --  Inpatient  --  Mod Sedation Same Physician Initial 15min  --  Mod Sedation Same Physician Add 15min  --  Coronary Catheterization (w/ LH)  PROCEDURE COMPLETION: The patient tolerated the procedure well and was discharged from the cath lab  TIMING: Test started at 09:55  Test concluded at 10:04  HEMOSTASIS: The sheath was removed  The site was compressed with a Hemoband  device  Hemostasis was obtained  MEDICATIONS GIVEN: Versed (2mg/2ml), 1 mg, IV, at 09:50  Fentanyl (1OOmcg/2 ml), 50 mcg, IV, at 09:50  Baby Aspirin, 324 mg, PO, at 09:53  1% Lidocaine, 1 ml, subcutaneously, at 09:55  Nitroglycerin  (200mcg/ml), 200 mcg, at 09:57  Verapamil (5mg/2ml), 2 5 mg, IV, at 09:58  Heparin 1000 units/ml, 4,000 units, IV, at 09:58  Versed (2mg/2ml), 1 mg, IV, at 10:00  Fentanyl (1OOmcg/2 ml), 50 mcg, IV, at 10:00  CONTRAST GIVEN: 20 ml  Omnipaque (350mg I /ml)  RADIATION EXPOSURE: Fluoroscopy time: 1 03 min  HEMODYNAMICS: Hemodynamic assessment demonstrated mildly elevated LVEDP  CORONARY VESSELS:   --  The coronary circulation is right dominant  --  Left main: Normal   --  LAD: The vessel was large sized  Angiography showed minor luminal irregularities  --  Circumflex: Normal   --  RCA: The vessel was large sized (dominant)   Angiography showed mild atherosclerosis  IMPRESSIONS:  There is no significant coronary artery disease  RECOMMENDATIONS  The patient should continue with the present medications  DISPOSITION:  The patient left the catheterization laboratory in stable condition  Prepared and signed by    Angel Resendiz MD  Signed 2021 10:06:10    Study diagram    Hemodynamic tables    Pressures:  Baseline  Pressures:  - HR: 76  Pressures:  - Rhythm:  Pressures:  -- Aortic Pressure (S/D/M): 121/79/62  Pressures:  -- Left Ventricle (s/edp): 113/20/--    Outputs:  Baseline  Outputs:  -- CALCULATIONS: [age]  Outputs:  -- CALCULATIONS: Body Surface Area: 1 60  Outputs:  -- CALCULATIONS: Height in cm: 168 00  Outputs:  -- CALCULATIONS: Sex: Female  Outputs:  -- CALCULATIONS: Weight in k 60       --------------------------------------------------------------------------------  ECHO:   Results for orders placed during the hospital encounter of 21   Echo complete with contrast if indicated    Narrative [address]  [address]  [facility], [address]  [phone]    Transthoracic Echocardiogram  2D, M-mode, Doppler, and Color Doppler    Study date:  2021    Patient: Nakita Phillips  MR number: [id number]  Account number: [de-identified]  : 1970  Age: 44 years  Gender: Female  Status: Inpatient  Location: Bedside  Height: 66 in  Weight: 127 lb  BP: 155/ 96 mmHg    Indications: Heart Failure    Diagnoses: I50 9 - Heart failure, unspecified    Sonographer:  Leanne Flower RDCS  Referring Physician:  Pippa Burton PA-C  Group:  [facility]  Interpreting Physician:  PRESTON Lagunas     SUMMARY    LEFT VENTRICLE:  The ventricle was mildly dilated  Systolic function was markedly reduced by visual assessment  Ejection fraction was estimated to be 25 %  There was severe diffuse hypokinesis    Doppler parameters were consistent with a reversible restrictive pattern, indicative of decreased left ventricular diastolic compliance and/or increased left atrial pressure (grade 3 diastolic dysfunction)  LEFT ATRIUM:  The atrium was mildly dilated  MITRAL VALVE:  There was mild regurgitation  AORTIC VALVE:  There was trace regurgitation  TRICUSPID VALVE:  There was mild regurgitation  PULMONIC VALVE:  There was trace regurgitation  PERICARDIUM:  A small pericardial effusion was identified circumferential to the heart  There was no evidence of hemodynamic compromise  There was a large left pleural effusion  SUMMARY MEASUREMENTS  2D measurements:  Unspecified Anatomy:   %FS was 14 6 %  Ao Diam was 3 1 cm  Ao asc was 3 cm  EDV(Teich) was 137 3 ml   EF(Teich) was 30 8 %  ESV(Teich) was 95 ml  IVSd was 0 8 cm  LA Diam was 3 7 cm  LAAs A2C was 20 cm2  LAESV A-L A2C was 65 3 ml  LAESV MOD A2C was 57 8 ml  LALs A2C was 5 2 cm  LVEDV MOD A4C was 116 9 ml  LVEF MOD A4C was 26 8 %  LVESV MOD A4C was 85 6 ml  LVIDd was 5 3 cm  LVIDs was 4 6 cm  LVLd A4C was 7 5 cm  LVLs A4C was 6 7 cm  LVPWd was 1 cm  RAAs  A4C was 13 2 cm2  RAESV A-L was 36 4 ml   RAESV MOD was 35 ml  RALs was 4 1 cm  RVIDd was 3 6 cm  SV MOD A4C was 31 4 ml   SV(Teich) was 42 3 ml   CW measurements:  Unspecified Anatomy:   AV Env  Ti was 239 8 ms   AV VTI was 11 7 cm  AV Vmax was 0 9 m/s  AV Vmean was 0 5 m/s  AV maxPG was 3 1 mmHg  AV meanPG was 1 3 mmHg  TR Vmax was 3 m/s  TR maxPG was 37 mmHg  MM measurements:  Unspecified Anatomy:   TAPSE was 1 5 cm  PW measurements:  Unspecified Anatomy:   DVI was 0 7   E' Sept was 0 m/s  E/E' Sept was 29 1   LVOT Env  Ti was 247 4 ms  LVOT VTI was 8 4 cm  LVOT Vmax was 0 6 m/s  LVOT Vmean was 0 3 m/s  LVOT maxPG was 1 4 mmHg  LVOT meanPG was 0 6 mmHg  MV A Stanton  was 0 4 m/s  MV Dec [place] was 7 3 m/s2  MV DecT was 144 4 ms   MV E [place] was 1 1 m/s  MV E/A Ratio was 2 8   MV PHT was 41 9 ms    MVA By PHT was 5 3 cm2     HISTORY: PRIOR HISTORY: Smoker, MAXI, Pleural Effusion, CHF, Elevated Troponin, Anemia    PROCEDURE: The procedure was performed at the bedside  This was a routine study  The transthoracic approach was used  The study included complete 2D imaging, M-mode, complete spectral Doppler, and color Doppler  The heart rate was 90 bpm,  at the start of the study  Images were obtained from the parasternal, apical, subcostal, and suprasternal notch acoustic windows  Image quality was adequate  LEFT VENTRICLE: The ventricle was mildly dilated  Systolic function was markedly reduced by visual assessment  Ejection fraction was estimated to be 25 %  There was severe diffuse hypokinesis  DOPPLER: Doppler parameters were consistent  with a reversible restrictive pattern, indicative of decreased left ventricular diastolic compliance and/or increased left atrial pressure (grade 3 diastolic dysfunction)  RIGHT VENTRICLE: The size was normal  Systolic function was normal  Wall thickness was normal     LEFT ATRIUM: The atrium was mildly dilated  RIGHT ATRIUM: Size was normal     MITRAL VALVE: Valve structure was normal  There was mild thickening of the anterior and posterior leaflets  There was normal leaflet separation  DOPPLER: The transmitral velocity was within the normal range  There was no evidence for  stenosis  There was mild regurgitation  AORTIC VALVE: The valve was trileaflet  Leaflets exhibited normal thickness, normal cuspal separation, and sclerosis  DOPPLER: Transaortic velocity was within the normal range  There was no evidence for stenosis  There was trace  regurgitation  TRICUSPID VALVE: The valve structure was normal  There was normal leaflet separation  DOPPLER: The transtricuspid velocity was within the normal range  There was no evidence for stenosis  There was mild regurgitation  Estimated peak PA  pressure was 40 mmHg      PULMONIC VALVE: Leaflets exhibited normal thickness, no calcification, and normal cuspal separation  DOPPLER: The transpulmonic velocity was within the normal range  There was no evidence for stenosis  There was trace regurgitation  PERICARDIUM: A small pericardial effusion was identified circumferential to the heart  There was no evidence of hemodynamic compromise  There was a large left pleural effusion  AORTA: The root exhibited normal size  SYSTEMIC VEINS: IVC: The inferior vena cava was normal in size and course  Respirophasic changes were normal     SYSTEM MEASUREMENT TABLES    2D  %FS: 14 6 %  Ao Diam: 3 1 cm  Ao asc: 3 cm  EDV(Teich): 137 3 ml  EF(Teich): 30 8 %  ESV(Teich): 95 ml  IVSd: 0 8 cm  LA Diam: 3 7 cm  LAAs A2C: 20 cm2  LAESV A-L A2C: 65 3 ml  LAESV MOD A2C: 57 8 ml  LALs A2C: 5 2 cm  LVEDV MOD A4C: 116 9 ml  LVEF MOD A4C: 26 8 %  LVESV MOD A4C: 85 6 ml  LVIDd: 5 3 cm  LVIDs: 4 6 cm  LVLd A4C: 7 5 cm  LVLs A4C: 6 7 cm  LVPWd: 1 cm  RAAs A4C: 13 2 cm2  RAESV A-L: 36 4 ml  RAESV MOD: 35 ml  RALs: 4 1 cm  RVIDd: 3 6 cm  SV MOD A4C: 31 4 ml  SV(Teich): 42 3 ml    CW  AV Env  Ti: 239 8 ms  AV VTI: 11 7 cm  AV Vmax: 0 9 m/s  AV Vmean: 0 5 m/s  AV maxPG: 3 1 mmHg  AV meanP 3 mmHg  TR Vmax: 3 m/s  TR maxP mmHg    MM  TAPSE: 1 5 cm    PW  DVI: 0 7  E' Sept: 0 m/s  E/E' Sept: 29 1  LVOT Env  Ti: 247 4 ms  LVOT VTI: 8 4 cm  LVOT Vmax: 0 6 m/s  LVOT Vmean: 0 3 m/s  LVOT maxP 4 mmHg  LVOT meanP 6 mmHg  MV A Stanton: 0 4 m/s  MV Dec [place]: 7 3 m/s2  MV DecT: 144 4 ms  MV E Stanton: 1 1 m/s  MV E/A Ratio: 2 8  MV PHT: 41 9 ms  MVA By PHT: 5 3 cm2    [facility] Accredited Echocardiography Laboratory    Prepared and electronically signed by    PRESTON Hawkins  Signed 2021 13:28:36       No results found for this or any previous visit   --------------------------------------------------------------------------------  HOLTER  No results found for this or any previous visit    No results found for this or any previous visit   --------------------------------------------------------------------------------  CAROTIDS  No results found for this or any previous visit    --------------------------------------------------------------------------------  ECGs:  Results for orders placed or performed in visit on 07/25/22   POCT ECG    Impression    Sinus bradycardia 50 bpm, CAITLIN, poor R wave progression        Lab Results   Component Value Date    WBC 7 38 07/12/2022    HGB 14 6 07/12/2022    HCT 43 3 07/12/2022    MCV 99 (H) 07/12/2022     07/12/2022      Lab Results   Component Value Date    SODIUM 144 07/12/2022    K 3 9 07/12/2022     07/12/2022    CO2 30 07/12/2022    BUN 22 07/12/2022    CREATININE 1 81 (H) 07/12/2022    GLUC 93 06/16/2021    CALCIUM 9 2 07/12/2022      Lab Results   Component Value Date    HGBA1C 5 3 01/07/2021      No results found for: CHOL  Lab Results   Component Value Date    HDL 50 07/12/2022    HDL 53 01/07/2021     Lab Results   Component Value Date    LDLCALC 80 07/12/2022    LDLCALC 187 (H) 01/07/2021     Lab Results   Component Value Date    TRIG 200 (H) 07/12/2022    TRIG 135 01/07/2021     No results found for: CHOLHDL   Lab Results   Component Value Date    INR 0 93 01/18/2021    INR 0 95 01/06/2021    INR 0 89 02/06/2018    PROTIME 12 3 01/18/2021    PROTIME 12 5 01/06/2021    PROTIME 12 0 (L) 02/06/2018        1  Chronic combined systolic and diastolic heart failure ([address] )    2  Coronary artery disease involving native coronary artery of native heart without angina pectoris    3  Nonischemic cardiomyopathy ([facility] [address] )    4  Benign essential hypertension  -     POCT ECG    5  Dyslipidemia    6   Tobacco use        IMPRESSION:  · Chronic systolic and diastolic congestive heart failure  · Nonischemic cardiomyopathy with recovering LV function  · LVEF 50%, abnormal GLS -11%, grade 1 diastolic dysfunction, trace MR, April 2021  · LVEF 40%, mild LV dilatation, no LGE with probable nonischemic cardiomyopathy by cMRI, March 2021  · Mild MR by 2D echocardiogram, January 2021  · Hypertension  · CAD s/p cath w/ minimal luminal irregularities LAD and RCA, January 2021  · Ongoing tobacco use, probable COPD  · CKD  · Dyslipidemia  · Nephrotic range proteinuria with > 6 9 g in 24 hours  · Diffuse proliferative/sclerosing glomerulonephritis with tubular atrophy and severe interstitial fibrosis of the kidney by biopsy    PLAN:  It was a pleasure to see Vijay Buchanan in the office for follow-up CV evaluation  She is here today for routine CV follow-up  Her echocardiogram in April 2021 showed low normal left ventricular systolic function improved from her prior echo  She has no symptoms concerning for angina and no signs or symptoms of heart failure  She examines to be euvolemic in the office today  Blood pressure is currently stable and heart rate is mildly bradycardic at 50 beats per minute without any evidence of high-degree heart block  No changes compared to her prior ECG  The patient was experiencing some lightheadedness with isosorbide medication  She also recently swelling Nephrology regarding her chronic kidney disease and GN  Aside from her significant hip discomfort, she feels to be in her usual state of health  Based on her clinical presentation, the following recommendations:    1  Reasonable to discontinue isosorbide with her recurrent headaches on the medication  2  Continue current medications including carvedilol, losartan and hydralazine for history of left ventricular systolic dysfunction  3  Continue statin therapy  Goal LDL is less than 70 mg/dL  Repeat lipid panel in 3-6 months  4  Tobacco cessation advised  5  Would encourage 30 minutes a day, 5 days a week of moderate intensity activity to build cardiovascular endurance  6  Continue amlodipine for antihypertensive control  7  No additional CV testing at this time  8   We will follow up with her in 6 months to reassess her progress  As always, please do not hesitate to call with any questions  Portions of the record may have been created with voice recognition software  Occasional wrong word or "sound a like" substitutions may have occurred due to the inherent limitations of voice recognition software  Read the chart carefully and recognize, using context, where substitutions have occurred        Signed: Ngoc Roberts DO, Isidro Justin

## 2022-08-09 DIAGNOSIS — I50.21 ACUTE SYSTOLIC CONGESTIVE HEART FAILURE (HCC): ICD-10-CM

## 2022-08-09 DIAGNOSIS — I50.9 CHF (CONGESTIVE HEART FAILURE) (HCC): ICD-10-CM

## 2022-08-09 DIAGNOSIS — I16.0 HYPERTENSIVE URGENCY: ICD-10-CM

## 2022-08-10 RX ORDER — FUROSEMIDE 20 MG/1
TABLET ORAL
Qty: 90 TABLET | Refills: 1 | Status: SHIPPED | OUTPATIENT
Start: 2022-08-10

## 2022-08-10 RX ORDER — CARVEDILOL 25 MG/1
TABLET ORAL
Qty: 180 TABLET | Refills: 1 | Status: SHIPPED | OUTPATIENT
Start: 2022-08-10

## 2022-08-10 RX ORDER — FUROSEMIDE 40 MG/1
TABLET ORAL
Qty: 90 TABLET | Refills: 1 | Status: SHIPPED | OUTPATIENT
Start: 2022-08-10

## 2022-08-10 RX ORDER — ATORVASTATIN CALCIUM 40 MG/1
TABLET, FILM COATED ORAL
Qty: 90 TABLET | Refills: 1 | Status: SHIPPED | OUTPATIENT
Start: 2022-08-10

## 2022-08-18 DIAGNOSIS — F33.1 MODERATE EPISODE OF RECURRENT MAJOR DEPRESSIVE DISORDER (HCC): ICD-10-CM

## 2022-08-18 RX ORDER — METHYLPHENIDATE HYDROCHLORIDE 20 MG/1
20 TABLET ORAL 2 TIMES DAILY
Qty: 60 TABLET | Refills: 0 | Status: SHIPPED | OUTPATIENT
Start: 2022-08-18 | End: 2022-09-16 | Stop reason: SDUPTHER

## 2022-09-07 DIAGNOSIS — N28.89 HYPERTENSION SECONDARY TO OTHER RENAL DISORDERS: ICD-10-CM

## 2022-09-07 DIAGNOSIS — I15.1 HYPERTENSION SECONDARY TO OTHER RENAL DISORDERS: ICD-10-CM

## 2022-09-07 RX ORDER — LOSARTAN POTASSIUM 25 MG/1
TABLET ORAL
Qty: 90 TABLET | Refills: 3 | Status: SHIPPED | OUTPATIENT
Start: 2022-09-07

## 2022-09-16 DIAGNOSIS — F33.1 MODERATE EPISODE OF RECURRENT MAJOR DEPRESSIVE DISORDER (HCC): ICD-10-CM

## 2022-09-16 RX ORDER — METHYLPHENIDATE HYDROCHLORIDE 20 MG/1
20 TABLET ORAL 2 TIMES DAILY
Qty: 60 TABLET | Refills: 0 | Status: SHIPPED | OUTPATIENT
Start: 2022-09-16 | End: 2022-10-25 | Stop reason: SDUPTHER

## 2022-09-23 ENCOUNTER — TELEPHONE (OUTPATIENT)
Dept: CARDIOLOGY CLINIC | Facility: CLINIC | Age: 52
End: 2022-09-23

## 2022-09-23 NOTE — TELEPHONE ENCOUNTER
Breana White has a cardiac clearance form that she needs filling out by Dr Burrell Prom  she is going to mail it over  No mention as to what kind of surgery ,physician, or anesthesia  She is going to mailfor to us and was calling for address

## 2022-09-28 ENCOUNTER — TELEPHONE (OUTPATIENT)
Dept: NEPHROLOGY | Facility: CLINIC | Age: 52
End: 2022-09-28

## 2022-10-06 ENCOUNTER — CONSULT (OUTPATIENT)
Dept: FAMILY MEDICINE CLINIC | Facility: CLINIC | Age: 52
End: 2022-10-06
Payer: COMMERCIAL

## 2022-10-06 VITALS
OXYGEN SATURATION: 94 % | BODY MASS INDEX: 23.63 KG/M2 | HEIGHT: 66 IN | WEIGHT: 147 LBS | SYSTOLIC BLOOD PRESSURE: 110 MMHG | RESPIRATION RATE: 18 BRPM | DIASTOLIC BLOOD PRESSURE: 80 MMHG | TEMPERATURE: 97.5 F | HEART RATE: 75 BPM

## 2022-10-06 DIAGNOSIS — Z23 ENCOUNTER FOR IMMUNIZATION: ICD-10-CM

## 2022-10-06 DIAGNOSIS — N18.32 STAGE 3B CHRONIC KIDNEY DISEASE (HCC): ICD-10-CM

## 2022-10-06 DIAGNOSIS — I15.8 OTHER SECONDARY HYPERTENSION: ICD-10-CM

## 2022-10-06 DIAGNOSIS — M16.11 PRIMARY OSTEOARTHRITIS OF ONE HIP, RIGHT: ICD-10-CM

## 2022-10-06 DIAGNOSIS — I50.21 ACUTE SYSTOLIC CONGESTIVE HEART FAILURE (HCC): ICD-10-CM

## 2022-10-06 DIAGNOSIS — F31.77 BIPOLAR DISORDER, IN PARTIAL REMISSION, MOST RECENT EPISODE MIXED (HCC): ICD-10-CM

## 2022-10-06 DIAGNOSIS — Z01.818 PRE-OP EVALUATION: Primary | ICD-10-CM

## 2022-10-06 PROCEDURE — 90471 IMMUNIZATION ADMIN: CPT

## 2022-10-06 PROCEDURE — 99214 OFFICE O/P EST MOD 30 MIN: CPT | Performed by: FAMILY MEDICINE

## 2022-10-06 PROCEDURE — 90686 IIV4 VACC NO PRSV 0.5 ML IM: CPT

## 2022-10-06 RX ORDER — ERGOCALCIFEROL 1.25 MG/1
50000 CAPSULE ORAL WEEKLY
COMMUNITY

## 2022-10-06 NOTE — PROGRESS NOTES
Assessment/Plan:   Her paperwork is filled out  She is medically cleared for right hip surgery  She will follow-up here in 2-3 months for annual physical      Diagnoses and all orders for this visit:    Pre-op evaluation    Acute systolic congestive heart failure ([facility] [address] [address] )    Other secondary hypertension    Primary osteoarthritis of one hip, right    Stage 3b chronic kidney disease ([facility] [address] )    Bipolar disorder, in partial remission, most recent episode mixed ([facility] [address] )    Other orders  -     ergocalciferol (VITAMIN D2) 50,000 units; Take 50,000 Units by mouth once a week        Subjective:     Patient ID: Dayne Moya is a 45 y o  female  She presents today for preoperative clearance  She is going to have right total hip arthroscopy and replacement  She also requests handicap placard paperwork to be filled out  Review of Systems   Constitutional: Negative  HENT: Negative  Eyes: Negative  Respiratory: Negative  Cardiovascular: Negative  Gastrointestinal: Negative  Endocrine: Negative  Genitourinary: Negative  Musculoskeletal: Positive for arthralgias and gait problem  Skin: Negative  Allergic/Immunologic: Negative  Hematological: Negative  Psychiatric/Behavioral: Negative  All other systems reviewed and are negative  Objective:     Physical Exam  Vitals and nursing note reviewed  Constitutional:       Appearance: She is well-developed  HENT:      Head: Normocephalic and atraumatic  Right Ear: External ear normal       Left Ear: External ear normal       Nose: Nose normal    Eyes:      Conjunctiva/sclera: Conjunctivae normal       Pupils: Pupils are equal, round, and reactive to light  Cardiovascular:      Rate and Rhythm: Normal rate and regular rhythm  Heart sounds: Normal heart sounds  Pulmonary:      Effort: Pulmonary effort is normal       Breath sounds: Normal breath sounds     Abdominal:      General: Bowel sounds are normal       Palpations: Abdomen is soft  Musculoskeletal:      Cervical back: Normal range of motion and neck supple  Skin:     General: Skin is warm and dry  Neurological:      Mental Status: She is alert and oriented to person, place, and time  Deep Tendon Reflexes: Reflexes are normal and symmetric     Psychiatric:         Behavior: Behavior normal

## 2022-10-17 ENCOUNTER — TELEPHONE (OUTPATIENT)
Dept: PSYCHIATRY | Facility: CLINIC | Age: 52
End: 2022-10-17

## 2022-10-17 NOTE — TELEPHONE ENCOUNTER
Spoke to patient in regards to wait list and if services are still needed  Patient is still interested

## 2022-10-25 DIAGNOSIS — F33.1 MODERATE EPISODE OF RECURRENT MAJOR DEPRESSIVE DISORDER (HCC): ICD-10-CM

## 2022-10-25 RX ORDER — METHYLPHENIDATE HYDROCHLORIDE 20 MG/1
20 TABLET ORAL 2 TIMES DAILY
Qty: 60 TABLET | Refills: 0 | Status: SHIPPED | OUTPATIENT
Start: 2022-10-25

## 2022-11-01 ENCOUNTER — TELEPHONE (OUTPATIENT)
Dept: FAMILY MEDICINE CLINIC | Facility: CLINIC | Age: 52
End: 2022-11-01

## 2022-11-01 NOTE — TELEPHONE ENCOUNTER
The above medication was denied on 10/26/22 due to it is not approved for treatment of major depressive disorder, recurrent moderate

## 2022-11-14 ENCOUNTER — DOCUMENTATION (OUTPATIENT)
Dept: CARDIOLOGY CLINIC | Facility: CLINIC | Age: 52
End: 2022-11-14

## 2022-11-14 NOTE — PROGRESS NOTES
Patient is without symptoms concerning for angina and no signs or symptoms of heart failure  Reports she is euvolemic  She can perform greater than 4 Mets without significant exertional symptoms  No absolute contraindication to right hip arthroplasty  Monitor on telemetry postoperatively

## 2022-11-19 DIAGNOSIS — I16.0 HYPERTENSIVE URGENCY: ICD-10-CM

## 2022-11-21 RX ORDER — AMLODIPINE BESYLATE 2.5 MG/1
TABLET ORAL
Qty: 90 TABLET | Refills: 1 | Status: SHIPPED | OUTPATIENT
Start: 2022-11-21

## 2022-11-25 DIAGNOSIS — F33.1 MODERATE EPISODE OF RECURRENT MAJOR DEPRESSIVE DISORDER (HCC): ICD-10-CM

## 2022-11-25 RX ORDER — METHYLPHENIDATE HYDROCHLORIDE 20 MG/1
20 TABLET ORAL 2 TIMES DAILY
Qty: 60 TABLET | Refills: 0 | Status: SHIPPED | OUTPATIENT
Start: 2022-11-25

## 2022-12-19 DIAGNOSIS — F33.1 MODERATE EPISODE OF RECURRENT MAJOR DEPRESSIVE DISORDER (HCC): ICD-10-CM

## 2022-12-19 RX ORDER — PAROXETINE 30 MG/1
TABLET, FILM COATED ORAL
Qty: 135 TABLET | Refills: 1 | Status: SHIPPED | OUTPATIENT
Start: 2022-12-19

## 2022-12-19 RX ORDER — QUETIAPINE FUMARATE 100 MG/1
TABLET, FILM COATED ORAL
Qty: 90 TABLET | Refills: 1 | Status: SHIPPED | OUTPATIENT
Start: 2022-12-19

## 2022-12-22 DIAGNOSIS — F33.1 MODERATE EPISODE OF RECURRENT MAJOR DEPRESSIVE DISORDER (HCC): ICD-10-CM

## 2022-12-22 RX ORDER — METHYLPHENIDATE HYDROCHLORIDE 20 MG/1
20 TABLET ORAL 2 TIMES DAILY
Qty: 60 TABLET | Refills: 0 | Status: SHIPPED | OUTPATIENT
Start: 2022-12-22

## 2023-01-09 ENCOUNTER — OFFICE VISIT (OUTPATIENT)
Dept: FAMILY MEDICINE CLINIC | Facility: CLINIC | Age: 53
End: 2023-01-09

## 2023-01-09 VITALS
HEART RATE: 70 BPM | WEIGHT: 148 LBS | BODY MASS INDEX: 23.78 KG/M2 | HEIGHT: 66 IN | SYSTOLIC BLOOD PRESSURE: 122 MMHG | DIASTOLIC BLOOD PRESSURE: 82 MMHG | TEMPERATURE: 97.8 F | OXYGEN SATURATION: 97 %

## 2023-01-09 DIAGNOSIS — F90.2 ATTENTION DEFICIT HYPERACTIVITY DISORDER (ADHD), COMBINED TYPE: ICD-10-CM

## 2023-01-09 DIAGNOSIS — E78.2 MIXED HYPERLIPIDEMIA: ICD-10-CM

## 2023-01-09 DIAGNOSIS — I50.21 ACUTE SYSTOLIC CONGESTIVE HEART FAILURE (HCC): ICD-10-CM

## 2023-01-09 DIAGNOSIS — I15.8 OTHER SECONDARY HYPERTENSION: ICD-10-CM

## 2023-01-09 DIAGNOSIS — Z00.00 WELL ADULT EXAM: Primary | ICD-10-CM

## 2023-01-09 DIAGNOSIS — F41.9 ANXIETY: ICD-10-CM

## 2023-01-09 DIAGNOSIS — N18.32 STAGE 3B CHRONIC KIDNEY DISEASE (HCC): ICD-10-CM

## 2023-01-09 DIAGNOSIS — F31.77 BIPOLAR DISORDER, IN PARTIAL REMISSION, MOST RECENT EPISODE MIXED (HCC): ICD-10-CM

## 2023-01-09 DIAGNOSIS — M16.11 PRIMARY OSTEOARTHRITIS OF ONE HIP, RIGHT: ICD-10-CM

## 2023-01-09 NOTE — PROGRESS NOTES
Name: Yasemin Jarrett      : 1970      MRN: [id number]  Encounter Provider: Dhruv Dove DO  Encounter Date: 2023   Encounter department: [address] [place] [address]     1  Well adult exam  -     Lipid panel  -     Comprehensive metabolic panel  -     CBC    2  Acute systolic congestive heart failure ([facility] )    3  Other secondary hypertension    4  Primary osteoarthritis of one hip, right    5  Stage 3b chronic kidney disease ([facility] )    6  Bipolar disorder, in partial remission, most recent episode mixed ([facility] )    7  Anxiety    8  Mixed hyperlipidemia    9  Attention deficit hyperactivity disorder (ADHD), combined type  Continue current therapy  She will follow-up with her oral surgeon to get the tooth taken care of  Hopefully she can get her right hip surgery taking care of  I do recommend follow-up with smoking cessation consider chest CT  Also mammogram and colonoscopy  We will discuss this again next visit  Tobacco Cessation Counseling: Tobacco cessation counseling was provided  The patient is sincerely urged to quit consumption of tobacco  She is ready to quit tobacco          Subjective      Patient presents with: Well Check: Pt is here for her annual physical and to discuss feeling miserable  Pt states she is in severe pain in her hip and mouth  Pt states she will not consider mammogram, cervical cancer screening, or colorectal cancer screening until she has her hip replaced  Review of Systems   Constitutional: Negative  HENT: Negative  Eyes: Negative  Respiratory: Negative  Cardiovascular: Negative  Gastrointestinal: Negative  Endocrine: Negative  Genitourinary: Negative  Musculoskeletal: Positive for arthralgias, gait problem and joint swelling  Skin: Negative  Allergic/Immunologic: Negative  Hematological: Negative  Psychiatric/Behavioral: Positive for dysphoric mood  The patient is nervous/anxious      All other systems reviewed and are negative  Current Outpatient Medications on File Prior to Visit   Medication Sig   • amLODIPine (NORVASC) 2 5 mg tablet TAKE 1 TABLET(2 5 MG) BY MOUTH DAILY   • atorvastatin (LIPITOR) 40 mg tablet TAKE 1 TABLET(40 MG) BY MOUTH DAILY   • carvedilol (COREG) 25 mg tablet TAKE 1 TABLET(25 MG) BY MOUTH TWICE DAILY WITH MEALS   • cyanocobalamin (VITAMIN B-12) 1000 MCG tablet Take 1 tablet (1,000 mcg total) by mouth daily   • furosemide (LASIX) 20 mg tablet TAKE 1 TABLET(20 MG) BY MOUTH EVERY EVENING   • furosemide (LASIX) 40 mg tablet TAKE 1 TABLET(40 MG) BY MOUTH EVERY MORNING   • hydrALAZINE (APRESOLINE) 25 mg tablet TAKE 1 TABLET(25 MG) BY MOUTH THREE TIMES DAILY   • losartan (COZAAR) 25 mg tablet TAKE 1 TABLET(25 MG) BY MOUTH DAILY   • methylphenidate (RITALIN) 20 MG tablet Take 1 tablet (20 mg total) by mouth 2 (two) times a day Max Daily Amount: 40 mg   • PARoxetine (PAXIL) 30 mg tablet TAKE 1 AND 1/2 TABLETS(45 MG) BY MOUTH DAILY   • QUEtiapine (SEROquel) 100 mg tablet TAKE 1 TABLET(100 MG) BY MOUTH DAILY AT BEDTIME   • ergocalciferol (VITAMIN D2) 50,000 units Take 50,000 Units by mouth once a week (Patient not taking: Reported on 1/9/2023)       Objective     /82 (BP Location: Right arm, Patient Position: Sitting, Cuff Size: Adult)   Pulse 70   Temp 97 8 °F (36 6 °C) (Tympanic)   Ht 5' 6" (1 676 m)   Wt 67 1 kg (148 lb)   SpO2 97%   BMI 23 89 kg/m²     Physical Exam  Vitals and nursing note reviewed  Constitutional:       Appearance: She is well-developed  HENT:      Head: Normocephalic and atraumatic  Right Ear: External ear normal       Left Ear: External ear normal       Nose: Nose normal    Eyes:      Conjunctiva/sclera: Conjunctivae normal       Pupils: Pupils are equal, round, and reactive to light  Cardiovascular:      Rate and Rhythm: Normal rate and regular rhythm  Heart sounds: Normal heart sounds     Pulmonary:      Effort: Pulmonary effort is normal       Breath sounds: Normal breath sounds  Abdominal:      General: Bowel sounds are normal       Palpations: Abdomen is soft  Musculoskeletal:         General: Swelling and tenderness present  Cervical back: Normal range of motion and neck supple  Right hip: Tenderness present  Decreased range of motion  Skin:     General: Skin is warm and dry  Neurological:      General: No focal deficit present  Mental Status: She is alert and oriented to person, place, and time  Deep Tendon Reflexes: Reflexes are normal and symmetric     Psychiatric:         Behavior: Behavior normal        Noemi Francisco DO

## 2023-01-17 ENCOUNTER — TELEPHONE (OUTPATIENT)
Dept: PSYCHIATRY | Facility: CLINIC | Age: 53
End: 2023-01-17

## 2023-01-18 ENCOUNTER — TELEPHONE (OUTPATIENT)
Dept: PSYCHIATRY | Facility: CLINIC | Age: 53
End: 2023-01-18

## 2023-01-18 ENCOUNTER — TELEPHONE (OUTPATIENT)
Dept: NEPHROLOGY | Facility: HOSPITAL | Age: 53
End: 2023-01-18

## 2023-01-18 DIAGNOSIS — I16.0 HYPERTENSIVE URGENCY: ICD-10-CM

## 2023-01-18 RX ORDER — HYDRALAZINE HYDROCHLORIDE 25 MG/1
TABLET, FILM COATED ORAL
Qty: 270 TABLET | Refills: 1 | Status: SHIPPED | OUTPATIENT
Start: 2023-01-18

## 2023-01-18 NOTE — TELEPHONE ENCOUNTER
Called and spoke with Patient to complete their bloodwork prior to their appointment on 1/23 with Dr Leisa Hernandez at the [facility]

## 2023-01-18 NOTE — TELEPHONE ENCOUNTER
Behavioral Health Outpatient Intake Questions    Referred By   :     Please advise interviewee that they need to answer all questions truthfully to allow for best care, and any misrepresentations of information may affect their ability to be seen at this clinic   => Was this discussed? Yes     If Minor Child (under age 25)    Who is/are the legal guardian(s) of the child? Is there a custody agreement? No     • If "YES"- Custody orders must be obtained prior to scheduling the first appointment  • In addition, Consent to Treatment must be signed by all legal guardians prior to scheduling the first appointment    • If "NO"- Consent to Treatment must be signed by all legal guardians prior to scheduling the first appointment    [address] History -     Presenting Problem (in patient's own words): "Need help managing pych meds  My son passed away 3 yrs ago and im depressed"  Are there any communication barriers for this patient? No                                               If yes, please describe barriers: ADHD  • If there is a unique situation, please refer to [address] [place] [address] for final determination  Are you taking any psychiatric medications? Yes   •   If "YES" -What are they Seroquel, Ritalin Paxil  •   If "YES" -Who prescribes? PCP     Has the Patient previously received outpatient Talk Therapy or Medication Management from [place] [address]  No     •    If "YES"- When, Where and with Whom? n/a     •    If "NO" -Has Patient received these services elsewhere? •   If "YES" -When, Where, and with Whom? Dr Fariba Lujan, [facility]  Dr Denny Haq    Has the Patient abused alcohol or other substances in the last 6 months ? No  No concerns of substance abuse are reported  •  If "YES" -What substance, How much, How often? n/a    •  If illegal substance: Refer to [facility] (for MIRIAM) or [facility]    •  If Alcohol in excess of 10 drinks per week:  Refer to [facility] (for MIRIAM) or [facility]    Legal History-     Is this treatment court ordered? Yes   • If "Yes"- refer to [address] [place] [address] for final determination  Has the Patient been convicted of a felony? No  •  If "Yes" -When, What?    • Talk Therapy : Send to [address] [place] [address] for final determination   • Med Management: Send to Dr Dannie Gomez for final determination     ACCEPTED as a patient Yes  • If "Yes" Appointment Date: 3/23/23 8 am     Referred Elsewhere? No  • If “Yes” - (Where? Ex: [facility], [facility], [address] [place] [address], etc ) n/a      Name of [employer] [address] ID# [id number]  Insurance Phone # [phone]  If ins is primary or secondary? Primary  If patient is a minor, parents information such as Name, D  O B of guarantor

## 2023-01-20 ENCOUNTER — TELEPHONE (OUTPATIENT)
Dept: NEPHROLOGY | Facility: CLINIC | Age: 53
End: 2023-01-20

## 2023-01-20 ENCOUNTER — APPOINTMENT (OUTPATIENT)
Dept: LAB | Facility: HOSPITAL | Age: 53
End: 2023-01-20

## 2023-01-20 DIAGNOSIS — N18.32 STAGE 3B CHRONIC KIDNEY DISEASE (HCC): ICD-10-CM

## 2023-01-20 LAB
ALBUMIN SERPL BCP-MCNC: 4 G/DL (ref 3.5–5)
ALP SERPL-CCNC: 89 U/L (ref 34–104)
ALT SERPL W P-5'-P-CCNC: 21 U/L (ref 7–52)
ANION GAP SERPL CALCULATED.3IONS-SCNC: 6 MMOL/L (ref 4–13)
AST SERPL W P-5'-P-CCNC: 23 U/L (ref 13–39)
BILIRUB SERPL-MCNC: 0.31 MG/DL (ref 0.2–1)
BUN SERPL-MCNC: 31 MG/DL (ref 5–25)
CALCIUM SERPL-MCNC: 9.3 MG/DL (ref 8.4–10.2)
CHLORIDE SERPL-SCNC: 105 MMOL/L (ref 96–108)
CHOLEST SERPL-MCNC: 165 MG/DL
CO2 SERPL-SCNC: 31 MMOL/L (ref 21–32)
CREAT SERPL-MCNC: 1.86 MG/DL (ref 0.6–1.3)
CREAT UR-MCNC: 146.2 MG/DL
ERYTHROCYTE [DISTWIDTH] IN BLOOD BY AUTOMATED COUNT: 12.1 % (ref 11.6–15.1)
GFR SERPL CREATININE-BSD FRML MDRD: 30 ML/MIN/1.73SQ M
GLUCOSE P FAST SERPL-MCNC: 89 MG/DL (ref 65–99)
HCT VFR BLD AUTO: 44.1 % (ref 34.8–46.1)
HDLC SERPL-MCNC: 57 MG/DL
HGB BLD-MCNC: 14.4 G/DL (ref 11.5–15.4)
LDLC SERPL CALC-MCNC: 85 MG/DL (ref 0–100)
MAGNESIUM SERPL-MCNC: 2.5 MG/DL (ref 1.9–2.7)
MCH RBC QN AUTO: 32.5 PG (ref 26.8–34.3)
MCHC RBC AUTO-ENTMCNC: 32.7 G/DL (ref 31.4–37.4)
MCV RBC AUTO: 100 FL (ref 82–98)
NONHDLC SERPL-MCNC: 108 MG/DL
PHOSPHATE SERPL-MCNC: 3.6 MG/DL (ref 2.7–4.5)
PLATELET # BLD AUTO: 317 THOUSANDS/UL (ref 149–390)
PMV BLD AUTO: 10 FL (ref 8.9–12.7)
POTASSIUM SERPL-SCNC: 4 MMOL/L (ref 3.5–5.3)
PROT SERPL-MCNC: 7.1 G/DL (ref 6.4–8.4)
PROT UR-MCNC: 275 MG/DL
PROT/CREAT UR: 1.88 MG/G{CREAT} (ref 0–0.1)
PTH-INTACT SERPL-MCNC: 87.8 PG/ML (ref 18.4–80.1)
RBC # BLD AUTO: 4.43 MILLION/UL (ref 3.81–5.12)
SODIUM SERPL-SCNC: 142 MMOL/L (ref 135–147)
TRIGL SERPL-MCNC: 117 MG/DL
URATE SERPL-MCNC: 5.8 MG/DL (ref 2–7.5)
WBC # BLD AUTO: 6.76 THOUSAND/UL (ref 4.31–10.16)

## 2023-01-20 NOTE — TELEPHONE ENCOUNTER
Appointment Confirmation   Person confirmed appointment with  If not patient, name of the person Patient    Date and time of appointment 1/23/23   2:00 pm   Patient acknowledged and will be at appointment? yes    Did you advise the patient that they will need a urine sample if they are a new patient?  N/A    Did you advise the patient to bring their current medications for verification? (including any OTC) Yes    Additional Information

## 2023-01-21 ENCOUNTER — APPOINTMENT (OUTPATIENT)
Dept: LAB | Facility: HOSPITAL | Age: 53
End: 2023-01-21

## 2023-01-21 LAB
BACTERIA UR QL AUTO: ABNORMAL /HPF
BILIRUB UR QL STRIP: NEGATIVE
CLARITY UR: CLEAR
COLOR UR: YELLOW
GLUCOSE UR STRIP-MCNC: NEGATIVE MG/DL
HGB UR QL STRIP.AUTO: ABNORMAL
KETONES UR STRIP-MCNC: NEGATIVE MG/DL
LEUKOCYTE ESTERASE UR QL STRIP: NEGATIVE
NITRITE UR QL STRIP: NEGATIVE
NON-SQ EPI CELLS URNS QL MICRO: ABNORMAL /HPF
PH UR STRIP.AUTO: 7 [PH]
PROT UR STRIP-MCNC: ABNORMAL MG/DL
RBC #/AREA URNS AUTO: ABNORMAL /HPF
SP GR UR STRIP.AUTO: 1.01 (ref 1–1.03)
UROBILINOGEN UR QL STRIP.AUTO: 0.2 E.U./DL
WBC #/AREA URNS AUTO: ABNORMAL /HPF

## 2023-01-23 ENCOUNTER — OFFICE VISIT (OUTPATIENT)
Dept: NEPHROLOGY | Facility: CLINIC | Age: 53
End: 2023-01-23

## 2023-01-23 VITALS
HEART RATE: 65 BPM | WEIGHT: 149 LBS | SYSTOLIC BLOOD PRESSURE: 132 MMHG | DIASTOLIC BLOOD PRESSURE: 84 MMHG | BODY MASS INDEX: 23.95 KG/M2 | HEIGHT: 66 IN

## 2023-01-23 DIAGNOSIS — N25.81 SECONDARY HYPERPARATHYROIDISM (HCC): Primary | ICD-10-CM

## 2023-01-23 DIAGNOSIS — N25.81 SECONDARY HYPERPARATHYROIDISM (HCC): ICD-10-CM

## 2023-01-23 DIAGNOSIS — N18.32 STAGE 3B CHRONIC KIDNEY DISEASE (HCC): ICD-10-CM

## 2023-01-23 DIAGNOSIS — F33.1 MODERATE EPISODE OF RECURRENT MAJOR DEPRESSIVE DISORDER (HCC): ICD-10-CM

## 2023-01-23 RX ORDER — MELATONIN
2000 DAILY
Qty: 90 TABLET | Refills: 3 | Status: SHIPPED | OUTPATIENT
Start: 2023-01-23 | End: 2023-01-23

## 2023-01-23 RX ORDER — MELATONIN
Qty: 180 TABLET | Refills: 3 | Status: SHIPPED | OUTPATIENT
Start: 2023-01-23

## 2023-01-23 RX ORDER — METHYLPHENIDATE HYDROCHLORIDE 20 MG/1
20 TABLET ORAL 2 TIMES DAILY
Qty: 60 TABLET | Refills: 0 | Status: SHIPPED | OUTPATIENT
Start: 2023-01-23 | End: 2023-01-26 | Stop reason: SDUPTHER

## 2023-01-23 NOTE — PROGRESS NOTES
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 and sclerosing glomerular nephritis with abundant intra capillary histiocytes seen on renal biopsy done March 18th, severe tubular atrophy and interstitial fibrosis, moderate chronic inflammation, severe arterial sclerosis on arteriolosclerosis with hyalinosis  o Clear-cut diagnostic reason for her renal failure still unclear, cryoglobulins were negative which can be seen when histiocytes are noted on renal biopsy, this could be infectious related GN  Which also may have contributed to patient's acute decrease  Ejection fraction which is now improved  o she also seems to have elements of hypertensive kidney disease  o at this point we are treating  The proteinuria with losartan this has improved from 7 g down to 2 g  o Blood pressures are stable  o Her creatinine has remained around 1 8-2  o Proteinuria has improved now down to 2 g    • Electrolytes/Acid Base  o  electrolytes and acid-base status are acceptable    • Blood Pressure- hypertension in the setting of cardiomyopathy  o  had a low output with an EF of 25%, this has improved to 50%  o  following with Cardiology  o  currently on carvedilol 25 mg twice daily  o  furosemide 40 mg in the a m  and 20 mg in the p m   o  Isordil 10 mg 3 times daily  o  hydralazine 25 mg 3 times daily  o  added losartan 25 mg daily  o Amlodipine 2 5 mg daily this can be discontinued if blood pressures are low    • Anemia of CKD  o   Monitor H&H    • BMD-secondary hyperparathyroidism  o continue to monitor parameters  o PTH is slightly elevated start vitamin D3 2000 units daily    • Risk Reduction/Clinical Course  o  continue cardiovascular risk reduction measures      DISCUSSION/SUMMARY    -it was nice seeing Jaspreet Score again today  -given advanced proteinuria she is at high risk for progression of this CKD   -Her creatinine levels remained stable around 1 8, her GFR remained stable around 30  -We will continue semiannual surveillance  -She was in agreement with this plan and had no further questions      HPI: Morgan Dixon is a 45 y o  female who is here for  Follow-up     she was seen in the hospital when she was admitted with acute decompensated congestive heart failure, she had a cardiac catheterization that was negative, she subsequently also had a urinalysis and urine protein to creatinine ratio upwards of 7 g, she had a serologic workup at the time that was negative should pulmonary edema and was treated with diuretics her EF was 25% during the hospitalization her blood pressures have now improved     Overall she feels okay she is having severe hip pain she has a tooth abscess which requires intervention and is seeing oral surgery prior to hip operation  Causing decreased mobility and pain  ROS:   All the systems were reviewed and were negative except as documented on the HPI  Allergies: Patient has no known allergies      Medications:   Current Outpatient Medications:   •  amLODIPine (NORVASC) 2 5 mg tablet, TAKE 1 TABLET(2 5 MG) BY MOUTH DAILY, Disp: 90 tablet, Rfl: 1  •  atorvastatin (LIPITOR) 40 mg tablet, TAKE 1 TABLET(40 MG) BY MOUTH DAILY, Disp: 90 tablet, Rfl: 1  •  carvedilol (COREG) 25 mg tablet, TAKE 1 TABLET(25 MG) BY MOUTH TWICE DAILY WITH MEALS, Disp: 180 tablet, Rfl: 1  •  cholecalciferol (VITAMIN D3) 1,000 units tablet, Take 2 tablets (2,000 Units total) by mouth daily, Disp: 90 tablet, Rfl: 3  •  cyanocobalamin (VITAMIN B-12) 1000 MCG tablet, Take 1 tablet (1,000 mcg total) by mouth daily, Disp: 30 tablet, Rfl: 0  •  furosemide (LASIX) 20 mg tablet, TAKE 1 TABLET(20 MG) BY MOUTH EVERY EVENING, Disp: 90 tablet, Rfl: 1  •  furosemide (LASIX) 40 mg tablet, TAKE 1 TABLET(40 MG) BY MOUTH EVERY MORNING, Disp: 90 tablet, Rfl: 1  •  hydrALAZINE (APRESOLINE) 25 mg tablet, TAKE 1 TABLET(25 MG) BY MOUTH THREE TIMES DAILY, Disp: 270 tablet, Rfl: 1  •  losartan (COZAAR) 25 mg tablet, TAKE 1 TABLET(25 MG) BY MOUTH DAILY, Disp: 90 tablet, Rfl: 3  •  methylphenidate (RITALIN) 20 MG tablet, Take 1 tablet (20 mg total) by mouth 2 (two) times a day Max Daily Amount: 40 mg, Disp: 60 tablet, Rfl: 0  •  PARoxetine (PAXIL) 30 mg tablet, TAKE 1 AND 1/2 TABLETS(45 MG) BY MOUTH DAILY, Disp: 135 tablet, Rfl: 1  •  QUEtiapine (SEROquel) 100 mg tablet, TAKE 1 TABLET(100 MG) BY MOUTH DAILY AT BEDTIME, Disp: 90 tablet, Rfl: 1  •  ergocalciferol (VITAMIN D2) 50,000 units, Take 50,000 Units by mouth once a week (Patient not taking: Reported on 1/9/2023), Disp: , Rfl:     Past Medical History:   Diagnosis Date   • Allergic    • Anemia    • Anxiety    • Arthritis 2017   • Bipolar disorder (HCC)    • CHF (congestive heart failure) (HCC)    • Chronic kidney disease 01/0/2021   • Coronary artery disease 1/6/2021   • Depression    • Hyperlipidemia 1/7/2021   • Hypertension 01/06/2021   • Osteoarthritis      Past Surgical History:   Procedure Laterality Date   • CT NEEDLE BIOPSY KIDNEY  3/2/2021   • ECTOPIC PREGNANCY SURGERY     • IR BIOPSY KIDNEY RANDOM  2/17/2021   • IR THORACENTESIS  1/12/2021   • JOINT REPLACEMENT     • ORTHOPEDIC SURGERY     • AL ARTHRP ACETBLR/PROX FEM PROSTC AGRFT/ALGRFT Left 3/5/2018    Procedure: ARTHROPLASTY HIP TOTAL;  Surgeon: Gwen Alva DO;  Location: [facility];  Service: Orthopedics   • RENAL BIOPSY  03/02/2021   • WISDOM TOOTH EXTRACTION      x1     Family History   Problem Relation Age of Onset   • Hypertension Mother    • Cancer Father         PROSTATE   • Atrial fibrillation Father    • Hypertension Father    • Hypertension Sister    • Breast cancer Sister    • Diabetes Brother    • Cancer Family       reports that she has been smoking cigarettes  She started smoking about 35 years ago   She has a 49 50 pack-year smoking history  She has never used smokeless tobacco  She reports that she does not currently use alcohol  She reports that she does not use drugs  Physical Exam:   Vitals:    01/23/23 1343   BP: 132/84   BP Location: Left arm   Patient Position: Sitting   Cuff Size: Adult   Pulse: 65   Weight: 67 6 kg (149 lb)   Height: 5' 6" (1 676 m)     Body mass index is 24 05 kg/m²      General: conscious, cooperative, in not acute distress  Eyes: conjunctivae pink, anicteric sclerae  ENT: lips and mucous membranes moist  Neck: supple, no JVD  Chest:  Decreased breath sounds on the left side  CVS: distinct S1 & S2, normal rate, regular rhythm  Abdomen: soft, non-tender, non-distended, normoactive bowel sounds  Extremities: no edema of both legs  Skin: no rash  Neuro: awake, alert, oriented      Lab Results:    Results for orders placed or performed in visit on 01/20/23   Magnesium   Result Value Ref Range    Magnesium 2 5 1 9 - 2 7 mg/dL   Phosphorus   Result Value Ref Range    Phosphorus 3 6 2 7 - 4 5 mg/dL   PTH, intact   Result Value Ref Range    PTH 87 8 (H) 18 4 - 80 1 pg/mL   Uric acid   Result Value Ref Range    Uric Acid 5 8 2 0 - 7 5 mg/dL   Urinalysis with microscopic   Result Value Ref Range    Color, UA Yellow     Clarity, UA Clear     Specific [place], UA 1 010 1 003 - 1 030    pH, UA 7 0 4 5, 5 0, 5 5, 6 0, 6 5, 7 0, 7 5, 8 0    Leukocytes, UA Negative Negative    Nitrite, UA Negative Negative    Protein, UA 30 (1+) (A) Negative mg/dl    Glucose, UA Negative Negative mg/dl    Ketones, UA Negative Negative mg/dl    Urobilinogen, UA 0 2 0 2, 1 0 E U /dl E U /dl    Bilirubin, UA Negative Negative    Occult Blood, UA Trace-lysed (A) Negative    RBC, UA 2-4 None Seen, 2-4 /hpf    WBC, UA None Seen None Seen, 2-4, 5-60 /hpf    Epithelial Cells Occasional None Seen, Occasional /hpf    Bacteria, UA None Seen None Seen, Occasional /hpf   Protein / creatinine ratio, urine   Result Value Ref Range Creatinine, Ur 146 2 mg/dL    Protein Urine Random 275 mg/dL    Prot/Creat Ratio, Ur 1 88 (H) 0 00 - 0 10       Portions of the record may have been created with voice recognition software  Occasional wrong word or "sound a like" substitutions may have occurred due to the inherent limitations of voice recognition software  Read the chart carefully and recognize, using context, where substitutions have occurred  If you have any questions, please contact the dictating provider

## 2023-01-23 NOTE — PATIENT INSTRUCTIONS
Low-Sodium Diet   WHAT YOU NEED TO KNOW:   What is a low-sodium diet? A low-sodium diet limits foods that are high in sodium (salt)  You will need to follow a low-sodium diet if you have high blood pressure, kidney disease, or heart failure  You may also need to follow this diet if you have a condition that is causing your body to retain (hold) extra fluid  You may need to limit the amount of sodium you eat in a day to 1,500 to 2,000 mg  Ask your healthcare provider how much sodium you can have each day  How can I use food labels to choose foods that are low in sodium? Read food labels to find the amount of sodium they contain  The amount of sodium is listed in milligrams (mg)  The % Daily Value (DV) column tells you how much of your daily needs are met by 1 serving of the food for each nutrient listed  Choose foods that have less than 5% of the DV of sodium  These foods are considered low in sodium  Foods that have 20% or more of the DV of sodium are considered high in sodium  Some food labels may also list any of the following terms that tell you about the sodium content in the food:  Sodium-free:  Less than 5 mg in each serving    Very low sodium:  35 mg of sodium or less in each serving    Low sodium:  140 mg of sodium or less in each serving    Reduced sodium: At least 25% less sodium in each serving than the regular type    Light in sodium:  50% less sodium in each serving    Unsalted or no added salt:  No extra salt is added during processing (the food may still contain sodium)       Which foods should I avoid? Salty foods are high in sodium   You should avoid the following:  Processed foods:      Mixes for cornbread, biscuits, cake, and pudding     Instant foods, such as potatoes, cereals, noodles, and rice     Packaged foods, such as bread stuffing, rice and pasta mixes, snack dip mixes, and macaroni and cheese     Canned foods, such as canned vegetables, soups, broths, sauces, and vegetable or tomato juice    Snack foods, such as salted chips, popcorn, pretzels, pork rinds, salted crackers, and salted nuts    Frozen foods, such as dinners, entrees, vegetables with sauces, and breaded meats    Sauerkraut, pickled vegetables, and other foods prepared in brine    Meats and cheeses:      Smoked or cured meat, such as corned beef, ng, ham, hot dogs, and sausage    Canned meats or spreads, such as potted meats, sardines, anchovies, and imitation seafood    Deli or lunch meats, such as bologna, ham, turkey, and roast beef    Processed cheese, such as American cheese and cheese spreads    Condiments, sauces, and seasonings:      Salt (¼ teaspoon of salt contains 575 mg of sodium)    Seasonings made with salt, such as garlic salt, celery salt, onion salt, and seasoned salt    Regular soy sauce, barbecue sauce, teriyaki sauce, steak sauce, Worcestershire sauce, and most flavored vinegars    Canned gravy and mixes     Regular condiments, such as mustard, ketchup, and salad dressings    Pickles and olives    Meat tenderizers and monosodium glutamate (MSG)    Which foods can I include? Read the food label to find the amount of sodium in each serving  Bread and cereal:  Try to choose breads with less than 80 mg of sodium per serving  Bread, roll, suzan, tortilla, or unsalted crackers  Ready-to-eat cereals with less than 5% DV of sodium (examples include shredded wheat and puffed rice)    Pasta    Vegetables and fruits:      Unsalted fresh, frozen, or canned vegetables    Fresh, frozen, or canned fruits    Fruit juice    Dairy:  One serving has about 150 mg of sodium  Milk, all types    Yogurt    Hard cheese, such as cheddar, Swiss, [employer], or [employer] and other protein foods:  Some raw meats may have added sodium       Plain meats, fish, and poultry     Egg    Other foods:      Homemade pudding    Unsalted nuts, popcorn, or pretzels    Unsalted butter or margarine    What are some ways that I can decrease sodium? Add spices and herbs to foods instead of salt during cooking  Use salt-free seasonings to add flavor to foods  Examples include onion powder, garlic powder, basil, fraser powder, paprika, and parsley  Try lemon or lime juice or vinegar to give foods a tart flavor  Use hot peppers, pepper, or cayenne pepper to add a spicy flavor  Do not keep a salt shaker at your kitchen table  This may help keep you from adding salt to food at the table  A teaspoon of salt has 2,300 mg of sodium  It may take time to get used to enjoying the natural flavor of food instead of adding salt  Talk to your healthcare provider before you use salt substitutes  Some salt substitutes have a high amount of potassium and need to be avoided if you have kidney disease  Choose low-sodium foods at restaurants  Meals from restaurants are often high in sodium  Some restaurants have nutrition information on the menu that tells you the amount of sodium in their foods  If possible, ask for your food to be prepared with less, or no salt  Shop for unsalted or low-sodium foods and snacks at the grocery store  Examples include unsalted or low-sodium broths, soups, and canned vegetables  Choose fresh or frozen vegetables instead  Choose unsalted nuts or seeds or fresh fruits or vegetables as snacks  Read food labels and choose salt-free, very low-sodium, or low-sodium foods  CARE AGREEMENT:   You have the right to help plan your care  Discuss treatment options with your healthcare provider to decide what care you want to receive  You always have the right to refuse treatment  The above information is an  only  It is not intended as medical advice for individual conditions or treatments  Talk to your doctor, nurse or pharmacist before following any medical regimen to see if it is safe and effective for you    © Copyright [employer] 2022 Information is for End User's use only and may not be sold, redistributed or otherwise used for commercial purposes   All illustrations and images included in CareNotes® are the copyrighted property of [employer]  or [facility] Erik Gonzalez

## 2023-01-26 DIAGNOSIS — F33.1 MODERATE EPISODE OF RECURRENT MAJOR DEPRESSIVE DISORDER (HCC): ICD-10-CM

## 2023-01-26 RX ORDER — METHYLPHENIDATE HYDROCHLORIDE 20 MG/1
20 TABLET ORAL 2 TIMES DAILY
Qty: 60 TABLET | Refills: 0 | Status: SHIPPED | OUTPATIENT
Start: 2023-01-26

## 2023-02-02 ENCOUNTER — VBI (OUTPATIENT)
Dept: ADMINISTRATIVE | Facility: OTHER | Age: 53
End: 2023-02-02

## 2023-02-17 DIAGNOSIS — I50.9 CHF (CONGESTIVE HEART FAILURE) (HCC): ICD-10-CM

## 2023-02-17 DIAGNOSIS — I50.21 ACUTE SYSTOLIC CONGESTIVE HEART FAILURE (HCC): ICD-10-CM

## 2023-02-17 DIAGNOSIS — I16.0 HYPERTENSIVE URGENCY: ICD-10-CM

## 2023-02-17 RX ORDER — CARVEDILOL 25 MG/1
TABLET ORAL
Qty: 180 TABLET | Refills: 1 | Status: SHIPPED | OUTPATIENT
Start: 2023-02-17

## 2023-02-17 RX ORDER — FUROSEMIDE 20 MG/1
TABLET ORAL
Qty: 90 TABLET | Refills: 1 | Status: SHIPPED | OUTPATIENT
Start: 2023-02-17

## 2023-02-17 RX ORDER — FUROSEMIDE 40 MG/1
TABLET ORAL
Qty: 90 TABLET | Refills: 1 | Status: SHIPPED | OUTPATIENT
Start: 2023-02-17

## 2023-02-17 RX ORDER — ATORVASTATIN CALCIUM 40 MG/1
TABLET, FILM COATED ORAL
Qty: 90 TABLET | Refills: 1 | Status: SHIPPED | OUTPATIENT
Start: 2023-02-17

## 2023-02-24 DIAGNOSIS — F33.1 MODERATE EPISODE OF RECURRENT MAJOR DEPRESSIVE DISORDER (HCC): ICD-10-CM

## 2023-02-27 RX ORDER — METHYLPHENIDATE HYDROCHLORIDE 20 MG/1
20 TABLET ORAL 2 TIMES DAILY
Qty: 60 TABLET | Refills: 0 | Status: SHIPPED | OUTPATIENT
Start: 2023-02-27

## 2023-03-08 NOTE — PROGRESS NOTES
Cardiology Office Note  MD Tomy Ramírez MD Jone Filbert, DO, MD Jean Morrison DO, Laney Carballo DO, [facility] [place]  ----------------------------------------------------------------  [address] [address]    Morgan Dixon 52 y o  female MRN: [id number]  Unit/Bed#:  Encounter: [id number]      History of Present Illness: It was a pleasure to see Morgan Dixon in the office today for follow up CV evaluation  She is a past medical history of hypertension, tobacco abuse and systolic heart failure with ejection fraction of 25%  She started to experience generalized abdominal bloating and nausea  She also noted that she was having shortness of breath that worsened over 3 days  After having orthopnea with improved breathing sitting up, she became worried and presented to [address] [place] [address] for evaluation in January 2021  Patient was found to have acute respiratory failure requiring nasal cannula  She had elevation of her troponin peaking at 0 14  Echocardiogram was performed and she was found to have an ejection fraction of 25%  Her blood pressure was also markedly elevated  The patient was diagnosed with acute heart failure and treated with intravenous diuretic  During her hospitalization, she had a thoracentesis of the left long with 630 mL removed  She subsequently underwent cardiac catheterization which showed no evidence of obstructive CAD  Her urine was checked and she was found to have nephrotic range proteinuria  Once euvolemic, patient was transitioned to oral diuretic and discharged home  Since our last encounter, patient underwent renal biopsy results showed 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 and severe interstitial fibrosis    She was sent for cardiac MRI and echocardiogram to reassess her function as well as to look for evidence of late gadolinium enhancement on the MRI  MRI demonstrated evidence nonischemic cardiomyopathy  Echocardiogram was performed in April 2021 which showed improved left ventricular function to an ejection fraction of 50%  She is here today for routine CV follow-up  Her creatinine has remained stable  Her creatinine stabilized and she is been following up with nephrology  Since that time, her functional capacity had been stable but somewhat limited due to her right hip pain  The patient is planned for surgery in the upcoming months with total right hip arthroplasty  She is here today for preoperative CV risk assessment prior to her upcoming surgery  Denies any significant shortness of breath, dyspnea on exertion, lower extremity swelling, orthopnea or paroxysmal nocturnal dyspnea  Denies chest pain, exertional chest discomfort, tightness or squeezing  Denies dizziness or palpitations  Review of Systems:  Review of Systems   Constitutional: Negative for decreased appetite, fever, weight gain and weight loss  HENT: Negative for congestion and sore throat  Eyes: Negative for visual disturbance  Cardiovascular: Negative for chest pain, dyspnea on exertion, leg swelling, near-syncope and palpitations  Respiratory: Negative for cough and shortness of breath  Hematologic/Lymphatic: Negative for bleeding problem  Skin: Negative for rash  Musculoskeletal: Negative for myalgias and neck pain  Gastrointestinal: Negative for abdominal pain and nausea  Neurological: Negative for light-headedness and weakness  Psychiatric/Behavioral: Negative for depression         Past Medical History:   Diagnosis Date   • Allergic    • Anemia    • Anxiety    • Arthritis 2017   • Bipolar disorder ([email or web address] )    • CHF (congestive heart failure) ([email or web address] )    • Chronic kidney disease 01/0/2021   • Coronary artery disease 1/6/2021   • Depression    • Hyperlipidemia 1/7/2021   • Hypertension 01/06/2021   • Osteoarthritis        Past Surgical History:   Procedure Laterality Date   • CT NEEDLE BIOPSY KIDNEY  3/2/2021   • ECTOPIC PREGNANCY SURGERY     • IR BIOPSY KIDNEY RANDOM  2/17/2021   • IR THORACENTESIS  1/12/2021   • JOINT REPLACEMENT     • ORTHOPEDIC SURGERY     • IL ARTHRP ACETBLR/PROX FEM PROSTC AGRFT/ALGRFT Left 3/5/2018    Procedure: ARTHROPLASTY HIP TOTAL;  Surgeon: Yue Laura DO;  Location: [facility];  Service: Orthopedics   • RENAL BIOPSY  03/02/2021   • WISDOM TOOTH EXTRACTION      x1       Social History     Socioeconomic History   • Marital status: Single     Spouse name: Not on file   • Number of children: Not on file   • Years of education: Not on file   • Highest education level: Not on file   Occupational History   • Occupation: disabled   Tobacco Use   • Smoking status: Every Day     Packs/day: 1 50     Years: 33 00     Pack years: 49 50     Types: Cigarettes     Start date: 1988   • Smokeless tobacco: Never   • Tobacco comments:     0 25 pack daily as of 6/2021   Vaping Use   • Vaping Use: Never used   Substance and Sexual Activity   • Alcohol use: Not Currently     Comment: rarely   • Drug use: No   • Sexual activity: Not Currently   Other Topics Concern   • Not on file   Social History Narrative   • Not on file     Social Determinants of Health     Financial Resource Strain: Not on file   Food Insecurity: Not on file   Transportation Needs: Not on file   Physical Activity: Not on file   Stress: Not on file   Social Connections: Not on file   Intimate Partner Violence: Not on file   Housing Stability: Not on file       Family History   Problem Relation Age of Onset   • Hypertension Mother    • Cancer Father         PROSTATE   • Atrial fibrillation Father    • Hypertension Father    • Hypertension Sister    • Breast cancer Sister    • Diabetes Brother    • Cancer Family        No Known Allergies      Current Outpatient Medications:   •  amLODIPine (NORVASC) 2 5 mg tablet, TAKE 1 TABLET(2 5 MG) BY MOUTH DAILY, Disp: 90 tablet, Rfl: 1  •  atorvastatin (LIPITOR) 40 mg tablet, TAKE 1 TABLET(40 MG) BY MOUTH DAILY, Disp: 90 tablet, Rfl: 1  •  carvedilol (COREG) 25 mg tablet, TAKE 1 TABLET(25 MG) BY MOUTH TWICE DAILY WITH MEALS, Disp: 180 tablet, Rfl: 1  •  cholecalciferol (VITAMIN D3) 1,000 units tablet, TAKE 2 TABLETS BY MOUTH DAILY, Disp: 180 tablet, Rfl: 3  •  cyanocobalamin (VITAMIN B-12) 1000 MCG tablet, Take 1 tablet (1,000 mcg total) by mouth daily, Disp: 30 tablet, Rfl: 0  •  furosemide (LASIX) 20 mg tablet, TAKE 1 TABLET(20 MG) BY MOUTH EVERY EVENING, Disp: 90 tablet, Rfl: 1  •  furosemide (LASIX) 40 mg tablet, TAKE 1 TABLET(40 MG) BY MOUTH EVERY MORNING, Disp: 90 tablet, Rfl: 1  •  hydrALAZINE (APRESOLINE) 25 mg tablet, TAKE 1 TABLET(25 MG) BY MOUTH THREE TIMES DAILY, Disp: 270 tablet, Rfl: 1  •  losartan (COZAAR) 25 mg tablet, TAKE 1 TABLET(25 MG) BY MOUTH DAILY, Disp: 90 tablet, Rfl: 3  •  methylphenidate (RITALIN) 20 MG tablet, Take 1 tablet (20 mg total) by mouth 2 (two) times a day Max Daily Amount: 40 mg, Disp: 60 tablet, Rfl: 0  •  PARoxetine (PAXIL) 30 mg tablet, TAKE 1 AND 1/2 TABLETS(45 MG) BY MOUTH DAILY, Disp: 135 tablet, Rfl: 1  •  QUEtiapine (SEROquel) 100 mg tablet, TAKE 1 TABLET(100 MG) BY MOUTH DAILY AT BEDTIME, Disp: 90 tablet, Rfl: 1  •  ergocalciferol (VITAMIN D2) 50,000 units, Take 50,000 Units by mouth once a week, Disp: , Rfl:     Vitals:    03/09/23 0919   BP: 122/82   BP Location: Right arm   Patient Position: Sitting   Cuff Size: Standard   Pulse: 64   Weight: 67 2 kg (148 lb 3 2 oz)       PHYSICAL EXAMINATION:  Gen: Awake, Alert, NAD  Head/eyes: AT/NC, pupils equal and round, Anicteric  ENT: mmm  Neck: Supple, No elevated JVP, trachea midline  Resp: CTA bilaterally no w/r/r  CV: RRR +S1, S2, No m/r/g  Abd: Soft, NT/ND + BS  Ext: no LE edema bilaterally  Neuro:  Follows commands, moves all extermities  Psych: Appropriate affect, pleasant mood, pleasant attitude, non-combative  Skin: warm; no rash, erythema or venous stasis changes on exposed skin    --------------------------------------------------------------------------------  TREADMILL STRESS  No results found for this or any previous visit    --------------------------------------------------------------------------------  NUCLEAR STRESS TEST: No results found for this or any previous visit  No results found for this or any previous visit     --------------------------------------------------------------------------------  CATH:    Results for orders placed during the hospital encounter of 21   Cardiac catheterization    Narrative [address] [place] [address], [address] [facility]  [phone]    [facility] [place]    Invasive Cardiovascular Lab Complete Report    Patient: Mayelin Hillman  MR number: [id number]  Account number: [de-identified]  Study date: 2021  Gender: Female  : 1970  Height: 66 1 in  Weight: 117 9 lb  BSA: 1 6 mï¾²    Allergies: NO KNOWN ALLERGIES    Diagnostic Cardiologist:  Jacob Brown MD    SUMMARY    CORONARY CIRCULATION:  Left main: Normal   Circumflex: Normal     HEMODYNAMICS:  Hemodynamic assessment demonstrated mildly elevated LVEDP  INDICATIONS:  --  Congestive heart failure with cardiomyopathy  PROCEDURES PERFORMED    --  Left heart catheterization without ventriculogram   --  Left coronary angiography  --  Right coronary angiography  --  Inpatient  --  Mod Sedation Same Physician Initial 15min  --  Mod Sedation Same Physician Add 15min  --  Coronary Catheterization (w/ LHC)  PROCEDURE: The risks and alternatives of the procedures and conscious sedation were explained to the patient and informed consent was obtained  The patient was brought to the cath lab and placed on the table  The planned puncture sites  were prepped and draped in the usual sterile fashion  --  Right radial artery access   After performing an Julián's test to verify adequate ulnar artery supply to the hand, the radial site was prepped  The puncture site was infiltrated with local anesthetic  The vessel was accessed using the  modified Seldinger technique, a wire was advanced into the vessel, and a sheath was advanced over the wire into the vessel  --  Left heart catheterization without ventriculogram  A catheter was advanced over a guidewire into the ascending aorta  After recording ascending aortic pressure, the catheter was advanced across the aortic valve and left ventricular  pressure was recorded  The catheter was pulled back across the aortic valve and into the ascending aorta and pullback pressures were obtained  --  Left coronary artery angiography  A catheter was advanced over a guidewire into the aorta and positioned in the left coronary artery ostium under fluoroscopic guidance  Angiography was performed  --  Right coronary artery angiography  A catheter was advanced over a guidewire into the aorta and positioned in the right coronary artery ostium under fluoroscopic guidance  Angiography was performed  --  Inpatient  --  Mod Sedation Same Physician Initial 15min  --  Mod Sedation Same Physician Add 15min  --  Coronary Catheterization (w/ LHC)  PROCEDURE COMPLETION: The patient tolerated the procedure well and was discharged from the cath lab  TIMING: Test started at 09:55  Test concluded at 10:04  HEMOSTASIS: The sheath was removed  The site was compressed with a Hemoband  device  Hemostasis was obtained  MEDICATIONS GIVEN: Versed (2mg/2ml), 1 mg, IV, at 09:50  Fentanyl (1OOmcg/2 ml), 50 mcg, IV, at 09:50  Baby Aspirin, 324 mg, PO, at 09:53  1% Lidocaine, 1 ml, subcutaneously, at 09:55  Nitroglycerin  (200mcg/ml), 200 mcg, at 09:57  Verapamil (5mg/2ml), 2 5 mg, IV, at 09:58  Heparin 1000 units/ml, 4,000 units, IV, at 09:58  Versed (2mg/2ml), 1 mg, IV, at 10:00  Fentanyl (1OOmcg/2 ml), 50 mcg, IV, at 10:00  CONTRAST GIVEN: 20 ml  Omnipaque (350mg I /ml)   RADIATION EXPOSURE: Fluoroscopy time: 1 03 min  HEMODYNAMICS: Hemodynamic assessment demonstrated mildly elevated LVEDP  CORONARY VESSELS:   --  The coronary circulation is right dominant  --  Left main: Normal   --  LAD: The vessel was large sized  Angiography showed minor luminal irregularities  --  Circumflex: Normal   --  RCA: The vessel was large sized (dominant)  Angiography showed mild atherosclerosis  IMPRESSIONS:  There is no significant coronary artery disease  RECOMMENDATIONS  The patient should continue with the present medications  DISPOSITION:  The patient left the catheterization laboratory in stable condition  Prepared and signed by    Glenna Harris MD  Signed 2021 10:06:10    Study diagram    Hemodynamic tables    Pressures:  Baseline  Pressures:  - HR: 76  Pressures:  - Rhythm:  Pressures:  -- Aortic Pressure (S/D/M): 121/79/62  Pressures:  -- Left Ventricle (s/edp): 113/20/--    Outputs:  Baseline  Outputs:  -- CALCULATIONS: [age]  Outputs:  -- CALCULATIONS: Body Surface Area: 1 60  Outputs:  -- CALCULATIONS: Height in cm: 168 00  Outputs:  -- CALCULATIONS: Sex: Female  Outputs:  -- CALCULATIONS: Weight in k 60       --------------------------------------------------------------------------------  ECHO:   Results for orders placed during the hospital encounter of 21   Echo complete with contrast if indicated    Narrative [place]   [address]    [place], [address]  [phone]    Transthoracic Echocardiogram  2D, M-mode, Doppler, and Color Doppler    Study date:  2021    Patient: Daniela Justice  MR number: [id number]  Account number: [de-identified]  : 1970  Age: 44 years  Gender: Female  Status: Inpatient  Location: Bedside  Height: 66 in  Weight: 127 lb  BP: 155/ 96 mmHg    Indications: Heart Failure    Diagnoses: I50 9 - Heart failure, unspecified    Sonographer:  Fatoumata Martin RDCS  Referring Physician:  Natanael Alcantar PA-C  Group:  [facility]  Interpreting Physician:  PRESTON Ochoa     SUMMARY    LEFT VENTRICLE:  The ventricle was mildly dilated  Systolic function was markedly reduced by visual assessment  Ejection fraction was estimated to be 25 %  There was severe diffuse hypokinesis  Doppler parameters were consistent with a reversible restrictive pattern, indicative of decreased left ventricular diastolic compliance and/or increased left atrial pressure (grade 3 diastolic dysfunction)  LEFT ATRIUM:  The atrium was mildly dilated  MITRAL VALVE:  There was mild regurgitation  AORTIC VALVE:  There was trace regurgitation  TRICUSPID VALVE:  There was mild regurgitation  PULMONIC VALVE:  There was trace regurgitation  PERICARDIUM:  A small pericardial effusion was identified circumferential to the heart  There was no evidence of hemodynamic compromise  There was a large left pleural effusion  SUMMARY MEASUREMENTS  2D measurements:  Unspecified Anatomy:   %FS was 14 6 %  Ao Diam was 3 1 cm  Ao asc was 3 cm  EDV(Teich) was 137 3 ml   EF(Teich) was 30 8 %  ESV(Teich) was 95 ml  IVSd was 0 8 cm  LA Diam was 3 7 cm  LAAs A2C was 20 cm2  LAESV A-L A2C was 65 3 ml  LAESV MOD A2C was 57 8 ml  LALs A2C was 5 2 cm  LVEDV MOD A4C was 116 9 ml  LVEF MOD A4C was 26 8 %  LVESV MOD A4C was 85 6 ml  LVIDd was 5 3 cm  LVIDs was 4 6 cm  LVLd A4C was 7 5 cm  LVLs A4C was 6 7 cm  LVPWd was 1 cm  RAAs  A4C was 13 2 cm2  RAESV A-L was 36 4 ml   RAESV MOD was 35 ml  RALs was 4 1 cm  RVIDd was 3 6 cm  SV MOD A4C was 31 4 ml   SV(Teich) was 42 3 ml   CW measurements:  Unspecified Anatomy:   AV Env  Ti was 239 8 ms   AV VTI was 11 7 cm  AV Vmax was 0 9 m/s  AV Vmean was 0 5 m/s  AV maxPG was 3 1 mmHg  AV meanPG was 1 3 mmHg  TR Vmax was 3 m/s  TR maxPG was 37 mmHg  MM measurements:  Unspecified Anatomy:   TAPSE was 1 5 cm    PW measurements:  Unspecified Anatomy:   DVI was 0 7   E' Sept was 0 m/s  E/E' Sept was 29 1   LVOT Env  Ti was 247 4 ms  LVOT VTI was 8 4 cm  LVOT Vmax was 0 6 m/s  LVOT Vmean was 0 3 m/s  LVOT maxPG was 1 4 mmHg  LVOT meanPG was 0 6 mmHg  MV A Stanton  was 0 4 m/s  MV Dec Shannon was 7 3 m/s2  MV DecT was 144 4 ms   MV E Stanton was 1 1 m/s  MV E/A Ratio was 2 8   MV PHT was 41 9 ms  MVA By PHT was 5 3 cm2  HISTORY: PRIOR HISTORY: Smoker, MAXI, Pleural Effusion, CHF, Elevated Troponin, Anemia    PROCEDURE: The procedure was performed at the bedside  This was a routine study  The transthoracic approach was used  The study included complete 2D imaging, M-mode, complete spectral Doppler, and color Doppler  The heart rate was 90 bpm,  at the start of the study  Images were obtained from the parasternal, apical, subcostal, and suprasternal notch acoustic windows  Image quality was adequate  LEFT VENTRICLE: The ventricle was mildly dilated  Systolic function was markedly reduced by visual assessment  Ejection fraction was estimated to be 25 %  There was severe diffuse hypokinesis  DOPPLER: Doppler parameters were consistent  with a reversible restrictive pattern, indicative of decreased left ventricular diastolic compliance and/or increased left atrial pressure (grade 3 diastolic dysfunction)  RIGHT VENTRICLE: The size was normal  Systolic function was normal  Wall thickness was normal     LEFT ATRIUM: The atrium was mildly dilated  RIGHT ATRIUM: Size was normal     MITRAL VALVE: Valve structure was normal  There was mild thickening of the anterior and posterior leaflets  There was normal leaflet separation  DOPPLER: The transmitral velocity was within the normal range  There was no evidence for  stenosis  There was mild regurgitation  AORTIC VALVE: The valve was trileaflet  Leaflets exhibited normal thickness, normal cuspal separation, and sclerosis   DOPPLER: Transaortic velocity was within the normal range  There was no evidence for stenosis  There was trace  regurgitation  TRICUSPID VALVE: The valve structure was normal  There was normal leaflet separation  DOPPLER: The transtricuspid velocity was within the normal range  There was no evidence for stenosis  There was mild regurgitation  Estimated peak PA  pressure was 40 mmHg  PULMONIC VALVE: Leaflets exhibited normal thickness, no calcification, and normal cuspal separation  DOPPLER: The transpulmonic velocity was within the normal range  There was no evidence for stenosis  There was trace regurgitation  PERICARDIUM: A small pericardial effusion was identified circumferential to the heart  There was no evidence of hemodynamic compromise  There was a large left pleural effusion  AORTA: The root exhibited normal size  SYSTEMIC VEINS: IVC: The inferior vena cava was normal in size and course  Respirophasic changes were normal     SYSTEM MEASUREMENT TABLES    2D  %FS: 14 6 %  Ao Diam: 3 1 cm  Ao asc: 3 cm  EDV(Teich): 137 3 ml  EF(Teich): 30 8 %  ESV(Teich): 95 ml  IVSd: 0 8 cm  LA Diam: 3 7 cm  LAAs A2C: 20 cm2  LAESV A-L A2C: 65 3 ml  LAESV MOD A2C: 57 8 ml  LALs A2C: 5 2 cm  LVEDV MOD A4C: 116 9 ml  LVEF MOD A4C: 26 8 %  LVESV MOD A4C: 85 6 ml  LVIDd: 5 3 cm  LVIDs: 4 6 cm  LVLd A4C: 7 5 cm  LVLs A4C: 6 7 cm  LVPWd: 1 cm  RAAs A4C: 13 2 cm2  RAESV A-L: 36 4 ml  RAESV MOD: 35 ml  RALs: 4 1 cm  RVIDd: 3 6 cm  SV MOD A4C: 31 4 ml  SV(Teich): 42 3 ml    CW  AV Env  Ti: 239 8 ms  AV VTI: 11 7 cm  AV Vmax: 0 9 m/s  AV Vmean: 0 5 m/s  AV maxPG: 3 1 mmHg  AV meanP 3 mmHg  TR Vmax: 3 m/s  TR maxP mmHg    MM  TAPSE: 1 5 cm    PW  DVI: 0 7  E' Sept: 0 m/s  E/E' Sept: 29 1  LVOT Env  Ti: 247 4 ms  LVOT VTI: 8 4 cm  LVOT Vmax: 0 6 m/s  LVOT Vmean: 0 3 m/s  LVOT maxP 4 mmHg  LVOT meanP 6 mmHg  MV A Stanton: 0 4 m/s  MV Dec [place]: 7 3 m/s2  MV DecT: 144 4 ms  MV E Stanton: 1 1 m/s  MV E/A Ratio: 2 8  MV PHT: 41 9 ms  MVA By PHT: 5 3 cm2    [employer] Accredited Echocardiography Laboratory    Prepared and electronically signed by    PRESTON Marcus  Signed 07-Jan-2021 13:28:36       No results found for this or any previous visit   --------------------------------------------------------------------------------  HOLTER  No results found for this or any previous visit  No results found for this or any previous visit   --------------------------------------------------------------------------------  CAROTIDS  No results found for this or any previous visit    --------------------------------------------------------------------------------  ECGs:  Results for orders placed or performed in visit on 03/09/23   POCT ECG    Impression    Sinus rhythm 64 bpm, poor R wave progression, cannot rule out inferior infarct age-indeterminate        Lab Results   Component Value Date    WBC 6 76 01/20/2023    HGB 14 4 01/20/2023    HCT 44 1 01/20/2023     (H) 01/20/2023     01/20/2023      Lab Results   Component Value Date    SODIUM 142 01/20/2023    K 4 0 01/20/2023     01/20/2023    CO2 31 01/20/2023    BUN 31 (H) 01/20/2023    CREATININE 1 86 (H) 01/20/2023    GLUC 93 06/16/2021    CALCIUM 9 3 01/20/2023      Lab Results   Component Value Date    HGBA1C 5 3 01/07/2021      No results found for: CHOL  Lab Results   Component Value Date    HDL 57 01/20/2023    HDL 50 07/12/2022    HDL 53 01/07/2021     Lab Results   Component Value Date    LDLCALC 85 01/20/2023    LDLCALC 80 07/12/2022    LDLCALC 187 (H) 01/07/2021     Lab Results   Component Value Date    TRIG 117 01/20/2023    TRIG 200 (H) 07/12/2022    TRIG 135 01/07/2021     No results found for: CHOLHDL   Lab Results   Component Value Date    INR 0 93 01/18/2021    INR 0 95 01/06/2021    INR 0 89 02/06/2018    PROTIME 12 3 01/18/2021    PROTIME 12 5 01/06/2021    PROTIME 12 0 (L) 02/06/2018        1  Pre-operative cardiovascular examination  -     POCT ECG    2  Pain in right hip    3  Chronic combined systolic and diastolic heart failure ([address] )    4  Nonischemic cardiomyopathy ([facility] [address] )    5  Coronary artery disease involving native coronary artery of native heart without angina pectoris    6  Benign essential hypertension    7  Dyslipidemia    8  Tobacco use        IMPRESSION:  · Preoperative CV risk assessment for right hip surgery  · Right hip pain   · Chronic systolic and diastolic congestive heart failure  · LVEF 50%, abnormal GLS -70%, grade 1 diastolic dysfunction, trace MR, April 2021  · Nonischemic cardiomyopathy with recovering LV function  · LVEF 40%, mild LV dilatation, no LGE with probable nonischemic cardiomyopathy by cMRI, March 2021  · Mild mitral regurgitation by 2D echocardiogram, January 2021  · CAD   · s/p cath w/ minimal luminal irregularities LAD and RCA, January 2021  · Hypertension  · Ongoing tobacco use, probable COPD  · CKD  · Dyslipidemia  · Nephrotic range proteinuria with > 6 9 g in 24 hours  · Diffuse proliferative/sclerosing glomerulonephritis with tubular atrophy and severe interstitial fibrosis of the kidney by biopsy    PLAN:  It was a pleasure to see Abena Matias in the office for follow-up CV evaluation  She is here today for preoperative CV risk assessment prior to her upcoming right hip surgery  She has no symptoms concerning for angina and no signs or symptoms of heart failure  She examines to be euvolemic in the office today  Blood pressure and heart rate are currently stable  She can perform greater than 4 METS on a daily basis without significant exertional symptoms, however sometimes limited due to hip discomfort  Her ECG demonstrates no acute ischemic changes  She is tolerating her current medications without any reported adverse effects  Based on her clinical presentation, the following recommendations:    1    Due to her multiple risk factors including her history of heart failure, COPD, kidney dysfunction and mild atherosclerotic disease of the coronaries, she is at an intermediate CV risk for her upcoming surgery  No further CV testing prior to the OR as it would not change our management  There are no absolute contraindications to surgery from a CV perspective  2   Continue carvedilol, amlodipine, hydralazine and losartan for antihypertensive control  3  Continue high intensity statin  Goal LDL is less than 70 mg/dL  4   Tobacco cessation advised  5  Would encourage 30 minutes a day, 5 days a week of moderate intensity activity to build cardiovascular endurance  6  Recommend heart healthy diet low in sodium and carbohydrate  7  No additional CV testing at this time  8  We will follow-up with her in 6 months to reassess her progress  As always, please not hesitate to call with any questions  Portions of the record may have been created with voice recognition software  Occasional wrong word or "sound a like" substitutions may have occurred due to the inherent limitations of voice recognition software  Read the chart carefully and recognize, using context, where substitutions have occurred        Signed: Romayne Arbour, DO, [address], [facility]

## 2023-03-09 ENCOUNTER — OFFICE VISIT (OUTPATIENT)
Dept: CARDIOLOGY CLINIC | Facility: CLINIC | Age: 53
End: 2023-03-09

## 2023-03-09 VITALS
BODY MASS INDEX: 23.92 KG/M2 | HEART RATE: 64 BPM | WEIGHT: 148.2 LBS | DIASTOLIC BLOOD PRESSURE: 82 MMHG | SYSTOLIC BLOOD PRESSURE: 122 MMHG

## 2023-03-09 DIAGNOSIS — Z01.810 PRE-OPERATIVE CARDIOVASCULAR EXAMINATION: Primary | ICD-10-CM

## 2023-03-09 DIAGNOSIS — I42.8 NONISCHEMIC CARDIOMYOPATHY (HCC): ICD-10-CM

## 2023-03-09 DIAGNOSIS — M25.551 PAIN IN RIGHT HIP: ICD-10-CM

## 2023-03-09 DIAGNOSIS — E78.5 DYSLIPIDEMIA: ICD-10-CM

## 2023-03-09 DIAGNOSIS — Z72.0 TOBACCO USE: ICD-10-CM

## 2023-03-09 DIAGNOSIS — I50.42 CHRONIC COMBINED SYSTOLIC AND DIASTOLIC HEART FAILURE (HCC): ICD-10-CM

## 2023-03-09 DIAGNOSIS — I10 BENIGN ESSENTIAL HYPERTENSION: ICD-10-CM

## 2023-03-09 DIAGNOSIS — I25.10 CORONARY ARTERY DISEASE INVOLVING NATIVE CORONARY ARTERY OF NATIVE HEART WITHOUT ANGINA PECTORIS: ICD-10-CM

## 2023-03-22 NOTE — PSYCH
Outpatient Psychiatry Intake Exam       PCP: No primary care provider on file  Referral source: PCP    Identifying information:  Harley Irizarry is a 52 y o  female with a history of depression and anxiety, ADD here for evaluation and determination of mental health management needs  Sources of information:   Information for this evaluation was gathered through direct interview with the patient and chart    Confidentiality discussion: Limits of confidentiality in cases of safety concerns involving self and others as well as this physician's role as a mandatory  of abuse  They voiced understanding and a desire to continue with the evaluation  SUBJECTIVE     Chief Complaint / reason for visit: " I always had depression and anxiety but the past 5 years    "    History of Present Illness:  Has a h/o of psychiatric care, Dr Angely Valentin, but insurance changed so PCP had managed for the past year or so    She lost her father about 5 years ago, and her son had opioid addiction issues and was in an out of custodial and rhab, passed away 2020 5 days after recovery  Her only child and she lived with him  He had 2 pets and they both passed this past April and October  Her mom lives across the street, and sister and her family live nearby  They are all close  First noted anxiety in teens  Not really depression  Her PCP placed bipolar disorder on her chart when starting seroquel, but she never had a diagnosis of bipolar disorder  After her son , she fell and hurt her ankle in , then her hip, and was hospitalized (kidney failure, respiratory and heart complication)  Before  she did not need her walker  Does have a hip surgery  And then will be able to get past the walker  She is on disability for past year for depression/mental illness  She continues to struggle, mostly lately because of her pain and inability to function the way she once did due to physical limitations   While she is not happy, she feels mental health is stable and prefers no medication changes today  Interested in therapy but also glad there is a wait because she just does not feel she has it in her right not for another commitment  Onset of symptoms was in teens with anxiety with fluctuating course including depression but then in 2013 depression was diagnosed and she first went on antidepressant  And then in 2018 due to stressors she was deeply impacted and has not adequately improved  Stressors: health, hip, grieving and loss, chronic mental illness, pain    HPI ROS:  Medication Side Effects: no concerns/issues presently but would like to get off seroquel eventually   Depression: 8 /10 (10 worst)  Anxiety: 9 /10 (10 worst)  Safety (SI, HI, other): yes, passive deathwish, no plan or intent  "I don't want to"  Sleep (NM = Nightmares): hypersomnia, 14-16hr but broken due to pain and positioning issue  Energy: low  Appetite: low due to nausea/ain  Weight Change: some gain since 2020    PHQ-9 Depression Screening    Little interest or pleasure in doing things: 3 - nearly every day  Feeling down, depressed, or hopeless: 3 - nearly every day  Trouble falling or staying asleep, or sleeping too much: 3 - nearly every day  Feeling tired or having little energy: 3 - nearly every day  Poor appetite or overeating: 3 - nearly every day  Feeling bad about yourself - or that you are a failure or have let yourself or your family down: 1 - several days  Trouble concentrating on things, such as reading the newspaper or watching television: 3 - nearly every day  Moving or speaking so slowly that other people could have noticed   Or the opposite - being so fidgety or restless that you have been moving around a lot more than usual: 3 - nearly every day  Thoughts that you would be better off dead, or of hurting yourself in some way: 2 - more than half the days  PHQ-9 Score: 24  Score Interpretation: Severe depression         PHQ-2/9 Depression Screening    Little interest or pleasure in doing things: 3 - nearly every day  Feeling down, depressed, or hopeless: 3 - nearly every day  Trouble falling or staying asleep, or sleeping too much: 3 - nearly every day  Feeling tired or having little energy: 3 - nearly every day  Poor appetite or overeating: 3 - nearly every day  Feeling bad about yourself - or that you are a failure or have let yourself or your family down: 1 - several days  Trouble concentrating on things, such as reading the newspaper or watching television: 3 - nearly every day  Moving or speaking so slowly that other people could have noticed  Or the opposite - being so fidgety or restless that you have been moving around a lot more than usual: 3 - nearly every day  Thoughts that you would be better off dead, or of hurting yourself in some way: 2 - more than half the days  PHQ-9 Score: 24   PHQ-9 Interpretation: Severe depression          In terms of depression, the patient see PHQ-9  In terms of bipolar disorder, the patient endorses no  Symptoms include no symptoms    LISS symptoms: excessive worry more days than not for longer than 3 months, difficulty concentrating, fatigue, insomnia, restlessness/keyed up, muscle tightness and 3+ symptoms and worry are significantly detrimental  Panic Disorder symptoms: palpitations/racing heart, sweating, trembling, shortness of breath, chest pain/pressure, nausea/GI distress, significant related worry or behavioral changes for 1+ months; Avoidance, but last time was about 1 year ago  R/O Social Anxiety symptoms: some symptoms  OCD Symptoms: No significant symptoms supportive of OCD  Eating Disorder symptoms: no historical or current eating disorder  ADHD Evaluation:  Present before age of 13? Diagnosed when adult, ~2013; son had it too  She had it in elementary school but was not diagnosed  Symptoms interfere with 2 or more settings?  yes    In terms of PTSD, the patient endorses exposure to trauma involving: no    In terms of psychotic symptoms, the patient reports no psychotic symptoms now or in the past     Past Psychiatric History  Previous diagnoses include depression, anxiety, ADHD  Prior outpatient psychiatric treatment: yes, Dr Nuha Gaston  Prior therapy: no  Prior inpatient psychiatric treatment: no  Prior suicide attempts: no  Prior self harm: no  Prior violence or aggression: no    Previous psychotropic medication trials:   Xanax  paxil  seroquel  wellbutrin (to quick smoking) years ago, had a rash, no significant issues and resolved  Buspar - "I felt out of it and sick, could not eat"  Ritalin    Social History:    The patient grew up in [facility] [place]  Childhood was described as "good"  During childhood, parents were together  They have 2 sister(s) and 1 brother(s)  Patient is middle in birth order    Abuse/neglect: none    As far as the patient (or present family member) is aware, overall childhood development: Patient notes ADHD symptoms in childhood and grades were not great, but no developmental delays    Education level: LPN/associates   Current occupation: after , had to stop  Marital status: never   Children: Deysi Andrews ( )  Current Living Situation: the patient currently lives alone   Social support: good family support, a friend    Christianity Affiliation: no   experience: no  Legal history: no  Access to Guns: no    Substance use and treatment:  Tobacco use: yes  Caffeine Use: yes  ETOH use: no  Other substance use: marijuana once a week  Not prescribed, but thought to get her card      Endorses previous experimentation with: marijuana     Longest clean time: not applicable  History of Inpatient/Outpatient rehabilitation program: no      Traumatic History:     Abuse: none  Other Traumatic Events: none     Family Psychiatric History:     Psychiatric Illness:  Mom, son - anxiety  Substance Abuse:  Son (opioids, )  Suicide Attempts:  no family history of suicide attempts    Denies bipolar disorder, schizophrenaia    Family History   Problem Relation Age of Onset   • Hypertension Mother    • Cancer Father         PROSTATE   • Atrial fibrillation Father    • Hypertension Father    • Hypertension Sister    • Breast cancer Sister    • Diabetes Brother    • Cancer Family             Past Medical / Surgical History:    Current PCP: Yes, through [facility]    Patient Active Problem List   Diagnosis   • Pre-operative clearance   • LISS (generalized anxiety disorder)   • Current smoker   • Painless hematuria   • Mild anemia   • Status post total hip replacement, left   • Hypertensive urgency   • Hypokalemia   • Bilateral pleural effusion   • Acute systolic congestive heart failure (HCC)   • Elevated troponin   • ADHD   • Closed fracture of distal end of right fibula   • Macrocytic anemia   • Hypertension   • Severe episode of recurrent major depressive disorder ([facility] [address] )   • Hyperlipidemia   • Primary osteoarthritis of one hip, right   • Pain in right hip   • Nephrotic syndrome   • Hypoalbuminemia   • Chronic bilateral low back pain without sciatica   • Spondylolisthesis at L5-S1 level   • Stage 3b chronic kidney disease (HCC)   • Panic disorder without agoraphobia   • Marijuana use       Past Medical History-  Past Medical History:   Diagnosis Date   • Allergic    • Anemia    • Anxiety    • Arthritis 2017   • CHF (congestive heart failure) ([facility] [address] )    • Chronic kidney disease 01/0/2021   • Coronary artery disease 1/6/2021   • Depression    • Hyperlipidemia 1/7/2021   • Hypertension 01/06/2021   • Osteoarthritis       History of Seizures: no  History of Head injury-LOC-Concussion: no    Past Surgical History-  Past Surgical History:   Procedure Laterality Date   • CT NEEDLE BIOPSY KIDNEY  3/2/2021   • ECTOPIC PREGNANCY SURGERY     • IR BIOPSY KIDNEY RANDOM  2/17/2021   • IR THORACENTESIS  1/12/2021   • JOINT REPLACEMENT     • ORTHOPEDIC SURGERY     • DC ARTHRP ACETBLR/PROX FEM PROSTC AGRFT/ALGRFT Left 3/5/2018    Procedure: ARTHROPLASTY HIP TOTAL;  Surgeon: Cass Vaughn DO;  Location: [facility];  Service: Orthopedics   • RENAL BIOPSY  03/02/2021   • WISDOM TOOTH EXTRACTION      x1          Allergies: NKDA  Allergies   Allergen Reactions   • Wellbutrin [Bupropion] Rash       Recent labs:  No visits with results within 1 Month(s) from this visit  Latest known visit with results is:   Appointment on 01/20/2023   Component Date Value   • Magnesium 01/20/2023 2 5    • Phosphorus 01/20/2023 3 6    • PTH 01/20/2023 87 8 (H)    • Uric Acid 01/20/2023 5 8    • Color, UA 01/21/2023 Yellow    • Clarity, UA 01/21/2023 Clear    • Specific Gravity, UA 01/21/2023 1 010    • pH, UA 01/21/2023 7 0    • Leukocytes, UA 01/21/2023 Negative    • Nitrite, UA 01/21/2023 Negative    • Protein, UA 01/21/2023 30 (1+) (A)    • Glucose, UA 01/21/2023 Negative    • Ketones, UA 01/21/2023 Negative    • Urobilinogen, UA 01/21/2023 0 2    • Bilirubin, UA 01/21/2023 Negative    • Occult Blood, UA 01/21/2023 Trace-lysed (A)    • RBC, UA 01/21/2023 2-4    • WBC, UA 01/21/2023 None Seen    • Epithelial Cells 01/21/2023 Occasional    • Bacteria, UA 01/21/2023 None Seen    • Creatinine, Ur 01/20/2023 146 2    • Protein Urine Random 01/20/2023 275    • Prot/Creat Ratio, Ur 01/20/2023 1 88 (H)      Labs were reviewed and discussed with patient    Medical Review Of Systems:    Patient admits to 9/10 hip pain and whole body too, nausea; otherwise A comprehensive review of systems was negative        Medications:  Current Outpatient Medications on File Prior to Visit   Medication Sig Dispense Refill   • Biotin 10 MG CHEW Chew     • amLODIPine (NORVASC) 2 5 mg tablet TAKE 1 TABLET(2 5 MG) BY MOUTH DAILY 90 tablet 1   • atorvastatin (LIPITOR) 40 mg tablet TAKE 1 TABLET(40 MG) BY MOUTH DAILY 90 tablet 1   • carvedilol (COREG) 25 mg tablet TAKE 1 TABLET(25 MG) BY MOUTH TWICE DAILY WITH MEALS 180 tablet 1   • cholecalciferol (VITAMIN D3) 1,000 units tablet TAKE 2 TABLETS BY MOUTH DAILY 180 tablet 3   • cyanocobalamin (VITAMIN B-12) 1000 MCG tablet Take 1 tablet (1,000 mcg total) by mouth daily 30 tablet 0   • furosemide (LASIX) 20 mg tablet TAKE 1 TABLET(20 MG) BY MOUTH EVERY EVENING 90 tablet 1   • furosemide (LASIX) 40 mg tablet TAKE 1 TABLET(40 MG) BY MOUTH EVERY MORNING 90 tablet 1   • hydrALAZINE (APRESOLINE) 25 mg tablet TAKE 1 TABLET(25 MG) BY MOUTH THREE TIMES DAILY 270 tablet 1   • losartan (COZAAR) 25 mg tablet TAKE 1 TABLET(25 MG) BY MOUTH DAILY 90 tablet 3   • [DISCONTINUED] ergocalciferol (VITAMIN D2) 50,000 units Take 50,000 Units by mouth once a week     • [DISCONTINUED] methylphenidate (RITALIN) 20 MG tablet Take 1 tablet (20 mg total) by mouth 2 (two) times a day Max Daily Amount: 40 mg 60 tablet 0   • [DISCONTINUED] PARoxetine (PAXIL) 30 mg tablet TAKE 1 AND 1/2 TABLETS(45 MG) BY MOUTH DAILY 135 tablet 1   • [DISCONTINUED] QUEtiapine (SEROquel) 100 mg tablet TAKE 1 TABLET(100 MG) BY MOUTH DAILY AT BEDTIME 90 tablet 1     No current facility-administered medications on file prior to visit  Medication Compliant? yes    All current active medications have been reviewed      Objective     OBJECTIVE     /86   Pulse 55     MENTAL STATUS EXAM  Appearance:  age appropriate, dressed casually   Behavior:  pleasant, cooperative, with good eye contact   Speech:  Normal volume, regular rate and rhythm   Mood:  depressed and anxious   Affect:  mood congruent, constricted   Language: intact and appropriate for age, education, and intellect   Thought Process:  Linear and goal directed   Associations: intact associations   Thought Content:  normal and appropriate   Perceptual Disturbances: no auditory or visual hallcunations   Risk Potential / Abnormal Thoughts: Suicidal ideation - Yes, deathwish but would not hasten death or pursue suicidal behavior, Would seek help, identifies reason to live, has means and plan to keep self safe  Homicidal ideation - None  Potential for aggression - No       Consciousness:  Alert & Awake   Sensorium:  Grossly oriented   Attention: attention span and concentration are age appropriate   Intellect: within normal limits   Fund of Knowledge:  Memory: awareness of current events: yes  recent and remote memory grossly intact   Insight:  good   Judgment: good   Muscle Strength Muscle Tone: normal  normal   Gait/Station: uses walker   Motor Activity: no abnormal movements     Pain severe   Pain Scale 9     IMPRESSIONS/FORMULATION          Diagnoses and all orders for this visit:    Severe episode of recurrent major depressive disorder, without psychotic features ([place] [address] )  -     Discontinue: PARoxetine (PAXIL) 30 mg tablet; Take 1 5 tablets (45 mg total) by mouth every morning  -     methylphenidate (RITALIN) 20 MG tablet; Take 1 tablet (20 mg total) by mouth 2 (two) times a day Max Daily Amount: 40 mg Do not start before March 28, 2023  -     QUEtiapine (SEROquel) 100 mg tablet; Take 1 tablet (100 mg total) by mouth daily at bedtime  -     PARoxetine (PAXIL) 30 mg tablet; Take 1 5 tablets (45 mg total) by mouth every morning    Attention deficit hyperactivity disorder (ADHD), predominantly inattentive type    LISS (generalized anxiety disorder)  -     PARoxetine (PAXIL) 30 mg tablet; Take 1 5 tablets (45 mg total) by mouth every morning    Panic disorder without agoraphobia  -     PARoxetine (PAXIL) 30 mg tablet; Take 1 5 tablets (45 mg total) by mouth every morning    Marijuana use    Other orders  -     Biotin 10 MG CHEW; Chew        1  Severe episode of recurrent major depressive disorder, without psychotic features ([place] [address] )    2  Attention deficit hyperactivity disorder (ADHD), predominantly inattentive type    3  LISS (generalized anxiety disorder)    4  Panic disorder without agoraphobia    5  Marijuana use      MDD  LISS  Panic Disorder  ADHD, Inattentive  Marijuana use- not daily but does help some with pain  May consider medical marijuana but wants to see how she is after surgery    Bia Walton is a 52 y o  female who presents with symptoms supporting the aforementioned  Differential includes Complex bereavement, Adjustment disorder  I do not believe the patient has Bipolar disorder or a psychotic process  She has CKD and other medical issues  BP ok today  She likes where medications are  I discussed side effects and risks of all meds I prescribed today including drug interactions, serious side effects, BP  And cardiovascular effects, significant withdrawal risks as it relates to paxil as well as altered levels of medications due to medications and high dose of paxil overall given her CKD  She has tolerated medications well and has no concerns presently and feels stable overall, thus we agreed not to make any changes today  She is on disability now, predominately for mental health but also medical components  PICKING is an issues she deals with , triggered by anxiety  Could be considering supplements or medication changes    From a biological perspective, she has multiple medical challenges    From a psychological perspective, she is motivated and has come good coping skills but could have further growth and support in this area    From a social perspective, she has great family supports and a good hugo friend she has known for years    Suicide / Homicide / Safety risk assessment: see above  safety risk low overall upon consideration of protective and risk factors  Plan:       Education about diagnosis and treatment modalities, patient voiced understanding and agreement with the following plan:    Discussed medication risks, beneftis, alternatives  Patient was informed and had time to ask questions   They agreed to treatment below    Controlled Medication Discussion:     Alvino Jorge has been filling controlled prescriptions on time as prescribed according to [place] Prescription Drug Monitoring program      Initial treatment plan:   1) MEDS:    - Continue Paxil 45mg daily   - Continue Ritalin 20mg BID   - Continue Seroquel 100mg HS    2) Labs:    - 1/20/2023 - , HDL 57, LDL 85; Fasting glucose 89; Cr 1 86, eGFR 30    3) Therapy:    - Referred 3/23/2023    4) Medical:    - Pt will f/u with other providers as needed    5) Other: Support as needed   - Was LPN, 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

## 2023-03-23 ENCOUNTER — OFFICE VISIT (OUTPATIENT)
Dept: PSYCHIATRY | Facility: CLINIC | Age: 53
End: 2023-03-23

## 2023-03-23 ENCOUNTER — TELEPHONE (OUTPATIENT)
Dept: PSYCHIATRY | Facility: CLINIC | Age: 53
End: 2023-03-23

## 2023-03-23 VITALS — SYSTOLIC BLOOD PRESSURE: 129 MMHG | HEART RATE: 55 BPM | DIASTOLIC BLOOD PRESSURE: 86 MMHG

## 2023-03-23 DIAGNOSIS — F12.90 MARIJUANA USE: ICD-10-CM

## 2023-03-23 DIAGNOSIS — F41.0 PANIC DISORDER WITHOUT AGORAPHOBIA: ICD-10-CM

## 2023-03-23 DIAGNOSIS — F90.0 ATTENTION DEFICIT HYPERACTIVITY DISORDER (ADHD), PREDOMINANTLY INATTENTIVE TYPE: ICD-10-CM

## 2023-03-23 DIAGNOSIS — F33.2 SEVERE EPISODE OF RECURRENT MAJOR DEPRESSIVE DISORDER, WITHOUT PSYCHOTIC FEATURES (HCC): Primary | ICD-10-CM

## 2023-03-23 DIAGNOSIS — F41.1 GAD (GENERALIZED ANXIETY DISORDER): ICD-10-CM

## 2023-03-23 RX ORDER — PAROXETINE 30 MG/1
45 TABLET, FILM COATED ORAL EVERY MORNING
Qty: 135 TABLET | Refills: 1 | Status: SHIPPED | OUTPATIENT
Start: 2023-03-23

## 2023-03-23 RX ORDER — PAROXETINE 30 MG/1
45 TABLET, FILM COATED ORAL EVERY MORNING
Qty: 135 TABLET | Refills: 1 | Status: SHIPPED | OUTPATIENT
Start: 2023-03-23 | End: 2023-03-23 | Stop reason: SDUPTHER

## 2023-03-23 RX ORDER — METHYLPHENIDATE HYDROCHLORIDE 20 MG/1
20 TABLET ORAL 2 TIMES DAILY
Qty: 60 TABLET | Refills: 0 | Status: SHIPPED | OUTPATIENT
Start: 2023-03-28

## 2023-03-23 RX ORDER — QUETIAPINE FUMARATE 100 MG/1
100 TABLET, FILM COATED ORAL
Qty: 90 TABLET | Refills: 1 | Status: SHIPPED | OUTPATIENT
Start: 2023-03-23

## 2023-03-23 NOTE — BH TREATMENT PLAN
TREATMENT PLAN (Medication Management Only)        [facility]    Name/Date of Birth/MRN:  Sylwia Hilario 52 y o  1970 MRN: [id number]  Date of Treatment Plan: March 23, 2023  Diagnosis/Diagnoses:   1  Attention deficit hyperactivity disorder (ADHD), predominantly inattentive type    2  LISS (generalized anxiety disorder)    3  Panic disorder without agoraphobia      Strengths/Personal Resources for Self-Care: I am kind and determined  Area/Areas of need (in own words): smoking  1  Long Term Goal: "to be happy again"   Target Date: 180 days from treatment plan  Person/Persons responsible for completion of goal: Dr Rome Kelly and Self  2  Short Term Objective (s) - How will we reach this goal?:   A  Provider new recommended medication/dosage changes and/or continue medication(s): as noed  B   Get through all surgeries and recoveries upcoming  C  Take medications regularly and seek out hobbies and activities that make me smile  Target Date: 6 months from treatment plan unless noted otherwise  Person/Persons Responsible for Completion of Goal: Dr Rome Kelly and Self   Progress Towards Goals: continuing treatment   Treatment Modality: Medication management and therapy PRN  Review due 180 days from date of this plan: Approximately 6 months from today ( 9/23/2023 )    Expected length of service: ongoing treatment unless revised  My Physician/PA/NP and I have developed this plan together and I agree to work on the goals and objectives  I understand the treatment goals that were developed for my treatment    Signature:       Date and time:  Signature of parent/guardian if under age of 15 years: Date and time:  Signature of provider:      Date and time:  Signature of Supervising Physician:    Date and time: 3/23/2023      Aracelis Limon III DO

## 2023-05-09 ENCOUNTER — OFFICE VISIT (OUTPATIENT)
Dept: FAMILY MEDICINE CLINIC | Facility: CLINIC | Age: 53
End: 2023-05-09

## 2023-05-09 VITALS
HEART RATE: 70 BPM | WEIGHT: 146.2 LBS | TEMPERATURE: 98.1 F | SYSTOLIC BLOOD PRESSURE: 124 MMHG | OXYGEN SATURATION: 98 % | BODY MASS INDEX: 23.5 KG/M2 | DIASTOLIC BLOOD PRESSURE: 82 MMHG | HEIGHT: 66 IN

## 2023-05-09 DIAGNOSIS — M16.11 PRIMARY OSTEOARTHRITIS OF ONE HIP, RIGHT: ICD-10-CM

## 2023-05-09 DIAGNOSIS — Z01.818 PRE-OPERATIVE CLEARANCE: Primary | ICD-10-CM

## 2023-05-09 DIAGNOSIS — I50.21 ACUTE SYSTOLIC CONGESTIVE HEART FAILURE (HCC): ICD-10-CM

## 2023-05-09 DIAGNOSIS — I15.8 OTHER SECONDARY HYPERTENSION: ICD-10-CM

## 2023-05-09 DIAGNOSIS — F33.2 SEVERE EPISODE OF RECURRENT MAJOR DEPRESSIVE DISORDER, WITHOUT PSYCHOTIC FEATURES (HCC): ICD-10-CM

## 2023-05-09 DIAGNOSIS — N18.32 STAGE 3B CHRONIC KIDNEY DISEASE (HCC): ICD-10-CM

## 2023-05-09 NOTE — PROGRESS NOTES
Name: Gianna Guzman      : 1970      MRN: [id number]  Encounter Provider: Aj Jerome DO  Encounter Date: 2023   Encounter department: [address] [place] [address]  Pre-operative clearance  Comments:  Yee Parisi is medically cleared for upcoming right hip surgery  2  Other secondary hypertension    3  Acute systolic congestive heart failure ([facility] [address] )    4  Primary osteoarthritis of one hip, right    5  Stage 3b chronic kidney disease ([facility] [address] )    6  Severe episode of recurrent major depressive disorder, without psychotic features ([facility] [address] )         Subjective      Patient presents with: Follow-up: Patient is here for a four month follow up  No additional concerns  Pre-op Exam: Patient is here ahead of right hip replacement surgery with Dr Darnell Valladares scheduled for 2023  Does follow-up with cardiology and nephrology as well as psychiatry  Review of Systems   Constitutional: Negative  HENT: Negative  Respiratory: Negative  Cardiovascular: Negative  Gastrointestinal: Negative  Musculoskeletal: Positive for arthralgias, gait problem and joint swelling  Skin: Negative          Current Outpatient Medications on File Prior to Visit   Medication Sig   • amLODIPine (NORVASC) 2 5 mg tablet TAKE 1 TABLET(2 5 MG) BY MOUTH DAILY   • atorvastatin (LIPITOR) 40 mg tablet TAKE 1 TABLET(40 MG) BY MOUTH DAILY   • Biotin 10 MG CHEW Chew   • carvedilol (COREG) 25 mg tablet TAKE 1 TABLET(25 MG) BY MOUTH TWICE DAILY WITH MEALS   • cholecalciferol (VITAMIN D3) 1,000 units tablet TAKE 2 TABLETS BY MOUTH DAILY   • cyanocobalamin (VITAMIN B-12) 1000 MCG tablet Take 1 tablet (1,000 mcg total) by mouth daily   • furosemide (LASIX) 20 mg tablet TAKE 1 TABLET(20 MG) BY MOUTH EVERY EVENING   • furosemide (LASIX) 40 mg tablet TAKE 1 TABLET(40 MG) BY MOUTH EVERY MORNING   • hydrALAZINE (APRESOLINE) 25 mg tablet TAKE 1 TABLET(25 MG) BY MOUTH THREE TIMES DAILY   • losartan

## 2023-05-18 DIAGNOSIS — I16.0 HYPERTENSIVE URGENCY: ICD-10-CM

## 2023-05-18 RX ORDER — AMLODIPINE BESYLATE 2.5 MG/1
TABLET ORAL
Qty: 90 TABLET | Refills: 1 | Status: SHIPPED | OUTPATIENT
Start: 2023-05-18

## 2023-05-25 DIAGNOSIS — F33.2 SEVERE EPISODE OF RECURRENT MAJOR DEPRESSIVE DISORDER, WITHOUT PSYCHOTIC FEATURES (HCC): ICD-10-CM

## 2023-05-25 RX ORDER — METHYLPHENIDATE HYDROCHLORIDE 20 MG/1
20 TABLET ORAL 2 TIMES DAILY
Qty: 60 TABLET | Refills: 0 | Status: SHIPPED | OUTPATIENT
Start: 2023-05-25

## 2023-06-14 ENCOUNTER — OFFICE VISIT (OUTPATIENT)
Dept: PSYCHIATRY | Facility: CLINIC | Age: 53
End: 2023-06-14
Payer: COMMERCIAL

## 2023-06-14 DIAGNOSIS — F33.2 SEVERE EPISODE OF RECURRENT MAJOR DEPRESSIVE DISORDER, WITHOUT PSYCHOTIC FEATURES (HCC): ICD-10-CM

## 2023-06-14 DIAGNOSIS — F41.0 PANIC DISORDER WITHOUT AGORAPHOBIA: ICD-10-CM

## 2023-06-14 DIAGNOSIS — F41.1 GAD (GENERALIZED ANXIETY DISORDER): ICD-10-CM

## 2023-06-14 PROCEDURE — 99214 OFFICE O/P EST MOD 30 MIN: CPT | Performed by: PSYCHIATRY & NEUROLOGY

## 2023-06-14 RX ORDER — METHOCARBAMOL 500 MG/1
500 TABLET, FILM COATED ORAL EVERY 6 HOURS PRN
COMMUNITY
Start: 2023-06-09 | End: 2023-07-09

## 2023-06-14 RX ORDER — QUETIAPINE FUMARATE 50 MG/1
50 TABLET, FILM COATED ORAL
Qty: 90 TABLET | Refills: 1 | Status: SHIPPED | OUTPATIENT
Start: 2023-06-14

## 2023-06-14 RX ORDER — METHYLPHENIDATE HYDROCHLORIDE 20 MG/1
20 TABLET ORAL 2 TIMES DAILY
Qty: 60 TABLET | Refills: 0 | Status: SHIPPED | OUTPATIENT
Start: 2023-06-22

## 2023-06-14 RX ORDER — ACETAMINOPHEN 500 MG
500 TABLET ORAL EVERY 4 HOURS PRN
COMMUNITY
Start: 2023-05-30 | End: 2023-06-29

## 2023-06-14 RX ORDER — PAROXETINE 30 MG/1
45 TABLET, FILM COATED ORAL EVERY MORNING
Qty: 135 TABLET | Refills: 1 | Status: SHIPPED | OUTPATIENT
Start: 2023-06-14

## 2023-06-14 RX ORDER — APIXABAN 2.5 MG/1
TABLET, FILM COATED ORAL
COMMUNITY
Start: 2023-05-31

## 2023-06-14 RX ORDER — OXYCODONE HYDROCHLORIDE 5 MG/1
5 TABLET ORAL EVERY 4 HOURS PRN
COMMUNITY
Start: 2023-06-13 | End: 2023-06-23

## 2023-06-23 NOTE — PROGRESS NOTES
Spoke to her regarding her results, at this point will continue her current medications she feels well, her biopsy was reviewed with her with multiple histiocytes, discussed with pathology will check a cryoglobulin for completeness repeat her BMP and CBC continue current medication otherwise no changes    She was in agreement and had no further question If you are a smoker, it is important for your health to stop smoking. Please be aware that second hand smoke is also harmful.

## 2023-07-03 ENCOUNTER — TELEPHONE (OUTPATIENT)
Dept: NEPHROLOGY | Facility: CLINIC | Age: 53
End: 2023-07-03

## 2023-07-03 NOTE — TELEPHONE ENCOUNTER
Called and spoke with Patient to complete their bloodwork prior to their appointment on 7/11 with Dr. Juan Carlos Avery at the [facility].

## 2023-07-10 ENCOUNTER — APPOINTMENT (OUTPATIENT)
Dept: LAB | Facility: HOSPITAL | Age: 53
End: 2023-07-10
Payer: MEDICARE

## 2023-07-10 DIAGNOSIS — Z79.899 HIGH RISK MEDICATION USE: ICD-10-CM

## 2023-07-10 DIAGNOSIS — N25.81 SECONDARY HYPERPARATHYROIDISM (HCC): ICD-10-CM

## 2023-07-10 DIAGNOSIS — N18.32 STAGE 3B CHRONIC KIDNEY DISEASE (HCC): ICD-10-CM

## 2023-07-10 LAB
ALBUMIN SERPL BCP-MCNC: 3.8 G/DL (ref 3.5–5)
ALP SERPL-CCNC: 77 U/L (ref 34–104)
ALT SERPL W P-5'-P-CCNC: 17 U/L (ref 7–52)
ANION GAP SERPL CALCULATED.3IONS-SCNC: 4 MMOL/L
AST SERPL W P-5'-P-CCNC: 21 U/L (ref 13–39)
BACTERIA UR QL AUTO: ABNORMAL /HPF
BILIRUB SERPL-MCNC: 0.25 MG/DL (ref 0.2–1)
BILIRUB UR QL STRIP: NEGATIVE
BUN SERPL-MCNC: 40 MG/DL (ref 5–25)
CALCIUM SERPL-MCNC: 8.9 MG/DL (ref 8.4–10.2)
CHLORIDE SERPL-SCNC: 108 MMOL/L (ref 96–108)
CLARITY UR: CLEAR
CO2 SERPL-SCNC: 27 MMOL/L (ref 21–32)
COLOR UR: ABNORMAL
CREAT SERPL-MCNC: 1.56 MG/DL (ref 0.6–1.3)
CREAT UR-MCNC: 71.6 MG/DL
ERYTHROCYTE [DISTWIDTH] IN BLOOD BY AUTOMATED COUNT: 12.5 % (ref 11.6–15.1)
GFR SERPL CREATININE-BSD FRML MDRD: 37 ML/MIN/1.73SQ M
GLUCOSE P FAST SERPL-MCNC: 86 MG/DL (ref 65–99)
GLUCOSE UR STRIP-MCNC: NEGATIVE MG/DL
HCT VFR BLD AUTO: 41.4 % (ref 34.8–46.1)
HGB BLD-MCNC: 13.2 G/DL (ref 11.5–15.4)
HGB UR QL STRIP.AUTO: NEGATIVE
KETONES UR STRIP-MCNC: NEGATIVE MG/DL
LEUKOCYTE ESTERASE UR QL STRIP: NEGATIVE
MAGNESIUM SERPL-MCNC: 2.4 MG/DL (ref 1.9–2.7)
MCH RBC QN AUTO: 32.4 PG (ref 26.8–34.3)
MCHC RBC AUTO-ENTMCNC: 31.9 G/DL (ref 31.4–37.4)
MCV RBC AUTO: 102 FL (ref 82–98)
NITRITE UR QL STRIP: NEGATIVE
NON-SQ EPI CELLS URNS QL MICRO: ABNORMAL /HPF
PH UR STRIP.AUTO: 7 [PH]
PHOSPHATE SERPL-MCNC: 4.8 MG/DL (ref 2.7–4.5)
PLATELET # BLD AUTO: 315 THOUSANDS/UL (ref 149–390)
PMV BLD AUTO: 9.6 FL (ref 8.9–12.7)
POTASSIUM SERPL-SCNC: 3.9 MMOL/L (ref 3.5–5.3)
PROT SERPL-MCNC: 6.5 G/DL (ref 6.4–8.4)
PROT UR STRIP-MCNC: ABNORMAL MG/DL
PROT UR-MCNC: 229 MG/DL
PROT/CREAT UR: 3.2 MG/G{CREAT} (ref 0–0.1)
PTH-INTACT SERPL-MCNC: 74.1 PG/ML (ref 12–88)
RBC # BLD AUTO: 4.08 MILLION/UL (ref 3.81–5.12)
RBC #/AREA URNS AUTO: ABNORMAL /HPF
SODIUM SERPL-SCNC: 139 MMOL/L (ref 135–147)
SP GR UR STRIP.AUTO: 1.02 (ref 1–1.03)
URATE SERPL-MCNC: 5 MG/DL (ref 2–7.5)
UROBILINOGEN UR STRIP-ACNC: <2 MG/DL
WBC # BLD AUTO: 6.51 THOUSAND/UL (ref 4.31–10.16)
WBC #/AREA URNS AUTO: ABNORMAL /HPF

## 2023-07-10 PROCEDURE — 83036 HEMOGLOBIN GLYCOSYLATED A1C: CPT

## 2023-07-10 PROCEDURE — 85027 COMPLETE CBC AUTOMATED: CPT

## 2023-07-10 PROCEDURE — 84156 ASSAY OF PROTEIN URINE: CPT

## 2023-07-10 PROCEDURE — 83970 ASSAY OF PARATHORMONE: CPT

## 2023-07-10 PROCEDURE — 36415 COLL VENOUS BLD VENIPUNCTURE: CPT

## 2023-07-10 PROCEDURE — 84550 ASSAY OF BLOOD/URIC ACID: CPT

## 2023-07-10 PROCEDURE — 80053 COMPREHEN METABOLIC PANEL: CPT

## 2023-07-10 PROCEDURE — 82570 ASSAY OF URINE CREATININE: CPT

## 2023-07-10 PROCEDURE — 81001 URINALYSIS AUTO W/SCOPE: CPT

## 2023-07-10 PROCEDURE — 83735 ASSAY OF MAGNESIUM: CPT

## 2023-07-10 PROCEDURE — 84100 ASSAY OF PHOSPHORUS: CPT

## 2023-07-11 ENCOUNTER — OFFICE VISIT (OUTPATIENT)
Dept: NEPHROLOGY | Facility: CLINIC | Age: 53
End: 2023-07-11
Payer: MEDICARE

## 2023-07-11 VITALS
DIASTOLIC BLOOD PRESSURE: 91 MMHG | SYSTOLIC BLOOD PRESSURE: 142 MMHG | HEART RATE: 70 BPM | HEIGHT: 66 IN | WEIGHT: 150 LBS | BODY MASS INDEX: 24.11 KG/M2

## 2023-07-11 DIAGNOSIS — N18.32 STAGE 3B CHRONIC KIDNEY DISEASE (HCC): Primary | ICD-10-CM

## 2023-07-11 LAB
EST. AVERAGE GLUCOSE BLD GHB EST-MCNC: 91 MG/DL
HBA1C MFR BLD: 4.8 %

## 2023-07-11 PROCEDURE — 99214 OFFICE O/P EST MOD 30 MIN: CPT | Performed by: INTERNAL MEDICINE

## 2023-07-11 RX ORDER — ACETAMINOPHEN 500 MG
500 TABLET ORAL EVERY 6 HOURS PRN
COMMUNITY

## 2023-07-11 RX ORDER — AMOXICILLIN 500 MG/1
TABLET, FILM COATED ORAL
COMMUNITY
Start: 2023-07-03

## 2023-07-11 RX ORDER — OXYCODONE HYDROCHLORIDE 5 MG/1
CAPSULE ORAL
COMMUNITY
Start: 2023-07-03

## 2023-07-11 NOTE — PATIENT INSTRUCTIONS
Chronic Kidney Disease   WHAT YOU NEED TO KNOW:   Chronic kidney disease (CKD) is the gradual and permanent loss of kidney function. It is also called chronic kidney failure, or chronic renal insufficiency. Normally, the kidneys remove fluid, chemicals, and waste from your blood. These wastes are turned into urine by your kidneys. CKD may worsen over time and lead to kidney failure. Your CKD team will help you and your family plan for your care at home. The team will help you create goals and find ways to meet your goals. Your care plan may change over time as your needs change. DISCHARGE INSTRUCTIONS:   Call your local emergency number (911 in the [address]) if:   You have a seizure. You have shortness of breath. Return to the emergency department if:   You are confused and very drowsy. Call your doctor or nephrologist if:   You suddenly gain or lose more weight than your healthcare provider has told you is okay. You have itchy skin or a rash. You urinate more or less than you normally do. You have blood in your urine. You have nausea and are vomiting. You have fatigue or muscle weakness. You have hiccups that will not stop. You have questions or concerns about your condition or care. Medicines:   Medicines  may be given to decrease blood pressure and get rid of extra fluid. You may also receive medicine to manage health conditions that may occur with CKD, such as anemia, diabetes, and heart disease. Take your medicine as directed. Contact your healthcare provider if you think your medicine is not helping or if you have side effects. Tell your provider if you are allergic to any medicine. Keep a list of the medicines, vitamins, and herbs you take. Include the amounts, and when and why you take them. Bring the list or the pill bottles to follow-up visits. Carry your medicine list with you in case of an emergency. What you can do to manage CKD:   Management may include making some lifestyle changes. Tell your healthcare provider if you have any concerns about being able to make changes. He or she can help you find solutions, including working with specialists. Ask for help creating a plan to break large goals into smaller steps. Your plan may include any of the following:  Manage other health conditions. Your healthcare provider will work with you to make a care plan that meets your needs. You will be checked regularly for heart disease or other conditions that can make CKD worse, such as diabetes. Your blood pressure will be closely monitored. You will also get a target blood pressure and help making a plan to reach your target. This may include taking your blood pressure at home. Maintain a healthy weight. Your weight and body mass index (BMI) will be checked regularly. BMI helps find if your weight is healthy for your height. Your healthcare provider will use other tests to check your muscle and protein levels. Extra weight can strain your kidneys. A low weight or low muscle mass can make you feel more tired. You may have trouble doing your daily activities. Ask your provider what a healthy weight is for you. He or she can help you create a plan to lose or gain weight safely, if needed. The plan may include keeping a food diary. This is a list of foods and liquids you have each day. Your provider will use the diary to help you make changes, if needed. Changes are based on your health and any other conditions you have, such as diabetes. Create an exercise plan. Regular exercise can help you manage CKD, high blood pressure, and diabetes. Exercise also helps control weight. Your provider can help you create exercise goals and a plan to reach those goals. For example, your goal may be to exercise for 30 minutes in a day. Your plan can include breaking exercise into 10 minute sessions, 3 times during the day. Create a healthy eating plan.   Your provider may tell you to eat food low in potassium, phosphorus, or protein. Your provider may also recommend vitamin or mineral supplements. Do not take any supplements without talking to your provider. A dietitian can help you plan meals if needed. Ask how much liquid to drink each day and which liquids are best for you. Limit sodium (salt) as directed. You may need to limit sodium to less than 2,300 milligrams (mg) each day. Ask your dietitian or healthcare provider how much sodium you can have each day. The amount depends on your stage of kidney disease. Table salt, canned foods, soups, salted snacks, and processed meats, like deli meats and sausage, are high in sodium. Your provider or a dietitian can show you how to read food labels for sodium. Limit alcohol as directed. Alcohol can cause fluid retention and can affect your kidneys. Ask how much alcohol is safe for you. A drink of alcohol is 12 ounces of beer, 5 ounces of wine, or 1½ ounces of liquor. Do not smoke. Nicotine and other chemicals in cigarettes and cigars can cause kidney damage. Ask your provider for information if you currently smoke and need help to quit. E-cigarettes or smokeless tobacco still contain nicotine. Talk to your provider before you use these products. Ask about over-the-counter medicines. Medicines such as NSAIDs and laxatives may harm your kidneys. Some cough and cold medicines can raise your blood pressure. Always ask if a medicine is safe before you take it. Ask about vaccines you may need. CKD can increase your risk for infections such as pneumonia, influenza, and hepatitis. Vaccines lower your risk for infection. Your healthcare provider will tell you which vaccines you need and when to get them. Follow up with your doctor or nephrologist as directed: You will need to return for tests to monitor your kidney and nerve function, and your parathyroid hormone level.  Your medicines may be changed, based on certain test results. Write down your questions so you remember to ask them during your visits. © Copyright Lars Regalado 2022 Information is for End User's use only and may not be sold, redistributed or otherwise used for commercial purposes. The above information is an  only. It is not intended as medical advice for individual conditions or treatments. Talk to your doctor, nurse or pharmacist before following any medical regimen to see if it is safe and effective for you.

## 2023-07-11 NOTE — PROGRESS NOTES
OFFICE FOLLOW UP - Nephrology   Prem Ritter 52 y.o. female MRN: [id number]    Encounter: [id number]        ASSESSMENT & PLAN    •  Stage IIIB chronic kidney disease with nephrotic range proteinuria  o Biopsy March 18th 2021diffuse proliferative and sclerosing glomerular nephritis with abundant intra capillary histiocytes seen on renal biopsy done March 18th, severe tubular atrophy and interstitial fibrosis, moderate chronic inflammation, severe arterial sclerosis on arteriolosclerosis with hyalinosis  o Clear-cut diagnostic reason for her renal failure still unclear, cryoglobulins were negative which can be seen when histiocytes are noted on renal biopsy, this could be infectious related GN  Which also may have contributed to patient's acute decrease  Ejection fraction which is now improved  o she also seems to have elements of hypertensive kidney disease  o We will continue to treat her proteinuria with the losartan currently on 25 mg daily, will monitor consider SGL 2 inhibitor if proteinuria worsens  o Blood pressures are stable  o Her creatinine has remained stable now 1.5 g  o Proteinuria was higher on her last blood work    • Electrolytes/Acid Base  o  electrolytes and acid-base status are acceptable    • Blood Pressure- hypertension in the setting of cardiomyopathy  o  had a low output with an EF of 25%, this has improved to 50%  o  following with Cardiology  o  currently on carvedilol 25 mg twice daily  o  furosemide 40 mg in the a.m. and 20 mg in the p.m.  o  Isordil 10 mg 3 times daily  o  hydralazine 25 mg 3 times daily  o  added losartan 25 mg daily  o Amlodipine 2.5 mg daily this can be discontinued if blood pressures are low    • Anemia of CKD  o   Monitor H&H    • BMD-secondary hyperparathyroidism  o continue to monitor parameters  o PTH is slightly elevated start vitamin D3 2000 units daily    • Risk Reduction/Clinical Course  o  continue cardiovascular risk reduction measures      DISCUSSION/SUMMARY    -it was nice seeing Judy Alfaro again today  -given advanced proteinuria she is at high risk for progression of this CKD   -Her creatinine levels are stable, her proteinuria does fluctuate, she is on RAAS inhibition, we will continue semiannual surveillance      HPI: Yadira Oates is a 52 y.o. female who is here for  Follow-up     she was seen in the hospital when she was admitted with acute decompensated congestive heart failure, she had a cardiac catheterization that was negative, she subsequently also had a urinalysis and urine protein to creatinine ratio upwards of 7 g, she had a serologic workup at the time that was negative should pulmonary edema and was treated with diuretics her EF was 25% during the hospitalization her blood pressures have now improved     She did get a hip surgery and is doing much better, she is completing physical therapy, this has been 6 weeks ago, she is using her cane but the pain is much better controlled she denies any fevers or chills nausea vomiting diarrhea or constipation no foamy or bloody urine      ROS:   All the systems were reviewed and were negative except as documented on the HPI. Allergies:  Wellbutrin [bupropion]    Medications:   Current Outpatient Medications:   •  acetaminophen (TYLENOL) 500 mg tablet, Take 500 mg by mouth every 6 (six) hours as needed, Disp: , Rfl:   •  amLODIPine (NORVASC) 2.5 mg tablet, TAKE 1 TABLET(2.5 MG) BY MOUTH DAILY, Disp: 90 tablet, Rfl: 1  •  amoxicillin (AMOXIL) 500 MG tablet, TAKE 4 TABLETS BY MOUTH 1 HOUR BEFORE DENTAL APPOINTMENT, Disp: , Rfl:   •  atorvastatin (LIPITOR) 40 mg tablet, TAKE 1 TABLET(40 MG) BY MOUTH DAILY, Disp: 90 tablet, Rfl: 1  •  Biotin 10 MG CHEW, Chew, Disp: , Rfl:   •  carvedilol (COREG) 25 mg tablet, TAKE 1 TABLET(25 MG) BY MOUTH TWICE DAILY WITH MEALS, Disp: 180 tablet, Rfl: 1  •  cholecalciferol (VITAMIN D3) 1,000 units tablet, TAKE 2 TABLETS BY MOUTH DAILY, Disp: 180 tablet, Rfl: 3  •  cyanocobalamin (VITAMIN B-12) 1000 MCG tablet, Take 1 tablet (1,000 mcg total) by mouth daily, Disp: 30 tablet, Rfl: 0  •  Eliquis 2.5 MG, , Disp: , Rfl:   •  furosemide (LASIX) 20 mg tablet, TAKE 1 TABLET(20 MG) BY MOUTH EVERY EVENING, Disp: 90 tablet, Rfl: 1  •  furosemide (LASIX) 40 mg tablet, TAKE 1 TABLET(40 MG) BY MOUTH EVERY MORNING, Disp: 90 tablet, Rfl: 1  •  hydrALAZINE (APRESOLINE) 25 mg tablet, TAKE 1 TABLET(25 MG) BY MOUTH THREE TIMES DAILY, Disp: 270 tablet, Rfl: 1  •  losartan (COZAAR) 25 mg tablet, TAKE 1 TABLET(25 MG) BY MOUTH DAILY, Disp: 90 tablet, Rfl: 3  •  methylphenidate (RITALIN) 20 MG tablet, Take 1 tablet (20 mg total) by mouth 2 (two) times a day Max Daily Amount: 40 mg Do not start before June 22, 2023., Disp: 60 tablet, Rfl: 0  •  oxyCODONE (OXY-IR) 5 MG capsule, , Disp: , Rfl:   •  PARoxetine (PAXIL) 30 mg tablet, Take 1.5 tablets (45 mg total) by mouth every morning, Disp: 135 tablet, Rfl: 1  •  QUEtiapine (SEROquel) 50 mg tablet, Take 1 tablet (50 mg total) by mouth daily at bedtime, Disp: 90 tablet, Rfl: 1    Past Medical History:   Diagnosis Date   • Allergic    • Anemia    • Anxiety    • Arthritis 2017   • CHF (congestive heart failure) (HCC)    • Chronic kidney disease 01/0/2021   • Coronary artery disease 1/6/2021   • Depression    • Hyperlipidemia 1/7/2021   • Hypertension 01/06/2021   • Osteoarthritis      Past Surgical History:   Procedure Laterality Date   • CT NEEDLE BIOPSY KIDNEY  3/2/2021   • ECTOPIC PREGNANCY SURGERY     • IR BIOPSY KIDNEY RANDOM  2/17/2021   • IR THORACENTESIS  1/12/2021   • JOINT REPLACEMENT     • ORTHOPEDIC SURGERY     • [place] ARTHRP ACETBLR/PROX FEM PROSTC AGRFT/ALGRFT Left 3/5/2018    Procedure: ARTHROPLASTY HIP TOTAL;  Surgeon: Rebeca Arellano DO;  Location: [facility];  Service: Orthopedics   • RENAL BIOPSY  03/02/2021   • WISDOM TOOTH EXTRACTION      x1     Family History   Problem Relation Age of Onset   • Hypertension Mother • Cancer Father         PROSTATE   • Atrial fibrillation Father    • Hypertension Father    • Prostate cancer Father    • Hypertension Sister    • Breast cancer Sister    • Diabetes Brother    • Cancer Family    • Breast cancer Sister       reports that she quit smoking about 3 months ago. Her smoking use included cigarettes. She started smoking about 35 years ago. She has a 16.50 pack-year smoking history. She has never used smokeless tobacco. She reports that she does not currently use alcohol. She reports that she does not use drugs. Physical Exam:   Vitals:    07/11/23 1440   BP: 142/91   BP Location: Left arm   Patient Position: Sitting   Cuff Size: Adult   Pulse: 70   Weight: 68 kg (150 lb)   Height: 5' 6" (1.676 m)     Body mass index is 24.21 kg/m².     General: conscious, cooperative, in not acute distress  Eyes: conjunctivae pink, anicteric sclerae  ENT: lips and mucous membranes moist  Neck: supple, no JVD  Chest:  Decreased breath sounds on the left side  CVS: distinct S1 & S2, normal rate, regular rhythm  Abdomen: soft, non-tender, non-distended, normoactive bowel sounds  Extremities: no edema of both legs  Skin: no rash  Neuro: awake, alert, oriented      Lab Results:    Results for orders placed or performed in visit on 07/10/23   Comprehensive metabolic panel   Result Value Ref Range    Sodium 139 135 - 147 mmol/L    Potassium 3.9 3.5 - 5.3 mmol/L    Chloride 108 96 - 108 mmol/L    CO2 27 21 - 32 mmol/L    ANION GAP 4 mmol/L    BUN 40 (H) 5 - 25 mg/dL    Creatinine 1.56 (H) 0.60 - 1.30 mg/dL    Glucose, Fasting 86 65 - 99 mg/dL    Calcium 8.9 8.4 - 10.2 mg/dL    AST 21 13 - 39 U/L    ALT 17 7 - 52 U/L    Alkaline Phosphatase 77 34 - 104 U/L    Total Protein 6.5 6.4 - 8.4 g/dL    Albumin 3.8 3.5 - 5.0 g/dL    Total Bilirubin 0.25 0.20 - 1.00 mg/dL    eGFR 37 ml/min/1.73sq m   Magnesium   Result Value Ref Range    Magnesium 2.4 1.9 - 2.7 mg/dL   Phosphorus   Result Value Ref Range    Phosphorus 4.8 (H) 2.7 - 4.5 mg/dL   PTH, intact   Result Value Ref Range    PTH 74.1 12.0 - 88.0 pg/mL   CBC and Platelet   Result Value Ref Range    WBC 6.51 4.31 - 10.16 Thousand/uL    RBC 4.08 3.81 - 5.12 Million/uL    Hemoglobin 13.2 11.5 - 15.4 g/dL    Hematocrit 41.4 34.8 - 46.1 %     (H) 82 - 98 fL    MCH 32.4 26.8 - 34.3 pg    MCHC 31.9 31.4 - 37.4 g/dL    RDW 12.5 11.6 - 15.1 %    Platelets 727 284 - 720 Thousands/uL    MPV 9.6 8.9 - 12.7 fL   Uric acid   Result Value Ref Range    Uric Acid 5.0 2.0 - 7.5 mg/dL   Urinalysis with microscopic   Result Value Ref Range    Color, UA Light Yellow     Clarity, UA Clear     Specific Gravity, UA 1.017 1.003 - 1.030    pH, UA 7.0 4.5, 5.0, 5.5, 6.0, 6.5, 7.0, 7.5, 8.0    Leukocytes, UA Negative Negative    Nitrite, UA Negative Negative    Protein,  (3+) (A) Negative mg/dl    Glucose, UA Negative Negative mg/dl    Ketones, UA Negative Negative mg/dl    Urobilinogen, UA <2.0 <2.0 mg/dl mg/dl    Bilirubin, UA Negative Negative    Occult Blood, UA Negative Negative    RBC, UA 1-2 None Seen, 1-2 /hpf    WBC, UA 1-2 None Seen, 1-2 /hpf    Epithelial Cells Occasional None Seen, Occasional /hpf    Bacteria, UA None Seen None Seen, Occasional /hpf   Protein / creatinine ratio, urine   Result Value Ref Range    Creatinine, Ur 71.6 mg/dL    Protein Urine Random 229 mg/dL    Prot/Creat Ratio, Ur 3.20 (H) 0.00 - 0.10       Portions of the record may have been created with voice recognition software. Occasional wrong word or "sound a like" substitutions may have occurred due to the inherent limitations of voice recognition software. Read the chart carefully and recognize, using context, where substitutions have occurred. If you have any questions, please contact the dictating provider.

## 2023-07-17 DIAGNOSIS — I16.0 HYPERTENSIVE URGENCY: ICD-10-CM

## 2023-07-17 RX ORDER — HYDRALAZINE HYDROCHLORIDE 25 MG/1
TABLET, FILM COATED ORAL
Qty: 270 TABLET | Refills: 1 | Status: SHIPPED | OUTPATIENT
Start: 2023-07-17

## 2023-07-28 DIAGNOSIS — F33.2 SEVERE EPISODE OF RECURRENT MAJOR DEPRESSIVE DISORDER, WITHOUT PSYCHOTIC FEATURES (HCC): ICD-10-CM

## 2023-07-28 RX ORDER — METHYLPHENIDATE HYDROCHLORIDE 20 MG/1
20 TABLET ORAL 2 TIMES DAILY
Qty: 60 TABLET | Refills: 0 | Status: SHIPPED | OUTPATIENT
Start: 2023-07-28 | End: 2023-08-14 | Stop reason: SDUPTHER

## 2023-08-14 ENCOUNTER — OFFICE VISIT (OUTPATIENT)
Dept: PSYCHIATRY | Facility: CLINIC | Age: 53
End: 2023-08-14
Payer: MEDICARE

## 2023-08-14 VITALS — HEART RATE: 60 BPM | DIASTOLIC BLOOD PRESSURE: 85 MMHG | SYSTOLIC BLOOD PRESSURE: 120 MMHG

## 2023-08-14 DIAGNOSIS — F33.2 SEVERE EPISODE OF RECURRENT MAJOR DEPRESSIVE DISORDER, WITHOUT PSYCHOTIC FEATURES (HCC): ICD-10-CM

## 2023-08-14 DIAGNOSIS — I50.21 ACUTE SYSTOLIC CONGESTIVE HEART FAILURE (HCC): ICD-10-CM

## 2023-08-14 DIAGNOSIS — F41.1 GAD (GENERALIZED ANXIETY DISORDER): ICD-10-CM

## 2023-08-14 DIAGNOSIS — N28.89 HYPERTENSION SECONDARY TO OTHER RENAL DISORDERS: ICD-10-CM

## 2023-08-14 DIAGNOSIS — I16.0 HYPERTENSIVE URGENCY: ICD-10-CM

## 2023-08-14 DIAGNOSIS — F41.0 PANIC DISORDER WITHOUT AGORAPHOBIA: ICD-10-CM

## 2023-08-14 DIAGNOSIS — I50.9 CHF (CONGESTIVE HEART FAILURE) (HCC): ICD-10-CM

## 2023-08-14 DIAGNOSIS — I15.1 HYPERTENSION SECONDARY TO OTHER RENAL DISORDERS: ICD-10-CM

## 2023-08-14 PROCEDURE — 90833 PSYTX W PT W E/M 30 MIN: CPT | Performed by: PSYCHIATRY & NEUROLOGY

## 2023-08-14 PROCEDURE — 99214 OFFICE O/P EST MOD 30 MIN: CPT | Performed by: PSYCHIATRY & NEUROLOGY

## 2023-08-14 RX ORDER — PAROXETINE 30 MG/1
45 TABLET, FILM COATED ORAL EVERY MORNING
Qty: 135 TABLET | Refills: 1 | Status: SHIPPED | OUTPATIENT
Start: 2023-08-14

## 2023-08-14 RX ORDER — METHOCARBAMOL 500 MG/1
TABLET, FILM COATED ORAL
COMMUNITY
Start: 2023-08-05

## 2023-08-14 RX ORDER — LOSARTAN POTASSIUM 25 MG/1
TABLET ORAL
Qty: 90 TABLET | Refills: 3 | Status: SHIPPED | OUTPATIENT
Start: 2023-08-14

## 2023-08-14 RX ORDER — FUROSEMIDE 20 MG/1
TABLET ORAL
Qty: 90 TABLET | Refills: 1 | Status: SHIPPED | OUTPATIENT
Start: 2023-08-14

## 2023-08-14 RX ORDER — QUETIAPINE FUMARATE 50 MG/1
50 TABLET, FILM COATED ORAL
Qty: 90 TABLET | Refills: 1 | Status: SHIPPED | OUTPATIENT
Start: 2023-08-14

## 2023-08-14 RX ORDER — ATORVASTATIN CALCIUM 40 MG/1
TABLET, FILM COATED ORAL
Qty: 90 TABLET | Refills: 1 | Status: SHIPPED | OUTPATIENT
Start: 2023-08-14

## 2023-08-14 RX ORDER — CARVEDILOL 25 MG/1
TABLET ORAL
Qty: 180 TABLET | Refills: 1 | Status: SHIPPED | OUTPATIENT
Start: 2023-08-14

## 2023-08-14 RX ORDER — FUROSEMIDE 40 MG/1
TABLET ORAL
Qty: 90 TABLET | Refills: 1 | Status: SHIPPED | OUTPATIENT
Start: 2023-08-14

## 2023-08-14 RX ORDER — METHYLPHENIDATE HYDROCHLORIDE 20 MG/1
20 TABLET ORAL 2 TIMES DAILY
Qty: 60 TABLET | Refills: 0 | Status: SHIPPED | OUTPATIENT
Start: 2023-08-27

## 2023-08-14 NOTE — PSYCH
MEDICATION MANAGEMENT NOTE        [facility]      Name and Date of Birth:  Ladi Phoenix 52 y.o. 1970    Date of Visit: August 14, 2023    SUBJECTIVE:  CC: Oumou Faustin presents today for follow up on "hanging in there"; MDD, LISS, PD, ADHD, mental health    Oumou Faustin notes she is ambulating well without support. Hip pain no longer an issue but muscles and repositioning challenging. Does take a long time, so a bit discouraging but she is doing ok. Seroquel reduced last time due to sedation, feels sleep a bit less quality but also trying not to nap, 2d/wk. PT no longer in home and she is making good progress, but she gets down on herself because it is slow going and she is not where she wants to be. Still some motivation issues delay her activities at times, and finances weigh on her, not sure how August will look with medications and other costs. She has good supports, feels she is managing adequately but we discussed coping skills and strategies. She prefers no medication change today, to give another month and then revisit. Since our last visit, overall symptoms have been worsening. Med Compliance: yes    HPI ROS:                      ('was' below is from prior visit)  Medication Side Effects (other than noted):  no     Depression (10 worst):  8 (Was 5)   Anxiety (10 worst):  9 (Was 7)   Hallucinations or Psychosis  no (Was no)    Safety concerns (self harm thoughts, suicidal ideation, HI, etc):  no (Was no)   Sleep: (NM = Nightmares)  naps 2/7. Falls asleep ok, wakes up 4-5, pain and restlessness wakes her.  (Was sleeping a bit better,less pain)   Energy:  low (Was still low)   Appetite:  a bit on low side, eats because she has to (Was decent appetite, eating better with less nausea related to pain)   Weight Change:  no          PHQ-2/9 Depression Screening    Little interest or pleasure in doing things: 1 - several days  Feeling down, depressed, or hopeless: 1 - several days  Trouble falling or staying asleep, or sleeping too much: 1 - several days  Feeling tired or having little energy: 1 - several days  Poor appetite or overeatin - several days  Feeling bad about yourself - or that you are a failure or have let yourself or your family down: 1 - several days  Trouble concentrating on things, such as reading the newspaper or watching television: 1 - several days  Moving or speaking so slowly that other people could have noticed. Or the opposite - being so fidgety or restless that you have been moving around a lot more than usual: 0 - not at all  Thoughts that you would be better off dead, or of hurting yourself in some way: 0 - not at all  PHQ-9 Score: 7   PHQ-9 Interpretation: Mild depression              Karyn Mao denies any side effects from medications unless noted above    Review Of Systems as noted above. Otherwise A relevant review of symptoms was otherwise negative    History Review:  The following portions of the patient's history were reviewed and documented: allergies, current medications, past family history, past medical history, past social history and problem list.     Lab Review: Labs were reviewed and discussed with patient      OBJECTIVE:     MENTAL STATUS EXAM  Appearance:  age appropriate   Behavior:  pleasant, cooperative, with good eye contact   Speech:  Normal volume, regular rate and rhythm   Mood:  depressed and anxious   Affect:  mood congruent   Language: intact and appropriate for age, education, and intellect   Thought Process:  Linear and goal directed   Associations: intact associations   Thought Content:  normal and appropriate, negative thinking and cognitive distortions   Perceptual Disturbances: no auditory or visual hallcunations   Risk Potential / Abnormal Thoughts: Suicidal ideation - None  Homicidal ideation - None  Potential for aggression - No       Consciousness:  Alert & Awake   Sensorium:  Grossly oriented   Attention: attention span and concentration are age appropriate       Fund of Knowledge:  Memory: awareness of current events: yes  recent and remote memory grossly intact   Insight:  good   Judgment: good   Muscle Strength Muscle Tone: normal  normal   Gait/Station: normal gait/station with good balance   Motor Activity: no abnormal movements       Risks, Benefits And Possible Side Effects Of Medications:    AGREE: Risks, benefits, and possible side effects of medications explained to Padmini Ferrer and she (or legal representative) verbalizes understanding and agreement for treatment. Controlled Medication Discussion:       _____________________________________________________________      Recent labs:  No visits with results within 1 Month(s) from this visit.    Latest known visit with results is:   Appointment on 07/10/2023   Component Date Value   • Sodium 07/10/2023 139    • Potassium 07/10/2023 3.9    • Chloride 07/10/2023 108    • CO2 07/10/2023 27    • ANION GAP 07/10/2023 4    • BUN 07/10/2023 40 (H)    • Creatinine 07/10/2023 1.56 (H)    • Glucose, Fasting 07/10/2023 86    • Calcium 07/10/2023 8.9    • AST 07/10/2023 21    • ALT 07/10/2023 17    • Alkaline Phosphatase 07/10/2023 77    • Total Protein 07/10/2023 6.5    • Albumin 07/10/2023 3.8    • Total Bilirubin 07/10/2023 0.25    • eGFR 07/10/2023 37    • Magnesium 07/10/2023 2.4    • Phosphorus 07/10/2023 4.8 (H)    • PTH 07/10/2023 74.1    • WBC 07/10/2023 6.51    • RBC 07/10/2023 4.08    • Hemoglobin 07/10/2023 13.2    • Hematocrit 07/10/2023 41.4    • MCV 07/10/2023 102 (H)    • MCH 07/10/2023 32.4    • MCHC 07/10/2023 31.9    • RDW 07/10/2023 12.5    • Platelets 97/27/9796 315    • MPV 07/10/2023 9.6    • Uric Acid 07/10/2023 5.0    • Color, UA 07/10/2023 Light Yellow    • Clarity, UA 07/10/2023 Clear    • Specific Gravity, UA 07/10/2023 1.017    • pH, UA 07/10/2023 7.0    • Leukocytes, UA 07/10/2023 Negative    • Nitrite, UA 07/10/2023 Negative    • Protein, UA 07/10/2023 300 (3+) (A)    • Glucose, UA 07/10/2023 Negative    • Ketones, UA 07/10/2023 Negative    • Urobilinogen, UA 07/10/2023 <2.0    • Bilirubin, UA 07/10/2023 Negative    • Occult Blood, UA 07/10/2023 Negative    • RBC, UA 07/10/2023 1-2    • WBC, UA 07/10/2023 1-2    • Epithelial Cells 07/10/2023 Occasional    • Bacteria, UA 07/10/2023 None Seen    • Creatinine, Ur 07/10/2023 71.6    • Protein Urine Random 07/10/2023 229    • Prot/Creat Ratio, Ur 07/10/2023 3.20 (H)    • Hemoglobin A1C 07/10/2023 4.8    • EAG 07/10/2023 91          Past Psychiatric History  Previous diagnoses include depression, anxiety, ADHD  Prior outpatient psychiatric treatment: yes, Dr. Citlaly Miller  Prior therapy: no  Prior inpatient psychiatric treatment: no  Prior suicide attempts: no  Prior self harm: no  Prior violence or aggression: no    Past Social History:  The patient grew up in [place]. Childhood was described as "good".     During childhood, parents were together. They have 2 sister(s) and 1 brother(s). Patient is middle in birth order     Abuse/neglect: none     As far as the patient (or present family member) is aware, overall childhood development: Patient notes ADHD symptoms in childhood and grades were not great, but no developmental delays     Education level: LPN/associates   Current occupation: after , had to stop  Marital status: never   Children: Millie Denson ( )  Current Living Situation: the patient lives alone . Social support: good family support, a friend     Yazdanism Affiliation: no   experience: no  Legal history: no  Access to Guns: no     Substance use and treatment:  Tobacco use: yes  Caffeine Use: yes  ETOH use: no  Other substance use: marijuana once a week. Not prescribed, but thought to get her card.     Endorses previous experimentation with: marijuana      Longest clean time: not applicable  History of Inpatient/Outpatient rehabilitation program: no        Traumatic History:      Abuse: none  Other Traumatic Events: none      Family Psychiatric History:      Psychiatric Illness:      Mom, son - anxiety  Substance Abuse:       Son (opioids, )  Suicide Attempts:        no family history of suicide attempts     Denies bipolar disorder, schizophrenaia    Family History   Problem Relation Age of Onset   • Hypertension Mother    • Cancer Father         PROSTATE   • Atrial fibrillation Father    • Hypertension Father    • Prostate cancer Father    • Hypertension Sister    • Breast cancer Sister    • Diabetes Brother    • Cancer Family    • Breast cancer Sister          Medical / Surgical History:    Past Medical History:   Diagnosis Date   • Allergic    • Anemia    • Anxiety    • Arthritis    • CHF (congestive heart failure) ([address])    • Chronic kidney disease    • Coronary artery disease 2021   • Depression    • Hyperlipidemia 2021   • Hypertension 2021   • Osteoarthritis      Past Surgical History:   Procedure Laterality Date   • CT NEEDLE BIOPSY KIDNEY  3/2/2021   • ECTOPIC PREGNANCY SURGERY     • IR BIOPSY KIDNEY RANDOM  2021   • IR THORACENTESIS  2021   • JOINT REPLACEMENT     • ORTHOPEDIC SURGERY     • [place] ARTHRP ACETBLR/PROX FEM PROSTC AGRFT/ALGRFT Left 3/5/2018    Procedure: ARTHROPLASTY HIP TOTAL;  Surgeon: Zeke Blanton DO;  Location: [facility];  Service: Orthopedics   • RENAL BIOPSY  2021   • WISDOM TOOTH EXTRACTION      x1       Assessment/Plan:        Diagnoses and all orders for this visit:    Severe episode of recurrent major depressive disorder, without psychotic features (HCC)  -     QUEtiapine (SEROquel) 50 mg tablet; Take 1 tablet (50 mg total) by mouth daily at bedtime  -     PARoxetine (PAXIL) 30 mg tablet; Take 1.5 tablets (45 mg total) by mouth every morning  -     methylphenidate (RITALIN) 20 MG tablet;  Take 1 tablet (20 mg total) by mouth 2 (two) times a day Max Daily Amount: 40 mg Do not start before August 27, 2023. LISS (generalized anxiety disorder)  -     PARoxetine (PAXIL) 30 mg tablet; Take 1.5 tablets (45 mg total) by mouth every morning    Panic disorder without agoraphobia  -     PARoxetine (PAXIL) 30 mg tablet; Take 1.5 tablets (45 mg total) by mouth every morning    Other orders  -     methocarbamol (ROBAXIN) 500 mg tablet        ______________________________________________________________________  MDD  LISS  Panic Disorder  ADHD, Inattentive  Marijuana use    MDD- not at goal  LISS - not at goal  Panic disorder  Marijuana use- was using for pain, but not really using any more recently   - Differential includes Complex bereavement, Adjustment disorder. Padmini Ferrer is a bit worse, but making physical gains. Offered medication change today but she declined, was more focused on self care and addressing issues without adding medications today but we can reconsider at next visit. Seroquel reduction went fine (less sedation but also may not be sleeping quite as well at night)    She has CKD and other medical issues. High dose of paxil, and considering her CKD we have discussed and benefits outweigh risks. She has tolerated medications well. CONSIDER NAC ( consider hypotension, delayed clotting, GI upset and other side effect considerations, including drug interactions or amplification of other drug effects) -   - Excoriation/picking is an issues she deals with , triggered by anxiety. Could be considering supplements or medication changes     She is on disability now, predominately for mental health but also medical components.      From a social perspective, she has great family supports and a good hugo friend she has known for years    Safety Risk Assessment: see above . In considering risk and protective factors, suicide risk and safety risk are low presently.     Additional Assessment:  Previous psychotropic medication trials:   Xanax  paxil  seroquel  wellbutrin (to quick smoking) years ago, had a rash, no significant issues and resolved  Buspar - "I felt out of it and sick, could not eat"  Ritalin    Scales:         Treatment Plan:        Patient has been educated about their diagnosis and treatment modalities. They voiced understanding and agreement with the following plan:    Discussed medications and if treatment adjustment was needed/desired. 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   - good family support     6) Follow up:   - Follow up in 2 mo   - Patient will call if issues or concerns      7) Treatment Plan:    - Enacted 3/23/2023, 8/14/2023    8) Crisis Plan: 8/14/2023     Discussed self monitoring of symptoms, and symptom monitoring tools. Patient has been informed of 24 hours and weekend coverage for urgent situations accessed by calling the main clinic phone number.              Psychotherapy in session:  Time spent performing psychotherapy: 16 minutes supportive therapy related to recovery, finances, life hopes and delays, self care and coping      Visit Time    Visit Start Time: 5508F  Visit Stop Time: 3334V  Total Visit Duration: 24 minutes

## 2023-08-14 NOTE — BH TREATMENT PLAN
TREATMENT PLAN (Medication Management Only)        [address] [facility]    Name/Date of Birth/MRN:  Stuart Medina 52 y.o. 1970 MRN: [id number]  Date of Treatment Plan: August 14, 2023  Diagnosis/Diagnoses:   1. Severe episode of recurrent major depressive disorder, without psychotic features ([address])    2. LISS (generalized anxiety disorder)    3. Panic disorder without agoraphobia      Strengths/Personal Resources for Self-Care: I am kind and determined  Area/Areas of need (in own words): smoking  1. Long Term Goal: "to be happy again"   Target Date: 180 days from treatment plan  Person/Persons responsible for completion of goal: Dr. Carlos Leon and Self  2. Short Term Objective (s) - How will we reach this goal?:   A. Provider new recommended medication/dosage changes and/or continue medication(s): as noed. B. To continue to work on recovery and mobility; cleaning, being active  C. Take medications regularly and seek out hobbies and activities that make me smile  Target Date: 6 months from treatment plan unless noted otherwise  Person/Persons Responsible for Completion of Goal: Dr. Carlos Leon and Self   Progress Towards Goals: continuing treatment   Treatment Modality: Medication management and therapy PRN  Review due 180 days from date of this plan: Approximately 6 months from today ( 2/14/2024 )    Expected length of service: ongoing treatment unless revised  My Physician/PA/NP and I have developed this plan together and I agree to work on the goals and objectives. I understand the treatment goals that were developed for my treatment.   Signature:       Date and time:  Signature of parent/guardian if under age of 15 years: Date and time:  Signature of provider:      Date and time:  Signature of Supervising Physician:    Date and time: 8/14/2023      Franco Ruelas III,

## 2023-08-14 NOTE — BH CRISIS PLAN
Client Name: Philipp Bran       Client YOB: 1970  : 1970    Treatment Team (include name and contact information):     Psychotherapist: no    Psychiatrist: Dr. Rakesh Hinds   Release of information completed: no      Healthcare Provider  DO Savanna Abdiharshnereyda  [place] [address]  [phone]    Type of Plan   * Child plans (children 15 yo and younger) must be completed and signed by the child's legal guardian   * Plans for all individuals 15 yo and above must be signed by the client. Plan Type: adolescent/adult (14 and over) Initial      My Personal Strengths are (in the client's own words):  "honest, trustworthy, a sense of humor"  The stressors and triggers that may put me at risk are:  health, finances and insurance    Coping skills I can use to keep myself calm and safe: Take a shower, Listen to music, Physical activity, Call a friend or family member, Ethel/meditate and Increased contact with professional supports    Coping skills/supports I can use to maintain abstinence from substance use:   Not Applicable    The people that provide me with help and support: (Include name, contact, and how they can help)   Support person #1: Mom ([address])    * Phone number: in cell phone    * How can they help me? Support     Support person #2: Boyfriend ([place])    * Phone number: in cell phone    * How can they help me?  Support     Support person #3: Sister Melvin Gould)    * Phone number: in cell phone    * How can they help me? support    In the past, the following has helped me in times of crisis:    Being in a quiet space, Taking medications, Talking to a professional on the telephone, Going into the hospital, Calling a friend, Calling a family member, Taking a walk or exercising, Breathing exercises (or other mindfulness-based activities), Praying or meditating, Listening to music and Watching television or a movie      If it is an emergency and you need immediate help, call -    If there is a possibility of danger to yourself or others, call the following crisis hotline resources:     Adult Crisis Numbers  Suicide Prevention Hotline - Dial 9-8-8  [facility]: [address]: [address] [facility]: [address]: [phone]  [address]: [phone]  [place]: [address]: [address]: [phone] (daytime). [phone] (after hours, weekends, holidays)     Child/Adolescent Crisis Numbers   [facility]: [address]: [phone]   [place] : [phone]   [address] [place] [address] Street: [phone]    Please note: Some [facility] do not have a separate number for Child/Adolescent specific crisis. If your county is not listed under Child/Adolescent, please call the adult number for your county     National Talk to Text Line   All Ages - [phone]    In the event your feelings become unmanageable, and you cannot reach your support system, you will call 911 immediately or go to the nearest hospital emergency room.

## 2023-08-17 ENCOUNTER — TELEPHONE (OUTPATIENT)
Dept: PSYCHIATRY | Facility: CLINIC | Age: 53
End: 2023-08-17

## 2023-09-05 ENCOUNTER — TELEPHONE (OUTPATIENT)
Dept: NEPHROLOGY | Facility: CLINIC | Age: 53
End: 2023-09-05

## 2023-09-05 NOTE — TELEPHONE ENCOUNTER
LVM to return call to schedule January follow up with Dr. Hari Iglesias in [facility].

## 2023-09-27 DIAGNOSIS — F33.2 SEVERE EPISODE OF RECURRENT MAJOR DEPRESSIVE DISORDER, WITHOUT PSYCHOTIC FEATURES (HCC): ICD-10-CM

## 2023-09-27 RX ORDER — METHYLPHENIDATE HYDROCHLORIDE 20 MG/1
20 TABLET ORAL 2 TIMES DAILY
Qty: 60 TABLET | Refills: 0 | Status: SHIPPED | OUTPATIENT
Start: 2023-09-27

## 2023-09-27 NOTE — TELEPHONE ENCOUNTER
Medication Refill Request     Name of Medication Ritalin  Dose/Frequency 20mg take 1 tablet by mouth 2 times a day  Quantity 60 Tablets  Verified pharmacy   [x]  Verified ordering Provider   [x]  Does patient have enough for the next 3 days? Yes [] No [x]  Does patient have a follow-up appointment scheduled?  Yes [x] No []   If so when is appointment: 10/9/23

## 2023-10-09 ENCOUNTER — OFFICE VISIT (OUTPATIENT)
Dept: PSYCHIATRY | Facility: CLINIC | Age: 53
End: 2023-10-09
Payer: MEDICARE

## 2023-10-09 DIAGNOSIS — F41.1 GAD (GENERALIZED ANXIETY DISORDER): ICD-10-CM

## 2023-10-09 DIAGNOSIS — Z79.899 HIGH RISK MEDICATION USE: ICD-10-CM

## 2023-10-09 DIAGNOSIS — F41.0 PANIC DISORDER WITHOUT AGORAPHOBIA: ICD-10-CM

## 2023-10-09 DIAGNOSIS — F33.2 SEVERE EPISODE OF RECURRENT MAJOR DEPRESSIVE DISORDER, WITHOUT PSYCHOTIC FEATURES (HCC): Primary | ICD-10-CM

## 2023-10-09 PROBLEM — M16.11 PRIMARY OSTEOARTHRITIS OF RIGHT HIP: Status: ACTIVE | Noted: 2023-05-30

## 2023-10-09 PROBLEM — M25.552 LEFT HIP PAIN: Status: ACTIVE | Noted: 2017-12-14

## 2023-10-09 PROBLEM — M16.12 PRIMARY LOCALIZED OSTEOARTHRITIS OF LEFT HIP: Status: ACTIVE | Noted: 2017-12-14

## 2023-10-09 PROCEDURE — 99214 OFFICE O/P EST MOD 30 MIN: CPT | Performed by: PSYCHIATRY & NEUROLOGY

## 2023-10-09 RX ORDER — ARIPIPRAZOLE 5 MG/1
TABLET ORAL
Qty: 30 TABLET | Refills: 1 | Status: SHIPPED | OUTPATIENT
Start: 2023-10-09

## 2023-10-09 RX ORDER — METHYLPHENIDATE HYDROCHLORIDE 20 MG/1
20 TABLET ORAL 2 TIMES DAILY
Qty: 60 TABLET | Refills: 0 | Status: SHIPPED | OUTPATIENT
Start: 2023-10-25

## 2023-10-09 RX ORDER — PAROXETINE 30 MG/1
45 TABLET, FILM COATED ORAL EVERY MORNING
Qty: 135 TABLET | Refills: 1 | Status: SHIPPED | OUTPATIENT
Start: 2023-10-09

## 2023-10-09 NOTE — PSYCH
MEDICATION MANAGEMENT NOTE        [facility]      Name and Date of Birth:  Miriam Murphy 52 y.o. 1970    Date of Visit: October 9, 2023    SUBJECTIVE:  CC: Lv Cabello presents today for follow up on "hanging in there"; MDD, LISS, PD, ADHD, mental health    Lv Cabello notes she still does not have the energy and desire to do stuff. She does clean and does get some thigs done, but procrastinates. No longer in PT, was just told not to overdo it, and that her every days is probably enough to do well. Pain is low, sometimes thigh muscle stretching. Skin picking improved      Seroquel reduced last time due to sedation, feels sleep a bit less quality but also trying not to nap, 2d/wk. PT no longer in home and she is making good progress, but she gets down on herself because it is slow going and she is not where she wants to be. Still some motivation issues delay her activities at times, and finances weigh on her, not sure how August will look with medications and other costs. She has good supports, feels she is managing adequately but we discussed coping skills and strategies. She prefers no medication change today, to give another month and then revisit. Since our last visit, overall symptoms have been unchanged. Med Compliance: yes    HPI ROS:                      ('was' below is from prior visit)  Medication Side Effects (other than noted):  no     Depression (10 worst):  8 (Was 8)   Anxiety (10 worst):  8 (Was 9)   Hallucinations or Psychosis  no (Was no)    Safety concerns (self harm thoughts, suicidal ideation, HI, etc):  some passive deathiwish (Was no)   Sleep: (NM = Nightmares)  12hr/night or a little less/night. Broken still, moves to get comfortable and once to urinate. Discussed reducing evening fluids (she notes she was told no restrictions or guidelines/just 'be normal').  Lasix does not help urination either (Was naps 2/7; falls asleep ok. Wakes up 4-5. Pain and restlessness wakes her)   Energy:  low (Was low)   Appetite:  low, nausea comes on easy. Anxiety worsens  (Was a bit on low side, eats because she has to)   Weight Change:  some loss          PHQ-2/9 Depression Screening    Little interest or pleasure in doing things: 2 - more than half the days  Feeling down, depressed, or hopeless: 2 - more than half the days  Trouble falling or staying asleep, or sleeping too much: 1 - several days  Feeling tired or having little energy: 2 - more than half the days  Poor appetite or overeatin - more than half the days  Feeling bad about yourself - or that you are a failure or have let yourself or your family down: 2 - more than half the days  Trouble concentrating on things, such as reading the newspaper or watching television: 2 - more than half the days  Moving or speaking so slowly that other people could have noticed. Or the opposite - being so fidgety or restless that you have been moving around a lot more than usual: 0 - not at all             Luiz Martin denies any side effects from medications unless noted above    Review Of Systems as noted above. Otherwise A relevant review of symptoms was otherwise negative    History Review:  The following portions of the patient's history were reviewed and documented: allergies, current medications, past family history, past medical history, past social history and problem list.     Lab Review: Labs were reviewed and discussed with patient      OBJECTIVE:     MENTAL STATUS EXAM  Appearance:  age appropriate   Behavior:  pleasant, cooperative, with good eye contact   Speech:  Normal volume, regular rate and rhythm   Mood:  depressed and anxious   Affect:  mood congruent   Language: intact and appropriate for age, education, and intellect   Thought Process:  Linear and goal directed   Associations: intact associations   Thought Content:  negative thinking and cognitive distortions, no overt delusions Perceptual Disturbances: no auditory or visual hallcunations   Risk Potential / Abnormal Thoughts: Suicidal ideation - Yes, deathwish but would not hasten death or pursue suicidal behavior, Would seek help, identifies reason to live, has means and plan to keep self safe  Homicidal ideation - None  Potential for aggression - No       Consciousness:  Alert & Awake   Sensorium:  Grossly oriented   Attention: attention span and concentration are age appropriate       Fund of Knowledge:  Memory: awareness of current events: yes  recent and remote memory grossly intact   Insight:  good   Judgment: good   Muscle Strength Muscle Tone: normal  normal   Gait/Station: normal gait/station with good balance   Motor Activity: no abnormal movements       Risks, Benefits And Possible Side Effects Of Medications:    AGREE: Risks, benefits, and possible side effects of medications explained to Darcy and she (or legal representative) verbalizes understanding and agreement for treatment. Controlled Medication Discussion:     Darcy has been filling controlled prescriptions on time as prescribed according to [address] program.   _____________________________________________________________      Past Psychiatric History  Previous diagnoses include depression, anxiety, ADHD  Prior outpatient psychiatric treatment: yes, Dr. Beau Wharton  Prior therapy: no  Prior inpatient psychiatric treatment: no  Prior suicide attempts: no  Prior self harm: no  Prior violence or aggression: no    Past Social History:  The patient grew up in [place]. Childhood was described as "good".     During childhood, parents were together. They have 2 sister(s) and 1 brother(s).  Patient is middle in birth order     Abuse/neglect: none     As far as the patient (or present family member) is aware, overall childhood development: Patient notes ADHD symptoms in childhood and grades were not great, but no developmental delays     Education level: LPN/associates   Current occupation: after , had to stop  Marital status: never   Children: Benedicto Vanessa ( )  Current Living Situation: the patient lives alone . Social support: good family support, a friend     Buddhist Affiliation: no   experience: no  Legal history: no  Access to Guns: no     Substance use and treatment:  Tobacco use: yes  Caffeine Use: yes  ETOH use: no  Other substance use: marijuana once a week. Not prescribed, but thought to get her card. Endorses previous experimentation with: marijuana      Longest clean time: not applicable  History of Inpatient/Outpatient rehabilitation program: no        Traumatic History:      Abuse: none  Other Traumatic Events: none      Family Psychiatric History:      Psychiatric Illness:      Mom, son - anxiety; Denies bipolar disorder, schizophrenaia  Substance Abuse:       Son (opioids, )  Suicide Attempts:        no family history of suicide attempts     Assessment/Plan:        Diagnoses and all orders for this visit:    Severe episode of recurrent major depressive disorder, without psychotic features ([address])  -     methylphenidate (RITALIN) 20 MG tablet; Take 1 tablet (20 mg total) by mouth 2 (two) times a day Max Daily Amount: 40 mg Do not start before 2023.  -     PARoxetine (PAXIL) 30 mg tablet; Take 1.5 tablets (45 mg total) by mouth every morning    LISS (generalized anxiety disorder)  -     PARoxetine (PAXIL) 30 mg tablet; Take 1.5 tablets (45 mg total) by mouth every morning    Panic disorder without agoraphobia  -     PARoxetine (PAXIL) 30 mg tablet; Take 1.5 tablets (45 mg total) by mouth every morning    High risk medication use  -     ARIPiprazole (ABILIFY) 5 mg tablet; Take 1/2 tab daily. After 1-2 weeks increase to 5mg daily  -     HEMOGLOBIN A1C W/ EAG ESTIMATION; Future  -     TSH, 3rd generation with Free T4 reflex;  Future  -     Lipid Panel with Direct LDL reflex; Future        ______________________________________________________________________  MDD  LISS  Panic Disorder  ADHD, Inattentive  Marijuana use    MDD- not at goal  LISS - not at goal  Panic disorder  Marijuana use- was using for pain, but not really using any more recently   - Differential includes Complex bereavement, Adjustment disorder. Fabricio Lerner is about the same, but skin picking improved (did not pursue NAC, but likely 1200mg x3wk, 2400mg x 3 wk, then consider 3000mg). Will stop seroquel, add abilify. Remeron likely more wt gain but considered, swap paxil discussed (she preferred not presently), hydroxyzine as well (but she notes while she has spot anxiety, it is general Mahajan Blow). She has CKD and other medical issues. High dose of paxil, and considering her CKD we have discussed and benefits outweigh risks. She has tolerated medications well.    She is on disability now, predominately for mental health but also medical components.      From a social perspective, she has great family supports and a good hugo friend she has known for years    Safety Risk Assessment: see above . In considering risk and protective factors, suicide risk and safety risk are low presently. Additional Assessment:  Previous psychotropic medication trials:   Xanax  paxil  seroquel  wellbutrin (to quick smoking) years ago, had a rash, no significant issues and resolved  Buspar - "I felt out of it and sick, could not eat"  Ritalin    Scales:  10/9/2023: no abnormal movements stated or identified         Treatment Plan:        Patient has been educated about their diagnosis and treatment modalities. They voiced understanding and agreement with the following plan:    Discussed medications and if treatment adjustment was needed/desired. 1) MEDS:           - Paxil 45mg daily   - Ritalin 20mg BID   - STOP Seroquel 100mg HS to 50mg HS (6/14/2023)   - START Abilify 2.5mg daily x1-2wk, then 5mg daily.  We have discussed side effects and similiarities to seroquel including metabolic syndrome, NMS, TD, EPS, and others as well as akathisia and activation.    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   - good family support     6) Follow up:   - Follow up in 2 mo   - Patient will call if issues or concerns      7) Treatment Plan:    - Enacted 3/23/2023, 8/14/2023    8) Crisis Plan: 8/14/2023     Discussed self monitoring of symptoms, and symptom monitoring tools. Patient has been informed of 24 hours and weekend coverage for urgent situations accessed by calling the main clinic phone number.              Psychotherapy in session:  Time spent performing psychotherapy:        Visit Time    Visit Start Time: 1110  Visit Stop Time: 1127a  Total Visit Duration: 17 minutes

## 2023-10-27 DIAGNOSIS — F33.2 SEVERE EPISODE OF RECURRENT MAJOR DEPRESSIVE DISORDER, WITHOUT PSYCHOTIC FEATURES (HCC): ICD-10-CM

## 2023-10-27 RX ORDER — METHYLPHENIDATE HYDROCHLORIDE 20 MG/1
20 TABLET ORAL 2 TIMES DAILY
Qty: 60 TABLET | Refills: 0 | Status: SHIPPED | OUTPATIENT
Start: 2023-10-27

## 2023-10-31 ENCOUNTER — TELEPHONE (OUTPATIENT)
Dept: PSYCHIATRY | Facility: CLINIC | Age: 53
End: 2023-10-31

## 2023-10-31 NOTE — TELEPHONE ENCOUNTER
Called Darcy and reiterated that since medications are in the same class, it increases her risks for TD and metabolic syndrome if she takes both. However, if she chooses to stay on both, provider recommends that she cut the Seroquel in half to only take 25 MG at bedtime as needed. Instructed her to call the office prior to next appointment if she has any concerns.

## 2023-10-31 NOTE — TELEPHONE ENCOUNTER
Follow up call to Adelia Hendrickson to discuss her request for a sleep aid. Adelia Hendrickson states the Abilify is working "great". States she can actually get up and do things. However after about 3 days, she started taking her Seroquel again. Informed her that the Seroquel was discontinued and she should not be taking it since she started on the Abilify. States she has been taking 50 MG at bedtime and it is working well. She would like to know if she can continue taking it until she can discuss other options at next appointment on 12/11. Will refer to Dr Rena Diaz for review.

## 2023-11-01 DIAGNOSIS — F41.1 GAD (GENERALIZED ANXIETY DISORDER): Primary | ICD-10-CM

## 2023-11-01 DIAGNOSIS — F33.2 SEVERE EPISODE OF RECURRENT MAJOR DEPRESSIVE DISORDER, WITHOUT PSYCHOTIC FEATURES (HCC): ICD-10-CM

## 2023-11-01 RX ORDER — QUETIAPINE FUMARATE 25 MG/1
25-50 TABLET, FILM COATED ORAL
Start: 2023-11-01

## 2023-11-02 ENCOUNTER — APPOINTMENT (OUTPATIENT)
Dept: LAB | Facility: HOSPITAL | Age: 53
End: 2023-11-02
Payer: MEDICARE

## 2023-11-02 DIAGNOSIS — Z79.899 HIGH RISK MEDICATION USE: ICD-10-CM

## 2023-11-02 LAB
CHOLEST SERPL-MCNC: 163 MG/DL
EST. AVERAGE GLUCOSE BLD GHB EST-MCNC: 123 MG/DL
HBA1C MFR BLD: 5.9 %
HDLC SERPL-MCNC: 56 MG/DL
LDLC SERPL CALC-MCNC: 71 MG/DL (ref 0–100)
TRIGL SERPL-MCNC: 180 MG/DL
TSH SERPL DL<=0.05 MIU/L-ACNC: 1.28 UIU/ML (ref 0.45–4.5)

## 2023-11-02 PROCEDURE — 83036 HEMOGLOBIN GLYCOSYLATED A1C: CPT

## 2023-11-02 PROCEDURE — 84443 ASSAY THYROID STIM HORMONE: CPT

## 2023-11-02 PROCEDURE — 80061 LIPID PANEL: CPT

## 2023-11-02 PROCEDURE — 36415 COLL VENOUS BLD VENIPUNCTURE: CPT

## 2023-11-09 DIAGNOSIS — I16.0 HYPERTENSIVE URGENCY: ICD-10-CM

## 2023-11-09 RX ORDER — AMLODIPINE BESYLATE 2.5 MG/1
TABLET ORAL
Qty: 90 TABLET | Refills: 1 | Status: SHIPPED | OUTPATIENT
Start: 2023-11-09

## 2023-11-27 DIAGNOSIS — F33.2 SEVERE EPISODE OF RECURRENT MAJOR DEPRESSIVE DISORDER, WITHOUT PSYCHOTIC FEATURES (HCC): ICD-10-CM

## 2023-11-27 NOTE — TELEPHONE ENCOUNTER
Medication Refill Request     Name of Medication Ritalin  Dose/Frequency 20MG Take 1 tablet by mouth 2 times a day  Quantity 60 Tablets  Verified pharmacy   [x]  Verified ordering Provider   [x]  Does patient have enough for the next 3 days? Yes [] No [x]  Does patient have a follow-up appointment scheduled?  Yes [x] No []   If so when is appointment: 12/11/23

## 2023-11-28 RX ORDER — METHYLPHENIDATE HYDROCHLORIDE 20 MG/1
20 TABLET ORAL 2 TIMES DAILY
Qty: 60 TABLET | Refills: 0 | Status: SHIPPED | OUTPATIENT
Start: 2023-11-28

## 2023-12-11 ENCOUNTER — OFFICE VISIT (OUTPATIENT)
Dept: PSYCHIATRY | Facility: CLINIC | Age: 53
End: 2023-12-11
Payer: MEDICARE

## 2023-12-11 DIAGNOSIS — F41.0 PANIC DISORDER WITHOUT AGORAPHOBIA: ICD-10-CM

## 2023-12-11 DIAGNOSIS — Z79.899 HIGH RISK MEDICATION USE: ICD-10-CM

## 2023-12-11 DIAGNOSIS — F41.1 GAD (GENERALIZED ANXIETY DISORDER): ICD-10-CM

## 2023-12-11 DIAGNOSIS — F33.2 SEVERE EPISODE OF RECURRENT MAJOR DEPRESSIVE DISORDER, WITHOUT PSYCHOTIC FEATURES (HCC): ICD-10-CM

## 2023-12-11 PROCEDURE — 99214 OFFICE O/P EST MOD 30 MIN: CPT | Performed by: PSYCHIATRY & NEUROLOGY

## 2023-12-11 RX ORDER — ARIPIPRAZOLE 5 MG/1
5 TABLET ORAL DAILY
Qty: 90 TABLET | Refills: 1 | Status: SHIPPED | OUTPATIENT
Start: 2023-12-11

## 2023-12-11 RX ORDER — PAROXETINE 30 MG/1
45 TABLET, FILM COATED ORAL EVERY MORNING
Qty: 135 TABLET | Refills: 1 | Status: SHIPPED | OUTPATIENT
Start: 2023-12-11

## 2023-12-11 RX ORDER — QUETIAPINE FUMARATE 25 MG/1
25 TABLET, FILM COATED ORAL
Qty: 90 TABLET | Refills: 2 | Status: SHIPPED | OUTPATIENT
Start: 2023-12-11

## 2023-12-11 RX ORDER — METHYLPHENIDATE HYDROCHLORIDE 20 MG/1
20 TABLET ORAL 2 TIMES DAILY
Qty: 60 TABLET | Refills: 0 | Status: SHIPPED | OUTPATIENT
Start: 2023-12-26

## 2023-12-11 NOTE — PSYCH
MEDICATION MANAGEMENT NOTE        [facility] [place]      Name and Date of Birth:  Shabana Mejias 52 y.o. 1970    Date of Visit: December 11, 2023    SUBJECTIVE:  CC: Darcy presents today for follow up on "I am doing pretty good"; MDD, LISS, PD, ADHD, mental health    Darcy is taking abilify and paxil in AM and taking seroquel 25mg HS to fall asleep. Pain is well managed, but takes tylenol at night. Doing stuff around the house she has not done in a long time. Deocrated for remington, first time in 6-7 years. Abilify "woke me up". Son would have turned 30 on the 3rd. Good memories  (4 years gone now, he was 32). Visited old client, her and mom were really close. . She has felt like doing stuff again. She understands my concerns about being on 2 SGAs, but also low doses, tolerating well and best I have seen her. I expressed long term goal would be to work away from seroquel but we agreed to maintain at present course due to dramatic improvement of abilify and needed sleep/balancing benefit from seroquel. Since our last visit, overall symptoms have been improving. Med Compliance: yes    HPI ROS:                      ('was' below is from prior visit)  Medication Side Effects (other than noted):  no     Depression (10 worst):  5 (Was 8)   Anxiety (10 worst):  5 (Was 8)   Hallucinations or Psychosis  no (Was no)    Safety concerns (self harm thoughts, suicidal ideation, HI, etc):  no (Was some passive deathwish)   Sleep: (NM = Nightmares)  Good with seroquel (Was 12hr/night or a little less/night. Broken still, moves to get comfortable and once to urinate. Discussed reducing evening fluids (she notes she was told no restrictions or guidelines/just 'be normal'). Lasix does not help urination either)   Energy:  good (Was low)   Appetite:  good (Was low, nausea comes on easy.  Anxiety worsens)   Weight Change:  no          PHQ-2/9 Depression Screening    Little interest or pleasure in doing things: 1 - several days  Feeling down, depressed, or hopeless: 1 - several days  Trouble falling or staying asleep, or sleeping too much: 2 - more than half the days  Feeling tired or having little energy: 1 - several days  Poor appetite or overeatin - several days  Feeling bad about yourself - or that you are a failure or have let yourself or your family down: 0 - not at all  Trouble concentrating on things, such as reading the newspaper or watching television: 1 - several days  Moving or speaking so slowly that other people could have noticed. Or the opposite - being so fidgety or restless that you have been moving around a lot more than usual: 0 - not at all             Johan Junior denies any side effects from medications unless noted above    Review Of Systems as noted above. Otherwise A relevant review of symptoms was otherwise negative    History Review:  The following portions of the patient's history were reviewed and documented: allergies, current medications, past family history, past medical history, past social history, and problem list.     Lab Review: Labs were reviewed and discussed with patient      OBJECTIVE:     MENTAL STATUS EXAM  Appearance:  age appropriate   Behavior:  pleasant, cooperative, with good eye contact   Speech:  Normal volume, regular rate and rhythm   Mood:  dysphoric, anxious   Affect:  brighter with some improvement   Language: intact and appropriate for age, education, and intellect   Thought Process:  Linear and goal directed   Associations: intact associations   Thought Content:  normal and appropriate   Perceptual Disturbances: no auditory or visual hallcunations   Risk Potential / Abnormal Thoughts: Suicidal ideation - None  Homicidal ideation - None  Potential for aggression - No       Consciousness:  Alert & Awake   Sensorium:  Grossly oriented   Attention: attention span and concentration are age appropriate       Fund of Knowledge:  Memory: awareness of current events: yes  recent and remote memory grossly intact   Insight:  good   Judgment: good   Muscle Strength Muscle Tone: normal  normal   Gait/Station: normal gait/station with good balance   Motor Activity: no abnormal movements       Risks, Benefits And Possible Side Effects Of Medications:    AGREE: Risks, benefits, and possible side effects of medications explained to Darcy and she (or legal representative) verbalizes understanding and agreement for treatment. Controlled Medication Discussion:     Not applicable  _____________________________________________________________      Past Psychiatric History  Previous diagnoses include depression, anxiety, ADHD  Prior outpatient psychiatric treatment: yes, Dr. Art Led  Prior therapy: no  Prior inpatient psychiatric treatment: no  Prior suicide attempts: no  Prior self harm: no  Prior violence or aggression: no    Past Social History:  The patient grew up in [place]. Childhood was described as "good". During childhood, parents were together. They have 2 sister(s) and 1 brother(s). Patient is middle in birth order     Abuse/neglect: none     As far as the patient (or present family member) is aware, overall childhood development: Patient notes ADHD symptoms in childhood and grades were not great, but no developmental delays     Education level: LPN/associates   Current occupation: after fall, had to stop  Marital status: never   Children: Urvashi Malcolm ( )  Current Living Situation: the patient lives alone . Social support: good family support, a friend     Muslim Affiliation: no   experience: no  Legal history: no  Access to Guns: no     Substance use and treatment:  Tobacco use: yes  Caffeine Use: yes  ETOH use: no  Other substance use: marijuana once a week. Not prescribed, but thought to get her card.     Endorses previous experimentation with: marijuana      Longest clean time: not applicable  History of Inpatient/Outpatient rehabilitation program: no        Traumatic History:      Abuse: none  Other Traumatic Events: none      Family Psychiatric History:      Psychiatric Illness:      Mom, son - anxiety; Denies bipolar disorder, schizophrenaia  Substance Abuse:       Son (opioids, )  Suicide Attempts:        no family history of suicide attempts     Assessment/Plan:        Diagnoses and all orders for this visit:    Severe episode of recurrent major depressive disorder, without psychotic features ([address])  -     methylphenidate (RITALIN) 20 MG tablet; Take 1 tablet (20 mg total) by mouth 2 (two) times a day Max Daily Amount: 40 mg Do not start before 2023.  -     PARoxetine (PAXIL) 30 mg tablet; Take 1.5 tablets (45 mg total) by mouth every morning  -     QUEtiapine (SEROquel) 25 mg tablet; Take 1 tablet (25 mg total) by mouth daily at bedtime    Panic disorder without agoraphobia  -     PARoxetine (PAXIL) 30 mg tablet; Take 1.5 tablets (45 mg total) by mouth every morning    LISS (generalized anxiety disorder)  -     PARoxetine (PAXIL) 30 mg tablet; Take 1.5 tablets (45 mg total) by mouth every morning  -     QUEtiapine (SEROquel) 25 mg tablet; Take 1 tablet (25 mg total) by mouth daily at bedtime    High risk medication use  -     ARIPiprazole (ABILIFY) 5 mg tablet; Take 1 tablet (5 mg total) by mouth daily          ______________________________________________________________________  MDD  LISS  Panic Disorder  ADHD, Inattentive  Marijuana use    MDD- improving  LISS - improving  Panic disorder - none recently  Marijuana use- was using for pain, but not really using any more recently   - Differential includes Complex bereavement, Adjustment disorder. [place] Winston is improving. Will continue serquel for now but look to other direction (2 SGAs) in future.  Discussed risks, combination concerns with abilify with her, and she understands risks, but benefits also have been clear and I have not seen her this well. F/U PRN skin picking that improved (did not pursue NAC, but likely 1200mg x3wk, 2400mg x 3 wk, then consider 3000mg). Remeron likely more wt gain but considered, swap paxil discussed (she preferred not presently), hydroxyzine as well (but she notes while she has spot anxiety, it is general [place] Mccrary). She has CKD and other medical issues. High dose of paxil, and considering her CKD we have discussed and benefits outweigh risks. She has tolerated medications well. She is on disability now, predominately for mental health but also medical components. From a social perspective, she has great family supports and a good hugo friend she has known for years    Safety Risk Assessment: see above . In considering risk and protective factors, suicide risk and safety risk are low presently. Additional Assessment:  Previous psychotropic medication trials:   Xanax  paxil  seroquel  wellbutrin (to quick smoking) years ago, had a rash, no significant issues and resolved  Buspar - "I felt out of it and sick, could not eat"  Ritalin    Scales:  10/9/2023: no abnormal movements stated or identified         Treatment Plan:        Patient has been educated about their diagnosis and treatment modalities. They voiced understanding and agreement with the following plan:    Discussed medications and if treatment adjustment was needed/desired.     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   - good family support     6) Follow up:   - Follow up in 2 mo   - Patient will call if issues or concerns      7) Treatment Plan:    - Enacted 3/23/2023, 8/14/2023    8) Crisis Plan: 8/14/2023     Discussed self monitoring of symptoms, and symptom monitoring tools. Patient has been informed of 24 hours and weekend coverage for urgent situations accessed by calling the main clinic phone number.              Psychotherapy in session:  Time spent performing psychotherapy:        Visit Time    Visit Start Time: 340  Visit Stop Time: 357p  Total Visit Duration:  17 minutes

## 2024-01-13 DIAGNOSIS — I16.0 HYPERTENSIVE URGENCY: ICD-10-CM

## 2024-01-15 ENCOUNTER — TELEPHONE (OUTPATIENT)
Dept: NEPHROLOGY | Facility: CLINIC | Age: 54
End: 2024-01-15

## 2024-01-15 DIAGNOSIS — F33.2 SEVERE EPISODE OF RECURRENT MAJOR DEPRESSIVE DISORDER, WITHOUT PSYCHOTIC FEATURES (HCC): ICD-10-CM

## 2024-01-15 RX ORDER — METHYLPHENIDATE HYDROCHLORIDE 20 MG/1
20 TABLET ORAL 2 TIMES DAILY
Qty: 60 TABLET | Refills: 0 | Status: SHIPPED | OUTPATIENT
Start: 2024-01-15

## 2024-01-15 RX ORDER — HYDRALAZINE HYDROCHLORIDE 25 MG/1
TABLET, FILM COATED ORAL
Qty: 270 TABLET | Refills: 1 | Status: SHIPPED | OUTPATIENT
Start: 2024-01-15

## 2024-01-15 NOTE — TELEPHONE ENCOUNTER
Pt called requesting to changer her appt on 1/17 with Dr Miller in the [facility], as she is unable to complete lab work. Appt was rescheduled on 3/11 with Joy Ríos in the [facility]. Pt is aware to complete labs.

## 2024-01-15 NOTE — TELEPHONE ENCOUNTER
Patient called stating that she hasn't had this medication of Ritalin for almost a month now. Her primary pharmacy does not have medication in stock. She did find a pharmacy that has it stock. She is requesting this refill to be sent to [facility]. Address [address] [address].

## 2024-02-12 ENCOUNTER — TELEPHONE (OUTPATIENT)
Dept: PSYCHIATRY | Facility: CLINIC | Age: 54
End: 2024-02-12

## 2024-02-12 DIAGNOSIS — I16.0 HYPERTENSIVE URGENCY: ICD-10-CM

## 2024-02-12 DIAGNOSIS — I50.9 CHF (CONGESTIVE HEART FAILURE) (HCC): ICD-10-CM

## 2024-02-12 DIAGNOSIS — I50.21 ACUTE SYSTOLIC CONGESTIVE HEART FAILURE (HCC): ICD-10-CM

## 2024-02-12 RX ORDER — FUROSEMIDE 40 MG/1
TABLET ORAL
Qty: 90 TABLET | Refills: 1 | Status: SHIPPED | OUTPATIENT
Start: 2024-02-12

## 2024-02-12 RX ORDER — FUROSEMIDE 20 MG/1
TABLET ORAL
Qty: 90 TABLET | Refills: 1 | Status: SHIPPED | OUTPATIENT
Start: 2024-02-12

## 2024-02-12 RX ORDER — CARVEDILOL 25 MG/1
TABLET ORAL
Qty: 180 TABLET | Refills: 1 | Status: SHIPPED | OUTPATIENT
Start: 2024-02-12

## 2024-02-12 RX ORDER — ATORVASTATIN CALCIUM 40 MG/1
TABLET, FILM COATED ORAL
Qty: 90 TABLET | Refills: 1 | Status: SHIPPED | OUTPATIENT
Start: 2024-02-12

## 2024-02-14 DIAGNOSIS — F33.2 SEVERE EPISODE OF RECURRENT MAJOR DEPRESSIVE DISORDER, WITHOUT PSYCHOTIC FEATURES (HCC): ICD-10-CM

## 2024-02-14 RX ORDER — METHYLPHENIDATE HYDROCHLORIDE 20 MG/1
20 TABLET ORAL 2 TIMES DAILY
Qty: 60 TABLET | Refills: 0 | Status: SHIPPED | OUTPATIENT
Start: 2024-02-14 | End: 2024-02-19 | Stop reason: SDUPTHER

## 2024-02-14 NOTE — TELEPHONE ENCOUNTER
Medication:   ritalin        Pharmacy:  [facility]/pharmacy #[id number] - [place] SANCHE[place] - [address]

## 2024-02-19 ENCOUNTER — OFFICE VISIT (OUTPATIENT)
Dept: PSYCHIATRY | Facility: CLINIC | Age: 54
End: 2024-02-19
Payer: COMMERCIAL

## 2024-02-19 VITALS — WEIGHT: 147.8 LBS | BODY MASS INDEX: 23.86 KG/M2

## 2024-02-19 DIAGNOSIS — F41.1 GAD (GENERALIZED ANXIETY DISORDER): ICD-10-CM

## 2024-02-19 DIAGNOSIS — F41.0 PANIC DISORDER WITHOUT AGORAPHOBIA: ICD-10-CM

## 2024-02-19 DIAGNOSIS — F33.2 SEVERE EPISODE OF RECURRENT MAJOR DEPRESSIVE DISORDER, WITHOUT PSYCHOTIC FEATURES (HCC): Primary | ICD-10-CM

## 2024-02-19 DIAGNOSIS — Z79.899 HIGH RISK MEDICATION USE: ICD-10-CM

## 2024-02-19 DIAGNOSIS — F11.21 OPIOID DEPENDENCE IN REMISSION (HCC): ICD-10-CM

## 2024-02-19 PROCEDURE — 99214 OFFICE O/P EST MOD 30 MIN: CPT | Performed by: PSYCHIATRY & NEUROLOGY

## 2024-02-19 RX ORDER — QUETIAPINE FUMARATE 25 MG/1
25 TABLET, FILM COATED ORAL
Qty: 90 TABLET | Refills: 2 | Status: SHIPPED | OUTPATIENT
Start: 2024-02-19

## 2024-02-19 RX ORDER — METHYLPHENIDATE HYDROCHLORIDE 20 MG/1
20 TABLET ORAL 2 TIMES DAILY
Qty: 60 TABLET | Refills: 0 | Status: SHIPPED | OUTPATIENT
Start: 2024-03-13

## 2024-02-19 RX ORDER — ARIPIPRAZOLE 5 MG/1
5 TABLET ORAL DAILY
Qty: 90 TABLET | Refills: 1 | Status: SHIPPED | OUTPATIENT
Start: 2024-02-19

## 2024-02-19 RX ORDER — PAROXETINE 30 MG/1
45 TABLET, FILM COATED ORAL EVERY MORNING
Qty: 135 TABLET | Refills: 1 | Status: SHIPPED | OUTPATIENT
Start: 2024-02-19

## 2024-02-19 NOTE — BH TREATMENT PLAN
"TREATMENT PLAN (Medication Management Only)        [facility] - [facility]YCHIATRIC [facility]    Name/Date of Birth/MRN:  Laurita Montana 54 y.o. 1970 MRN: [id number]  Date of Treatment Plan: February 19, 2024  Diagnosis/Diagnoses:   1. High risk medication use    2. Severe episode of recurrent major depressive disorder, without psychotic features (HCC)    3. Panic disorder without agoraphobia    4. LISS (generalized anxiety disorder)      Strengths/Personal Resources for Self-Care: I am kind and determined  Area/Areas of need (in own words): smoking  1. Long Term Goal: \"to be happy again\"   Target Date: 180 days from treatment plan  Person/Persons responsible for completion of goal: Dr. Odom and Self  2.  Short Term Objective (s) - How will we reach this goal?:   A.  Provider new recommended medication/dosage changes and/or continue medication(s): as noed.  B.  To continue to work on recovery and mobility; cleaning, being active  C.  Take medications regularly and seek out hobbies and activities that make me smile, gardening, drawing/art kit  Target Date: 6 months from treatment plan unless noted otherwise  Person/Persons Responsible for Completion of Goal: Dr. Odom and Self   Progress Towards Goals: continuing treatment   Treatment Modality: Medication management and therapy PRN  Review due 180 days from date of this plan: Approximately 6 months from today ( 8/19/2024 )    Expected length of service: ongoing treatment unless revised  My Physician/PA/NP and I have developed this plan together and I agree to work on the goals and objectives. I understand the treatment goals that were developed for my treatment.  Signature:       Date and time:  Signature of parent/guardian if under age of 14 years: Date and time:  Signature of provider:      Date and time:  Signature of Supervising Physician:    Date and time: 2/19/2024      Lei Odom III, DO  "

## 2024-02-19 NOTE — PSYCH
"MEDICATION MANAGEMENT NOTE        [facility] - [facility]YCHIAT[facility]      Name and Date of Birth:  Laurita Montana 54 y.o. 1970    Date of Visit: February 19, 2024    SUBJECTIVE:  CC: Laurita presents today for follow up on \"I am doing ok I guess, a little funk\"; MDD, LISS, PD, ADHD, mental health    Laurita notes she is in ' a funk' but notes weather seems to do it. Amovitation, low energy. No pain, and sleeping good. Eating ok, sometime low appetite.    Anxiety to leave house after being in so long. And she has to change a lot of doctors with her insurance change so that is stressing her out. New location.currently with [facility], but will have to go to [facility] and she will continue here however.     She is actually doing a lot of little tasks, 'i probably do not give myself enough credit' but shopping and other tasks she avoids.    Sometimes has not taken seroquel, and did fine without it but anxious not to have it. Will get melatonin.    Brother borrowing her car, and she is using her mom's. With snow her mom is anxious if her car is not parked out front during the snow.  She has good relationship with all siblings (2 sistres, 1 brother). Sister, mom, both live close by down the street, separately.     Son's friends still post to him on Facebook, and she had capacity to look for first time in a long time, cried but handled well. She is making progress.      Since our last visit, overall symptoms have been slightly worse.      Med Compliance: yes    HPI ROS:                      ('was' below is from prior visit)  Medication Side Effects (other than noted):  no     Depression (10 worst):  5-6 (Was 5)   Anxiety (10 worst):  5 (Was 5)   Hallucinations or Psychosis  no (Was no)    Safety concerns (self harm thoughts, suicidal ideation, HI, etc):  no (Was no)   Sleep: (NM = Nightmares)  good (Was good with seroquel)   Energy:  good (Was good)   Appetite:  good (Was good)   Weight Change:  " no          PHQ-2/9 Depression Screening                 Laurita denies any side effects from medications unless noted above    Review Of Systems as noted above. Otherwise A relevant review of symptoms was otherwise negative    History Review: The following portions of the patient's history were reviewed and documented: allergies, current medications, past family history, past medical history, past social history, and problem list.     Lab Review: Labs were reviewed      OBJECTIVE:     MENTAL STATUS EXAM  Appearance:  age appropriate   Behavior:  pleasant, cooperative, with good eye contact   Speech:  Normal volume, regular rate and rhythm   Mood:  depressed and anxious   Affect:  mood congruent   Language: intact and appropriate for age, education, and intellect   Thought Process:  Linear and goal directed   Associations: intact associations   Thought Content:  normal and appropriate   Perceptual Disturbances: no auditory or visual hallcunations   Risk Potential / Abnormal Thoughts: Suicidal ideation - None  Homicidal ideation - None  Potential for aggression - No       Consciousness:  Alert & Awake   Sensorium:  Grossly oriented   Attention: attention span and concentration are age appropriate       Fund of Knowledge:  Memory: awareness of current events: yes  recent and remote memory grossly intact   Insight:  good   Judgment: good   Muscle Strength Muscle Tone: normal  normal   Gait/Station: normal gait/station with good balance   Motor Activity: no abnormal movements       Risks, Benefits And Possible Side Effects Of Medications:    AGREE: Risks, benefits, and possible side effects of medications explained to Laurita and she (or legal representative) verbalizes understanding and agreement for treatment.    Controlled Medication Discussion:     Laurita has been filling controlled prescriptions on time as prescribed according to [place] Prescription Drug Monitoring program.  "  _____________________________________________________________      Past Psychiatric History  Previous diagnoses include depression, anxiety, ADHD  Prior outpatient psychiatric treatment: yes, Dr. York  Prior therapy: no  Prior inpatient psychiatric treatment: no  Prior suicide attempts: no  Prior self harm: no  Prior violence or aggression: no    Past Social History:  The patient grew up in [place].  Childhood was described as \"good\".     During childhood, parents were together. They have 2 sister(s) and 1 brother(s). Patient is middle in birth order     Abuse/neglect: none     As far as the patient (or present family member) is aware, overall childhood development: Patient notes ADHD symptoms in childhood and grades were not great, but no developmental delays     Education level: LPN/associates   Current occupation: after , had to stop  Marital status: never   Children: Ayaan ( )  Current Living Situation: the patient lives alone .  Social support: good family support, a friend     Adventism Affiliation: no   experience: no  Legal history: no  Access to Guns: no     Substance use and treatment:  Tobacco use: yes  Caffeine Use: yes  ETOH use: no  Other substance use: marijuana once a week. Not prescribed, but thought to get her card.    Endorses previous experimentation with: marijuana      Longest clean time: not applicable  History of Inpatient/Outpatient rehabilitation program: no        Traumatic History:      Abuse: none  Other Traumatic Events: none      Family Psychiatric History:      Psychiatric Illness:      Mom, son - anxiety; Denies bipolar disorder, schizophrenaia  Substance Abuse:       Son (opioids, )  Suicide Attempts:        no family history of suicide attempts     Assessment/Plan:        Diagnoses and all orders for this visit:    Severe episode of recurrent major depressive disorder, without psychotic features (HCC)    High risk medication use    Panic " "disorder without agoraphobia    LISS (generalized anxiety disorder)            ______________________________________________________________________  MDD  LISS  Panic Disorder  ADHD, Inattentive  Marijuana use    MDD- not at goal  LISS - not at goal  Panic disorder - less so but also avoidant  Marijuana use- was using for pain, but not really using any more recently   - Differential includes Complex bereavement, Adjustment disorder.      Laurita is doing ok, 'in a funk', but does not want to make changes, and wants to wait until the better weather comes. Does not want to stop seroquel, but will try to cut back to PRN and also try melatonin. Considered doxepin and other sleep aid medications, will continue to do so. Reviewed ECG information from [facility] 2/2024. Discussed, but will not make changes today, will stay course for now.     Abilify \"woke me up\"- works very well for her    F/U PRN skin picking that improved (did not pursue NAC, but likely 1200mg x3wk, 2400mg x 3 wk, then consider 3000mg).     Remeron likely more wt gain but considered, swap paxil discussed (she preferred not presently), hydroxyzine as well (but she notes while she has spot anxiety, it is general /constant).  She has CKD and other medical issues. High dose of paxil, and considering her CKD we have discussed and benefits outweigh risks. She has tolerated medications well.    She is on disability now, predominately for mental health but also medical components.      From a social perspective, she has great family supports and a good hugo friend she has known for years    Safety Risk Assessment: see above . In considering risk and protective factors, suicide risk and safety risk are low presently.    Additional Assessment:  Previous psychotropic medication trials:   Xanax  paxil  seroquel  wellbutrin (to quick smoking) years ago, had a rash, no significant issues and resolved  Buspar - \"I felt out of it and sick, could not " "eat\"  Ritalin    Scales:  10/9/2023: no abnormal movements stated or identified         Treatment Plan:        Patient has been educated about their diagnosis and treatment modalities. They voiced understanding and agreement with the following plan:    Discussed medications and if treatment adjustment was needed/desired.    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   - good family support     6) Follow up:   - Follow up in 3-4 mo    - Patient will call if issues or concerns      7) Treatment Plan:    - Enacted 3/23/2023, 8/14/2023, 2/19/2024    8) Crisis Plan: 8/14/2023     Discussed self monitoring of symptoms, and symptom monitoring tools.    Patient has been informed of 24 hours and weekend coverage for urgent situations accessed by calling the main clinic phone number.             Psychotherapy in session:  Time spent performing psychotherapy:          Visit Time    Visit Start Time: 410p  Visit Stop Time:  432  Total Visit Duration:  22 minutes  "

## 2024-03-05 ENCOUNTER — TELEPHONE (OUTPATIENT)
Dept: NEPHROLOGY | Facility: CLINIC | Age: 54
End: 2024-03-05

## 2024-03-05 NOTE — TELEPHONE ENCOUNTER
Left  a voicemail to patient with the following information:  Patient on Joy schedule for next week but recently saw [facility] so please call our office and let us know if you  wants to keep following with them then we can cancel your  appointment with Joy.

## 2024-03-05 NOTE — TELEPHONE ENCOUNTER
----- Message from Joy Ríos PA-C sent at 3/5/2024  2:44 PM EST -----  Patient on my schedule for next week but recently saw [facility] so please check if she wants to keep following with them then cancel her appointment with me

## 2024-03-08 ENCOUNTER — TELEPHONE (OUTPATIENT)
Dept: NEPHROLOGY | Facility: CLINIC | Age: 54
End: 2024-03-08

## 2024-03-08 NOTE — TELEPHONE ENCOUNTER
Pt called to cancel her 3/11/24 with Joy in the [facility] bc her insurance is having her switch providers to [facility].

## 2024-04-17 DIAGNOSIS — F33.2 SEVERE EPISODE OF RECURRENT MAJOR DEPRESSIVE DISORDER, WITHOUT PSYCHOTIC FEATURES (HCC): ICD-10-CM

## 2024-04-17 RX ORDER — METHYLPHENIDATE HYDROCHLORIDE 20 MG/1
20 TABLET ORAL 2 TIMES DAILY
Qty: 60 TABLET | Refills: 0 | Status: SHIPPED | OUTPATIENT
Start: 2024-04-17

## 2024-04-17 NOTE — TELEPHONE ENCOUNTER
Medication Refill Request     Name of Medication Ritalin  Dose/Frequency 20MG Take 1 tablet by mouth 2(two) times a day  Quantity 60 Tablets  Verified pharmacy   [x]  Verified ordering Provider   []  Does patient have enough for the next 3 days? Yes [] No [x]  Does patient have a follow-up appointment scheduled? Yes [x] No []   If so when is appointment: 6/10/24

## 2024-05-17 DIAGNOSIS — F33.2 SEVERE EPISODE OF RECURRENT MAJOR DEPRESSIVE DISORDER, WITHOUT PSYCHOTIC FEATURES (HCC): ICD-10-CM

## 2024-05-17 RX ORDER — METHYLPHENIDATE HYDROCHLORIDE 20 MG/1
20 TABLET ORAL 2 TIMES DAILY
Qty: 60 TABLET | Refills: 0 | Status: SHIPPED | OUTPATIENT
Start: 2024-05-17

## 2024-05-20 NOTE — POST OP PROGRESS NOTES
Progress Note - Orthopedics   Jhon Claros 46 y o  female MRN: [id number]  Unit/Bed#: E2 -01 Encounter: [id number]    Assessment:  47 yo female s/p left total hip arthroplasty POD 1, ABLA    Plan:  -Posterior hip precautions  -Abduction pillow when in bed  -pain control prn  -PT/OT WBAT LLE  -Lovenox/TEDs/SCDs for DVT prophylaxis  -abx x 24hr  -monitor hemglobin    Subjective: Pt seen and examined at bedside  She states her pain is controlled  She is complaining of a headache  Denies any SOB,CP, abdominal pain  Vitals: Blood pressure 120/76, pulse 80, temperature 98 6 °F (37 °C), temperature source Temporal, resp  rate 16, height 5' 6" (1 676 m), weight 59 kg (130 lb), SpO2 97 %  ,Body mass index is 20 98 kg/m²  Intake/Output Summary (Last 24 hours) at 03/06/18 0753  Last data filed at 03/06/18 0533   Gross per 24 hour   Intake             4430 ml   Output              875 ml   Net             3555 ml       Invasive Devices     Peripheral Intravenous Line            Peripheral IV 03/05/18 Right Wrist less than 1 day          Drain            Urethral Catheter Latex 16 Fr  less than 1 day                Ortho Exam:LLE  -Dressing C/D/I  -sensation L4, L5,S1 intact  -EHL/AT/GS intact  -palpable pedal pulse  -compartments soft  Lab, Imaging and other studies:   I have personally reviewed pertinent lab results    CBC:   Lab Results   Component Value Date    WBC 11 17 (H) 03/06/2018    HGB 10 9 (L) 03/06/2018    HCT 33 2 (L) 03/06/2018    MCV 97 03/06/2018     03/06/2018    MCH 31 8 03/06/2018    MCHC 32 8 03/06/2018    RDW 12 9 03/06/2018    MPV 9 5 03/06/2018     CMP:   Lab Results   Component Value Date     03/06/2018     (H) 03/06/2018    CO2 22 03/06/2018    ANIONGAP 8 03/06/2018    BUN 21 03/06/2018    CREATININE 0 76 03/06/2018    GLUCOSE 117 03/06/2018    CALCIUM 7 3 (L) 03/06/2018    EGFR 92 03/06/2018 Yes

## 2024-06-18 DIAGNOSIS — F33.2 SEVERE EPISODE OF RECURRENT MAJOR DEPRESSIVE DISORDER, WITHOUT PSYCHOTIC FEATURES (HCC): ICD-10-CM

## 2024-06-18 RX ORDER — METHYLPHENIDATE HYDROCHLORIDE 20 MG/1
20 TABLET ORAL 2 TIMES DAILY
Qty: 60 TABLET | Refills: 0 | Status: SHIPPED | OUTPATIENT
Start: 2024-06-18

## 2024-06-20 ENCOUNTER — TELEPHONE (OUTPATIENT)
Dept: PSYCHIATRY | Facility: CLINIC | Age: 54
End: 2024-06-20

## 2024-06-20 NOTE — TELEPHONE ENCOUNTER
Attempted to contact pt to cancel appt for tomorrow at 9am with   Offered 10 10:30 or 11 for a virtual visit.     LVM with above info

## 2024-07-19 DIAGNOSIS — F33.2 SEVERE EPISODE OF RECURRENT MAJOR DEPRESSIVE DISORDER, WITHOUT PSYCHOTIC FEATURES (HCC): ICD-10-CM

## 2024-07-19 NOTE — TELEPHONE ENCOUNTER
Reason for call:   [x] Refill   [] Prior Auth  [] Other:     Office:   [] PCP/Provider -   [x] Specialty/Provider - Lei-psych    Medication: Methylphenidate 20mg    Dose/Frequency: 1 tab bid    Quantity: 60    Pharmacy: [id number] - [place] - [address] [phone]     Does the patient have enough for 3 days?   [] Yes   [x] No - pt has 1 tab

## 2024-07-22 RX ORDER — METHYLPHENIDATE HYDROCHLORIDE 20 MG/1
20 TABLET ORAL 2 TIMES DAILY
Qty: 60 TABLET | Refills: 0 | Status: SHIPPED | OUTPATIENT
Start: 2024-07-22

## 2024-08-21 DIAGNOSIS — F41.1 GAD (GENERALIZED ANXIETY DISORDER): ICD-10-CM

## 2024-08-21 DIAGNOSIS — Z79.899 HIGH RISK MEDICATION USE: ICD-10-CM

## 2024-08-21 DIAGNOSIS — F41.0 PANIC DISORDER WITHOUT AGORAPHOBIA: ICD-10-CM

## 2024-08-21 DIAGNOSIS — F33.2 SEVERE EPISODE OF RECURRENT MAJOR DEPRESSIVE DISORDER, WITHOUT PSYCHOTIC FEATURES (HCC): ICD-10-CM

## 2024-08-21 NOTE — TELEPHONE ENCOUNTER
Reason for call:   [x] Refill   [] Prior Auth  [] Other:     Office:   [] PCP/Provider -   [x] Specialty/Provider - Psych            Does the patient have enough for 3 days?   [] Yes   [x] No - Send as HP to POD

## 2024-08-21 NOTE — TELEPHONE ENCOUNTER
Patient due for 6 month encounter so Paroxetine is not passing protocol-please review if refill is appropriate.   Methylphenidate-cannot be delegated       1 [id number] 07/22/2024 07/22/2024 Methylphenidate Hcl (Tablet) 60.0 30 20 MG NA JERRICA [employer] [place] 0 / 0 [place]   1 [id number] 06/18/2024 06/18/2024 Methylphenidate Hcl (Tablet) 60.0 30 20 MG NA JERRICA [employer] [place] 0 / 0 [place]   1 [id number] 05/17/2024 05/17/2024 Methylphenidate Hcl (Tablet) 60.0 30 20 MG NA JERRICA [employer] [place] 0 / 0 [place]   1 [id number] 04/17/2024 04/17/2024 Methylphenidate Hcl (Tablet) 60.0 30 20 MG NA JERRICA [employer] [place] 0 / 0 [place]   1 [id number] 03/15/2024 02/19/2024 Methylphenidate Hcl (Tablet) 60.0 30 20 MG NA JERRICA [employer] [place] 0 / 0 [place]   1 [id number] 02/14/2024 02/14/2024 Methylphenidate Hcl (Tablet) 60.0 30 20 MG NA JERRICA [employer] [place] 0 / 0 [place]   2 [id number] 01/15/2024 01/15/2024 Methylphenidate Hcl (Tablet) 60.0 30 20 MG NA JERRICA [facility] Medicare 0 / 0 [place]   1 [id number] 11/28/2023 11/28/2023 Methylphenidate Hcl (Tablet) 60.0 30 20 MG NA JERRICA [employer] [place] 0 / 0 [place]   1 [id number] 10/27/2023 10/27/2023 Methylphenidate Hcl (Tablet) 60.0 30 20 MG NA JERRICA [employer] [place] 0 / 0 [place]   1 [id number] 09/27/2023 09/27/2023 Methylphenidate Hcl (Tablet) 60.0 30 20 MG NA JERRICA [employer] [place] 0 / 0 [place]

## 2024-08-23 RX ORDER — PAROXETINE 30 MG/1
45 TABLET, FILM COATED ORAL EVERY MORNING
Qty: 135 TABLET | Refills: 0 | Status: SHIPPED | OUTPATIENT
Start: 2024-08-23

## 2024-08-23 RX ORDER — METHYLPHENIDATE HYDROCHLORIDE 20 MG/1
20 TABLET ORAL 2 TIMES DAILY
Qty: 60 TABLET | Refills: 0 | Status: SHIPPED | OUTPATIENT
Start: 2024-08-23

## 2024-08-23 RX ORDER — ARIPIPRAZOLE 5 MG/1
5 TABLET ORAL DAILY
Qty: 90 TABLET | Refills: 0 | Status: SHIPPED | OUTPATIENT
Start: 2024-08-23

## 2024-09-04 ENCOUNTER — TELEPHONE (OUTPATIENT)
Dept: PSYCHIATRY | Facility: CLINIC | Age: 54
End: 2024-09-04

## 2024-09-04 DIAGNOSIS — Z79.899 HIGH RISK MEDICATION USE: ICD-10-CM

## 2024-09-04 RX ORDER — ARIPIPRAZOLE 5 MG/1
5 TABLET ORAL DAILY
Qty: 90 TABLET | Refills: 0 | Status: SHIPPED | OUTPATIENT
Start: 2024-09-04

## 2024-09-04 NOTE — TELEPHONE ENCOUNTER
Patient called requesting refill for abilify. Patient made aware medication was refilled on 8/23/2024 for 90 with 0 refills to [facility] pharmacy. Patient instructed to contact the pharmacy to obtain refills of medication. Patient verbalized understanding.

## 2024-09-20 DIAGNOSIS — F33.2 SEVERE EPISODE OF RECURRENT MAJOR DEPRESSIVE DISORDER, WITHOUT PSYCHOTIC FEATURES (HCC): ICD-10-CM

## 2024-09-20 RX ORDER — METHYLPHENIDATE HYDROCHLORIDE 20 MG/1
20 TABLET ORAL 2 TIMES DAILY
Qty: 60 TABLET | Refills: 0 | Status: SHIPPED | OUTPATIENT
Start: 2024-09-20

## 2024-09-20 NOTE — TELEPHONE ENCOUNTER
Reason for call:   [x] Refill   [] Prior Auth  [] Other:     Office:   [x] PCP/Provider -   [] Specialty/Provider -     Medication: RITALIN    Dose/Frequency: 20 mg    Quantity: 60    Pharmacy:   [id number] - [place] - [address]  [address] [place] [address]  Phone: [phone]  Fax: [phone]  MICHELE #: [id number]     Does the patient have enough for 3 days?   [x] Yes   [] No - Send as HP to POD

## 2024-10-02 ENCOUNTER — OFFICE VISIT (OUTPATIENT)
Dept: PSYCHIATRY | Facility: CLINIC | Age: 54
End: 2024-10-02
Payer: COMMERCIAL

## 2024-10-02 VITALS
SYSTOLIC BLOOD PRESSURE: 113 MMHG | WEIGHT: 133.6 LBS | BODY MASS INDEX: 21.56 KG/M2 | DIASTOLIC BLOOD PRESSURE: 78 MMHG | HEART RATE: 71 BPM

## 2024-10-02 DIAGNOSIS — F11.21 OPIOID DEPENDENCE IN REMISSION (HCC): ICD-10-CM

## 2024-10-02 DIAGNOSIS — F90.0 ATTENTION DEFICIT HYPERACTIVITY DISORDER (ADHD), PREDOMINANTLY INATTENTIVE TYPE: ICD-10-CM

## 2024-10-02 DIAGNOSIS — F41.1 GAD (GENERALIZED ANXIETY DISORDER): ICD-10-CM

## 2024-10-02 DIAGNOSIS — F41.0 PANIC DISORDER WITHOUT AGORAPHOBIA: ICD-10-CM

## 2024-10-02 DIAGNOSIS — F51.05 INSOMNIA DUE TO MENTAL CONDITION: ICD-10-CM

## 2024-10-02 DIAGNOSIS — F12.90 MARIJUANA USE: ICD-10-CM

## 2024-10-02 DIAGNOSIS — Z79.899 HIGH RISK MEDICATION USE: ICD-10-CM

## 2024-10-02 DIAGNOSIS — F33.2 SEVERE EPISODE OF RECURRENT MAJOR DEPRESSIVE DISORDER, WITHOUT PSYCHOTIC FEATURES (HCC): Primary | ICD-10-CM

## 2024-10-02 PROCEDURE — G2211 COMPLEX E/M VISIT ADD ON: HCPCS | Performed by: PSYCHIATRY & NEUROLOGY

## 2024-10-02 PROCEDURE — 90833 PSYTX W PT W E/M 30 MIN: CPT | Performed by: PSYCHIATRY & NEUROLOGY

## 2024-10-02 PROCEDURE — 99214 OFFICE O/P EST MOD 30 MIN: CPT | Performed by: PSYCHIATRY & NEUROLOGY

## 2024-10-02 RX ORDER — DOXEPIN 3 MG/1
3 TABLET, FILM COATED ORAL
Qty: 30 TABLET | Refills: 2 | Status: SHIPPED | OUTPATIENT
Start: 2024-10-02

## 2024-10-02 RX ORDER — ARIPIPRAZOLE 5 MG/1
5 TABLET ORAL DAILY
Qty: 90 TABLET | Refills: 1 | Status: SHIPPED | OUTPATIENT
Start: 2024-10-02

## 2024-10-02 RX ORDER — PAROXETINE 30 MG/1
45 TABLET, FILM COATED ORAL EVERY MORNING
Qty: 135 TABLET | Refills: 1 | Status: SHIPPED | OUTPATIENT
Start: 2024-10-02

## 2024-10-02 RX ORDER — METHYLPHENIDATE HYDROCHLORIDE 20 MG/1
20 TABLET ORAL 2 TIMES DAILY
Qty: 180 TABLET | Refills: 0 | Status: SHIPPED | OUTPATIENT
Start: 2024-10-18

## 2024-10-02 NOTE — ASSESSMENT & PLAN NOTE
Not fully at goal but manageable with current regimen  Orders:    methylphenidate (RITALIN) 20 MG tablet; Take 1 tablet (20 mg total) by mouth 2 (two) times a day Max Daily Amount: 40 mg Do not start before October 18, 2024.    PARoxetine (PAXIL) 30 mg tablet; Take 1.5 tablets (45 mg total) by mouth every morning

## 2024-10-02 NOTE — BH CRISIS PLAN
Evelyn Safety Plan    Creation Date: 10/2/24    Created By: Lei Odom III, DO       Step 1: Warning Signs:   Warning Signs   more negative thinking            Step 2: Internal Coping Strategies:   Internal Coping Strategies   toro            Step 3: People and social settings that provide distraction:   Name Contact Information   Sam, Significant Other cell   Allie, Mother cell   Nelia, girlfriend cell          Step 4: People whom I can ask for help during a crisis:    Name Contact Information    Sam, significant other cell    Allie, mother cell    Nelia, girlfriend cell      Step 5: Professionals or agencies I can contact during a crisis:    Clinican/Agency Name Phone Emergency Contact    Dr. Odom [phone]     Local Emergency Department Emergency Department Phone Emergency   Department Address    [facility]        Crisis Phone Numbers:   Suicide Prevention Lifeline: Call or Text  392 Crisis Text Line: Text HOME   to [phone]   Please note: Some [facility] do not have a separate number for   Child/Adolescent specific crisis. If your county is not listed under   Child/Adolescent, please call the adult number for your county      Adult Crisis Numbers: Child/Adolescent Crisis Numbers   [facility]: [phone] [facility]: [phone]   [facility]: [phone] [facility]: [phone]   [facility]: [phone] [place]: [phone]   [facility]: [phone] [place]: [phone]   [facility]: [phone]   [facility]: [phone]   [facility]: [phone]   [facility]: [phone] (daytime) [phone]   (after hours, weekends, holidays)      Step 6: Making the environment safer (plan for lethal means safety):   Plan: No guns at home     Optional: What is most important to me and worth living for?      Evelyn Safety Plan. Mai Mendez and Rachid Llamas. Used with    permission of the authors.

## 2024-10-02 NOTE — PSYCH
"MEDICATION MANAGEMENT NOTE        [facility]      Name and Date of Birth:  Laurita Montana 54 y.o. 1970    Date of Visit: 2024    SUBJECTIVE:  CC: Laurita presents today for follow up on \"prety good I guess\"; MDD, LISS, PD, ADHD, mental health    Laurita feels medications doing what she needs. \"I am content with it\". She has tried to stop seroquel but just does not work out for her with insomnia.    Now crocheting, and that has been a positive hobby.    Some anxiety getting out of the house to grocery shop, she thinks she has to sit often and just prefers not to put herself in that situation. She gets things from pharmacy with food sections, sometimes on line delivery.    Med Compliance: yes  HPI ROS:                      ('was' below is from prior visit)  Medication Side Effects (other than noted):  no     Depression (10 worst):  5 (Was 5-6)   Anxiety (10 worst):  5 (Was 5)   Hallucinations or Psychosis  no (Was no)    Safety concerns (self harm thoughts, suicidal ideation, HI, etc):  no (Was no)   Sleep: (NM = Nightmares)  8-10 (Was good)   Energy:  Middle of the road but adequate (Was good)   Appetite:  good (Was good)   Weight Change:  no      Since our last visit, overall symptoms have been improving.          PHQ-2/9 Depression Screening    Little interest or pleasure in doing things: 0 - not at all  Feeling down, depressed, or hopeless: 1 - several days  Trouble falling or staying asleep, or sleeping too much: 1 - several days  Feeling tired or having little energy: 1 - several days  Poor appetite or overeatin - several days  Feeling bad about yourself - or that you are a failure or have let yourself or your family down: 0 - not at all  Trouble concentrating on things, such as reading the newspaper or watching television: 0 - not at all  Moving or speaking so slowly that other people could have noticed. Or the opposite - being so " fidgety or restless that you have been moving around a lot more than usual: 0 - not at all  Thoughts that you would be better off dead, or of hurting yourself in some way: 0 - not at all  PHQ-9 Score: 4  PHQ-9 Interpretation: No or Minimal depression           LISS-7 Flowsheet Screening      Flowsheet Row Most Recent Value   Over the last two weeks, how often have you been bothered by the following problems?     Feeling nervous, anxious, or on edge 1    Not being able to stop or control worrying 0    Worrying too much about different things 1    Trouble relaxing  1    Being so restless that it's hard to sit still 1    Becoming easily annoyed or irritable  0    Feeling afraid as if something awful might happen 1    How difficult have these problems made it for you to do your work, take care of things at home, or get along with other people?  Somewhat difficult    LISS Score  5             Laurita denies any side effects from medications unless noted above    Review Of Systems as noted above. Otherwise A relevant review of symptoms was otherwise negative    History Review: The following portions of the patient's history were reviewed and documented: allergies, current medications, past family history, past medical history, past social history, and problem list.     Lab Review: Labs were reviewed      OBJECTIVE:     MENTAL STATUS EXAM  Appearance:  age appropriate   Behavior:  pleasant, cooperative, with good eye contact   Speech:  Normal volume, regular rate and rhythm   Mood:  dysphoric, anxious   Affect:  brighter with some improvement   Language: intact and appropriate for age, education, and intellect   Thought Process:  Linear and goal directed   Associations: intact associations   Thought Content:  normal and appropriate   Perceptual Disturbances: no auditory or visual hallcunations   Risk Potential / Abnormal Thoughts: Suicidal ideation - None  Homicidal ideation - None  Potential for aggression - No      "  Consciousness:  Alert & Awake   Sensorium:  Grossly oriented   Attention: attention span and concentration are age appropriate       Fund of Knowledge:  Memory: awareness of current events: yes  recent and remote memory grossly intact   Insight:  good   Judgment: good   Muscle Strength Muscle Tone: normal  normal   Gait/Station: slow   Motor Activity: no abnormal movements       Risks, Benefits And Possible Side Effects Of Medications:    AGREE: Risks, benefits, and possible side effects of medications explained to Laurita and she (or legal representative) verbalizes understanding and agreement for treatment.    Controlled Medication Discussion:     Patient using medication appropriately  _____________________________________________________________    Past Psychiatric History  Previous diagnoses include depression, anxiety, ADHD  Prior outpatient psychiatric treatment: yes, Dr. York  Prior therapy: no  Prior inpatient psychiatric treatment: no  Prior suicide attempts: no  Prior self harm: no  Prior violence or aggression: no    Past Social History:  The patient grew up in [facility].  Childhood was described as \"good\".     During childhood, parents were together. They have 2 sister(s) and 1 brother(s). Patient is middle in birth order     Abuse/neglect: none     As far as the patient (or present family member) is aware, overall childhood development: Patient notes ADHD symptoms in childhood and grades were not great, but no developmental delays     Education level: LPN/associates   Current occupation: after fall, had to stop  Marital status: never   Children: Ayaan ( )  Current Living Situation: the patient lives alone .  Social support: good family support, a friend     Mormon Affiliation: no   experience: no  Legal history: no  Access to Guns: no     Substance use and treatment:  Tobacco use: yes  Caffeine Use: yes  ETOH use: no  Other substance use: marijuana once a week. Not " prescribed, but thought to get her card.    Endorses previous experimentation with: marijuana      Longest clean time: not applicable  History of Inpatient/Outpatient rehabilitation program: no        Traumatic History:      Abuse: none  Other Traumatic Events: none      Family Psychiatric History:      Psychiatric Illness:      Mom, son - anxiety; Denies bipolar disorder, schizophrenaia  Substance Abuse:       Son (opioids, )  Suicide Attempts:        no family history of suicide attempts     Assessment/Plan:        Assessment & Plan  Severe episode of recurrent major depressive disorder, without psychotic features (HCC)  Not fully at goal but manageable with current regimen  Orders:    methylphenidate (RITALIN) 20 MG tablet; Take 1 tablet (20 mg total) by mouth 2 (two) times a day Max Daily Amount: 40 mg Do not start before 2024.    PARoxetine (PAXIL) 30 mg tablet; Take 1.5 tablets (45 mg total) by mouth every morning    High risk medication use    Orders:    ARIPiprazole (ABILIFY) 5 mg tablet; Take 1 tablet (5 mg total) by mouth daily    Lipid panel; Future    Hemoglobin A1C; Future    Panic disorder without agoraphobia  manageable  Orders:    PARoxetine (PAXIL) 30 mg tablet; Take 1.5 tablets (45 mg total) by mouth every morning    LISS (generalized anxiety disorder)  Not fully at goal but manageable with current regimen  Orders:    PARoxetine (PAXIL) 30 mg tablet; Take 1.5 tablets (45 mg total) by mouth every morning    Insomnia due to mental condition  Much improved with medications  Orders:    Doxepin HCl 3 MG TABS; Take 1 tablet (3 mg total) by mouth daily at bedtime as needed (insomnia)    Attention deficit hyperactivity disorder (ADHD), predominantly inattentive type  Manageable with medication. Appreciate reviewing cardiology's recent note, acknowledged their concerns related to medication and weighed risks and benefits. QTc was reported to be 415. Discussed that methylphenidate is not  "ideal for her cardiomyopathy history but she notes she cannot manage without and does not want to pursue treatment changes though discussed.        Marijuana use  minimal       Opioid dependence in remission (HCC)  Sustained remission              ______________________________________________________________________  MDD  LISS  Panic Disorder  ADHD, Inattentive  Marijuana use    MDD- not at goal  LISS - not at goal  Panic disorder - less so but also avoidant  Marijuana use- was using for pain, but not really using any more recently   - Differential includes Complex bereavement, Adjustment disorder.      Laurita is doing well overall. Does not want to make changes. Open to swapping seroquel to doxepin. Reviewed ECG information from [facility] 2/2024. Discussed, but will not make changes today, will stay course for now. THEY noted :- Checked ECG today given she is on seroquel and paxil, which can both prolong QT, and methylphenidate. -QTc 415. Not ideal for her to be on stimulant with cardiomyopathy history but she apparently cannot manage with out it.  ECG 12-LEAD Status: Normal 2/13/2024 2:31 PM\"    Paxil max preferred would be 40mg if CrCl <30    Abilify \"woke me up\"- works very well for her    F/U PRN skin picking that improved (did not pursue NAC, but likely 1200mg x3wk, 2400mg x 3 wk, then consider 3000mg).     Remeron likely more wt gain but considered, swap paxil discussed (she preferred not presently), hydroxyzine as well (but she notes while she has spot anxiety, it is general /constant).  She has CKD and other medical issues. High dose of paxil, and considering her CKD we have discussed and benefits outweigh risks. She has tolerated medications well.    She is on disability now, predominately for mental health but also medical components.      From a social perspective, she has great family supports and a good hugo friend she has known for years    Safety Risk Assessment: see above . In considering risk and " "protective factors, suicide risk and safety risk are low presently.    Additional Assessment:  Previous psychotropic medication trials:   Xanax  paxil  seroquel  wellbutrin (to quick smoking) years ago, had a rash, no significant issues and resolved  Buspar - \"I felt out of it and sick, could not eat\"  Ritalin    Scales:  10/9/2023: no abnormal movements stated or identified         Treatment Plan:        Patient has been educated about their diagnosis and treatment modalities. They voiced understanding and agreement with the following plan:    Discussed medications and if treatment adjustment was needed/desired.    1) MEDS:           - Paxil 45mg daily   - Ritalin 20mg BID   - STOP Seroquel 25mg HS   - Abilify 5mg daily   - START Doxepin 3mg HS PRN Insomnia. PARQ completed including GI distress, serotonin syndrome, anticholinergic effects, eye changes, weight gain, tiredness, QT prolongation and orthostatic hypotension, others, drug interactions.          2) Labs:Lipid panel, A1c   - 9/2024 Glucose 99; GFR 40   - 2/2024 ([facility])- ECG 12-LEAD Status: Normal 2/13/2024 2:31 PM; Narrative; Sinus Bradycardia; -Left atrial enlargement. 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   - good family support     6) Follow up:   - Follow up in 3-4 mo    - Patient will call if issues or concerns      7) Treatment Plan:    - Enacted 3/23/2023, 8/14/2023, 2/19/2024, 10/2/2024    8) Crisis Plan: 8/14/2023, 10/2/2024     Discussed self " monitoring of symptoms, and symptom monitoring tools.    Patient has been informed of 24 hours and weekend coverage for urgent situations accessed by calling the main clinic phone number.             Psychotherapy in session:  Time spent performing psychotherapy: 18 minutes supportive therapy related to activity level, hobbies, family, self care, avoidance of going out, other matters      Visit Time    Visit Start Time: 105  Visit Stop Time: 234  Total Visit Duration:  29 minutes

## 2024-10-02 NOTE — ASSESSMENT & PLAN NOTE
Not fully at goal but manageable with current regimen  Orders:    PARoxetine (PAXIL) 30 mg tablet; Take 1.5 tablets (45 mg total) by mouth every morning

## 2024-10-02 NOTE — ASSESSMENT & PLAN NOTE
manageable  Orders:    PARoxetine (PAXIL) 30 mg tablet; Take 1.5 tablets (45 mg total) by mouth every morning

## 2024-10-02 NOTE — BH TREATMENT PLAN
"TREATMENT PLAN (Medication Management Only)        [facility] - [facility]    Name/Date of Birth/MRN:  Laurita Montana 54 y.o. 1970 MRN: [id number]  Date of Treatment Plan: October 2, 2024  Diagnosis/Diagnoses:   1. Severe episode of recurrent major depressive disorder, without psychotic features (HCC)    2. High risk medication use    3. Panic disorder without agoraphobia    4. LISS (generalized anxiety disorder)    5. Insomnia due to mental condition      Strengths/Personal Resources for Self-Care: I am kind and determined  Area/Areas of need (in own words): smoking  1. Long Term Goal: \"to be happy again\"   Target Date: 180 days from treatment plan  Person/Persons responsible for completion of goal: Dr. Odom and Self  2.  Short Term Objective (s) - How will we reach this goal?:   A.  Provider new recommended medication/dosage changes and/or continue medication(s): as noted.  B.  Do dariana daily  C.  Get out of the house once a week. Take medications regularly and follow up as recommended  Target Date: 6 months from treatment plan unless noted otherwise  Person/Persons Responsible for Completion of Goal: Dr. Odom and Self   Progress Towards Goals: continuing treatment   Treatment Modality: Medication management and therapy PRN  Review due 180 days from date of this plan: Approximately 6 months from today ( 4/2/2025 )    Expected length of service: ongoing treatment unless revised  My Physician/PA/NP and I have developed this plan together and I agree to work on the goals and objectives. I understand the treatment goals that were developed for my treatment.  Signature:       Date and time:  Signature of parent/guardian if under age of 14 years: Date and time:  Signature of provider:      Date and time:  Signature of Supervising Physician:    Date and time: 10/2/2024      Lei Odom III, DO  "

## 2024-10-04 NOTE — ASSESSMENT & PLAN NOTE
Manageable with medication. Appreciate reviewing cardiology's recent note, acknowledged their concerns related to medication and weighed risks and benefits. QTc was reported to be 415. Discussed that methylphenidate is not ideal for her cardiomyopathy history but she notes she cannot manage without and does not want to pursue treatment changes though discussed.

## 2024-10-04 NOTE — ASSESSMENT & PLAN NOTE
Much improved with medications  Orders:    Doxepin HCl 3 MG TABS; Take 1 tablet (3 mg total) by mouth daily at bedtime as needed (insomnia)

## 2024-12-26 ENCOUNTER — TELEPHONE (OUTPATIENT)
Age: 54
End: 2024-12-26

## 2024-12-26 NOTE — TELEPHONE ENCOUNTER
Called Laurita and left  informing her that Dr. Odom is out of office until 1/5. Request she call back if she wants to discuss this further with nursing.

## 2024-12-27 DIAGNOSIS — F51.05 INSOMNIA DUE TO MENTAL CONDITION: ICD-10-CM

## 2024-12-27 RX ORDER — DOXEPIN 6 MG/1
6 TABLET, FILM COATED ORAL
Qty: 30 TABLET | Refills: 1 | Status: SHIPPED | OUTPATIENT
Start: 2024-12-27

## 2024-12-27 NOTE — TELEPHONE ENCOUNTER
Called Laurita and informed her that Dr. Odom sent an updated script to her pharmacy. Nothing further needed at this time.

## 2025-01-13 ENCOUNTER — OFFICE VISIT (OUTPATIENT)
Dept: PSYCHIATRY | Facility: CLINIC | Age: 55
End: 2025-01-13
Payer: COMMERCIAL

## 2025-01-13 DIAGNOSIS — F51.05 INSOMNIA DUE TO MENTAL CONDITION: ICD-10-CM

## 2025-01-13 DIAGNOSIS — F33.1 MODERATE EPISODE OF RECURRENT MAJOR DEPRESSIVE DISORDER (HCC): ICD-10-CM

## 2025-01-13 DIAGNOSIS — F41.1 GAD (GENERALIZED ANXIETY DISORDER): Primary | ICD-10-CM

## 2025-01-13 DIAGNOSIS — F41.0 PANIC DISORDER WITHOUT AGORAPHOBIA: ICD-10-CM

## 2025-01-13 DIAGNOSIS — Z79.899 HIGH RISK MEDICATION USE: ICD-10-CM

## 2025-01-13 PROBLEM — I42.8 NON-ISCHEMIC CARDIOMYOPATHY (HCC): Status: ACTIVE | Noted: 2024-02-21

## 2025-01-13 PROBLEM — I50.21 ACUTE SYSTOLIC CONGESTIVE HEART FAILURE (HCC): Status: RESOLVED | Noted: 2021-01-06 | Resolved: 2025-01-13

## 2025-01-13 PROBLEM — I42.0 NONISCHEMIC CONGESTIVE CARDIOMYOPATHY (HCC): Status: ACTIVE | Noted: 2024-02-20

## 2025-01-13 PROCEDURE — 99214 OFFICE O/P EST MOD 30 MIN: CPT | Performed by: PSYCHIATRY & NEUROLOGY

## 2025-01-13 RX ORDER — ARIPIPRAZOLE 5 MG/1
5 TABLET ORAL DAILY
Qty: 90 TABLET | Refills: 1 | Status: SHIPPED | OUTPATIENT
Start: 2025-01-13 | End: 2025-01-13 | Stop reason: SDUPTHER

## 2025-01-13 RX ORDER — DOXEPIN HYDROCHLORIDE 10 MG/1
10 CAPSULE ORAL
Qty: 30 CAPSULE | Refills: 2 | Status: SHIPPED | OUTPATIENT
Start: 2025-01-13

## 2025-01-13 RX ORDER — PAROXETINE 30 MG/1
45 TABLET, FILM COATED ORAL EVERY MORNING
Qty: 135 TABLET | Refills: 1 | Status: SHIPPED | OUTPATIENT
Start: 2025-01-13

## 2025-01-13 RX ORDER — METHYLPHENIDATE HYDROCHLORIDE 20 MG/1
20 TABLET ORAL 2 TIMES DAILY
Qty: 180 TABLET | Refills: 0 | Status: SHIPPED | OUTPATIENT
Start: 2025-01-18

## 2025-01-13 RX ORDER — ARIPIPRAZOLE 5 MG/1
5 TABLET ORAL DAILY
Qty: 90 TABLET | Refills: 1 | Status: SHIPPED | OUTPATIENT
Start: 2025-01-13

## 2025-01-13 NOTE — ASSESSMENT & PLAN NOTE
Increase Doxepin to 10mg HS   Orders:    doxepin (SINEquan) 10 mg capsule; Take 1 capsule (10 mg total) by mouth daily at bedtime

## 2025-01-13 NOTE — ASSESSMENT & PLAN NOTE
Not at goal, she feels insomnia has worsened it (not the other way around) so will increase doxepin  Orders:    PARoxetine (PAXIL) 30 mg tablet; Take 1.5 tablets (45 mg total) by mouth every morning    methylphenidate (RITALIN) 20 MG tablet; Take 1 tablet (20 mg total) by mouth 2 (two) times a day Max Daily Amount: 40 mg Do not start before January 18, 2025.    ARIPiprazole (ABILIFY) 5 mg tablet; Take 1 tablet (5 mg total) by mouth daily

## 2025-01-13 NOTE — PSYCH
"MEDICATION MANAGEMENT NOTE        [facility]      Name and Date of Birth:  Laurita Montana 54 y.o. 1970    Date of Visit: 2025    SUBJECTIVE:  CC: Laurita presents today for follow up on \"doing ok, not sleeping too well\"; MDD, LISS, PD, ADHD, mental health    Laurita notes doxepin does not work as well as seroquel for her. Waking often and not deep. She got a deep sleep on seroquel. Has not noticed side effects on seroquel, or for doxepin. Still same time in bed but not as restorative, and delayed onset.    She reports no issues with heart, no current concerns medically aside from chronic conditions. Understands cardiovascular risks with ritalin doxepin and other medications, but feels her mental health is benefited, does not want to remove/alter medications and she has no current side effect concerns.    Crocheting, a good hobby, has been REDUCED with worse sleep quality since last visit.     Med Compliance: yes  HPI ROS:                      ('was' below is from prior visit)  Medication Side Effects (other than noted):  no     Depression (10 worst):  Feels it worse due to poor sleep; 6 (Was 5)   Anxiety (10 worst):   Feels it worse due to poor sleep; 6 (Was 5)   Hallucinations or Psychosis  no (Was no)    Safety concerns (self harm thoughts, suicidal ideation, HI, etc):  no (Was no)   Sleep: (NM = Nightmares)  More broken, more delay (Was 8-10)   Energy:  lower (Was Middle of the road but adequate)   Appetite:  good (Was good)   Weight Change:  no      Since our last visit, overall symptoms have been unchanged.          PHQ-2/9 Depression Screening    Little interest or pleasure in doing things: 1 - several days  Feeling down, depressed, or hopeless: 1 - several days  Trouble falling or staying asleep, or sleeping too much: 3 - nearly every day  Feeling tired or having little energy: 3 - nearly every day  Poor appetite or overeatin - not at " all  Feeling bad about yourself - or that you are a failure or have let yourself or your family down: 1 - several days  Trouble concentrating on things, such as reading the newspaper or watching television: 1 - several days  Moving or speaking so slowly that other people could have noticed. Or the opposite - being so fidgety or restless that you have been moving around a lot more than usual: 0 - not at all  Thoughts that you would be better off dead, or of hurting yourself in some way: 0 - not at all  PHQ-9 Score: 10  PHQ-9 Interpretation: Moderate depression                 Laurita denies any side effects from medications unless noted above    Review Of Systems as noted above. Otherwise A relevant review of symptoms was otherwise negative    History Review: The following portions of the patient's history were reviewed and documented: allergies, current medications, past family history, past medical history, past social history, and problem list.     Lab Review: Labs were reviewed and discussed with patient      OBJECTIVE:     MENTAL STATUS EXAM  Appearance:  underweight, disheveled   Behavior:  pleasant, cooperative, with good eye contact   Speech:  Normal volume, regular rate and rhythm   Mood:  dysphoric, anxious   Affect:  constricted   Language: intact and appropriate for age, education, and intellect   Thought Process:  Linear and goal directed   Associations: intact associations   Thought Content:  normal and appropriate   Perceptual Disturbances: no auditory or visual hallcunations   Risk Potential / Abnormal Thoughts: Suicidal ideation - None  Homicidal ideation - None  Potential for aggression - No       Consciousness:  Alert & Awake   Sensorium:  Grossly oriented   Attention: attention span and concentration are age appropriate       Fund of Knowledge:  Memory: awareness of current events: yes  recent and remote memory grossly intact   Insight:  good   Judgment: good   Muscle Strength Muscle Tone:  "normal  normal   Gait/Station: normal gait/station with good balance   Motor Activity: no abnormal movements       Risks, Benefits And Possible Side Effects Of Medications:    AGREE: Risks, benefits, and possible side effects of medications explained to Laurita and she (or legal representative) verbalizes understanding and agreement for treatment.    Controlled Medication Discussion:     Laurita has been filling controlled prescriptions on time as prescribed according to [place] Prescription Drug Monitoring program.   _____________________________________________________________    Past Psychiatric History  Previous diagnoses include depression, anxiety, ADHD  Prior outpatient psychiatric treatment: yes, Dr. York  Prior therapy: no  Prior inpatient psychiatric treatment: no  Prior suicide attempts: no  Prior self harm: no  Prior violence or aggression: no    Past Social History:  The patient grew up in [facility].  Childhood was described as \"good\".     During childhood, parents were together. They have 2 sister(s) and 1 brother(s). Patient is middle in birth order     Abuse/neglect: none     As far as the patient (or present family member) is aware, overall childhood development: Patient notes ADHD symptoms in childhood and grades were not great, but no developmental delays     Education level: LPN/associates   Current occupation: after fall, had to stop  Marital status: never   Children: Ayaan ( )  Current Living Situation: the patient lives alone .  Social support: good family support, a friend     Mandaen Affiliation: no   experience: no  Legal history: no  Access to Guns: no     Substance use and treatment:  Tobacco use: yes  Caffeine Use: yes  ETOH use: no  Other substance use: marijuana once a week. Not prescribed, but thought to get her card.    Endorses previous experimentation with: marijuana      Longest clean time: not applicable  History of Inpatient/Outpatient " rehabilitation program: no        Traumatic History:      Abuse: none  Other Traumatic Events: none      Family Psychiatric History:      Psychiatric Illness:      Mom, son - anxiety; Denies bipolar disorder, schizophrenaia  Substance Abuse:       Son (opioids, )  Suicide Attempts:        no family history of suicide attempts     Assessment/Plan:        Assessment & Plan  Moderate episode of recurrent major depressive disorder (HCC)  Not at goal, she feels insomnia has worsened it (not the other way around) so will increase doxepin  Orders:    PARoxetine (PAXIL) 30 mg tablet; Take 1.5 tablets (45 mg total) by mouth every morning    methylphenidate (RITALIN) 20 MG tablet; Take 1 tablet (20 mg total) by mouth 2 (two) times a day Max Daily Amount: 40 mg Do not start before 2025.    ARIPiprazole (ABILIFY) 5 mg tablet; Take 1 tablet (5 mg total) by mouth daily    Panic disorder without agoraphobia  manageable  Orders:    PARoxetine (PAXIL) 30 mg tablet; Take 1.5 tablets (45 mg total) by mouth every morning    LISS (generalized anxiety disorder)  Not at goal, she feels insomnia has worsened it (not the other way around) so will increase doxepin  Orders:    PARoxetine (PAXIL) 30 mg tablet; Take 1.5 tablets (45 mg total) by mouth every morning    High risk medication use         Insomnia due to mental condition  Increase Doxepin to 10mg HS   Orders:    doxepin (SINEquan) 10 mg capsule; Take 1 capsule (10 mg total) by mouth daily at bedtime          ______________________________________________________________________  MDD  LISS  Panic Disorder  ADHD, Inattentive  Marijuana use (more for pain)  - Differential includes Complex bereavement, Adjustment disorder.      Laurita is having worse sleep, liked seroquel more but willing to increase doxepin. Reviewed ECG information from [facility] 2024. THEY noted : - Checked ECG at QTc 415.    While there is a risk with medications she is taking and her cardiovascular  "health we have weighed risk/benefit and she prefers current course.   ECG 12-LEAD Status: Normal 2/13/2024 2:31 PM\"    Paxil max preferred would be 40mg if CrCl <30    F/U PRN skin picking that improved (did not pursue NAC, but likely 1200mg x3wk, 2400mg x 3 wk, then consider 3000mg).     Remeron likely more wt gain but considered, swap paxil discussed (she preferred not presently), hydroxyzine as well (but she notes while she has spot anxiety, it is general /constant).  She has CKD and other medical issues. High dose of paxil, and considering her CKD we have discussed and benefits outweigh risks. She has tolerated medications well.    She is on disability now, predominately for mental health but also medical components.      From a social perspective, she has great family supports and a good hugo friend she has known for years    Safety Risk Assessment: see above . In considering risk and protective factors, suicide risk and safety risk are low presently.    Additional Assessment:  Previous psychotropic medication trials:   Xanax  paxil  seroquel  wellbutrin (to quick smoking) years ago, had a rash, no significant issues and resolved  Buspar - \"I felt out of it and sick, could not eat\"  Ritalin  Abilify \"woke me up\"- works very well for her    Scales:  10/9/2023: no abnormal movements stated or identified         Treatment Plan:        Patient has been educated about their diagnosis and treatment modalities. They voiced understanding and agreement with the following plan:    Discussed medications and if treatment adjustment was needed/desired.    1) MEDS:           - Paxil 45mg daily   - Ritalin 20mg BID   - STOP Seroquel 25mg HS   - Abilify 5mg daily   - INCREASE Doxepin 10g HS PRN Insomnia (1/13/2025) Discussed cardiac risks, combination/drug interaction risks, hypotension and falls, anticholinergic effects and other side effects today       2) Labs:Lipid panel, A1c (Did not do, will do)   - 9/2024 Glucose 99; GFR " 40   - 2/2024 ([facility])- ECG 12-LEAD Status: Normal 2/13/2024 2:31 PM; Narrative; Sinus Bradycardia; -Left atrial enlargement. 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   - good family support     6) Follow up:   - Follow up in 3-4 mo    - Patient will call if issues or concerns      7) Treatment Plan:    - Enacted 3/23/2023, 8/14/2023, 2/19/2024, 10/2/2024    8) Crisis Plan: 8/14/2023, 10/2/2024     Discussed self monitoring of symptoms, and symptom monitoring tools.    Patient has been informed of 24 hours and weekend coverage for urgent situations accessed by calling the main clinic phone number.             Psychotherapy in session:  Time spent performing psychotherapy:           Visit Time    Visit Start Time: 204  Visit Stop Time: 217  Total Visit Duration:  13 minutes

## 2025-01-13 NOTE — ASSESSMENT & PLAN NOTE
Not at goal, she feels insomnia has worsened it (not the other way around) so will increase doxepin  Orders:    PARoxetine (PAXIL) 30 mg tablet; Take 1.5 tablets (45 mg total) by mouth every morning

## 2025-01-22 ENCOUNTER — TELEPHONE (OUTPATIENT)
Age: 55
End: 2025-01-22

## 2025-01-22 NOTE — TELEPHONE ENCOUNTER
Spoke to Laurita about doubling her dose of doxepin to 20 MG for the next week or so to see how that works for her. Laurita is in agreement with this plan and will call the office with update.

## 2025-01-22 NOTE — TELEPHONE ENCOUNTER
Kulwinder Cohen's call regarding doxepin.  States that even with dose increase, medication is not working.  Seroquel worked well for her and she is requesting to go back on the Seroquel.    Will refer to Dr Odom for review.

## 2025-01-22 NOTE — TELEPHONE ENCOUNTER
Patient called in requesting a call back from provider regarding medication update.     Patient shared medication Doxepin 10mg is not working for her.     Patient also shared provider mentioned patient has the option to go back to medication Seroquel.     Writer informed patient msg will be relay.

## 2025-01-28 ENCOUNTER — TELEPHONE (OUTPATIENT)
Age: 55
End: 2025-01-28

## 2025-01-28 DIAGNOSIS — F51.05 INSOMNIA DUE TO MENTAL CONDITION: Primary | ICD-10-CM

## 2025-01-28 DIAGNOSIS — F41.1 GAD (GENERALIZED ANXIETY DISORDER): ICD-10-CM

## 2025-01-28 DIAGNOSIS — F33.1 MODERATE EPISODE OF RECURRENT MAJOR DEPRESSIVE DISORDER (HCC): ICD-10-CM

## 2025-01-28 RX ORDER — QUETIAPINE FUMARATE 25 MG/1
25 TABLET, FILM COATED ORAL
Qty: 30 TABLET | Refills: 2 | Status: SHIPPED | OUTPATIENT
Start: 2025-01-28

## 2025-01-28 NOTE — TELEPHONE ENCOUNTER
Spoke with Laurita. Reviewed that Dr. Odom said she can discontinue doxepin and resume seroquel 25 mg HS. Nothing further needed at this time.

## 2025-03-12 ENCOUNTER — TELEPHONE (OUTPATIENT)
Dept: PSYCHIATRY | Facility: CLINIC | Age: 55
End: 2025-03-12

## 2025-03-27 ENCOUNTER — OFFICE VISIT (OUTPATIENT)
Dept: PSYCHIATRY | Facility: CLINIC | Age: 55
End: 2025-03-27
Payer: MEDICARE

## 2025-03-27 VITALS — BODY MASS INDEX: 22.27 KG/M2 | WEIGHT: 138 LBS

## 2025-03-27 DIAGNOSIS — F41.0 PANIC DISORDER WITHOUT AGORAPHOBIA: ICD-10-CM

## 2025-03-27 DIAGNOSIS — F51.05 INSOMNIA DUE TO MENTAL CONDITION: ICD-10-CM

## 2025-03-27 DIAGNOSIS — F41.1 GAD (GENERALIZED ANXIETY DISORDER): ICD-10-CM

## 2025-03-27 DIAGNOSIS — F33.1 MODERATE EPISODE OF RECURRENT MAJOR DEPRESSIVE DISORDER (HCC): ICD-10-CM

## 2025-03-27 PROCEDURE — G2211 COMPLEX E/M VISIT ADD ON: HCPCS | Performed by: PSYCHIATRY & NEUROLOGY

## 2025-03-27 PROCEDURE — 99214 OFFICE O/P EST MOD 30 MIN: CPT | Performed by: PSYCHIATRY & NEUROLOGY

## 2025-03-27 RX ORDER — PAROXETINE 30 MG/1
45 TABLET, FILM COATED ORAL EVERY MORNING
Qty: 135 TABLET | Refills: 1 | Status: SHIPPED | OUTPATIENT
Start: 2025-03-27

## 2025-03-27 RX ORDER — QUETIAPINE FUMARATE 25 MG/1
25 TABLET, FILM COATED ORAL
Qty: 90 TABLET | Refills: 1 | Status: SHIPPED | OUTPATIENT
Start: 2025-03-27

## 2025-03-27 RX ORDER — ARIPIPRAZOLE 5 MG/1
5 TABLET ORAL DAILY
Qty: 90 TABLET | Refills: 1 | Status: SHIPPED | OUTPATIENT
Start: 2025-03-27

## 2025-03-27 RX ORDER — METHYLPHENIDATE HYDROCHLORIDE 20 MG/1
20 TABLET ORAL 2 TIMES DAILY
Qty: 180 TABLET | Refills: 0 | Status: SHIPPED | OUTPATIENT
Start: 2025-04-27

## 2025-03-27 NOTE — PSYCH
"MEDICATION MANAGEMENT NOTE        [facility] - [facility]      Name and Date of Birth:  Laurita Montana 55 y.o. 1970    Date of Visit: 2025    SUBJECTIVE:  CC: Laurita presents today for follow up on \"doing good\"; MDD, LISS, PD, ADHD, mental health    Laurita notes she is satisfied with her medication, that doxepin did not work out and she likes seroquel.    Did not do labs, but she will get it done.    Likes to stay home, does get anxious about leaving the house. Sometimes does not go to the grocery store so may not then eat that day. But her boyfriend is supportive and will get her food. She does however eat, not anything serious she notes.    She does get out to see a friend for lunch monthly. And has been visiting others as well.     Crocheting, a good hobby she continues to enjoy    No pain any more, so that has been great she notes      Med Compliance: yes  HPI ROS:                      ('was' below is from prior visit)  Medication Side Effects (other than noted):  no     Depression (10 worst):  4 (Was Feels it worse due to poor sleep; 6)   Anxiety (10 worst):  4 (Was Feels it worse due to poor sleep; 6)   Hallucinations or Psychosis  no (Was no)    Safety concerns (self harm thoughts, suicidal ideation, HI, etc):  no (Was no)   Sleep: (NM = Nightmares)  good (Was More broken, more delay)   Energy:  better (Was lower)   Appetite:  good (Was good)   Weight Change:  no      Since our last visit, overall symptoms have been improving.          PHQ-2/9 Depression Screening    Little interest or pleasure in doing things: 0 - not at all  Feeling down, depressed, or hopeless: 1 - several days  Trouble falling or staying asleep, or sleeping too much: 0 - not at all  Feeling tired or having little energy: 1 - several days  Poor appetite or overeatin - not at all  Feeling bad about yourself - or that you are a failure or have let yourself or your family down: 0 " - not at all  Trouble concentrating on things, such as reading the newspaper or watching television: 0 - not at all  Moving or speaking so slowly that other people could have noticed. Or the opposite - being so fidgety or restless that you have been moving around a lot more than usual: 0 - not at all  Thoughts that you would be better off dead, or of hurting yourself in some way: 0 - not at all  PHQ-9 Score: 2  PHQ-9 Interpretation: No or Minimal depression           LISS-7 Flowsheet Screening      Flowsheet Row Most Recent Value   Over the last two weeks, how often have you been bothered by the following problems?     Feeling nervous, anxious, or on edge 1    Not being able to stop or control worrying 0    Worrying too much about different things 0    Trouble relaxing  0    Being so restless that it's hard to sit still 1    Becoming easily annoyed or irritable  0    Feeling afraid as if something awful might happen 0    How difficult have these problems made it for you to do your work, take care of things at home, or get along with other people?  Somewhat difficult    LISS Score  2             Laurita denies any side effects from medications unless noted above    Review Of Systems as noted above. Otherwise A relevant review of symptoms was otherwise negative    History Review: The following portions of the patient's history were reviewed and documented: allergies, current medications, past family history, past medical history, past social history, and problem list.     Lab Review: Labs were reviewed and discussed with patient      OBJECTIVE:     MENTAL STATUS EXAM  Appearance:  age appropriate   Behavior:  pleasant, cooperative, with good eye contact   Speech:  Normal volume, regular rate and rhythm   Mood:  euthymic   Affect:  brighter with some improvement   Language: intact and appropriate for age, education, and intellect   Thought Process:  Linear and goal directed   Associations: intact associations   Thought  "Content:  normal and appropriate   Perceptual Disturbances: no auditory or visual hallcunations   Risk Potential / Abnormal Thoughts: Suicidal ideation - None  Homicidal ideation - None  Potential for aggression - No       Consciousness:  Alert & Awake   Sensorium:  Grossly oriented   Attention: attention span and concentration are age appropriate       Fund of Knowledge:  Memory: awareness of current events: yes  recent and remote memory grossly intact   Insight:  good   Judgment: good   Muscle Strength Muscle Tone: normal  normal   Gait/Station: normal gait/station with good balance   Motor Activity: See AIMS in note       Risks, Benefits And Possible Side Effects Of Medications:    AGREE: Risks, benefits, and possible side effects of medications explained to Laurita and she (or legal representative) verbalizes understanding and agreement for treatment.    Controlled Medication Discussion:     Laurita has been filling controlled prescriptions on time as prescribed according to [place] Prescription Drug Monitoring program.   _____________________________________________________________    Past Psychiatric History  Previous diagnoses include depression, anxiety, ADHD  Prior outpatient psychiatric treatment: yes, Dr. York  Prior therapy: no  Prior inpatient psychiatric treatment: no  Prior suicide attempts: no  Prior self harm: no  Prior violence or aggression: no    Past Social History:  The patient grew up in [place].  Childhood was described as \"good\".     During childhood, parents were together. They have 2 sister(s) and 1 brother(s). 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  Social support: good family support, a friend     Lutheran Affiliation: no   experience: no  Legal history: no  Access to Guns: no     Substance use and treatment:  Tobacco use: yes  Caffeine Use: yes  ETOH use: no  Other substance use: marijuana once a week. Not prescribed, but thought to get her card.    Endorses previous experimentation with: marijuana      Longest clean time: not applicable  History of Inpatient/Outpatient rehabilitation program: no        Traumatic History:      Abuse: none  Other Traumatic Events: none      Family Psychiatric History:      Psychiatric Illness:      Mom, son - anxiety; Denies bipolar disorder, schizophrenaia  Substance Abuse:       Son (opioids, )  Suicide Attempts:        no family history of suicide attempts     Assessment/Plan:        Assessment & Plan  Moderate episode of recurrent major depressive disorder (HCC)  Not at goal but adequately managed. Not interested in therapy and likes where treatment now stands  Orders:    ARIPiprazole (ABILIFY) 5 mg tablet; Take 1 tablet (5 mg total) by mouth daily    methylphenidate (RITALIN) 20 MG tablet; Take 1 tablet (20 mg total) by mouth 2 (two) times a day Max Daily Amount: 40 mg Do not start before 2025.    PARoxetine (PAXIL) 30 mg tablet; Take 1.5 tablets (45 mg total) by mouth every morning    QUEtiapine (SEROquel) 25 mg tablet; Take 1 tablet (25 mg total) by mouth daily at bedtime    Panic disorder without agoraphobia  Adequately managed  Orders:    PARoxetine (PAXIL) 30 mg tablet; Take 1.5 tablets (45 mg total) by mouth every morning    LISS (generalized anxiety disorder)  Adequately managed  Orders:    PARoxetine (PAXIL) 30 mg tablet; Take 1.5 tablets (45 mg total) by mouth every morning    QUEtiapine (SEROquel) 25 mg tablet; Take 1 tablet (25 mg total) by mouth daily at bedtime    Insomnia due to mental condition  Adequately  "managed  Orders:    QUEtiapine (SEROquel) 25 mg tablet; Take 1 tablet (25 mg total) by mouth daily at bedtime          ______________________________________________________________________  MDD  LISS  Panic Disorder  ADHD, Inattentive  Marijuana use (more for pain)  - Differential includes Complex bereavement, Adjustment disorder.      Laurita is doing better is having worse sleep, liked seroquel more but willing to increase doxepin. [facility] noted : - Checked ECG at QTc 415.    While there is a risk with medications she is taking and her cardiovascular health we have weighed risk/benefit and she prefers current course.   ECG 12-LEAD Status: Normal 2/13/2024 2:31 PM\"    Paxil max preferred would be 40mg if CrCl <30    F/U PRN skin picking that improved (did not pursue NAC, but likely 1200mg x3wk, 2400mg x 3 wk, then consider 3000mg).     Remeron likely more wt gain but considered, swap paxil discussed (she preferred not presently), hydroxyzine as well (but she notes while she has spot anxiety, it is general /constant).  She has CKD and other medical issues. High dose of paxil, and considering her CKD we have discussed and benefits outweigh risks. She has tolerated medications well.    She is on disability now, predominately for mental health but also medical components.      From a social perspective, she has great family supports and a good hugo friend she has known for years    Safety Risk Assessment: see above . In considering risk and protective factors, suicide risk and safety risk are low presently.    Additional Assessment:  Previous psychotropic medication trials:   Xanax  paxil  seroquel  wellbutrin (to quick smoking) years ago, had a rash, no significant issues and resolved  Buspar - \"I felt out of it and sick, could not eat\"  Ritalin  Abilify \"woke me up\"- works very well for her  Doxepin- ineffective/not tolerated    Scales:  10/9/2023: no abnormal movements stated " or identified  3/27/2025 - AIMS - L ankle /foot movement noted today but she reports that has been for years, and predated medications. I still advised it was something we should keep an eye on. 1 point, and no distress. Rest of AIMS negative       Treatment Plan:        Patient has been educated about their diagnosis and treatment modalities. They voiced understanding and agreement with the following plan:    Discussed medications and if treatment adjustment was needed/desired.    1) MEDS:           - Paxil 45mg daily   - Ritalin 20mg BID   - Seroquel 25mg HS (Attempt to stop/swap to doxepin unsuccessful)   - Abilify 5mg daily     2) Labs:Lipid panel, A1c (Did not do, will do)   - 9/2024 Glucose 99; GFR 40   - 2/2024 ([facility])- ECG 12-LEAD Status: Normal 2/13/2024 2:31 PM; Narrative; Sinus Bradycardia; -Left atrial enlargement. 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   - good family support     6) Follow up:   - Follow up in 6mo    - Patient will call if issues or concerns      7) Treatment Plan:    - Enacted 3/23/2023, 8/14/2023, 2/19/2024, 10/2/2024, 3/27/2025    8) Crisis Plan: 8/14/2023, 10/2/2024     Discussed self monitoring of symptoms, and symptom monitoring tools.    Patient has been informed of 24 hours and weekend coverage for urgent situations accessed by calling the main clinic phone number.             Psychotherapy in session:  Time spent performing psychotherapy:       Visit  Time    Visit Start Time: 938  Visit Stop Time: 957  Total Visit Duration:  19 minutes

## 2025-03-27 NOTE — BH TREATMENT PLAN
"TREATMENT PLAN (Medication Management Only)        [facility] - [facility]IC [facility]    Name/Date of Birth/MRN:  Laurita Montana 55 y.o. 1970 MRN: [id number]  Date of Treatment Plan: March 27, 2025  Diagnosis/Diagnoses:   1. Moderate episode of recurrent major depressive disorder (HCC)    2. Panic disorder without agoraphobia    3. LISS (generalized anxiety disorder)    4. Insomnia due to mental condition      Strengths/Personal Resources for Self-Care: I am kind and determined  Area/Areas of need (in own words): smoking  1. Long Term Goal: \"staying content\"   Target Date: 180 days from treatment plan  Person/Persons responsible for completion of goal: Dr. Odom and Self  2.  Short Term Objective (s) - How will we reach this goal?:   A.  Provider new recommended medication/dosage changes and/or continue medication(s): as noted.  B.  Do dariana daily  C.  Get out of the house once a week.  Target Date: 6 months from treatment plan unless noted otherwise  Person/Persons Responsible for Completion of Goal: Dr. Odom and Self   Progress Towards Goals: continuing treatment   Treatment Modality: Medication management and therapy PRN  Review due 180 days from date of this plan: Approximately 6 months from today ( 9/27/2025 )    Expected length of service: ongoing treatment unless revised  My Physician/PA/NP and I have developed this plan together and I agree to work on the goals and objectives. I understand the treatment goals that were developed for my treatment.  Signature:       Date and time:  Signature of parent/guardian if under age of 14 years: Date and time:  Signature of provider:      Date and time:  Signature of Supervising Physician:    Date and time: 3/27/2025      Lei Odom III, DO  "

## 2025-03-27 NOTE — ASSESSMENT & PLAN NOTE
Adequately managed  Orders:    QUEtiapine (SEROquel) 25 mg tablet; Take 1 tablet (25 mg total) by mouth daily at bedtime

## 2025-03-27 NOTE — ASSESSMENT & PLAN NOTE
Adequately managed  Orders:    PARoxetine (PAXIL) 30 mg tablet; Take 1.5 tablets (45 mg total) by mouth every morning    QUEtiapine (SEROquel) 25 mg tablet; Take 1 tablet (25 mg total) by mouth daily at bedtime

## 2025-03-27 NOTE — ASSESSMENT & PLAN NOTE
Adequately managed  Orders:    PARoxetine (PAXIL) 30 mg tablet; Take 1.5 tablets (45 mg total) by mouth every morning

## 2025-06-04 DIAGNOSIS — F33.1 MODERATE EPISODE OF RECURRENT MAJOR DEPRESSIVE DISORDER (HCC): ICD-10-CM

## 2025-06-04 NOTE — TELEPHONE ENCOUNTER
Reason for call:   [x] Refill   [] Prior Auth  [] Other:     Office:   [] PCP/Provider -   [x] Specialty/Provider - psych/Lei    Medication: Methylphenidate 20mg    Dose/Frequency: 1 tab bid    Quantity: 180    Pharmacy: [facility] - [address] [phone]     Mail Away Pharmacy   Does the patient have enough for 10 days?   [x] Yes patient has half the script left but wanted to change pharmacy  [] No - Send as HP to POD

## 2025-06-04 NOTE — TELEPHONE ENCOUNTER
Refill must be reviewed and completed by the office or provider. The refill is unable to be approved or denied by the medication management team.  Medication cannot be delegated.    [id number] 04/29/2025 04/27/2025 03/27/2025 Methylphenidate Hcl (Tablet) 180.0 90 20 MG NA JERRICA BECERRIL [email or web address], [employer]. Medicare 0 / 0 [place]   1 [id number] 01/29/2025 01/29/2025 01/13/2025 Methylphenidate Hcl (Tablet) 180.0 90 20 MG NA JERRICA BECERRIL [employer], [employer]. Medicare 0 / 0 [place]   1 [id number] 10/25/2024 10/21/2024 10/02/2024 Methylphenidate Hcl (Tablet) 180.0 90 20 MG NA JERRICA [employer]. Medicare 0 / 0 [place]   1 [id number] 09/24/2024 09/20/2024 09/20/2024 Methylphenidate Hcl (Tablet) 60.0 30 20 MG NA JERRICA BECERRIL [employer], [facility]. Medicare 0 / 0 [place]   1 [id number] 08/24/2024 08/23/2024 08/23/2024 Methylphenidate Hcl (Tablet) 60.0 30 20 MG TAM BECERRIL

## 2025-06-16 RX ORDER — METHYLPHENIDATE HYDROCHLORIDE 20 MG/1
20 TABLET ORAL
Qty: 180 TABLET | Refills: 0 | Status: SHIPPED | OUTPATIENT
Start: 2025-06-16

## (undated) DEVICE — INSTRUMENT POUCH: Brand: CONVERTORS

## (undated) DEVICE — CHLORAPREP HI-LITE 26ML ORANGE

## (undated) DEVICE — 3000CC GUARDIAN II: Brand: GUARDIAN

## (undated) DEVICE — COBAN 6 IN STERILE

## (undated) DEVICE — BLADE BEAVER DEBAKEY 10MM

## (undated) DEVICE — 3M™ STERI-DRAPE™ U-DRAPE 1015: Brand: STERI-DRAPE™

## (undated) DEVICE — SUT MONOCRYL 3-0 PS-2 27 IN Y427H

## (undated) DEVICE — PLUMEPEN PRO 10FT

## (undated) DEVICE — BOVIE LONG TIP

## (undated) DEVICE — TIBURON HIP DRAPE WITH POUCHES: Brand: CONVERTORS

## (undated) DEVICE — GEL ULTRASOUND 8OZ HIGH VISCOUS

## (undated) DEVICE — DRESSING MEPILEX BORDER 4 X 10 IN

## (undated) DEVICE — MAYO STAND COVER: Brand: CONVERTORS

## (undated) DEVICE — GLOVE INDICATOR PI UNDERGLOVE SZ 7 BLUE

## (undated) DEVICE — SYRINGE 10ML LL

## (undated) DEVICE — INTENDED FOR TISSUE SEPARATION, AND OTHER PROCEDURES THAT REQUIRE A SHARP SURGICAL BLADE TO PUNCTURE OR CUT.: Brand: BARD-PARKER SAFETY BLADES SIZE 10, STERILE

## (undated) DEVICE — DRESSING MEPILEX AG BORDER 4 X 8 IN

## (undated) DEVICE — SUT VICRYL 1 CTX 36 IN J977H

## (undated) DEVICE — TWIST DRILL 2.0MM DIA 127.0MM LONG

## (undated) DEVICE — FRAZIER SUCTION INSTRUMENT 18 FR W/OBTURATOR, NO CONTROL VENT: Brand: FRAZIER

## (undated) DEVICE — NEEDLE HYPO 22G X 1-1/2 IN

## (undated) DEVICE — BULB SYRINGE,IRRIGATION WITH PROTECTIVE CAP: Brand: DOVER

## (undated) DEVICE — HOOD: Brand: FLYTE

## (undated) DEVICE — 3M™ STERI-STRIP™ REINFORCED ADHESIVE SKIN CLOSURES, R1547, 1/2 IN X 4 IN (12 MM X 100 MM), 6 STRIPS/ENVELOPE: Brand: 3M™ STERI-STRIP™

## (undated) DEVICE — SUT VICRYL 2-0 CT-1 36 IN J945H

## (undated) DEVICE — CAPIT HIP COP -CERAMIC ON POLY

## (undated) DEVICE — SUT ETHIBOND 5 V-37 30 IN MB66G

## (undated) DEVICE — U-DRAPE: Brand: CONVERTORS

## (undated) DEVICE — STRL ALLENTOWN HIP SHOULDER PK: Brand: CARDINAL HEALTH

## (undated) DEVICE — HEAVY DUTY TABLE COVER: Brand: CONVERTORS

## (undated) DEVICE — GLOVE SRG BIOGEL 6.5

## (undated) DEVICE — ABDUCTION PILLOW FOAM POSITIONER: Brand: CARDINAL HEALTH

## (undated) DEVICE — KERLIX BANDAGE ROLL: Brand: KERLIX

## (undated) DEVICE — SURGICAL GOWN, XL SMARTSLEEVE: Brand: CONVERTORS

## (undated) DEVICE — SPONGE STICK WITH PVP-I: Brand: KENDALL

## (undated) DEVICE — SCD SEQUENTIAL COMPRESSION COMFORT SLEEVE MEDIUM KNEE LENGTH: Brand: KENDALL SCD

## (undated) DEVICE — Device

## (undated) DEVICE — THE SIMPULSE SOLO SYSTEM WITH ULTREX RETRACTABLE SPLASH SHIELD TIP: Brand: SIMPULSE SOLO

## (undated) DEVICE — SAW BLADE OSCILLATING BRAZOL 167

## (undated) DEVICE — VEST SURGEON DISPOSABLE